# Patient Record
Sex: FEMALE | Race: BLACK OR AFRICAN AMERICAN | NOT HISPANIC OR LATINO | URBAN - METROPOLITAN AREA
[De-identification: names, ages, dates, MRNs, and addresses within clinical notes are randomized per-mention and may not be internally consistent; named-entity substitution may affect disease eponyms.]

---

## 2017-01-04 ENCOUNTER — OUTPATIENT (OUTPATIENT)
Dept: OUTPATIENT SERVICES | Facility: HOSPITAL | Age: 62
LOS: 1 days | End: 2017-01-04

## 2017-01-04 ENCOUNTER — LABORATORY RESULT (OUTPATIENT)
Age: 62
End: 2017-01-04

## 2017-01-04 ENCOUNTER — APPOINTMENT (OUTPATIENT)
Dept: INTERNAL MEDICINE | Facility: HOSPITAL | Age: 62
End: 2017-01-04

## 2017-01-04 LAB
APTT BLD: 42.4 SEC — HIGH (ref 27.5–37.4)
INR BLD: 2.44 — HIGH (ref 0.87–1.18)
PROTHROM AB SERPL-ACNC: 28.1 SEC — HIGH (ref 10–13.1)

## 2017-01-05 DIAGNOSIS — Z79.01 LONG TERM (CURRENT) USE OF ANTICOAGULANTS: ICD-10-CM

## 2017-01-11 ENCOUNTER — APPOINTMENT (OUTPATIENT)
Dept: INTERNAL MEDICINE | Facility: HOSPITAL | Age: 62
End: 2017-01-11

## 2017-01-11 ENCOUNTER — OUTPATIENT (OUTPATIENT)
Dept: OUTPATIENT SERVICES | Facility: HOSPITAL | Age: 62
LOS: 1 days | End: 2017-01-11

## 2017-01-12 DIAGNOSIS — Z79.01 LONG TERM (CURRENT) USE OF ANTICOAGULANTS: ICD-10-CM

## 2017-01-12 LAB — INR PPP: 2.9 RATIO

## 2017-01-25 ENCOUNTER — APPOINTMENT (OUTPATIENT)
Dept: INTERNAL MEDICINE | Facility: HOSPITAL | Age: 62
End: 2017-01-25

## 2017-01-31 ENCOUNTER — OUTPATIENT (OUTPATIENT)
Dept: OUTPATIENT SERVICES | Facility: HOSPITAL | Age: 62
LOS: 1 days | End: 2017-01-31

## 2017-01-31 ENCOUNTER — APPOINTMENT (OUTPATIENT)
Dept: INTERNAL MEDICINE | Facility: HOSPITAL | Age: 62
End: 2017-01-31

## 2017-02-01 DIAGNOSIS — Z79.01 LONG TERM (CURRENT) USE OF ANTICOAGULANTS: ICD-10-CM

## 2017-02-01 LAB — INR PPP: 2.7 RATIO

## 2017-02-14 ENCOUNTER — APPOINTMENT (OUTPATIENT)
Dept: INTERNAL MEDICINE | Facility: HOSPITAL | Age: 62
End: 2017-02-14

## 2017-02-23 ENCOUNTER — RESULT CHARGE (OUTPATIENT)
Age: 62
End: 2017-02-23

## 2017-02-23 ENCOUNTER — APPOINTMENT (OUTPATIENT)
Dept: INTERNAL MEDICINE | Facility: HOSPITAL | Age: 62
End: 2017-02-23

## 2017-02-23 ENCOUNTER — OUTPATIENT (OUTPATIENT)
Dept: OUTPATIENT SERVICES | Facility: HOSPITAL | Age: 62
LOS: 1 days | End: 2017-02-23

## 2017-02-24 DIAGNOSIS — I48.91 UNSPECIFIED ATRIAL FIBRILLATION: ICD-10-CM

## 2017-02-24 DIAGNOSIS — Z95.2 PRESENCE OF PROSTHETIC HEART VALVE: ICD-10-CM

## 2017-02-24 DIAGNOSIS — Z79.01 LONG TERM (CURRENT) USE OF ANTICOAGULANTS: ICD-10-CM

## 2017-02-24 LAB — INR PPP: 2.3 RATIO

## 2017-02-28 ENCOUNTER — APPOINTMENT (OUTPATIENT)
Dept: INTERNAL MEDICINE | Facility: HOSPITAL | Age: 62
End: 2017-02-28

## 2017-02-28 ENCOUNTER — OUTPATIENT (OUTPATIENT)
Dept: OUTPATIENT SERVICES | Facility: HOSPITAL | Age: 62
LOS: 1 days | End: 2017-02-28

## 2017-03-01 DIAGNOSIS — Z79.01 LONG TERM (CURRENT) USE OF ANTICOAGULANTS: ICD-10-CM

## 2017-03-02 ENCOUNTER — APPOINTMENT (OUTPATIENT)
Dept: INTERNAL MEDICINE | Facility: HOSPITAL | Age: 62
End: 2017-03-02

## 2017-03-07 ENCOUNTER — APPOINTMENT (OUTPATIENT)
Age: 62
End: 2017-03-07

## 2017-03-07 ENCOUNTER — OUTPATIENT (OUTPATIENT)
Dept: OUTPATIENT SERVICES | Facility: HOSPITAL | Age: 62
LOS: 1 days | End: 2017-03-07

## 2017-03-07 ENCOUNTER — RESULT CHARGE (OUTPATIENT)
Age: 62
End: 2017-03-07

## 2017-03-07 LAB — INR PPP: 2.2 RATIO

## 2017-03-08 DIAGNOSIS — Z79.01 LONG TERM (CURRENT) USE OF ANTICOAGULANTS: ICD-10-CM

## 2017-03-21 ENCOUNTER — APPOINTMENT (OUTPATIENT)
Dept: INTERNAL MEDICINE | Facility: HOSPITAL | Age: 62
End: 2017-03-21

## 2017-03-21 ENCOUNTER — OTHER (OUTPATIENT)
Age: 62
End: 2017-03-21

## 2017-03-21 ENCOUNTER — OUTPATIENT (OUTPATIENT)
Dept: OUTPATIENT SERVICES | Facility: HOSPITAL | Age: 62
LOS: 1 days | End: 2017-03-21

## 2017-03-21 DIAGNOSIS — Z79.01 LONG TERM (CURRENT) USE OF ANTICOAGULANTS: ICD-10-CM

## 2017-03-22 DIAGNOSIS — Z95.2 PRESENCE OF PROSTHETIC HEART VALVE: ICD-10-CM

## 2017-03-22 DIAGNOSIS — I48.91 UNSPECIFIED ATRIAL FIBRILLATION: ICD-10-CM

## 2017-03-22 DIAGNOSIS — Z95.4 PRESENCE OF OTHER HEART-VALVE REPLACEMENT: ICD-10-CM

## 2017-03-22 LAB — INR PPP: 2.3 RATIO

## 2017-03-28 ENCOUNTER — OUTPATIENT (OUTPATIENT)
Dept: OUTPATIENT SERVICES | Facility: HOSPITAL | Age: 62
LOS: 1 days | End: 2017-03-28

## 2017-03-28 ENCOUNTER — RESULT CHARGE (OUTPATIENT)
Age: 62
End: 2017-03-28

## 2017-03-28 ENCOUNTER — APPOINTMENT (OUTPATIENT)
Dept: INTERNAL MEDICINE | Facility: HOSPITAL | Age: 62
End: 2017-03-28

## 2017-03-29 ENCOUNTER — APPOINTMENT (OUTPATIENT)
Dept: INTERNAL MEDICINE | Facility: HOSPITAL | Age: 62
End: 2017-03-29

## 2017-03-30 DIAGNOSIS — Z79.01 LONG TERM (CURRENT) USE OF ANTICOAGULANTS: ICD-10-CM

## 2017-04-04 ENCOUNTER — APPOINTMENT (OUTPATIENT)
Dept: INTERNAL MEDICINE | Facility: HOSPITAL | Age: 62
End: 2017-04-04

## 2017-04-11 ENCOUNTER — APPOINTMENT (OUTPATIENT)
Dept: INTERNAL MEDICINE | Facility: HOSPITAL | Age: 62
End: 2017-04-11

## 2017-04-11 ENCOUNTER — OUTPATIENT (OUTPATIENT)
Dept: OUTPATIENT SERVICES | Facility: HOSPITAL | Age: 62
LOS: 1 days | End: 2017-04-11

## 2017-04-11 DIAGNOSIS — I48.91 UNSPECIFIED ATRIAL FIBRILLATION: ICD-10-CM

## 2017-04-12 LAB
INR PPP: 2 RATIO
QUALITY CONTROL: YES

## 2017-04-18 ENCOUNTER — APPOINTMENT (OUTPATIENT)
Dept: INTERNAL MEDICINE | Facility: HOSPITAL | Age: 62
End: 2017-04-18

## 2017-04-18 ENCOUNTER — OUTPATIENT (OUTPATIENT)
Dept: OUTPATIENT SERVICES | Facility: HOSPITAL | Age: 62
LOS: 1 days | End: 2017-04-18

## 2017-04-19 DIAGNOSIS — Z79.01 LONG TERM (CURRENT) USE OF ANTICOAGULANTS: ICD-10-CM

## 2017-04-21 DIAGNOSIS — Z00.00 ENCOUNTER FOR GENERAL ADULT MEDICAL EXAMINATION WITHOUT ABNORMAL FINDINGS: ICD-10-CM

## 2017-04-21 LAB
INR PPP: 2.4 RATIO
QUALITY CONTROL: YES

## 2017-04-25 ENCOUNTER — APPOINTMENT (OUTPATIENT)
Dept: INTERNAL MEDICINE | Facility: HOSPITAL | Age: 62
End: 2017-04-25

## 2017-04-27 ENCOUNTER — LABORATORY RESULT (OUTPATIENT)
Age: 62
End: 2017-04-27

## 2017-04-27 ENCOUNTER — OUTPATIENT (OUTPATIENT)
Dept: OUTPATIENT SERVICES | Facility: HOSPITAL | Age: 62
LOS: 1 days | End: 2017-04-27

## 2017-04-27 ENCOUNTER — APPOINTMENT (OUTPATIENT)
Dept: INTERNAL MEDICINE | Facility: HOSPITAL | Age: 62
End: 2017-04-27

## 2017-04-27 VITALS — DIASTOLIC BLOOD PRESSURE: 80 MMHG | SYSTOLIC BLOOD PRESSURE: 126 MMHG | HEART RATE: 70 BPM

## 2017-04-27 VITALS — HEIGHT: 63 IN | WEIGHT: 177 LBS | BODY MASS INDEX: 31.36 KG/M2

## 2017-04-27 LAB
BUN SERPL-MCNC: 14 MG/DL — SIGNIFICANT CHANGE UP (ref 7–23)
CALCIUM SERPL-MCNC: 9.4 MG/DL — SIGNIFICANT CHANGE UP (ref 8.4–10.5)
CHLORIDE SERPL-SCNC: 104 MMOL/L — SIGNIFICANT CHANGE UP (ref 98–107)
CHOLEST SERPL-MCNC: 136 MG/DL — SIGNIFICANT CHANGE UP (ref 120–199)
CO2 SERPL-SCNC: 26 MMOL/L — SIGNIFICANT CHANGE UP (ref 22–31)
CREAT SERPL-MCNC: 1.06 MG/DL — SIGNIFICANT CHANGE UP (ref 0.5–1.3)
GLUCOSE SERPL-MCNC: 108 MG/DL — HIGH (ref 70–99)
HBA1C BLD-MCNC: 6.4 % — HIGH (ref 4–5.6)
HDLC SERPL-MCNC: 54 MG/DL — SIGNIFICANT CHANGE UP (ref 45–65)
LIPID PNL WITH DIRECT LDL SERPL: 72 MG/DL — SIGNIFICANT CHANGE UP
POTASSIUM SERPL-MCNC: 4.8 MMOL/L — SIGNIFICANT CHANGE UP (ref 3.5–5.3)
POTASSIUM SERPL-SCNC: 4.8 MMOL/L — SIGNIFICANT CHANGE UP (ref 3.5–5.3)
SODIUM SERPL-SCNC: 142 MMOL/L — SIGNIFICANT CHANGE UP (ref 135–145)
TRIGL SERPL-MCNC: 75 MG/DL — SIGNIFICANT CHANGE UP (ref 10–149)

## 2017-04-28 DIAGNOSIS — E78.5 HYPERLIPIDEMIA, UNSPECIFIED: ICD-10-CM

## 2017-04-28 DIAGNOSIS — Z00.00 ENCOUNTER FOR GENERAL ADULT MEDICAL EXAMINATION WITHOUT ABNORMAL FINDINGS: ICD-10-CM

## 2017-04-28 DIAGNOSIS — I10 ESSENTIAL (PRIMARY) HYPERTENSION: ICD-10-CM

## 2017-04-28 DIAGNOSIS — R73.09 OTHER ABNORMAL GLUCOSE: ICD-10-CM

## 2017-04-28 DIAGNOSIS — I48.91 UNSPECIFIED ATRIAL FIBRILLATION: ICD-10-CM

## 2017-04-28 LAB — INR PPP: 3.1 RATIO

## 2017-05-11 ENCOUNTER — APPOINTMENT (OUTPATIENT)
Dept: INTERNAL MEDICINE | Facility: HOSPITAL | Age: 62
End: 2017-05-11

## 2017-05-16 ENCOUNTER — OUTPATIENT (OUTPATIENT)
Dept: OUTPATIENT SERVICES | Facility: HOSPITAL | Age: 62
LOS: 1 days | End: 2017-05-16

## 2017-05-16 ENCOUNTER — LABORATORY RESULT (OUTPATIENT)
Age: 62
End: 2017-05-16

## 2017-05-16 ENCOUNTER — APPOINTMENT (OUTPATIENT)
Dept: INTERNAL MEDICINE | Facility: HOSPITAL | Age: 62
End: 2017-05-16

## 2017-05-16 DIAGNOSIS — Z79.01 LONG TERM (CURRENT) USE OF ANTICOAGULANTS: ICD-10-CM

## 2017-05-16 LAB — FECAL IMMUNOCHEMICAL TEST: NEGATIVE — SIGNIFICANT CHANGE UP

## 2017-06-02 LAB — INR PPP: 2.9 RATIO

## 2017-06-06 ENCOUNTER — OUTPATIENT (OUTPATIENT)
Dept: OUTPATIENT SERVICES | Facility: HOSPITAL | Age: 62
LOS: 1 days | End: 2017-06-06

## 2017-06-06 ENCOUNTER — APPOINTMENT (OUTPATIENT)
Dept: INTERNAL MEDICINE | Facility: HOSPITAL | Age: 62
End: 2017-06-06

## 2017-06-07 DIAGNOSIS — Z95.2 PRESENCE OF PROSTHETIC HEART VALVE: ICD-10-CM

## 2017-06-07 DIAGNOSIS — Z79.01 LONG TERM (CURRENT) USE OF ANTICOAGULANTS: ICD-10-CM

## 2017-06-12 LAB — INR PPP: 3.2 RATIO

## 2017-06-26 ENCOUNTER — MOBILE ON CALL (OUTPATIENT)
Age: 62
End: 2017-06-26

## 2017-06-27 ENCOUNTER — APPOINTMENT (OUTPATIENT)
Dept: INTERNAL MEDICINE | Facility: HOSPITAL | Age: 62
End: 2017-06-27

## 2017-06-27 ENCOUNTER — RESULT CHARGE (OUTPATIENT)
Age: 62
End: 2017-06-27

## 2017-06-27 ENCOUNTER — OUTPATIENT (OUTPATIENT)
Dept: OUTPATIENT SERVICES | Facility: HOSPITAL | Age: 62
LOS: 1 days | End: 2017-06-27

## 2017-06-27 ENCOUNTER — RX RENEWAL (OUTPATIENT)
Age: 62
End: 2017-06-27

## 2017-06-27 VITALS — BODY MASS INDEX: 32.25 KG/M2 | HEIGHT: 63 IN | WEIGHT: 182 LBS

## 2017-06-27 DIAGNOSIS — R60.9 EDEMA, UNSPECIFIED: ICD-10-CM

## 2017-06-28 DIAGNOSIS — Z79.01 LONG TERM (CURRENT) USE OF ANTICOAGULANTS: ICD-10-CM

## 2017-06-30 DIAGNOSIS — Z95.2 PRESENCE OF PROSTHETIC HEART VALVE: ICD-10-CM

## 2017-06-30 DIAGNOSIS — R60.9 EDEMA, UNSPECIFIED: ICD-10-CM

## 2017-07-05 LAB — INR PPP: 2.4 RATIO

## 2017-07-17 ENCOUNTER — APPOINTMENT (OUTPATIENT)
Dept: INTERNAL MEDICINE | Facility: HOSPITAL | Age: 62
End: 2017-07-17

## 2017-07-17 ENCOUNTER — OUTPATIENT (OUTPATIENT)
Dept: OUTPATIENT SERVICES | Facility: HOSPITAL | Age: 62
LOS: 1 days | End: 2017-07-17

## 2017-07-17 ENCOUNTER — MEDICATION RENEWAL (OUTPATIENT)
Age: 62
End: 2017-07-17

## 2017-07-17 DIAGNOSIS — Z46.0: ICD-10-CM

## 2017-07-17 LAB — INR PPP: 3.2 RATIO

## 2017-07-18 ENCOUNTER — RX RENEWAL (OUTPATIENT)
Age: 62
End: 2017-07-18

## 2017-07-18 DIAGNOSIS — Z79.01 LONG TERM (CURRENT) USE OF ANTICOAGULANTS: ICD-10-CM

## 2017-07-24 ENCOUNTER — APPOINTMENT (OUTPATIENT)
Dept: INTERNAL MEDICINE | Facility: HOSPITAL | Age: 62
End: 2017-07-24

## 2017-07-25 ENCOUNTER — APPOINTMENT (OUTPATIENT)
Dept: INTERNAL MEDICINE | Facility: HOSPITAL | Age: 62
End: 2017-07-25

## 2017-07-25 ENCOUNTER — OUTPATIENT (OUTPATIENT)
Dept: OUTPATIENT SERVICES | Facility: HOSPITAL | Age: 62
LOS: 1 days | End: 2017-07-25

## 2017-07-25 LAB — INR PPP: 3 RATIO

## 2017-07-26 DIAGNOSIS — I48.91 UNSPECIFIED ATRIAL FIBRILLATION: ICD-10-CM

## 2017-08-08 ENCOUNTER — APPOINTMENT (OUTPATIENT)
Dept: OPHTHALMOLOGY | Facility: CLINIC | Age: 62
End: 2017-08-08

## 2017-08-08 ENCOUNTER — APPOINTMENT (OUTPATIENT)
Dept: INTERNAL MEDICINE | Facility: HOSPITAL | Age: 62
End: 2017-08-08

## 2017-08-08 ENCOUNTER — OUTPATIENT (OUTPATIENT)
Dept: OUTPATIENT SERVICES | Facility: HOSPITAL | Age: 62
LOS: 1 days | End: 2017-08-08

## 2017-08-12 ENCOUNTER — EMERGENCY (EMERGENCY)
Facility: HOSPITAL | Age: 62
LOS: 1 days | Discharge: HOME | End: 2017-08-12
Attending: EMERGENCY MEDICINE | Admitting: EMERGENCY MEDICINE
Payer: MEDICAID

## 2017-08-12 VITALS
SYSTOLIC BLOOD PRESSURE: 140 MMHG | RESPIRATION RATE: 20 BRPM | TEMPERATURE: 98 F | OXYGEN SATURATION: 100 % | HEART RATE: 74 BPM | DIASTOLIC BLOOD PRESSURE: 80 MMHG

## 2017-08-12 VITALS
DIASTOLIC BLOOD PRESSURE: 93 MMHG | HEART RATE: 85 BPM | SYSTOLIC BLOOD PRESSURE: 137 MMHG | OXYGEN SATURATION: 97 % | TEMPERATURE: 99 F | RESPIRATION RATE: 18 BRPM

## 2017-08-12 LAB
ALBUMIN SERPL ELPH-MCNC: 3.9 G/DL — SIGNIFICANT CHANGE UP (ref 3.3–5)
ALP SERPL-CCNC: 105 U/L — SIGNIFICANT CHANGE UP (ref 40–120)
ALT FLD-CCNC: 21 U/L — SIGNIFICANT CHANGE UP (ref 4–33)
APPEARANCE UR: CLEAR — SIGNIFICANT CHANGE UP
AST SERPL-CCNC: 25 U/L — SIGNIFICANT CHANGE UP (ref 4–32)
BILIRUB SERPL-MCNC: 0.3 MG/DL — SIGNIFICANT CHANGE UP (ref 0.2–1.2)
BILIRUB UR-MCNC: NEGATIVE — SIGNIFICANT CHANGE UP
BLOOD UR QL VISUAL: HIGH
BUN SERPL-MCNC: 18 MG/DL — SIGNIFICANT CHANGE UP (ref 7–23)
CALCIUM SERPL-MCNC: 9.7 MG/DL — SIGNIFICANT CHANGE UP (ref 8.4–10.5)
CHLORIDE SERPL-SCNC: 100 MMOL/L — SIGNIFICANT CHANGE UP (ref 98–107)
CO2 SERPL-SCNC: 27 MMOL/L — SIGNIFICANT CHANGE UP (ref 22–31)
COLOR SPEC: SIGNIFICANT CHANGE UP
CREAT SERPL-MCNC: 1.05 MG/DL — SIGNIFICANT CHANGE UP (ref 0.5–1.3)
GLUCOSE SERPL-MCNC: 124 MG/DL — HIGH (ref 70–99)
GLUCOSE UR-MCNC: NEGATIVE — SIGNIFICANT CHANGE UP
HCT VFR BLD CALC: 44.4 % — SIGNIFICANT CHANGE UP (ref 34.5–45)
HGB BLD-MCNC: 14 G/DL — SIGNIFICANT CHANGE UP (ref 11.5–15.5)
KETONES UR-MCNC: NEGATIVE — SIGNIFICANT CHANGE UP
LEUKOCYTE ESTERASE UR-ACNC: NEGATIVE — SIGNIFICANT CHANGE UP
LIDOCAIN IGE QN: 38.6 U/L — SIGNIFICANT CHANGE UP (ref 7–60)
MCHC RBC-ENTMCNC: 29.4 PG — SIGNIFICANT CHANGE UP (ref 27–34)
MCHC RBC-ENTMCNC: 31.5 % — LOW (ref 32–36)
MCV RBC AUTO: 93.1 FL — SIGNIFICANT CHANGE UP (ref 80–100)
NITRITE UR-MCNC: NEGATIVE — SIGNIFICANT CHANGE UP
NRBC # FLD: 0 — SIGNIFICANT CHANGE UP
PH UR: 7 — SIGNIFICANT CHANGE UP (ref 4.6–8)
PLATELET # BLD AUTO: 226 K/UL — SIGNIFICANT CHANGE UP (ref 150–400)
PMV BLD: 10.4 FL — SIGNIFICANT CHANGE UP (ref 7–13)
POTASSIUM SERPL-MCNC: 4.8 MMOL/L — SIGNIFICANT CHANGE UP (ref 3.5–5.3)
POTASSIUM SERPL-SCNC: 4.8 MMOL/L — SIGNIFICANT CHANGE UP (ref 3.5–5.3)
PROT SERPL-MCNC: 7.8 G/DL — SIGNIFICANT CHANGE UP (ref 6–8.3)
PROT UR-MCNC: 20 — SIGNIFICANT CHANGE UP
RBC # BLD: 4.77 M/UL — SIGNIFICANT CHANGE UP (ref 3.8–5.2)
RBC # FLD: 12.2 % — SIGNIFICANT CHANGE UP (ref 10.3–14.5)
RBC CASTS # UR COMP ASSIST: HIGH (ref 0–?)
SODIUM SERPL-SCNC: 138 MMOL/L — SIGNIFICANT CHANGE UP (ref 135–145)
SP GR SPEC: > 1.04 — HIGH (ref 1–1.03)
SQUAMOUS # UR AUTO: SIGNIFICANT CHANGE UP
UROBILINOGEN FLD QL: NORMAL E.U. — SIGNIFICANT CHANGE UP (ref 0.1–0.2)
WBC # BLD: 9.75 K/UL — SIGNIFICANT CHANGE UP (ref 3.8–10.5)
WBC # FLD AUTO: 9.75 K/UL — SIGNIFICANT CHANGE UP (ref 3.8–10.5)
WBC UR QL: SIGNIFICANT CHANGE UP (ref 0–?)

## 2017-08-12 PROCEDURE — 99285 EMERGENCY DEPT VISIT HI MDM: CPT

## 2017-08-12 PROCEDURE — 74177 CT ABD & PELVIS W/CONTRAST: CPT | Mod: 26

## 2017-08-12 RX ORDER — FAMOTIDINE 10 MG/ML
20 INJECTION INTRAVENOUS ONCE
Qty: 0 | Refills: 0 | Status: COMPLETED | OUTPATIENT
Start: 2017-08-12 | End: 2017-08-12

## 2017-08-12 RX ORDER — CEPHALEXIN 500 MG
1 CAPSULE ORAL
Qty: 14 | Refills: 0
Start: 2017-08-12 | End: 2017-08-19

## 2017-08-12 RX ADMIN — FAMOTIDINE 20 MILLIGRAM(S): 10 INJECTION INTRAVENOUS at 16:58

## 2017-08-12 NOTE — ED ADULT NURSE NOTE - OBJECTIVE STATEMENT
Received pt to room 8 A&Ox4 nonenglish speaking requests son at bedside to translate c/o diffuse midline abdominal pain x 1 week worsening this am, denies N/V, denies urinary symptoms, last BM this am. Noted to be poor historian denies hx or medications, denies allergies. IV placed, labs sent, VS as documented, will reassess.

## 2017-08-12 NOTE — ED PROVIDER NOTE - PMH
Atrial fibrillation  on coumadin  History of MI (myocardial infarction)  8-9 years ago in [facility]  History of stroke  2008 hospitalized in [facility]  HLD (hyperlipidemia)    HTN (hypertension)

## 2017-08-12 NOTE — ED ADULT TRIAGE NOTE - CHIEF COMPLAINT QUOTE
Pt. c/o midabdominal pain x 7 days; last bowel movement yesterday. Denies nausea, vomiting, diarrhea, fevers, SOB, chest pain, dysuria.

## 2017-08-12 NOTE — ED PROVIDER NOTE - PROGRESS NOTE DETAILS
abdominal pain improved; CT shows possible ureteritis; UA shows blood; counseled pt on hematuria; will treat for UTI with urology follow up

## 2017-08-14 DIAGNOSIS — H35.143 RETINOPATHY OF PREMATURITY, STAGE 3, BILATERAL: ICD-10-CM

## 2017-08-14 LAB
BACTERIA UR CULT: SIGNIFICANT CHANGE UP
SPECIMEN SOURCE: SIGNIFICANT CHANGE UP

## 2017-08-15 ENCOUNTER — EMERGENCY (EMERGENCY)
Facility: HOSPITAL | Age: 62
LOS: 1 days | Discharge: HOME | End: 2017-08-15
Attending: EMERGENCY MEDICINE | Admitting: EMERGENCY MEDICINE
Payer: MEDICAID

## 2017-08-15 ENCOUNTER — LABORATORY RESULT (OUTPATIENT)
Age: 62
End: 2017-08-15

## 2017-08-15 ENCOUNTER — RESULT REVIEW (OUTPATIENT)
Age: 62
End: 2017-08-15

## 2017-08-15 ENCOUNTER — APPOINTMENT (OUTPATIENT)
Dept: INTERNAL MEDICINE | Facility: HOSPITAL | Age: 62
End: 2017-08-15

## 2017-08-15 ENCOUNTER — OUTPATIENT (OUTPATIENT)
Dept: OUTPATIENT SERVICES | Facility: HOSPITAL | Age: 62
LOS: 1 days | End: 2017-08-15

## 2017-08-15 VITALS
OXYGEN SATURATION: 100 % | RESPIRATION RATE: 16 BRPM | SYSTOLIC BLOOD PRESSURE: 130 MMHG | TEMPERATURE: 98 F | HEART RATE: 56 BPM | DIASTOLIC BLOOD PRESSURE: 77 MMHG

## 2017-08-15 VITALS
DIASTOLIC BLOOD PRESSURE: 78 MMHG | SYSTOLIC BLOOD PRESSURE: 127 MMHG | HEART RATE: 75 BPM | OXYGEN SATURATION: 100 % | RESPIRATION RATE: 16 BRPM

## 2017-08-15 LAB
ALBUMIN SERPL ELPH-MCNC: 3.8 G/DL — SIGNIFICANT CHANGE UP (ref 3.3–5)
ALP SERPL-CCNC: 101 U/L — SIGNIFICANT CHANGE UP (ref 40–120)
ALT FLD-CCNC: 18 U/L — SIGNIFICANT CHANGE UP (ref 4–33)
APPEARANCE UR: CLEAR — SIGNIFICANT CHANGE UP
APTT BLD: 64.8 SEC — HIGH (ref 27.5–37.4)
AST SERPL-CCNC: 21 U/L — SIGNIFICANT CHANGE UP (ref 4–32)
BASE EXCESS BLDV CALC-SCNC: 4.4 MMOL/L — SIGNIFICANT CHANGE UP
BASOPHILS # BLD AUTO: 0.08 K/UL — SIGNIFICANT CHANGE UP (ref 0–0.2)
BASOPHILS NFR BLD AUTO: 1.1 % — SIGNIFICANT CHANGE UP (ref 0–2)
BILIRUB SERPL-MCNC: 0.2 MG/DL — SIGNIFICANT CHANGE UP (ref 0.2–1.2)
BILIRUB UR-MCNC: NEGATIVE — SIGNIFICANT CHANGE UP
BLOOD UR QL VISUAL: NEGATIVE — SIGNIFICANT CHANGE UP
BUN SERPL-MCNC: 24 MG/DL — HIGH (ref 7–23)
CALCIUM SERPL-MCNC: 10 MG/DL — SIGNIFICANT CHANGE UP (ref 8.4–10.5)
CHLORIDE SERPL-SCNC: 103 MMOL/L — SIGNIFICANT CHANGE UP (ref 98–107)
CO2 SERPL-SCNC: 30 MMOL/L — SIGNIFICANT CHANGE UP (ref 22–31)
COLOR SPEC: YELLOW — SIGNIFICANT CHANGE UP
CREAT SERPL-MCNC: 1.03 MG/DL — SIGNIFICANT CHANGE UP (ref 0.5–1.3)
EOSINOPHIL # BLD AUTO: 0.4 K/UL — SIGNIFICANT CHANGE UP (ref 0–0.5)
EOSINOPHIL NFR BLD AUTO: 5.3 % — SIGNIFICANT CHANGE UP (ref 0–6)
GAS PNL BLDV: 140 MMOL/L — SIGNIFICANT CHANGE UP (ref 136–146)
GLUCOSE BLDV-MCNC: 124 — HIGH (ref 70–99)
GLUCOSE SERPL-MCNC: 157 MG/DL — HIGH (ref 70–99)
GLUCOSE UR-MCNC: NEGATIVE — SIGNIFICANT CHANGE UP
HCO3 BLDV-SCNC: 26 MMOL/L — SIGNIFICANT CHANGE UP (ref 20–27)
HCT VFR BLD CALC: 42.7 % — SIGNIFICANT CHANGE UP (ref 34.5–45)
HCT VFR BLDV CALC: 41.5 % — SIGNIFICANT CHANGE UP (ref 34.5–45)
HGB BLD-MCNC: 13.5 G/DL — SIGNIFICANT CHANGE UP (ref 11.5–15.5)
HGB BLDV-MCNC: 13.5 G/DL — SIGNIFICANT CHANGE UP (ref 11.5–15.5)
IMM GRANULOCYTES # BLD AUTO: 0.01 # — SIGNIFICANT CHANGE UP
IMM GRANULOCYTES NFR BLD AUTO: 0.1 % — SIGNIFICANT CHANGE UP (ref 0–1.5)
INR BLD: 6.76 — CRITICAL HIGH (ref 0.88–1.17)
INR BLD: 6.83 — CRITICAL HIGH (ref 0.88–1.17)
KETONES UR-MCNC: NEGATIVE — SIGNIFICANT CHANGE UP
LEUKOCYTE ESTERASE UR-ACNC: NEGATIVE — SIGNIFICANT CHANGE UP
LYMPHOCYTES # BLD AUTO: 2.22 K/UL — SIGNIFICANT CHANGE UP (ref 1–3.3)
LYMPHOCYTES # BLD AUTO: 29.4 % — SIGNIFICANT CHANGE UP (ref 13–44)
MCHC RBC-ENTMCNC: 30.1 PG — SIGNIFICANT CHANGE UP (ref 27–34)
MCHC RBC-ENTMCNC: 31.6 % — LOW (ref 32–36)
MCV RBC AUTO: 95.1 FL — SIGNIFICANT CHANGE UP (ref 80–100)
MONOCYTES # BLD AUTO: 0.55 K/UL — SIGNIFICANT CHANGE UP (ref 0–0.9)
MONOCYTES NFR BLD AUTO: 7.3 % — SIGNIFICANT CHANGE UP (ref 2–14)
MUCOUS THREADS # UR AUTO: SIGNIFICANT CHANGE UP
NEUTROPHILS # BLD AUTO: 4.28 K/UL — SIGNIFICANT CHANGE UP (ref 1.8–7.4)
NEUTROPHILS NFR BLD AUTO: 56.8 % — SIGNIFICANT CHANGE UP (ref 43–77)
NITRITE UR-MCNC: NEGATIVE — SIGNIFICANT CHANGE UP
NRBC # FLD: 0 — SIGNIFICANT CHANGE UP
PCO2 BLDV: 58 MMHG — HIGH (ref 41–51)
PH BLDV: 7.33 PH — SIGNIFICANT CHANGE UP (ref 7.32–7.43)
PH UR: 6 — SIGNIFICANT CHANGE UP (ref 4.6–8)
PLATELET # BLD AUTO: 229 K/UL — SIGNIFICANT CHANGE UP (ref 150–400)
PMV BLD: 10.6 FL — SIGNIFICANT CHANGE UP (ref 7–13)
PO2 BLDV: 26 MMHG — LOW (ref 35–40)
POTASSIUM BLDV-SCNC: 4.8 MMOL/L — HIGH (ref 3.4–4.5)
POTASSIUM SERPL-MCNC: 5.3 MMOL/L — SIGNIFICANT CHANGE UP (ref 3.5–5.3)
POTASSIUM SERPL-SCNC: 5.3 MMOL/L — SIGNIFICANT CHANGE UP (ref 3.5–5.3)
PROT SERPL-MCNC: 7.7 G/DL — SIGNIFICANT CHANGE UP (ref 6–8.3)
PROT UR-MCNC: 10 — SIGNIFICANT CHANGE UP
PROTHROM AB SERPL-ACNC: 78.7 SEC — HIGH (ref 9.8–13.1)
PROTHROM AB SERPL-ACNC: 79.5 SEC — HIGH (ref 9.8–13.1)
RBC # BLD: 4.49 M/UL — SIGNIFICANT CHANGE UP (ref 3.8–5.2)
RBC # FLD: 12.2 % — SIGNIFICANT CHANGE UP (ref 10.3–14.5)
RBC CASTS # UR COMP ASSIST: SIGNIFICANT CHANGE UP (ref 0–?)
SAO2 % BLDV: 35.8 % — LOW (ref 60–85)
SODIUM SERPL-SCNC: 143 MMOL/L — SIGNIFICANT CHANGE UP (ref 135–145)
SP GR SPEC: 1.02 — SIGNIFICANT CHANGE UP (ref 1–1.03)
SQUAMOUS # UR AUTO: SIGNIFICANT CHANGE UP
UROBILINOGEN FLD QL: NORMAL E.U. — SIGNIFICANT CHANGE UP (ref 0.1–0.2)
WBC # BLD: 7.54 K/UL — SIGNIFICANT CHANGE UP (ref 3.8–10.5)
WBC # FLD AUTO: 7.54 K/UL — SIGNIFICANT CHANGE UP (ref 3.8–10.5)
WBC UR QL: SIGNIFICANT CHANGE UP (ref 0–?)

## 2017-08-15 PROCEDURE — 99285 EMERGENCY DEPT VISIT HI MDM: CPT

## 2017-08-15 RX ORDER — SODIUM CHLORIDE 9 MG/ML
1000 INJECTION INTRAMUSCULAR; INTRAVENOUS; SUBCUTANEOUS ONCE
Qty: 0 | Refills: 0 | Status: COMPLETED | OUTPATIENT
Start: 2017-08-15 | End: 2017-08-15

## 2017-08-15 RX ORDER — FAMOTIDINE 10 MG/ML
20 INJECTION INTRAVENOUS
Qty: 0 | Refills: 0 | Status: DISCONTINUED | OUTPATIENT
Start: 2017-08-15 | End: 2017-08-19

## 2017-08-15 RX ORDER — ONDANSETRON 8 MG/1
4 TABLET, FILM COATED ORAL ONCE
Qty: 0 | Refills: 0 | Status: DISCONTINUED | OUTPATIENT
Start: 2017-08-15 | End: 2017-08-15

## 2017-08-15 RX ORDER — ONDANSETRON 8 MG/1
4 TABLET, FILM COATED ORAL ONCE
Qty: 0 | Refills: 0 | Status: COMPLETED | OUTPATIENT
Start: 2017-08-15 | End: 2017-08-15

## 2017-08-15 RX ADMIN — Medication 30 MILLILITER(S): at 15:30

## 2017-08-15 RX ADMIN — FAMOTIDINE 20 MILLIGRAM(S): 10 INJECTION INTRAVENOUS at 15:30

## 2017-08-15 RX ADMIN — ONDANSETRON 4 MILLIGRAM(S): 8 TABLET, FILM COATED ORAL at 15:58

## 2017-08-15 RX ADMIN — SODIUM CHLORIDE 1000 MILLILITER(S): 9 INJECTION INTRAMUSCULAR; INTRAVENOUS; SUBCUTANEOUS at 14:59

## 2017-08-15 NOTE — ED POST DISCHARGE NOTE - REASON FOR FOLLOW-UP
Culture Follow-up/Other UCX: staph sp coag neg 10,000-49,000 and normal genitourinary lakeisha 10,000-49,000. Pt treated with Keflex

## 2017-08-15 NOTE — ED PROVIDER NOTE - MEDICAL DECISION MAKING DETAILS
- CBC and coags to evaluate bleeding and INR status  - CMP to evaluate electrolytes - CBC and coags to evaluate bleeding and INR status  - CMP to evaluate electrolytes  - FAST and gallbladder US to examine abdominal pain  - reviewed labs and INR 6.7   - d/w PMD about f/u care. Patient will get repeat INR in clinic tomorrow at 1p

## 2017-08-15 NOTE — ED PROCEDURE NOTE - PROCEDURE ADDITIONAL DETAILS
Focused Ed ultrasound RUQ:  Indication: abdominal pain  Findings: no wall thickening, no wall edema, no pericholecystic fluid, negative sonographic Grijalva's. no stones/sludge  anterior gallbladder wall: within normal limits  CBD: within normal limits    Impression: no cholelithiasis, no cholecystitis  [facility]  [id number]

## 2017-08-15 NOTE — ED ADULT NURSE NOTE - OBJECTIVE STATEMENT
Pt received AxOx4, Creole only speaking with son as . Pt was sent to be seen in the ED from PCP office for elevated Coumadin level in the serum. Pt denies any recent injury/trauma. No bruise or active bleeding were noted on exam. Pt also has c/o abdominal pain started about a week ago. abdomen is tender on palpation, bowel sound present in all 4 quadrants. Denies any n/v/diarrhea or constipation. EDMD is aware and at bedside evaluating. VS are as noted, lab are sent. Pt is in NAD at this time. 20G PIV placed in RAC. will continue to monitor.

## 2017-08-15 NOTE — ED PROVIDER NOTE - OBJECTIVE STATEMENT
Patient is a 62 yo F with PMHx of ROEL galvez on coumadin, HTN, HLD, CVA in 2008, Heart valve replacement 2009, MI 2010 who presents to the ED from her PCP with abnormal lab and abdominal pain. She is accompanied by her son. He states that at her doctor's office, her INR was 8. She currently takes warfarin 6mg M/W/F and 4mg T/R/Sa/Tang. She has been experiencing diffuse abdominal pain for the past week. She was seen in the ED 3 days ago and was discharge with treatment for a UTI. The son states that she was looking better, but now the pain is much worse. She localized the pain to her lower abdomen. She is tolerating po, but is not eating very much. Denies fever/chills/N/V/change in bowel movements/melena/BRB in stool. Son states he is unsure when the coumadin was changed last.

## 2017-08-15 NOTE — ED PROVIDER NOTE - PSH
S/P AVR (aortic valve replacement)  1/09 metal in [place]  S/P MVR (mitral valve replacement)  1/09 metal in [place]

## 2017-08-15 NOTE — ED PROCEDURE NOTE - PROCEDURE ADDITIONAL DETAILS
Focused ED ultrasound FAST exam performed.  Indication: r/o free fluid  Abdomen scanned in grey scale in bilateral upper quadrants, pelvis, and subxiphoid.  Right upper quadrant: no abdominal free fluid.  Left upper quadrant: no abdominal free fluid.  Thoracic cavity scanned bilaterally: no hemothorax or effusion.  Subxiphoid view: no pericardial fluid.    Impression: Negative FAST exam.  [place]  [id number]

## 2017-08-15 NOTE — ED PROVIDER NOTE - PHYSICAL EXAMINATION
Gen: no acute distress  Neuro: alert and oriented x4, moving spontaneously  CV: RRR, no murmurs, no calf tenderness, dorsalis pedis b/l +2  Resp: lungs CTA  GI: soft abdomen, diffuse tenderness with increased tenderness to epigastric and lower quadrants, +BS  Skin: no bruising or obvious hematomas

## 2017-08-15 NOTE — ED PROVIDER NOTE - CARE PLAN
Principal Discharge DX:	INR (international normal ratio) abnormal  Secondary Diagnosis:	Chronic atrial fibrillation

## 2017-08-15 NOTE — ED PROVIDER NOTE - ATTENDING CONTRIBUTION TO CARE
61F h/o afib, htn, cva, cad presents from clinic with family for elevated INR. Patient also with chronic abd pain that is unchanged from previous. INR reported in clinic at 8. Here in ED at 6.8. Patient with no signs of bleeding. Patient advised to hold coumadin and recheck INR with PMD in 2 days. Patient with chronic abd pain and tenderness. The patient had repeat abdominal examinations.  They did not show peritoneal signs.  There were no signs of ischemic bowel, AAA, or perforations. No evidence of Appendicitis, Diverticulitis, Bowel Obstruction, MI, Torsion, acute biliary tract disease or any other acute abdominal condition. US showed negative FAST and normal gallbladder. Patient asking to go home, will followup with primary care physician.

## 2017-08-15 NOTE — ED PROVIDER NOTE - PMH
Atrial fibrillation  on coumadin  History of MI (myocardial infarction)  8-9 years ago in [facility]  History of stroke  2008 hospitalized in [facility]  HLD (hyperlipidemia)    HTN (hypertension)

## 2017-08-16 ENCOUNTER — APPOINTMENT (OUTPATIENT)
Dept: INTERNAL MEDICINE | Facility: HOSPITAL | Age: 62
End: 2017-08-16

## 2017-08-16 ENCOUNTER — RESULT CHARGE (OUTPATIENT)
Age: 62
End: 2017-08-16

## 2017-08-16 ENCOUNTER — OUTPATIENT (OUTPATIENT)
Dept: OUTPATIENT SERVICES | Facility: HOSPITAL | Age: 62
LOS: 1 days | End: 2017-08-16

## 2017-08-16 ENCOUNTER — APPOINTMENT (OUTPATIENT)
Dept: INTERNAL MEDICINE | Facility: HOSPITAL | Age: 62
End: 2017-08-16
Payer: MEDICAID

## 2017-08-16 VITALS — BODY MASS INDEX: 31.36 KG/M2 | WEIGHT: 177 LBS | HEIGHT: 63 IN

## 2017-08-16 DIAGNOSIS — Z79.01 LONG TERM (CURRENT) USE OF ANTICOAGULANTS: ICD-10-CM

## 2017-08-16 PROCEDURE — 99213 OFFICE O/P EST LOW 20 MIN: CPT | Mod: GE

## 2017-08-17 DIAGNOSIS — Z01.419 ENCOUNTER FOR GYNECOLOGICAL EXAMINATION (GENERAL) (ROUTINE) WITHOUT ABNORMAL FINDINGS: ICD-10-CM

## 2017-08-17 DIAGNOSIS — E78.5 HYPERLIPIDEMIA, UNSPECIFIED: ICD-10-CM

## 2017-08-17 DIAGNOSIS — I48.91 UNSPECIFIED ATRIAL FIBRILLATION: ICD-10-CM

## 2017-08-17 DIAGNOSIS — Z79.01 LONG TERM (CURRENT) USE OF ANTICOAGULANTS: ICD-10-CM

## 2017-08-22 ENCOUNTER — RESULT CHARGE (OUTPATIENT)
Age: 62
End: 2017-08-22

## 2017-08-22 ENCOUNTER — OUTPATIENT (OUTPATIENT)
Dept: OUTPATIENT SERVICES | Facility: HOSPITAL | Age: 62
LOS: 1 days | End: 2017-08-22

## 2017-08-22 ENCOUNTER — APPOINTMENT (OUTPATIENT)
Dept: INTERNAL MEDICINE | Facility: HOSPITAL | Age: 62
End: 2017-08-22

## 2017-08-22 LAB — INR PPP: 1.7 RATIO

## 2017-08-23 DIAGNOSIS — I48.91 UNSPECIFIED ATRIAL FIBRILLATION: ICD-10-CM

## 2017-08-29 ENCOUNTER — OUTPATIENT (OUTPATIENT)
Dept: OUTPATIENT SERVICES | Facility: HOSPITAL | Age: 62
LOS: 1 days | End: 2017-08-29

## 2017-08-29 ENCOUNTER — APPOINTMENT (OUTPATIENT)
Dept: INTERNAL MEDICINE | Facility: HOSPITAL | Age: 62
End: 2017-08-29

## 2017-08-29 LAB — INR PPP: 3.4 RATIO

## 2017-08-30 DIAGNOSIS — Z79.01 LONG TERM (CURRENT) USE OF ANTICOAGULANTS: ICD-10-CM

## 2017-09-05 ENCOUNTER — APPOINTMENT (OUTPATIENT)
Dept: INTERNAL MEDICINE | Facility: HOSPITAL | Age: 62
End: 2017-09-05

## 2017-09-05 ENCOUNTER — OUTPATIENT (OUTPATIENT)
Dept: OUTPATIENT SERVICES | Facility: HOSPITAL | Age: 62
LOS: 1 days | End: 2017-09-05

## 2017-09-05 DIAGNOSIS — Z00.00 ENCOUNTER FOR GENERAL ADULT MEDICAL EXAMINATION WITHOUT ABNORMAL FINDINGS: ICD-10-CM

## 2017-09-06 DIAGNOSIS — Z79.01 LONG TERM (CURRENT) USE OF ANTICOAGULANTS: ICD-10-CM

## 2017-09-06 LAB — INR PPP: 3 RATIO

## 2017-09-12 ENCOUNTER — APPOINTMENT (OUTPATIENT)
Dept: INTERNAL MEDICINE | Facility: HOSPITAL | Age: 62
End: 2017-09-12

## 2017-09-12 ENCOUNTER — RESULT CHARGE (OUTPATIENT)
Age: 62
End: 2017-09-12

## 2017-09-12 ENCOUNTER — OUTPATIENT (OUTPATIENT)
Dept: OUTPATIENT SERVICES | Facility: HOSPITAL | Age: 62
LOS: 1 days | End: 2017-09-12

## 2017-09-12 DIAGNOSIS — Z79.01 LONG TERM (CURRENT) USE OF ANTICOAGULANTS: ICD-10-CM

## 2017-09-12 LAB — INR PPP: 2.4 RATIO

## 2017-09-13 ENCOUNTER — APPOINTMENT (OUTPATIENT)
Dept: INTERNAL MEDICINE | Facility: HOSPITAL | Age: 62
End: 2017-09-13

## 2017-09-19 ENCOUNTER — APPOINTMENT (OUTPATIENT)
Dept: INTERNAL MEDICINE | Facility: HOSPITAL | Age: 62
End: 2017-09-19

## 2017-09-19 ENCOUNTER — OUTPATIENT (OUTPATIENT)
Dept: OUTPATIENT SERVICES | Facility: HOSPITAL | Age: 62
LOS: 1 days | End: 2017-09-19

## 2017-09-19 DIAGNOSIS — Z79.01 LONG TERM (CURRENT) USE OF ANTICOAGULANTS: ICD-10-CM

## 2017-09-19 DIAGNOSIS — Z00.00 ENCOUNTER FOR GENERAL ADULT MEDICAL EXAMINATION WITHOUT ABNORMAL FINDINGS: ICD-10-CM

## 2017-09-20 ENCOUNTER — APPOINTMENT (OUTPATIENT)
Dept: INTERNAL MEDICINE | Facility: HOSPITAL | Age: 62
End: 2017-09-20

## 2017-09-21 ENCOUNTER — APPOINTMENT (OUTPATIENT)
Dept: OPHTHALMOLOGY | Facility: CLINIC | Age: 62
End: 2017-09-21

## 2017-09-22 ENCOUNTER — APPOINTMENT (OUTPATIENT)
Dept: INTERNAL MEDICINE | Facility: HOSPITAL | Age: 62
End: 2017-09-22

## 2017-09-26 ENCOUNTER — MED ADMIN CHARGE (OUTPATIENT)
Age: 62
End: 2017-09-26

## 2017-09-26 ENCOUNTER — OTHER (OUTPATIENT)
Age: 62
End: 2017-09-26

## 2017-09-26 ENCOUNTER — OUTPATIENT (OUTPATIENT)
Dept: OUTPATIENT SERVICES | Facility: HOSPITAL | Age: 62
LOS: 1 days | End: 2017-09-26

## 2017-09-26 ENCOUNTER — APPOINTMENT (OUTPATIENT)
Dept: INTERNAL MEDICINE | Facility: HOSPITAL | Age: 62
End: 2017-09-26

## 2017-09-26 VITALS
WEIGHT: 177 LBS | SYSTOLIC BLOOD PRESSURE: 130 MMHG | BODY MASS INDEX: 31.36 KG/M2 | HEART RATE: 81 BPM | DIASTOLIC BLOOD PRESSURE: 74 MMHG | HEIGHT: 63 IN

## 2017-09-26 DIAGNOSIS — I48.91 UNSPECIFIED ATRIAL FIBRILLATION: ICD-10-CM

## 2017-09-26 DIAGNOSIS — E78.5 HYPERLIPIDEMIA, UNSPECIFIED: ICD-10-CM

## 2017-09-26 DIAGNOSIS — I10 ESSENTIAL (PRIMARY) HYPERTENSION: ICD-10-CM

## 2017-09-26 DIAGNOSIS — Z23 ENCOUNTER FOR IMMUNIZATION: ICD-10-CM

## 2017-10-02 ENCOUNTER — APPOINTMENT (OUTPATIENT)
Dept: INTERNAL MEDICINE | Facility: HOSPITAL | Age: 62
End: 2017-10-02

## 2017-10-03 ENCOUNTER — APPOINTMENT (OUTPATIENT)
Dept: INTERNAL MEDICINE | Facility: HOSPITAL | Age: 62
End: 2017-10-03

## 2017-10-03 ENCOUNTER — OUTPATIENT (OUTPATIENT)
Dept: OUTPATIENT SERVICES | Facility: HOSPITAL | Age: 62
LOS: 1 days | End: 2017-10-03

## 2017-10-03 DIAGNOSIS — Z79.01 LONG TERM (CURRENT) USE OF ANTICOAGULANTS: ICD-10-CM

## 2017-10-03 DIAGNOSIS — Z00.00 ENCOUNTER FOR GENERAL ADULT MEDICAL EXAMINATION WITHOUT ABNORMAL FINDINGS: ICD-10-CM

## 2017-10-03 LAB — INR PPP: 2.2 RATIO

## 2017-10-10 ENCOUNTER — APPOINTMENT (OUTPATIENT)
Dept: INTERNAL MEDICINE | Facility: HOSPITAL | Age: 62
End: 2017-10-10

## 2017-10-10 ENCOUNTER — OUTPATIENT (OUTPATIENT)
Dept: OUTPATIENT SERVICES | Facility: HOSPITAL | Age: 62
LOS: 1 days | End: 2017-10-10

## 2017-10-10 ENCOUNTER — LABORATORY RESULT (OUTPATIENT)
Age: 62
End: 2017-10-10

## 2017-10-10 DIAGNOSIS — Z79.01 LONG TERM (CURRENT) USE OF ANTICOAGULANTS: ICD-10-CM

## 2017-10-10 LAB
INR BLD: 3.44 — HIGH (ref 0.88–1.17)
PROTHROM AB SERPL-ACNC: 39.5 SEC — HIGH (ref 9.8–13.1)

## 2017-10-17 ENCOUNTER — APPOINTMENT (OUTPATIENT)
Dept: INTERNAL MEDICINE | Facility: HOSPITAL | Age: 62
End: 2017-10-17

## 2017-10-24 ENCOUNTER — APPOINTMENT (OUTPATIENT)
Dept: NEUROLOGY | Facility: HOSPITAL | Age: 62
End: 2017-10-24

## 2017-10-24 ENCOUNTER — OUTPATIENT (OUTPATIENT)
Dept: OUTPATIENT SERVICES | Facility: HOSPITAL | Age: 62
LOS: 1 days | End: 2017-10-24

## 2017-10-24 ENCOUNTER — LABORATORY RESULT (OUTPATIENT)
Age: 62
End: 2017-10-24

## 2017-10-24 ENCOUNTER — APPOINTMENT (OUTPATIENT)
Dept: INTERNAL MEDICINE | Facility: HOSPITAL | Age: 62
End: 2017-10-24

## 2017-10-24 DIAGNOSIS — F03.90 UNSPECIFIED DEMENTIA, UNSPECIFIED SEVERITY, WITHOUT BEHAVIORAL DISTURBANCE, PSYCHOTIC DISTURBANCE, MOOD DISTURBANCE, AND ANXIETY: ICD-10-CM

## 2017-10-24 DIAGNOSIS — F03.90 UNSPECIFIED DEMENTIA W/OUT BEHAVIORAL DISTURBANCE: ICD-10-CM

## 2017-10-24 LAB
INR BLD: 2.48 — HIGH (ref 0.88–1.17)
PROTHROM AB SERPL-ACNC: 28.3 SEC — HIGH (ref 9.8–13.1)

## 2017-10-26 DIAGNOSIS — Z79.01 LONG TERM (CURRENT) USE OF ANTICOAGULANTS: ICD-10-CM

## 2017-10-26 DIAGNOSIS — F03.90 UNSPECIFIED DEMENTIA WITHOUT BEHAVIORAL DISTURBANCE: ICD-10-CM

## 2017-10-31 ENCOUNTER — OUTPATIENT (OUTPATIENT)
Dept: OUTPATIENT SERVICES | Facility: HOSPITAL | Age: 62
LOS: 1 days | End: 2017-10-31

## 2017-10-31 ENCOUNTER — APPOINTMENT (OUTPATIENT)
Dept: INTERNAL MEDICINE | Facility: HOSPITAL | Age: 62
End: 2017-10-31

## 2017-11-01 DIAGNOSIS — Z79.01 LONG TERM (CURRENT) USE OF ANTICOAGULANTS: ICD-10-CM

## 2017-11-02 ENCOUNTER — RECORD ABSTRACTING (OUTPATIENT)
Age: 62
End: 2017-11-02

## 2017-11-02 LAB — INR PPP: 3.1 RATIO

## 2017-11-14 ENCOUNTER — APPOINTMENT (OUTPATIENT)
Dept: INTERNAL MEDICINE | Facility: HOSPITAL | Age: 62
End: 2017-11-14

## 2017-11-14 ENCOUNTER — OUTPATIENT (OUTPATIENT)
Dept: OUTPATIENT SERVICES | Facility: HOSPITAL | Age: 62
LOS: 1 days | End: 2017-11-14

## 2017-11-14 DIAGNOSIS — Z79.01 LONG TERM (CURRENT) USE OF ANTICOAGULANTS: ICD-10-CM

## 2017-11-17 LAB — INR PPP: 2.9 RATIO

## 2017-11-21 ENCOUNTER — OUTPATIENT (OUTPATIENT)
Dept: OUTPATIENT SERVICES | Facility: HOSPITAL | Age: 62
LOS: 1 days | End: 2017-11-21

## 2017-11-21 ENCOUNTER — OTHER (OUTPATIENT)
Age: 62
End: 2017-11-21

## 2017-11-21 ENCOUNTER — APPOINTMENT (OUTPATIENT)
Dept: INTERNAL MEDICINE | Facility: HOSPITAL | Age: 62
End: 2017-11-21

## 2017-11-21 LAB — INR PPP: 4 RATIO

## 2017-11-22 DIAGNOSIS — Z79.01 LONG TERM (CURRENT) USE OF ANTICOAGULANTS: ICD-10-CM

## 2017-12-01 ENCOUNTER — APPOINTMENT (OUTPATIENT)
Dept: INTERNAL MEDICINE | Facility: HOSPITAL | Age: 62
End: 2017-12-01

## 2017-12-01 ENCOUNTER — OUTPATIENT (OUTPATIENT)
Dept: OUTPATIENT SERVICES | Facility: HOSPITAL | Age: 62
LOS: 1 days | End: 2017-12-01

## 2017-12-01 ENCOUNTER — LABORATORY RESULT (OUTPATIENT)
Age: 62
End: 2017-12-01

## 2017-12-01 LAB
INR BLD: 2.72 — HIGH (ref 0.88–1.17)
PROTHROM AB SERPL-ACNC: 31.1 SEC — HIGH (ref 9.8–13.1)

## 2017-12-05 ENCOUNTER — RX RENEWAL (OUTPATIENT)
Age: 62
End: 2017-12-05

## 2017-12-07 DIAGNOSIS — Z79.01 LONG TERM (CURRENT) USE OF ANTICOAGULANTS: ICD-10-CM

## 2017-12-18 ENCOUNTER — OUTPATIENT (OUTPATIENT)
Dept: OUTPATIENT SERVICES | Facility: HOSPITAL | Age: 62
LOS: 1 days | End: 2017-12-18

## 2017-12-18 ENCOUNTER — APPOINTMENT (OUTPATIENT)
Dept: INTERNAL MEDICINE | Facility: HOSPITAL | Age: 62
End: 2017-12-18

## 2017-12-18 DIAGNOSIS — Z86.79 PERSONAL HISTORY OF OTHER DISEASES OF THE CIRCULATORY SYSTEM: ICD-10-CM

## 2017-12-18 DIAGNOSIS — Z95.2 PRESENCE OF PROSTHETIC HEART VALVE: ICD-10-CM

## 2017-12-18 DIAGNOSIS — Z79.01 LONG TERM (CURRENT) USE OF ANTICOAGULANTS: ICD-10-CM

## 2017-12-18 DIAGNOSIS — Z00.00 ENCOUNTER FOR GENERAL ADULT MEDICAL EXAMINATION WITHOUT ABNORMAL FINDINGS: ICD-10-CM

## 2017-12-19 LAB — INR PPP: 2.8 RATIO

## 2018-01-08 ENCOUNTER — LABORATORY RESULT (OUTPATIENT)
Age: 63
End: 2018-01-08

## 2018-01-08 ENCOUNTER — APPOINTMENT (OUTPATIENT)
Dept: INTERNAL MEDICINE | Facility: HOSPITAL | Age: 63
End: 2018-01-08

## 2018-01-08 ENCOUNTER — OUTPATIENT (OUTPATIENT)
Dept: OUTPATIENT SERVICES | Facility: HOSPITAL | Age: 63
LOS: 1 days | End: 2018-01-08

## 2018-01-08 LAB
INR BLD: 2.36 — HIGH (ref 0.88–1.17)
PROTHROM AB SERPL-ACNC: 26.7 SEC — HIGH (ref 9.8–13.1)

## 2018-01-09 DIAGNOSIS — Z79.01 LONG TERM (CURRENT) USE OF ANTICOAGULANTS: ICD-10-CM

## 2018-01-22 ENCOUNTER — APPOINTMENT (OUTPATIENT)
Dept: INTERNAL MEDICINE | Facility: HOSPITAL | Age: 63
End: 2018-01-22

## 2018-01-22 ENCOUNTER — OUTPATIENT (OUTPATIENT)
Dept: OUTPATIENT SERVICES | Facility: HOSPITAL | Age: 63
LOS: 1 days | End: 2018-01-22

## 2018-01-22 DIAGNOSIS — Z95.2 PRESENCE OF PROSTHETIC HEART VALVE: ICD-10-CM

## 2018-01-22 DIAGNOSIS — Z00.00 ENCOUNTER FOR GENERAL ADULT MEDICAL EXAMINATION WITHOUT ABNORMAL FINDINGS: ICD-10-CM

## 2018-01-22 DIAGNOSIS — Z79.01 LONG TERM (CURRENT) USE OF ANTICOAGULANTS: ICD-10-CM

## 2018-01-22 DIAGNOSIS — I48.91 UNSPECIFIED ATRIAL FIBRILLATION: ICD-10-CM

## 2018-01-30 LAB — INR PPP: 3.5 RATIO

## 2018-02-12 ENCOUNTER — OUTPATIENT (OUTPATIENT)
Dept: OUTPATIENT SERVICES | Facility: HOSPITAL | Age: 63
LOS: 1 days | End: 2018-02-12

## 2018-02-12 ENCOUNTER — APPOINTMENT (OUTPATIENT)
Dept: INTERNAL MEDICINE | Facility: HOSPITAL | Age: 63
End: 2018-02-12

## 2018-02-12 ENCOUNTER — OTHER (OUTPATIENT)
Age: 63
End: 2018-02-12

## 2018-02-12 DIAGNOSIS — I48.91 UNSPECIFIED ATRIAL FIBRILLATION: ICD-10-CM

## 2018-02-13 LAB — INR PPP: 3.5 RATIO

## 2018-02-20 ENCOUNTER — APPOINTMENT (OUTPATIENT)
Dept: INTERNAL MEDICINE | Facility: HOSPITAL | Age: 63
End: 2018-02-20

## 2018-02-26 ENCOUNTER — OUTPATIENT (OUTPATIENT)
Dept: OUTPATIENT SERVICES | Facility: HOSPITAL | Age: 63
LOS: 1 days | End: 2018-02-26

## 2018-02-26 ENCOUNTER — APPOINTMENT (OUTPATIENT)
Dept: INTERNAL MEDICINE | Facility: HOSPITAL | Age: 63
End: 2018-02-26

## 2018-02-27 DIAGNOSIS — Z79.01 LONG TERM (CURRENT) USE OF ANTICOAGULANTS: ICD-10-CM

## 2018-02-27 LAB — INR PPP: 2.2 RATIO

## 2018-03-05 ENCOUNTER — APPOINTMENT (OUTPATIENT)
Dept: INTERNAL MEDICINE | Facility: HOSPITAL | Age: 63
End: 2018-03-05

## 2018-03-05 ENCOUNTER — OUTPATIENT (OUTPATIENT)
Dept: OUTPATIENT SERVICES | Facility: HOSPITAL | Age: 63
LOS: 1 days | End: 2018-03-05

## 2018-03-05 DIAGNOSIS — Z79.01 LONG TERM (CURRENT) USE OF ANTICOAGULANTS: ICD-10-CM

## 2018-03-06 LAB — INR PPP: 2.6 RATIO

## 2018-04-09 ENCOUNTER — APPOINTMENT (OUTPATIENT)
Dept: INTERNAL MEDICINE | Facility: HOSPITAL | Age: 63
End: 2018-04-09

## 2018-04-09 ENCOUNTER — OUTPATIENT (OUTPATIENT)
Dept: OUTPATIENT SERVICES | Facility: HOSPITAL | Age: 63
LOS: 1 days | End: 2018-04-09

## 2018-04-11 ENCOUNTER — OTHER (OUTPATIENT)
Age: 63
End: 2018-04-11

## 2018-04-11 DIAGNOSIS — Z79.01 LONG TERM (CURRENT) USE OF ANTICOAGULANTS: ICD-10-CM

## 2018-04-12 ENCOUNTER — MEDICATION RENEWAL (OUTPATIENT)
Age: 63
End: 2018-04-12

## 2018-04-12 ENCOUNTER — MED ADMIN CHARGE (OUTPATIENT)
Age: 63
End: 2018-04-12

## 2018-04-23 ENCOUNTER — OUTPATIENT (OUTPATIENT)
Dept: OUTPATIENT SERVICES | Facility: HOSPITAL | Age: 63
LOS: 1 days | End: 2018-04-23

## 2018-04-23 ENCOUNTER — APPOINTMENT (OUTPATIENT)
Dept: INTERNAL MEDICINE | Facility: HOSPITAL | Age: 63
End: 2018-04-23

## 2018-04-23 DIAGNOSIS — Z79.01 LONG TERM (CURRENT) USE OF ANTICOAGULANTS: ICD-10-CM

## 2018-04-23 LAB — INR PPP: 2.6 RATIO

## 2018-05-01 LAB — INR PPP: 3.5 RATIO

## 2018-05-02 DIAGNOSIS — Z00.00 ENCOUNTER FOR GENERAL ADULT MEDICAL EXAMINATION WITHOUT ABNORMAL FINDINGS: ICD-10-CM

## 2018-05-14 ENCOUNTER — APPOINTMENT (OUTPATIENT)
Dept: INTERNAL MEDICINE | Facility: HOSPITAL | Age: 63
End: 2018-05-14

## 2018-05-14 ENCOUNTER — RESULT CHARGE (OUTPATIENT)
Age: 63
End: 2018-05-14

## 2018-05-14 ENCOUNTER — OUTPATIENT (OUTPATIENT)
Dept: OUTPATIENT SERVICES | Facility: HOSPITAL | Age: 63
LOS: 1 days | End: 2018-05-14

## 2018-05-14 DIAGNOSIS — Z79.01 LONG TERM (CURRENT) USE OF ANTICOAGULANTS: ICD-10-CM

## 2018-05-14 LAB — INR PPP: 3.2 RATIO

## 2018-05-15 NOTE — ED PROVIDER NOTE - NEURO NEGATIVE STATEMENT, MLM
used
no loss of consciousness, no gait abnormality, no headache, no sensory deficits, and no weakness.

## 2018-05-29 ENCOUNTER — APPOINTMENT (OUTPATIENT)
Dept: INTERNAL MEDICINE | Facility: HOSPITAL | Age: 63
End: 2018-05-29

## 2018-06-04 ENCOUNTER — APPOINTMENT (OUTPATIENT)
Dept: INTERNAL MEDICINE | Facility: HOSPITAL | Age: 63
End: 2018-06-04

## 2018-06-04 ENCOUNTER — OUTPATIENT (OUTPATIENT)
Dept: OUTPATIENT SERVICES | Facility: HOSPITAL | Age: 63
LOS: 1 days | End: 2018-06-04

## 2018-06-04 DIAGNOSIS — Z79.01 LONG TERM (CURRENT) USE OF ANTICOAGULANTS: ICD-10-CM

## 2018-06-04 LAB — INR PPP: 4.1 RATIO

## 2018-06-11 ENCOUNTER — OUTPATIENT (OUTPATIENT)
Dept: OUTPATIENT SERVICES | Facility: HOSPITAL | Age: 63
LOS: 1 days | End: 2018-06-11

## 2018-06-11 ENCOUNTER — APPOINTMENT (OUTPATIENT)
Dept: INTERNAL MEDICINE | Facility: HOSPITAL | Age: 63
End: 2018-06-11

## 2018-06-11 ENCOUNTER — RESULT CHARGE (OUTPATIENT)
Age: 63
End: 2018-06-11

## 2018-06-12 DIAGNOSIS — Z79.01 LONG TERM (CURRENT) USE OF ANTICOAGULANTS: ICD-10-CM

## 2018-06-13 LAB — INR PPP: 2.6 RATIO

## 2018-06-25 ENCOUNTER — APPOINTMENT (OUTPATIENT)
Dept: INTERNAL MEDICINE | Facility: HOSPITAL | Age: 63
End: 2018-06-25

## 2018-06-25 ENCOUNTER — OUTPATIENT (OUTPATIENT)
Dept: OUTPATIENT SERVICES | Facility: HOSPITAL | Age: 63
LOS: 1 days | End: 2018-06-25

## 2018-06-25 ENCOUNTER — LABORATORY RESULT (OUTPATIENT)
Age: 63
End: 2018-06-25

## 2018-06-25 DIAGNOSIS — Z79.01 LONG TERM (CURRENT) USE OF ANTICOAGULANTS: ICD-10-CM

## 2018-06-25 LAB
INR BLD: 3.56 — HIGH (ref 0.88–1.17)
PROTHROM AB SERPL-ACNC: 40.6 SEC — HIGH (ref 9.8–13.1)

## 2018-07-02 ENCOUNTER — APPOINTMENT (OUTPATIENT)
Dept: INTERNAL MEDICINE | Facility: HOSPITAL | Age: 63
End: 2018-07-02

## 2018-07-09 ENCOUNTER — OUTPATIENT (OUTPATIENT)
Dept: OUTPATIENT SERVICES | Facility: HOSPITAL | Age: 63
LOS: 1 days | End: 2018-07-09

## 2018-07-09 ENCOUNTER — RESULT CHARGE (OUTPATIENT)
Age: 63
End: 2018-07-09

## 2018-07-09 ENCOUNTER — APPOINTMENT (OUTPATIENT)
Dept: INTERNAL MEDICINE | Facility: HOSPITAL | Age: 63
End: 2018-07-09

## 2018-07-09 DIAGNOSIS — Z79.01 LONG TERM (CURRENT) USE OF ANTICOAGULANTS: ICD-10-CM

## 2018-07-09 LAB
INR PPP: 2.4 RATIO
QUALITY CONTROL: YES

## 2018-07-16 ENCOUNTER — APPOINTMENT (OUTPATIENT)
Dept: INTERNAL MEDICINE | Facility: HOSPITAL | Age: 63
End: 2018-07-16

## 2018-07-16 ENCOUNTER — OUTPATIENT (OUTPATIENT)
Dept: OUTPATIENT SERVICES | Facility: HOSPITAL | Age: 63
LOS: 1 days | End: 2018-07-16

## 2018-07-16 DIAGNOSIS — Z79.01 LONG TERM (CURRENT) USE OF ANTICOAGULANTS: ICD-10-CM

## 2018-07-17 LAB — INR PPP: 2.5 RATIO

## 2018-07-23 ENCOUNTER — APPOINTMENT (OUTPATIENT)
Dept: INTERNAL MEDICINE | Facility: HOSPITAL | Age: 63
End: 2018-07-23

## 2018-07-30 ENCOUNTER — APPOINTMENT (OUTPATIENT)
Dept: INTERNAL MEDICINE | Facility: HOSPITAL | Age: 63
End: 2018-07-30

## 2018-07-30 ENCOUNTER — OUTPATIENT (OUTPATIENT)
Dept: OUTPATIENT SERVICES | Facility: HOSPITAL | Age: 63
LOS: 1 days | End: 2018-07-30

## 2018-07-30 ENCOUNTER — OTHER (OUTPATIENT)
Age: 63
End: 2018-07-30

## 2018-07-30 ENCOUNTER — LABORATORY RESULT (OUTPATIENT)
Age: 63
End: 2018-07-30

## 2018-07-30 LAB
INR BLD: 2.85 — HIGH (ref 0.88–1.17)
PROTHROM AB SERPL-ACNC: 33.5 SEC — HIGH (ref 9.8–13.1)

## 2018-07-31 DIAGNOSIS — Z79.01 LONG TERM (CURRENT) USE OF ANTICOAGULANTS: ICD-10-CM

## 2018-08-10 ENCOUNTER — APPOINTMENT (OUTPATIENT)
Dept: INTERNAL MEDICINE | Facility: HOSPITAL | Age: 63
End: 2018-08-10
Payer: MEDICAID

## 2018-08-10 ENCOUNTER — LABORATORY RESULT (OUTPATIENT)
Age: 63
End: 2018-08-10

## 2018-08-10 ENCOUNTER — OUTPATIENT (OUTPATIENT)
Dept: OUTPATIENT SERVICES | Facility: HOSPITAL | Age: 63
LOS: 1 days | End: 2018-08-10

## 2018-08-10 VITALS — SYSTOLIC BLOOD PRESSURE: 144 MMHG | HEART RATE: 66 BPM | DIASTOLIC BLOOD PRESSURE: 90 MMHG

## 2018-08-10 VITALS — BODY MASS INDEX: 30.83 KG/M2 | WEIGHT: 174 LBS | HEIGHT: 63 IN

## 2018-08-10 DIAGNOSIS — M79.642 PAIN IN LEFT HAND: ICD-10-CM

## 2018-08-10 DIAGNOSIS — R52 PAIN, UNSPECIFIED: ICD-10-CM

## 2018-08-10 DIAGNOSIS — Z86.79 PERSONAL HISTORY OF OTHER DISEASES OF THE CIRCULATORY SYSTEM: ICD-10-CM

## 2018-08-10 DIAGNOSIS — K08.9 DISORDER OF TEETH AND SUPPORTING STRUCTURES, UNSPECIFIED: ICD-10-CM

## 2018-08-10 DIAGNOSIS — Z95.4 PRESENCE OF OTHER HEART-VALVE REPLACEMENT: ICD-10-CM

## 2018-08-10 LAB
ALBUMIN SERPL ELPH-MCNC: 3.9 G/DL — SIGNIFICANT CHANGE UP (ref 3.3–5)
ALP SERPL-CCNC: 83 U/L — SIGNIFICANT CHANGE UP (ref 40–120)
ALT FLD-CCNC: 19 U/L — SIGNIFICANT CHANGE UP (ref 4–33)
AST SERPL-CCNC: 23 U/L — SIGNIFICANT CHANGE UP (ref 4–32)
BILIRUB SERPL-MCNC: < 0.2 MG/DL — LOW (ref 0.2–1.2)
BUN SERPL-MCNC: 18 MG/DL — SIGNIFICANT CHANGE UP (ref 7–23)
CALCIUM SERPL-MCNC: 9.2 MG/DL — SIGNIFICANT CHANGE UP (ref 8.4–10.5)
CHLORIDE SERPL-SCNC: 105 MMOL/L — SIGNIFICANT CHANGE UP (ref 98–107)
CHOLEST SERPL-MCNC: 108 MG/DL — LOW (ref 120–199)
CO2 SERPL-SCNC: 25 MMOL/L — SIGNIFICANT CHANGE UP (ref 22–31)
CREAT SERPL-MCNC: 0.96 MG/DL — SIGNIFICANT CHANGE UP (ref 0.5–1.3)
GLUCOSE SERPL-MCNC: 93 MG/DL — SIGNIFICANT CHANGE UP (ref 70–99)
HBA1C BLD-MCNC: 6.3 % — HIGH (ref 4–5.6)
HDLC SERPL-MCNC: 48 MG/DL — SIGNIFICANT CHANGE UP (ref 45–65)
LIPID PNL WITH DIRECT LDL SERPL: 48 MG/DL — SIGNIFICANT CHANGE UP
POTASSIUM SERPL-MCNC: 4.7 MMOL/L — SIGNIFICANT CHANGE UP (ref 3.5–5.3)
POTASSIUM SERPL-SCNC: 4.7 MMOL/L — SIGNIFICANT CHANGE UP (ref 3.5–5.3)
PROT SERPL-MCNC: 7.6 G/DL — SIGNIFICANT CHANGE UP (ref 6–8.3)
SODIUM SERPL-SCNC: 140 MMOL/L — SIGNIFICANT CHANGE UP (ref 135–145)
TRIGL SERPL-MCNC: 58 MG/DL — SIGNIFICANT CHANGE UP (ref 10–149)

## 2018-08-10 PROCEDURE — 99214 OFFICE O/P EST MOD 30 MIN: CPT | Mod: GC

## 2018-08-10 NOTE — PHYSICAL EXAM
[No Acute Distress] : no acute distress [Well-Appearing] : well-appearing [Normal Sclera/Conjunctiva] : normal sclera/conjunctiva [PERRL] : pupils equal round and reactive to light [EOMI] : extraocular movements intact [Normal Outer Ear/Nose] : the outer ears and nose were normal in appearance [Normal Oropharynx] : the oropharynx was normal [No Lymphadenopathy] : no lymphadenopathy [Thyroid Normal, No Nodules] : the thyroid was normal and there were no nodules present [No Respiratory Distress] : no respiratory distress  [Clear to Auscultation] : lungs were clear to auscultation bilaterally [Irregularly Irregular] : irregularly irregular [Prosthetic Aortic Valve] : prosthetic aortic valve heard [Prosthetic Mitral Valve] : prosthetic mitral valve heard [III] : a grade 3 [No Varicosities] : no varicosities [No Extremity Clubbing/Cyanosis] : no extremity clubbing/cyanosis [Soft] : abdomen soft [Non Tender] : non-tender [Non-distended] : non-distended [Normal Bowel Sounds] : normal bowel sounds [Normal Posterior Cervical Nodes] : no posterior cervical lymphadenopathy [Normal Anterior Cervical Nodes] : no anterior cervical lymphadenopathy [No Joint Swelling] : no joint swelling [No Rash] : no rash [No Focal Deficits] : no focal deficits [Normal Affect] : the affect was normal [Normal Insight/Judgement] : insight and judgment were intact

## 2018-08-14 ENCOUNTER — APPOINTMENT (OUTPATIENT)
Dept: INTERNAL MEDICINE | Facility: HOSPITAL | Age: 63
End: 2018-08-14

## 2018-08-14 NOTE — ASSESSMENT
[FreeTextEntry1] : Pt is 63 yo Guine[place] Creole speaking F w/ Afib, AVR/MVR (2009) on coumadin, pre-DM, HTN, and HLD  presenting to clinic for follow up visit accompanied by her son.\par \par HM: -Referred for PAP.\par -Referred for Mammogram\par -Ordered FIT test\par -Ordered A1C\par -Dental Referral\par \par RTC in 3 months\par D/w Dr. Garza\par \par Jeremy Denney MD PGY1

## 2018-08-14 NOTE — HEALTH RISK ASSESSMENT
[No falls in past year] : Patient reported no falls in the past year [0] : 1) Little interest or pleasure doing things: Not at all (0) [1] : 2) Feeling down, depressed, or hopeless for several days (1) [] : No [EOM2Deukx] : 1

## 2018-08-14 NOTE — HISTORY OF PRESENT ILLNESS
[Family Member] : family member [de-identified] : Pt is 61 yo Pakistani Creole speaking F w/ Afib, AVR/MVR (2009) on coumadin, pre-DM, HTN, and HLD  presenting to clinic for follow up visit accompanied by her son.\par \par Pt reported that she has been experiencing dizzy spells about 1-3x/week. Pt reported that she could not remember when these spell began, but have been going on for over 1 month. Pt noted that she has not experienced any falls, but spontaneously experienced dizziness while sitting, when standing up from a sitting position, and while standing still and not moving. Pt reported that dizziness was not preceded by chest pain or palpations. Pt reported dyspnea on exertion while climbing a flight of stair which has been ongoing for about 2 years. Pt reported no fever, chills, chest pain, palpitations, abdominal pain, loose stools, constipation, abnormal bleeding, or dysuria.\par \par HM: PAP: 11/15\par Mammo: 11/14\par FIT: 5/17\par A1C: 6.4% 4/17\par PPSV: 10/13\par Td/Tdap: 5/14\par \par

## 2018-08-14 NOTE — REVIEW OF SYSTEMS
[Dyspnea on Exertion] : dyspnea on exertion [Negative] : Heme/Lymph [Cough] : cough [FreeTextEntry5] : See HPI

## 2018-08-15 DIAGNOSIS — E78.5 HYPERLIPIDEMIA, UNSPECIFIED: ICD-10-CM

## 2018-08-15 DIAGNOSIS — R52 PAIN, UNSPECIFIED: ICD-10-CM

## 2018-08-15 DIAGNOSIS — Z95.4 PRESENCE OF OTHER HEART-VALVE REPLACEMENT: ICD-10-CM

## 2018-08-15 DIAGNOSIS — I10 ESSENTIAL (PRIMARY) HYPERTENSION: ICD-10-CM

## 2018-08-15 DIAGNOSIS — I48.91 UNSPECIFIED ATRIAL FIBRILLATION: ICD-10-CM

## 2018-08-15 DIAGNOSIS — Z86.79 PERSONAL HISTORY OF OTHER DISEASES OF THE CIRCULATORY SYSTEM: ICD-10-CM

## 2018-08-15 DIAGNOSIS — K08.9 DISORDER OF TEETH AND SUPPORTING STRUCTURES, UNSPECIFIED: ICD-10-CM

## 2018-08-15 DIAGNOSIS — R42 DIZZINESS AND GIDDINESS: ICD-10-CM

## 2018-08-15 DIAGNOSIS — M79.642 PAIN IN LEFT HAND: ICD-10-CM

## 2018-08-27 ENCOUNTER — OUTPATIENT (OUTPATIENT)
Dept: OUTPATIENT SERVICES | Facility: HOSPITAL | Age: 63
LOS: 1 days | End: 2018-08-27

## 2018-08-27 ENCOUNTER — APPOINTMENT (OUTPATIENT)
Dept: INTERNAL MEDICINE | Facility: HOSPITAL | Age: 63
End: 2018-08-27

## 2018-08-27 DIAGNOSIS — I48.91 UNSPECIFIED ATRIAL FIBRILLATION: ICD-10-CM

## 2018-08-28 LAB — INR PPP: 2.4 RATIO

## 2018-09-10 ENCOUNTER — OUTPATIENT (OUTPATIENT)
Dept: OUTPATIENT SERVICES | Facility: HOSPITAL | Age: 63
LOS: 1 days | End: 2018-09-10

## 2018-09-10 ENCOUNTER — APPOINTMENT (OUTPATIENT)
Dept: INTERNAL MEDICINE | Facility: HOSPITAL | Age: 63
End: 2018-09-10

## 2018-09-10 DIAGNOSIS — Z79.01 LONG TERM (CURRENT) USE OF ANTICOAGULANTS: ICD-10-CM

## 2018-09-12 LAB — INR PPP: 2.6 RATIO

## 2018-09-14 ENCOUNTER — FORM ENCOUNTER (OUTPATIENT)
Age: 63
End: 2018-09-14

## 2018-09-15 ENCOUNTER — APPOINTMENT (OUTPATIENT)
Dept: MAMMOGRAPHY | Facility: IMAGING CENTER | Age: 63
End: 2018-09-15
Payer: MEDICAID

## 2018-09-15 ENCOUNTER — OUTPATIENT (OUTPATIENT)
Dept: OUTPATIENT SERVICES | Facility: HOSPITAL | Age: 63
LOS: 1 days | End: 2018-09-15
Payer: MEDICAID

## 2018-09-15 DIAGNOSIS — Z00.8 ENCOUNTER FOR OTHER GENERAL EXAMINATION: ICD-10-CM

## 2018-09-15 PROCEDURE — 77063 BREAST TOMOSYNTHESIS BI: CPT | Mod: 26

## 2018-09-15 PROCEDURE — 77067 SCR MAMMO BI INCL CAD: CPT

## 2018-09-15 PROCEDURE — 77067 SCR MAMMO BI INCL CAD: CPT | Mod: 26

## 2018-09-15 PROCEDURE — 77063 BREAST TOMOSYNTHESIS BI: CPT

## 2018-09-24 ENCOUNTER — APPOINTMENT (OUTPATIENT)
Dept: CV DIAGNOSITCS | Facility: HOSPITAL | Age: 63
End: 2018-09-24

## 2018-10-01 ENCOUNTER — OTHER (OUTPATIENT)
Age: 63
End: 2018-10-01

## 2018-10-01 ENCOUNTER — OUTPATIENT (OUTPATIENT)
Dept: OUTPATIENT SERVICES | Facility: HOSPITAL | Age: 63
LOS: 1 days | End: 2018-10-01

## 2018-10-01 ENCOUNTER — RESULT CHARGE (OUTPATIENT)
Age: 63
End: 2018-10-01

## 2018-10-01 ENCOUNTER — APPOINTMENT (OUTPATIENT)
Dept: INTERNAL MEDICINE | Facility: HOSPITAL | Age: 63
End: 2018-10-01

## 2018-10-01 DIAGNOSIS — Z95.2 PRESENCE OF PROSTHETIC HEART VALVE: ICD-10-CM

## 2018-10-02 ENCOUNTER — APPOINTMENT (OUTPATIENT)
Dept: CARDIOLOGY | Facility: CLINIC | Age: 63
End: 2018-10-02

## 2018-10-02 DIAGNOSIS — Z79.01 LONG TERM (CURRENT) USE OF ANTICOAGULANTS: ICD-10-CM

## 2018-10-02 LAB — INR PPP: 1.9 RATIO

## 2018-10-08 ENCOUNTER — APPOINTMENT (OUTPATIENT)
Dept: INTERNAL MEDICINE | Facility: HOSPITAL | Age: 63
End: 2018-10-08

## 2018-10-08 ENCOUNTER — OUTPATIENT (OUTPATIENT)
Dept: OUTPATIENT SERVICES | Facility: HOSPITAL | Age: 63
LOS: 1 days | End: 2018-10-08

## 2018-10-08 DIAGNOSIS — Z79.01 LONG TERM (CURRENT) USE OF ANTICOAGULANTS: ICD-10-CM

## 2018-10-08 DIAGNOSIS — I48.91 UNSPECIFIED ATRIAL FIBRILLATION: ICD-10-CM

## 2018-10-12 LAB — INR PPP: 2.3 RATIO

## 2018-10-15 ENCOUNTER — APPOINTMENT (OUTPATIENT)
Dept: INTERNAL MEDICINE | Facility: HOSPITAL | Age: 63
End: 2018-10-15

## 2018-10-29 ENCOUNTER — OUTPATIENT (OUTPATIENT)
Dept: OUTPATIENT SERVICES | Facility: HOSPITAL | Age: 63
LOS: 1 days | End: 2018-10-29

## 2018-10-29 ENCOUNTER — RESULT REVIEW (OUTPATIENT)
Age: 63
End: 2018-10-29

## 2018-10-29 ENCOUNTER — LABORATORY RESULT (OUTPATIENT)
Age: 63
End: 2018-10-29

## 2018-10-29 ENCOUNTER — APPOINTMENT (OUTPATIENT)
Dept: INTERNAL MEDICINE | Facility: HOSPITAL | Age: 63
End: 2018-10-29

## 2018-10-29 DIAGNOSIS — Z79.01 LONG TERM (CURRENT) USE OF ANTICOAGULANTS: ICD-10-CM

## 2018-10-29 LAB
INR BLD: 3.43 — HIGH (ref 0.88–1.17)
PROTHROM AB SERPL-ACNC: 40.4 SEC — HIGH (ref 9.8–13.1)

## 2018-11-05 ENCOUNTER — LABORATORY RESULT (OUTPATIENT)
Age: 63
End: 2018-11-05

## 2018-11-05 ENCOUNTER — OUTPATIENT (OUTPATIENT)
Dept: OUTPATIENT SERVICES | Facility: HOSPITAL | Age: 63
LOS: 1 days | End: 2018-11-05

## 2018-11-05 ENCOUNTER — OTHER (OUTPATIENT)
Age: 63
End: 2018-11-05

## 2018-11-05 ENCOUNTER — APPOINTMENT (OUTPATIENT)
Dept: INTERNAL MEDICINE | Facility: HOSPITAL | Age: 63
End: 2018-11-05

## 2018-11-05 LAB
INR BLD: 2.2 — HIGH (ref 0.88–1.17)
PROTHROM AB SERPL-ACNC: 25 SEC — HIGH (ref 9.8–13.1)

## 2018-11-06 DIAGNOSIS — Z79.01 LONG TERM (CURRENT) USE OF ANTICOAGULANTS: ICD-10-CM

## 2018-12-03 ENCOUNTER — APPOINTMENT (OUTPATIENT)
Dept: INTERNAL MEDICINE | Facility: HOSPITAL | Age: 63
End: 2018-12-03

## 2018-12-03 ENCOUNTER — OUTPATIENT (OUTPATIENT)
Dept: OUTPATIENT SERVICES | Facility: HOSPITAL | Age: 63
LOS: 1 days | End: 2018-12-03

## 2018-12-03 ENCOUNTER — OTHER (OUTPATIENT)
Age: 63
End: 2018-12-03

## 2018-12-03 ENCOUNTER — LABORATORY RESULT (OUTPATIENT)
Age: 63
End: 2018-12-03

## 2018-12-03 LAB
INR BLD: 2.38 — HIGH (ref 0.88–1.17)
PROTHROM AB SERPL-ACNC: 27.9 SEC — HIGH (ref 9.8–13.1)

## 2018-12-05 DIAGNOSIS — Z79.01 LONG TERM (CURRENT) USE OF ANTICOAGULANTS: ICD-10-CM

## 2018-12-17 ENCOUNTER — APPOINTMENT (OUTPATIENT)
Dept: INTERNAL MEDICINE | Facility: HOSPITAL | Age: 63
End: 2018-12-17

## 2018-12-17 ENCOUNTER — OUTPATIENT (OUTPATIENT)
Dept: OUTPATIENT SERVICES | Facility: HOSPITAL | Age: 63
LOS: 1 days | End: 2018-12-17

## 2018-12-17 ENCOUNTER — LABORATORY RESULT (OUTPATIENT)
Age: 63
End: 2018-12-17

## 2018-12-17 DIAGNOSIS — Z79.01 LONG TERM (CURRENT) USE OF ANTICOAGULANTS: ICD-10-CM

## 2018-12-17 LAB
INR BLD: 2.39 — HIGH (ref 0.88–1.17)
PROTHROM AB SERPL-ACNC: 27.2 SEC — HIGH (ref 9.8–13.1)

## 2019-01-16 ENCOUNTER — LABORATORY RESULT (OUTPATIENT)
Age: 64
End: 2019-01-16

## 2019-01-16 ENCOUNTER — APPOINTMENT (OUTPATIENT)
Dept: INTERNAL MEDICINE | Facility: HOSPITAL | Age: 64
End: 2019-01-16

## 2019-01-16 ENCOUNTER — OTHER (OUTPATIENT)
Age: 64
End: 2019-01-16

## 2019-01-16 ENCOUNTER — OUTPATIENT (OUTPATIENT)
Dept: OUTPATIENT SERVICES | Facility: HOSPITAL | Age: 64
LOS: 1 days | End: 2019-01-16

## 2019-01-16 LAB
INR BLD: 2.71 — HIGH (ref 0.88–1.17)
PROTHROM AB SERPL-ACNC: 31.9 SEC — HIGH (ref 9.8–13.1)

## 2019-01-17 DIAGNOSIS — Z79.01 LONG TERM (CURRENT) USE OF ANTICOAGULANTS: ICD-10-CM

## 2019-01-31 ENCOUNTER — OUTPATIENT (OUTPATIENT)
Dept: OUTPATIENT SERVICES | Facility: HOSPITAL | Age: 64
LOS: 1 days | End: 2019-01-31

## 2019-01-31 ENCOUNTER — APPOINTMENT (OUTPATIENT)
Dept: CV DIAGNOSITCS | Facility: HOSPITAL | Age: 64
End: 2019-01-31
Payer: MEDICAID

## 2019-01-31 DIAGNOSIS — Z00.00 ENCOUNTER FOR GENERAL ADULT MEDICAL EXAMINATION WITHOUT ABNORMAL FINDINGS: ICD-10-CM

## 2019-01-31 DIAGNOSIS — R42 DIZZINESS AND GIDDINESS: ICD-10-CM

## 2019-01-31 PROCEDURE — 93306 TTE W/DOPPLER COMPLETE: CPT | Mod: 26

## 2019-02-03 ENCOUNTER — EMERGENCY (EMERGENCY)
Facility: HOSPITAL | Age: 64
LOS: 1 days | Discharge: HOME | End: 2019-02-03
Attending: EMERGENCY MEDICINE | Admitting: EMERGENCY MEDICINE
Payer: MEDICAID

## 2019-02-03 VITALS
SYSTOLIC BLOOD PRESSURE: 155 MMHG | TEMPERATURE: 98 F | HEART RATE: 79 BPM | OXYGEN SATURATION: 100 % | RESPIRATION RATE: 16 BRPM | DIASTOLIC BLOOD PRESSURE: 94 MMHG

## 2019-02-03 VITALS
TEMPERATURE: 98 F | OXYGEN SATURATION: 100 % | SYSTOLIC BLOOD PRESSURE: 145 MMHG | RESPIRATION RATE: 17 BRPM | HEART RATE: 70 BPM | DIASTOLIC BLOOD PRESSURE: 78 MMHG

## 2019-02-03 LAB
ALBUMIN SERPL ELPH-MCNC: 4 G/DL — SIGNIFICANT CHANGE UP (ref 3.3–5)
ALP SERPL-CCNC: 83 U/L — SIGNIFICANT CHANGE UP (ref 40–120)
ALT FLD-CCNC: 15 U/L — SIGNIFICANT CHANGE UP (ref 4–33)
ANION GAP SERPL CALC-SCNC: 9 MMO/L — SIGNIFICANT CHANGE UP (ref 7–14)
APPEARANCE UR: CLEAR — SIGNIFICANT CHANGE UP
APTT BLD: 36.2 SEC — SIGNIFICANT CHANGE UP (ref 27.5–36.3)
AST SERPL-CCNC: 17 U/L — SIGNIFICANT CHANGE UP (ref 4–32)
BASOPHILS # BLD AUTO: 0.1 K/UL — SIGNIFICANT CHANGE UP (ref 0–0.2)
BASOPHILS NFR BLD AUTO: 1.5 % — SIGNIFICANT CHANGE UP (ref 0–2)
BILIRUB SERPL-MCNC: 0.6 MG/DL — SIGNIFICANT CHANGE UP (ref 0.2–1.2)
BILIRUB UR-MCNC: NEGATIVE — SIGNIFICANT CHANGE UP
BLOOD UR QL VISUAL: NEGATIVE — SIGNIFICANT CHANGE UP
BUN SERPL-MCNC: 14 MG/DL — SIGNIFICANT CHANGE UP (ref 7–23)
CALCIUM SERPL-MCNC: 9.8 MG/DL — SIGNIFICANT CHANGE UP (ref 8.4–10.5)
CHLORIDE SERPL-SCNC: 103 MMOL/L — SIGNIFICANT CHANGE UP (ref 98–107)
CO2 SERPL-SCNC: 30 MMOL/L — SIGNIFICANT CHANGE UP (ref 22–31)
COLOR SPEC: YELLOW — SIGNIFICANT CHANGE UP
CREAT SERPL-MCNC: 0.96 MG/DL — SIGNIFICANT CHANGE UP (ref 0.5–1.3)
EOSINOPHIL # BLD AUTO: 0.35 K/UL — SIGNIFICANT CHANGE UP (ref 0–0.5)
EOSINOPHIL NFR BLD AUTO: 5.4 % — SIGNIFICANT CHANGE UP (ref 0–6)
GLUCOSE SERPL-MCNC: 91 MG/DL — SIGNIFICANT CHANGE UP (ref 70–99)
GLUCOSE UR-MCNC: NEGATIVE — SIGNIFICANT CHANGE UP
HCT VFR BLD CALC: 42.1 % — SIGNIFICANT CHANGE UP (ref 34.5–45)
HGB BLD-MCNC: 13.2 G/DL — SIGNIFICANT CHANGE UP (ref 11.5–15.5)
IMM GRANULOCYTES NFR BLD AUTO: 0.3 % — SIGNIFICANT CHANGE UP (ref 0–1.5)
INR BLD: 1.95 — HIGH (ref 0.88–1.17)
KETONES UR-MCNC: NEGATIVE — SIGNIFICANT CHANGE UP
LEUKOCYTE ESTERASE UR-ACNC: NEGATIVE — SIGNIFICANT CHANGE UP
LYMPHOCYTES # BLD AUTO: 2.53 K/UL — SIGNIFICANT CHANGE UP (ref 1–3.3)
LYMPHOCYTES # BLD AUTO: 39 % — SIGNIFICANT CHANGE UP (ref 13–44)
MCHC RBC-ENTMCNC: 30.1 PG — SIGNIFICANT CHANGE UP (ref 27–34)
MCHC RBC-ENTMCNC: 31.4 % — LOW (ref 32–36)
MCV RBC AUTO: 95.9 FL — SIGNIFICANT CHANGE UP (ref 80–100)
MONOCYTES # BLD AUTO: 0.5 K/UL — SIGNIFICANT CHANGE UP (ref 0–0.9)
MONOCYTES NFR BLD AUTO: 7.7 % — SIGNIFICANT CHANGE UP (ref 2–14)
NEUTROPHILS # BLD AUTO: 2.98 K/UL — SIGNIFICANT CHANGE UP (ref 1.8–7.4)
NEUTROPHILS NFR BLD AUTO: 46.1 % — SIGNIFICANT CHANGE UP (ref 43–77)
NITRITE UR-MCNC: NEGATIVE — SIGNIFICANT CHANGE UP
NRBC # FLD: 0 K/UL — LOW (ref 25–125)
PH UR: 7 — SIGNIFICANT CHANGE UP (ref 5–8)
PLATELET # BLD AUTO: 160 K/UL — SIGNIFICANT CHANGE UP (ref 150–400)
PMV BLD: 11.3 FL — SIGNIFICANT CHANGE UP (ref 7–13)
POTASSIUM SERPL-MCNC: 4.9 MMOL/L — SIGNIFICANT CHANGE UP (ref 3.5–5.3)
POTASSIUM SERPL-SCNC: 4.9 MMOL/L — SIGNIFICANT CHANGE UP (ref 3.5–5.3)
PROT SERPL-MCNC: 7.1 G/DL — SIGNIFICANT CHANGE UP (ref 6–8.3)
PROT UR-MCNC: NEGATIVE — SIGNIFICANT CHANGE UP
PROTHROM AB SERPL-ACNC: 22.7 SEC — HIGH (ref 9.8–13.1)
RBC # BLD: 4.39 M/UL — SIGNIFICANT CHANGE UP (ref 3.8–5.2)
RBC # FLD: 12.4 % — SIGNIFICANT CHANGE UP (ref 10.3–14.5)
SODIUM SERPL-SCNC: 142 MMOL/L — SIGNIFICANT CHANGE UP (ref 135–145)
SP GR SPEC: 1.01 — SIGNIFICANT CHANGE UP (ref 1–1.04)
TROPONIN T, HIGH SENSITIVITY: < 6 NG/L — SIGNIFICANT CHANGE UP (ref ?–14)
UROBILINOGEN FLD QL: NORMAL — SIGNIFICANT CHANGE UP
WBC # BLD: 6.48 K/UL — SIGNIFICANT CHANGE UP (ref 3.8–10.5)
WBC # FLD AUTO: 6.48 K/UL — SIGNIFICANT CHANGE UP (ref 3.8–10.5)

## 2019-02-03 PROCEDURE — 99284 EMERGENCY DEPT VISIT MOD MDM: CPT | Mod: 25

## 2019-02-03 PROCEDURE — 71045 X-RAY EXAM CHEST 1 VIEW: CPT | Mod: 26

## 2019-02-03 PROCEDURE — 73030 X-RAY EXAM OF SHOULDER: CPT | Mod: 26,RT

## 2019-02-03 PROCEDURE — 73502 X-RAY EXAM HIP UNI 2-3 VIEWS: CPT | Mod: 26,RT

## 2019-02-03 PROCEDURE — 93010 ELECTROCARDIOGRAM REPORT: CPT

## 2019-02-03 PROCEDURE — 70450 CT HEAD/BRAIN W/O DYE: CPT | Mod: 26

## 2019-02-03 RX ORDER — ACETAMINOPHEN 500 MG
650 TABLET ORAL ONCE
Qty: 0 | Refills: 0 | Status: COMPLETED | OUTPATIENT
Start: 2019-02-03 | End: 2019-02-03

## 2019-02-03 RX ORDER — METOCLOPRAMIDE HCL 10 MG
10 TABLET ORAL ONCE
Qty: 0 | Refills: 0 | Status: COMPLETED | OUTPATIENT
Start: 2019-02-03 | End: 2019-02-03

## 2019-02-03 RX ADMIN — Medication 10 MILLIGRAM(S): at 12:59

## 2019-02-03 RX ADMIN — Medication 650 MILLIGRAM(S): at 12:42

## 2019-02-03 NOTE — ED PROVIDER NOTE - OBJECTIVE STATEMENT
12:22 Sridevi att: 63F h/o cva residual left sided weakness, htn, hld, afib on coumadin  p/w ataxia and rue weakness x 3 wks. Per daughter at times she notes her baseline ambulatory without assistance mother has an occasional stumble and wobble. Past three weeks rue less strength. Denies fever, urinary symptoms, cough. At present patient notes mild frontal migraine ha.

## 2019-02-03 NOTE — ED PROVIDER NOTE - PHYSICAL EXAMINATION
Sridevi att: CN 2-12 intact, neg pronator drift, sensation intact throughout, clear speech. While examining ue strength patient noted to have severe right shoulder pain limiting RUE range of motrion. While ambulating patient ambulates in straight line without asstisance but displays issues with turning. Neg romberg.

## 2019-02-03 NOTE — ED PROVIDER NOTE - ATTENDING CONTRIBUTION TO CARE
Dr. Laureano: I have personally seen and examined this patient at the bedside. I have fully participated in the care of this patient. I have reviewed all pertinent clinical information, including history, physical exam, plan and the Resident's note and agree except as noted. HPI above as by me. PE above as by me. 63F h/o cva with residual left sided weakness p/w 3 wk intermittent ataxia and RUE wk. On exam patient's RUE strength limited by severe right shoulder pain worse with abduction. On gait patient ambulates steadily in a straight line but displays issues executing turns PLAN ct head r/o cva, cbc, cmp,ua, ucx, xr rt hip, xr rt shoulder, pt eval. If ct head abnl low threshold to acquire neuro consult.

## 2019-02-03 NOTE — ED PROVIDER NOTE - NSFOLLOWUPINSTRUCTIONS_ED_ALL_ED_FT
follow up with pmd within 1-2days, call to make appointment  come back to ED if you develop fever greater than 100.4F, chest pain, worsening pain, shortness of breath, worsening weakness  take tylenol 650 mg every 6 hours as needed for pain

## 2019-02-03 NOTE — ED PROVIDER NOTE - PMH
Atrial fibrillation  on coumadin  History of MI (myocardial infarction)  8-9 years ago in [facility]  History of stroke  2008 hospitalized in [facility]  HLD (hyperlipidemia)    HTN (hypertension) Atrial fibrillation  on coumadin  History of MI (myocardial infarction)  8-9 years ago in [facility]  History of stroke  2008 hospitalized in [facility]  HLD (hyperlipidemia)    HTN (hypertension)

## 2019-02-03 NOTE — PHYSICAL THERAPY INITIAL EVALUATION ADULT - ADDITIONAL COMMENTS
Pt reports that she lives in an apartment with daughter with ~15STE; (+)1 handrail; bedroom/bathroom on first floor. Prior to hospital admission pt was completely independent and used no assistive device with ambulation. Pt denies any recent falls.    Pt left comfortable on stretcher, NAD, all lines intact, all precautions maintained, with daughter at bedside, and RN/MD aware of PT evaluation.

## 2019-02-03 NOTE — PHYSICAL THERAPY INITIAL EVALUATION ADULT - DIAGNOSIS, PT EVAL
Pt admitted for right sided body pain; CT of Head A chronic right cerebellar infarct is again noted. No new acute intracranial abnormality is noted; X-ray of  right shoulder and right hip (-)for fracture; pt presents with antalgic gait

## 2019-02-03 NOTE — ED PROVIDER NOTE - PSH
S/P AVR (aortic valve replacement)  1/09 metal in [place]  S/P MVR (mitral valve replacement)  1/09 metal in [place]

## 2019-02-03 NOTE — PHYSICAL THERAPY INITIAL EVALUATION ADULT - ACTIVE RANGE OF MOTION EXAMINATION, REHAB EVAL
bilateral lower extremity Active ROM was WNL (within normal limits)/emmanuel. upper extremity Active ROM was WNL (within normal limits)

## 2019-02-03 NOTE — ED PROVIDER NOTE - CARE PLAN
Principal Discharge DX:	Ataxia Principal Discharge DX:	Ataxia  Secondary Diagnosis:	Right shoulder pain

## 2019-02-03 NOTE — ED PROVIDER NOTE - MEDICAL DECISION MAKING DETAILS
Sridevi att: 63F h/o cva with residual left sided weakness p/w 3 wk intermittent ataxia and RUE wk. On exam patient's RUE strength limited by severe right shoulder pain worse with abduction. On gait patient ambulates steadily in a straight line but displays issues executing turns PLAN ct head r/o cva, cbc, cmp,ua, ucx, xr rt hip, xr rt shoulder, pt eval. If ct head abnl low threshold to acquire neuro consult

## 2019-02-03 NOTE — PHYSICAL THERAPY INITIAL EVALUATION ADULT - PATIENT PROFILE REVIEW, REHAB EVAL
yes/No Formal Activity Order in the computer; spoke with ED RN prior to PT evaluation--> Pt OK for PT consult

## 2019-02-03 NOTE — PHYSICAL THERAPY INITIAL EVALUATION ADULT - PERTINENT HX OF CURRENT PROBLEM, REHAB EVAL
Pt is a 74 year old female that presents to ED with c/o Right sided pain x a few weeks. Pt with hx CVA with left sided weakness, A-fib, MI, and HTN

## 2019-02-03 NOTE — PHYSICAL THERAPY INITIAL EVALUATION ADULT - GENERAL OBSERVATIONS, REHAB EVAL
Pt encountered in semisupine position on stretcher in the ED, no distress, AxOx4, with +IV, and daughter @bedside.

## 2019-02-03 NOTE — ED PROVIDER NOTE - PROGRESS NOTE DETAILS
Sridevi att: Patient ambulated to and from the length of the crit care hallway steady and unassisted and provided urine sample. Notified family ct head neg acute ich, labs nl. Given unsteady turns dispo home pending PT eval. TRE Cisse asked to expedite PT eval for outpatient reccs. Anticipate dc home. pt signed out to me from dr gagnon.  wnl. will dc

## 2019-02-04 NOTE — DISCUSSION/SUMMARY
[ED Visit] : a visit to ED [FreeTextEntry1] : Pt seen in the ED for unsteady gait; CT head and labs unremarkable. pt seen by PT and able to ambulate without issue. Pt d/c home.Pt has cardiology f/u and INR check this week.

## 2019-02-05 LAB
BACTERIA UR CULT: SIGNIFICANT CHANGE UP
SPECIMEN SOURCE: SIGNIFICANT CHANGE UP

## 2019-02-06 ENCOUNTER — APPOINTMENT (OUTPATIENT)
Dept: INTERNAL MEDICINE | Facility: HOSPITAL | Age: 64
End: 2019-02-06

## 2019-02-07 ENCOUNTER — APPOINTMENT (OUTPATIENT)
Dept: CARDIOLOGY | Facility: CLINIC | Age: 64
End: 2019-02-07
Payer: MEDICAID

## 2019-02-07 ENCOUNTER — APPOINTMENT (OUTPATIENT)
Dept: CARDIOLOGY | Facility: HOSPITAL | Age: 64
End: 2019-02-07

## 2019-02-07 VITALS
HEART RATE: 74 BPM | DIASTOLIC BLOOD PRESSURE: 84 MMHG | OXYGEN SATURATION: 98 % | BODY MASS INDEX: 31.01 KG/M2 | SYSTOLIC BLOOD PRESSURE: 122 MMHG | HEIGHT: 63 IN | WEIGHT: 175 LBS

## 2019-02-07 DIAGNOSIS — I49.1 ATRIAL PREMATURE DEPOLARIZATION: ICD-10-CM

## 2019-02-07 PROCEDURE — 99205 OFFICE O/P NEW HI 60 MIN: CPT

## 2019-02-07 PROCEDURE — 93000 ELECTROCARDIOGRAM COMPLETE: CPT

## 2019-02-09 ENCOUNTER — NON-APPOINTMENT (OUTPATIENT)
Age: 64
End: 2019-02-09

## 2019-02-11 ENCOUNTER — APPOINTMENT (OUTPATIENT)
Dept: INTERNAL MEDICINE | Facility: HOSPITAL | Age: 64
End: 2019-02-11

## 2019-02-11 ENCOUNTER — OUTPATIENT (OUTPATIENT)
Dept: OUTPATIENT SERVICES | Facility: HOSPITAL | Age: 64
LOS: 1 days | End: 2019-02-11

## 2019-02-11 ENCOUNTER — LABORATORY RESULT (OUTPATIENT)
Age: 64
End: 2019-02-11

## 2019-02-11 ENCOUNTER — RESULT REVIEW (OUTPATIENT)
Age: 64
End: 2019-02-11

## 2019-02-11 DIAGNOSIS — Z79.01 LONG TERM (CURRENT) USE OF ANTICOAGULANTS: ICD-10-CM

## 2019-02-11 LAB
INR BLD: 3.03 — HIGH (ref 0.88–1.17)
PROTHROM AB SERPL-ACNC: 34.8 SEC — HIGH (ref 9.8–13.1)

## 2019-02-19 ENCOUNTER — APPOINTMENT (OUTPATIENT)
Dept: INTERNAL MEDICINE | Facility: HOSPITAL | Age: 64
End: 2019-02-19

## 2019-02-19 ENCOUNTER — OTHER (OUTPATIENT)
Age: 64
End: 2019-02-19

## 2019-02-19 ENCOUNTER — OUTPATIENT (OUTPATIENT)
Dept: OUTPATIENT SERVICES | Facility: HOSPITAL | Age: 64
LOS: 1 days | End: 2019-02-19

## 2019-02-19 ENCOUNTER — LABORATORY RESULT (OUTPATIENT)
Age: 64
End: 2019-02-19

## 2019-02-19 LAB
INR BLD: 3.15 — HIGH (ref 0.88–1.17)
PROTHROM AB SERPL-ACNC: 37.2 SEC — HIGH (ref 9.8–13.1)

## 2019-03-01 DIAGNOSIS — Z79.01 LONG TERM (CURRENT) USE OF ANTICOAGULANTS: ICD-10-CM

## 2019-03-06 DIAGNOSIS — R79.1 ABNORMAL COAGULATION PROFILE: ICD-10-CM

## 2019-03-11 ENCOUNTER — APPOINTMENT (OUTPATIENT)
Dept: INTERNAL MEDICINE | Facility: HOSPITAL | Age: 64
End: 2019-03-11

## 2019-03-25 ENCOUNTER — APPOINTMENT (OUTPATIENT)
Dept: INTERNAL MEDICINE | Facility: HOSPITAL | Age: 64
End: 2019-03-25

## 2019-04-29 ENCOUNTER — APPOINTMENT (OUTPATIENT)
Dept: INTERNAL MEDICINE | Facility: CLINIC | Age: 64
End: 2019-04-29

## 2019-05-06 ENCOUNTER — APPOINTMENT (OUTPATIENT)
Dept: INTERNAL MEDICINE | Facility: CLINIC | Age: 64
End: 2019-05-06

## 2019-05-06 ENCOUNTER — RESULT REVIEW (OUTPATIENT)
Age: 64
End: 2019-05-06

## 2019-05-06 ENCOUNTER — OUTPATIENT (OUTPATIENT)
Dept: OUTPATIENT SERVICES | Facility: HOSPITAL | Age: 64
LOS: 1 days | End: 2019-05-06

## 2019-05-06 ENCOUNTER — OTHER (OUTPATIENT)
Age: 64
End: 2019-05-06

## 2019-05-06 DIAGNOSIS — I48.91 UNSPECIFIED ATRIAL FIBRILLATION: ICD-10-CM

## 2019-05-07 DIAGNOSIS — Z79.01 LONG TERM (CURRENT) USE OF ANTICOAGULANTS: ICD-10-CM

## 2019-05-15 LAB
INR PPP: 2 RATIO
QUALITY CONTROL: YES

## 2019-05-20 ENCOUNTER — APPOINTMENT (OUTPATIENT)
Dept: INTERNAL MEDICINE | Facility: CLINIC | Age: 64
End: 2019-05-20

## 2019-05-29 ENCOUNTER — APPOINTMENT (OUTPATIENT)
Dept: INTERNAL MEDICINE | Facility: CLINIC | Age: 64
End: 2019-05-29

## 2019-05-29 ENCOUNTER — OUTPATIENT (OUTPATIENT)
Dept: OUTPATIENT SERVICES | Facility: HOSPITAL | Age: 64
LOS: 1 days | End: 2019-05-29

## 2019-05-29 LAB
INR PPP: 2.2 RATIO
QUALITY CONTROL: YES

## 2019-05-30 DIAGNOSIS — I48.91 UNSPECIFIED ATRIAL FIBRILLATION: ICD-10-CM

## 2019-05-30 DIAGNOSIS — Z79.01 LONG TERM (CURRENT) USE OF ANTICOAGULANTS: ICD-10-CM

## 2019-06-10 ENCOUNTER — APPOINTMENT (OUTPATIENT)
Dept: INTERNAL MEDICINE | Facility: CLINIC | Age: 64
End: 2019-06-10

## 2019-06-17 ENCOUNTER — APPOINTMENT (OUTPATIENT)
Dept: INTERNAL MEDICINE | Facility: CLINIC | Age: 64
End: 2019-06-17

## 2019-06-17 ENCOUNTER — OUTPATIENT (OUTPATIENT)
Dept: OUTPATIENT SERVICES | Facility: HOSPITAL | Age: 64
LOS: 1 days | End: 2019-06-17

## 2019-06-20 DIAGNOSIS — Z79.01 LONG TERM (CURRENT) USE OF ANTICOAGULANTS: ICD-10-CM

## 2019-06-20 DIAGNOSIS — I48.91 UNSPECIFIED ATRIAL FIBRILLATION: ICD-10-CM

## 2019-06-20 LAB
INR PPP: 2.6 RATIO
POCT-PROTHROMBIN TIME: 31.4 SECS
QUALITY CONTROL: YES

## 2019-06-24 ENCOUNTER — OUTPATIENT (OUTPATIENT)
Dept: OUTPATIENT SERVICES | Facility: HOSPITAL | Age: 64
LOS: 1 days | End: 2019-06-24

## 2019-06-24 ENCOUNTER — LABORATORY RESULT (OUTPATIENT)
Age: 64
End: 2019-06-24

## 2019-06-24 ENCOUNTER — APPOINTMENT (OUTPATIENT)
Dept: INTERNAL MEDICINE | Facility: CLINIC | Age: 64
End: 2019-06-24
Payer: MEDICAID

## 2019-06-24 VITALS — DIASTOLIC BLOOD PRESSURE: 80 MMHG | HEART RATE: 74 BPM | SYSTOLIC BLOOD PRESSURE: 126 MMHG

## 2019-06-24 VITALS
WEIGHT: 173 LBS | HEIGHT: 63 IN | SYSTOLIC BLOOD PRESSURE: 140 MMHG | DIASTOLIC BLOOD PRESSURE: 84 MMHG | BODY MASS INDEX: 30.65 KG/M2 | HEART RATE: 65 BPM

## 2019-06-24 DIAGNOSIS — R73.03 PREDIABETES.: ICD-10-CM

## 2019-06-24 LAB
ALBUMIN SERPL ELPH-MCNC: 4.1 G/DL — SIGNIFICANT CHANGE UP (ref 3.3–5)
ALP SERPL-CCNC: 84 U/L — SIGNIFICANT CHANGE UP (ref 40–120)
ALT FLD-CCNC: 12 U/L — SIGNIFICANT CHANGE UP (ref 4–33)
ANION GAP SERPL CALC-SCNC: 9 MMO/L — SIGNIFICANT CHANGE UP (ref 7–14)
AST SERPL-CCNC: 16 U/L — SIGNIFICANT CHANGE UP (ref 4–32)
BASOPHILS # BLD AUTO: 0.1 K/UL — SIGNIFICANT CHANGE UP (ref 0–0.2)
BASOPHILS NFR BLD AUTO: 1.4 % — SIGNIFICANT CHANGE UP (ref 0–2)
BILIRUB SERPL-MCNC: 0.3 MG/DL — SIGNIFICANT CHANGE UP (ref 0.2–1.2)
BUN SERPL-MCNC: 17 MG/DL — SIGNIFICANT CHANGE UP (ref 7–23)
CALCIUM SERPL-MCNC: 10.1 MG/DL — SIGNIFICANT CHANGE UP (ref 8.4–10.5)
CHLORIDE SERPL-SCNC: 104 MMOL/L — SIGNIFICANT CHANGE UP (ref 98–107)
CHOLEST SERPL-MCNC: 100 MG/DL — LOW (ref 120–199)
CO2 SERPL-SCNC: 29 MMOL/L — SIGNIFICANT CHANGE UP (ref 22–31)
CREAT SERPL-MCNC: 0.91 MG/DL — SIGNIFICANT CHANGE UP (ref 0.5–1.3)
EOSINOPHIL # BLD AUTO: 0.47 K/UL — SIGNIFICANT CHANGE UP (ref 0–0.5)
EOSINOPHIL NFR BLD AUTO: 6.7 % — HIGH (ref 0–6)
GLUCOSE SERPL-MCNC: 99 MG/DL — SIGNIFICANT CHANGE UP (ref 70–99)
HBA1C BLD-MCNC: 6.2 % — HIGH (ref 4–5.6)
HCT VFR BLD CALC: 42.7 % — SIGNIFICANT CHANGE UP (ref 34.5–45)
HDLC SERPL-MCNC: 49 MG/DL — SIGNIFICANT CHANGE UP (ref 45–65)
HGB BLD-MCNC: 13.1 G/DL — SIGNIFICANT CHANGE UP (ref 11.5–15.5)
IMM GRANULOCYTES NFR BLD AUTO: 0.4 % — SIGNIFICANT CHANGE UP (ref 0–1.5)
LIPID PNL WITH DIRECT LDL SERPL: 51 MG/DL — SIGNIFICANT CHANGE UP
LYMPHOCYTES # BLD AUTO: 2.3 K/UL — SIGNIFICANT CHANGE UP (ref 1–3.3)
LYMPHOCYTES # BLD AUTO: 32.9 % — SIGNIFICANT CHANGE UP (ref 13–44)
MCHC RBC-ENTMCNC: 29.8 PG — SIGNIFICANT CHANGE UP (ref 27–34)
MCHC RBC-ENTMCNC: 30.7 % — LOW (ref 32–36)
MCV RBC AUTO: 97 FL — SIGNIFICANT CHANGE UP (ref 80–100)
MONOCYTES # BLD AUTO: 0.47 K/UL — SIGNIFICANT CHANGE UP (ref 0–0.9)
MONOCYTES NFR BLD AUTO: 6.7 % — SIGNIFICANT CHANGE UP (ref 2–14)
NEUTROPHILS # BLD AUTO: 3.63 K/UL — SIGNIFICANT CHANGE UP (ref 1.8–7.4)
NEUTROPHILS NFR BLD AUTO: 51.9 % — SIGNIFICANT CHANGE UP (ref 43–77)
NRBC # FLD: 0 K/UL — SIGNIFICANT CHANGE UP (ref 0–0)
PLATELET # BLD AUTO: 177 K/UL — SIGNIFICANT CHANGE UP (ref 150–400)
PMV BLD: 11.2 FL — SIGNIFICANT CHANGE UP (ref 7–13)
POTASSIUM SERPL-MCNC: 4.3 MMOL/L — SIGNIFICANT CHANGE UP (ref 3.5–5.3)
POTASSIUM SERPL-SCNC: 4.3 MMOL/L — SIGNIFICANT CHANGE UP (ref 3.5–5.3)
PROT SERPL-MCNC: 7.6 G/DL — SIGNIFICANT CHANGE UP (ref 6–8.3)
RBC # BLD: 4.4 M/UL — SIGNIFICANT CHANGE UP (ref 3.8–5.2)
RBC # FLD: 12.7 % — SIGNIFICANT CHANGE UP (ref 10.3–14.5)
SODIUM SERPL-SCNC: 142 MMOL/L — SIGNIFICANT CHANGE UP (ref 135–145)
TRIGL SERPL-MCNC: 58 MG/DL — SIGNIFICANT CHANGE UP (ref 10–149)
WBC # BLD: 7 K/UL — SIGNIFICANT CHANGE UP (ref 3.8–10.5)
WBC # FLD AUTO: 7 K/UL — SIGNIFICANT CHANGE UP (ref 3.8–10.5)

## 2019-06-24 PROCEDURE — 99214 OFFICE O/P EST MOD 30 MIN: CPT | Mod: GC

## 2019-06-25 ENCOUNTER — RESULT REVIEW (OUTPATIENT)
Age: 64
End: 2019-06-25

## 2019-06-28 ENCOUNTER — RX RENEWAL (OUTPATIENT)
Age: 64
End: 2019-06-28

## 2019-06-29 NOTE — PLAN
[FreeTextEntry1] : The patient is a 63 year old Dominican Creole speaking female with Afib, AVR/MVR (2009) on Coumadin, pre-diabetes mellitus, HTN and HLD who presents for follow up of the following issues:\par \par 1) Dizziness - the patient's dizziness has persisted for years. Differentials are arrhythmia vs intracranial lesion vs orthostatic 2/2 dehydration, blood loss or diuretics vs electrolyte abnormalities \par - Blood pressure on examination 126/80, patient endorses adequate po intake, dizziness is not associated with position, and denies any recent blood loss. Orthostatic is less likely.\par - Neurologic examination was normal, so intracranial lesion less likely\par - will check CMP to assess electrolytes and CBC to assess for bleeding\par - patient was supposed to complete echo and nuclear stress test, but has not; will refer for echo and stress test\par \par 2) Atrial fibrillation - continue with Coumadin 4 mg daily.\par \par 3) Hyperlipidemia - continue with simvastatin 10 mg daily\par \par 4) Hypertension - continue with HCTZ 12.5 mg daily and lisinopril 10 mg daily\par \par 5) Pre-diabetes mellitus - Last HbA1C was 6.3% in 08/2018. Will check HBA1C and lipid panel.\par \par 6) HCM - Will send for new FIT test. Up-to-date on mammogram. Age 63, so pap smears no longer needed.\par \par Patient discussed with Dr. Alvarado.\par All risks and benefits were discussed with patient. \par Patient and attending agreed with the above assessment and plan.\par \par Lina Robertson M.D. PGY1\par Internal Medicine\par [facility]\par [phone]\par \par \par

## 2019-06-29 NOTE — HISTORY OF PRESENT ILLNESS
[Other: _____] : [unfilled] [FreeTextEntry1] : The patient is here for a follow-up visit. [de-identified] : The patient is a 63 year old Bhutanese Creole speaking female with Afib, AVR/MVR (2009) on coumadin, pre-diabetes mellitus, HTN and HLD who presents for follow up of the following issues:\par \par 1) Dizziness - the patient continues to experience dizziness. These episodes occur at any time of the day and are not associated with any activity. The episodes last for a few seconds. She denies presyncopal symptoms. The room does not spin. She denies nausea or vomiting or hearing changes. She denies poor po intake, palpitations or shortness of breath. She does not feel limited in her daily activities, but her son notes that she does have to stop and hold onto something for balance when episodes occur. She has gait instability but has never fallen.\par \par 2) Atrial fibrillation - the patient is compliant with her Coumadin and INR monitoring visits. She denies episodes of bleeding or dark stools.\par \par 3) Hyperlipidemia - she takes simvastatin regularly.\par \par 4) Hypertension - she is compliant with her HCTZ and lisinopril daily.\par \par 5) HCM - The patient's last FIT test in 2017 was normal. Her last pap smear in 2015 was normal. Her last mammogram in 2018 was normal.

## 2019-06-29 NOTE — END OF VISIT
[] : Resident [FreeTextEntry3] : Pt's neurologic exam is normal today; cranial nerves and limbs examined carefully and no deficits identified.  will complete cardiac screening already planned, hydrate well, take additional care with position change.  will follow closely.  new neurology evaluation may be necessary if other cause cannot be readily identified.  FIT test as well.

## 2019-07-01 DIAGNOSIS — R73.03 PREDIABETES: ICD-10-CM

## 2019-07-01 DIAGNOSIS — I48.91 UNSPECIFIED ATRIAL FIBRILLATION: ICD-10-CM

## 2019-07-01 DIAGNOSIS — Z79.01 LONG TERM (CURRENT) USE OF ANTICOAGULANTS: ICD-10-CM

## 2019-07-01 DIAGNOSIS — Z01.419 ENCOUNTER FOR GYNECOLOGICAL EXAMINATION (GENERAL) (ROUTINE) WITHOUT ABNORMAL FINDINGS: ICD-10-CM

## 2019-07-01 DIAGNOSIS — I10 ESSENTIAL (PRIMARY) HYPERTENSION: ICD-10-CM

## 2019-07-01 DIAGNOSIS — R42 DIZZINESS AND GIDDINESS: ICD-10-CM

## 2019-07-26 ENCOUNTER — RX RENEWAL (OUTPATIENT)
Age: 64
End: 2019-07-26

## 2019-07-29 ENCOUNTER — OUTPATIENT (OUTPATIENT)
Dept: OUTPATIENT SERVICES | Facility: HOSPITAL | Age: 64
LOS: 1 days | End: 2019-07-29

## 2019-07-29 ENCOUNTER — APPOINTMENT (OUTPATIENT)
Dept: INTERNAL MEDICINE | Facility: CLINIC | Age: 64
End: 2019-07-29

## 2019-07-29 ENCOUNTER — RX RENEWAL (OUTPATIENT)
Age: 64
End: 2019-07-29

## 2019-08-01 DIAGNOSIS — Z79.01 LONG TERM (CURRENT) USE OF ANTICOAGULANTS: ICD-10-CM

## 2019-08-01 LAB
INR PPP: 3 RATIO
POCT-PROTHROMBIN TIME: 35.6 SECS
QUALITY CONTROL: YES

## 2019-08-05 ENCOUNTER — APPOINTMENT (OUTPATIENT)
Dept: INTERNAL MEDICINE | Facility: CLINIC | Age: 64
End: 2019-08-05

## 2019-08-12 ENCOUNTER — OUTPATIENT (OUTPATIENT)
Dept: OUTPATIENT SERVICES | Facility: HOSPITAL | Age: 64
LOS: 1 days | End: 2019-08-12

## 2019-08-12 ENCOUNTER — RESULT CHARGE (OUTPATIENT)
Age: 64
End: 2019-08-12

## 2019-08-12 ENCOUNTER — APPOINTMENT (OUTPATIENT)
Dept: INTERNAL MEDICINE | Facility: CLINIC | Age: 64
End: 2019-08-12

## 2019-08-12 DIAGNOSIS — I48.91 UNSPECIFIED ATRIAL FIBRILLATION: ICD-10-CM

## 2019-08-13 DIAGNOSIS — Z79.01 LONG TERM (CURRENT) USE OF ANTICOAGULANTS: ICD-10-CM

## 2019-08-16 LAB
INR PPP: 3.5 RATIO
POCT-PROTHROMBIN TIME: 41.5 SECS
QUALITY CONTROL: YES

## 2019-08-29 ENCOUNTER — FORM ENCOUNTER (OUTPATIENT)
Age: 64
End: 2019-08-29

## 2019-08-30 ENCOUNTER — INPATIENT (INPATIENT)
Facility: HOSPITAL | Age: 64
LOS: 1 days | Discharge: HOME | End: 2019-09-01
Attending: INTERNAL MEDICINE | Admitting: INTERNAL MEDICINE
Payer: MEDICARE

## 2019-08-30 ENCOUNTER — APPOINTMENT (OUTPATIENT)
Dept: CV DIAGNOSTICS | Facility: HOSPITAL | Age: 64
End: 2019-08-30
Payer: MEDICARE

## 2019-08-30 VITALS
SYSTOLIC BLOOD PRESSURE: 144 MMHG | TEMPERATURE: 99 F | RESPIRATION RATE: 16 BRPM | DIASTOLIC BLOOD PRESSURE: 78 MMHG | OXYGEN SATURATION: 100 % | HEART RATE: 63 BPM

## 2019-08-30 DIAGNOSIS — I48.91 UNSPECIFIED ATRIAL FIBRILLATION: ICD-10-CM

## 2019-08-30 DIAGNOSIS — E78.5 HYPERLIPIDEMIA, UNSPECIFIED: ICD-10-CM

## 2019-08-30 DIAGNOSIS — I10 ESSENTIAL (PRIMARY) HYPERTENSION: ICD-10-CM

## 2019-08-30 DIAGNOSIS — I25.2 OLD MYOCARDIAL INFARCTION: ICD-10-CM

## 2019-08-30 DIAGNOSIS — G45.9 TRANSIENT CEREBRAL ISCHEMIC ATTACK, UNSPECIFIED: ICD-10-CM

## 2019-08-30 LAB
ALBUMIN SERPL ELPH-MCNC: 3.8 G/DL — SIGNIFICANT CHANGE UP (ref 3.3–5)
ALP SERPL-CCNC: 85 U/L — SIGNIFICANT CHANGE UP (ref 40–120)
ALT FLD-CCNC: 17 U/L — SIGNIFICANT CHANGE UP (ref 4–33)
ANION GAP SERPL CALC-SCNC: 8 MMO/L — SIGNIFICANT CHANGE UP (ref 7–14)
APTT BLD: 42 SEC — HIGH (ref 27.5–36.3)
AST SERPL-CCNC: 24 U/L — SIGNIFICANT CHANGE UP (ref 4–32)
BASE EXCESS BLDV CALC-SCNC: 4.9 MMOL/L — SIGNIFICANT CHANGE UP
BASOPHILS # BLD AUTO: 0.13 K/UL — SIGNIFICANT CHANGE UP (ref 0–0.2)
BASOPHILS NFR BLD AUTO: 1.5 % — SIGNIFICANT CHANGE UP (ref 0–2)
BILIRUB SERPL-MCNC: 0.2 MG/DL — SIGNIFICANT CHANGE UP (ref 0.2–1.2)
BLOOD GAS VENOUS - CREATININE: 0.82 MG/DL — SIGNIFICANT CHANGE UP (ref 0.5–1.3)
BUN SERPL-MCNC: 15 MG/DL — SIGNIFICANT CHANGE UP (ref 7–23)
CALCIUM SERPL-MCNC: 9.4 MG/DL — SIGNIFICANT CHANGE UP (ref 8.4–10.5)
CHLORIDE BLDV-SCNC: 104 MMOL/L — SIGNIFICANT CHANGE UP (ref 96–108)
CHLORIDE SERPL-SCNC: 104 MMOL/L — SIGNIFICANT CHANGE UP (ref 98–107)
CO2 SERPL-SCNC: 27 MMOL/L — SIGNIFICANT CHANGE UP (ref 22–31)
CREAT SERPL-MCNC: 0.84 MG/DL — SIGNIFICANT CHANGE UP (ref 0.5–1.3)
EOSINOPHIL # BLD AUTO: 0.5 K/UL — SIGNIFICANT CHANGE UP (ref 0–0.5)
EOSINOPHIL NFR BLD AUTO: 5.9 % — SIGNIFICANT CHANGE UP (ref 0–6)
GAS PNL BLDV: 138 MMOL/L — SIGNIFICANT CHANGE UP (ref 136–146)
GLUCOSE BLDV-MCNC: 75 MG/DL — SIGNIFICANT CHANGE UP (ref 70–99)
GLUCOSE SERPL-MCNC: 98 MG/DL — SIGNIFICANT CHANGE UP (ref 70–99)
HBA1C BLD-MCNC: 6.3 % — HIGH (ref 4–5.6)
HCO3 BLDV-SCNC: 26 MMOL/L — SIGNIFICANT CHANGE UP (ref 20–27)
HCT VFR BLD CALC: 41.3 % — SIGNIFICANT CHANGE UP (ref 34.5–45)
HCT VFR BLDV CALC: 41.4 % — SIGNIFICANT CHANGE UP (ref 34.5–45)
HGB BLD-MCNC: 12.6 G/DL — SIGNIFICANT CHANGE UP (ref 11.5–15.5)
HGB BLDV-MCNC: 13.5 G/DL — SIGNIFICANT CHANGE UP (ref 11.5–15.5)
IMM GRANULOCYTES NFR BLD AUTO: 0.4 % — SIGNIFICANT CHANGE UP (ref 0–1.5)
INR BLD: 2.56 — HIGH (ref 0.88–1.17)
LACTATE BLDV-MCNC: 0.8 MMOL/L — SIGNIFICANT CHANGE UP (ref 0.5–2)
LYMPHOCYTES # BLD AUTO: 2.86 K/UL — SIGNIFICANT CHANGE UP (ref 1–3.3)
LYMPHOCYTES # BLD AUTO: 33.8 % — SIGNIFICANT CHANGE UP (ref 13–44)
MCHC RBC-ENTMCNC: 29.6 PG — SIGNIFICANT CHANGE UP (ref 27–34)
MCHC RBC-ENTMCNC: 30.5 % — LOW (ref 32–36)
MCV RBC AUTO: 96.9 FL — SIGNIFICANT CHANGE UP (ref 80–100)
MONOCYTES # BLD AUTO: 0.74 K/UL — SIGNIFICANT CHANGE UP (ref 0–0.9)
MONOCYTES NFR BLD AUTO: 8.7 % — SIGNIFICANT CHANGE UP (ref 2–14)
NEUTROPHILS # BLD AUTO: 4.2 K/UL — SIGNIFICANT CHANGE UP (ref 1.8–7.4)
NEUTROPHILS NFR BLD AUTO: 49.7 % — SIGNIFICANT CHANGE UP (ref 43–77)
NRBC # FLD: 0 K/UL — SIGNIFICANT CHANGE UP (ref 0–0)
PCO2 BLDV: 63 MMHG — HIGH (ref 41–51)
PH BLDV: 7.31 PH — LOW (ref 7.32–7.43)
PLATELET # BLD AUTO: 169 K/UL — SIGNIFICANT CHANGE UP (ref 150–400)
PMV BLD: 10.5 FL — SIGNIFICANT CHANGE UP (ref 7–13)
PO2 BLDV: < 24 MMHG — LOW (ref 35–40)
POTASSIUM BLDV-SCNC: 4.1 MMOL/L — SIGNIFICANT CHANGE UP (ref 3.4–4.5)
POTASSIUM SERPL-MCNC: 4.1 MMOL/L — SIGNIFICANT CHANGE UP (ref 3.5–5.3)
POTASSIUM SERPL-SCNC: 4.1 MMOL/L — SIGNIFICANT CHANGE UP (ref 3.5–5.3)
PROT SERPL-MCNC: 7.4 G/DL — SIGNIFICANT CHANGE UP (ref 6–8.3)
PROTHROM AB SERPL-ACNC: 29.3 SEC — HIGH (ref 9.8–13.1)
RBC # BLD: 4.26 M/UL — SIGNIFICANT CHANGE UP (ref 3.8–5.2)
RBC # FLD: 12.7 % — SIGNIFICANT CHANGE UP (ref 10.3–14.5)
SAO2 % BLDV: 26.1 % — LOW (ref 60–85)
SODIUM SERPL-SCNC: 139 MMOL/L — SIGNIFICANT CHANGE UP (ref 135–145)
WBC # BLD: 8.46 K/UL — SIGNIFICANT CHANGE UP (ref 3.8–10.5)
WBC # FLD AUTO: 8.46 K/UL — SIGNIFICANT CHANGE UP (ref 3.8–10.5)

## 2019-08-30 PROCEDURE — 70496 CT ANGIOGRAPHY HEAD: CPT | Mod: 26

## 2019-08-30 PROCEDURE — 70450 CT HEAD/BRAIN W/O DYE: CPT | Mod: 26,59

## 2019-08-30 PROCEDURE — 99223 1ST HOSP IP/OBS HIGH 75: CPT

## 2019-08-30 PROCEDURE — 70498 CT ANGIOGRAPHY NECK: CPT | Mod: 26

## 2019-08-30 RX ORDER — HYDROCHLOROTHIAZIDE 25 MG
25 TABLET ORAL DAILY
Refills: 0 | Status: DISCONTINUED | OUTPATIENT
Start: 2019-08-30 | End: 2019-09-01

## 2019-08-30 RX ORDER — LISINOPRIL 2.5 MG/1
10 TABLET ORAL DAILY
Refills: 0 | Status: DISCONTINUED | OUTPATIENT
Start: 2019-08-30 | End: 2019-09-01

## 2019-08-30 RX ORDER — ASPIRIN/CALCIUM CARB/MAGNESIUM 324 MG
81 TABLET ORAL DAILY
Refills: 0 | Status: DISCONTINUED | OUTPATIENT
Start: 2019-08-30 | End: 2019-09-01

## 2019-08-30 RX ORDER — SIMVASTATIN 20 MG/1
0 TABLET, FILM COATED ORAL
Qty: 0 | Refills: 0 | DISCHARGE

## 2019-08-30 RX ORDER — CHOLECALCIFEROL (VITAMIN D3) 125 MCG
1000 CAPSULE ORAL DAILY
Refills: 0 | Status: DISCONTINUED | OUTPATIENT
Start: 2019-08-30 | End: 2019-09-01

## 2019-08-30 RX ORDER — ASPIRIN/CALCIUM CARB/MAGNESIUM 324 MG
300 TABLET ORAL DAILY
Refills: 0 | Status: DISCONTINUED | OUTPATIENT
Start: 2019-08-30 | End: 2019-08-31

## 2019-08-30 RX ORDER — WARFARIN SODIUM 2.5 MG/1
4 TABLET ORAL ONCE
Refills: 0 | Status: COMPLETED | OUTPATIENT
Start: 2019-08-30 | End: 2019-08-30

## 2019-08-30 RX ORDER — PANTOPRAZOLE SODIUM 20 MG/1
40 TABLET, DELAYED RELEASE ORAL
Refills: 0 | Status: DISCONTINUED | OUTPATIENT
Start: 2019-08-30 | End: 2019-09-01

## 2019-08-30 RX ORDER — METOPROLOL TARTRATE 50 MG
12.5 TABLET ORAL
Refills: 0 | Status: DISCONTINUED | OUTPATIENT
Start: 2019-08-30 | End: 2019-08-31

## 2019-08-30 RX ORDER — SIMVASTATIN 20 MG/1
40 TABLET, FILM COATED ORAL AT BEDTIME
Refills: 0 | Status: DISCONTINUED | OUTPATIENT
Start: 2019-08-30 | End: 2019-09-01

## 2019-08-30 RX ORDER — METOPROLOL TARTRATE 50 MG
0 TABLET ORAL
Qty: 0 | Refills: 0 | DISCHARGE

## 2019-08-30 RX ADMIN — SIMVASTATIN 40 MILLIGRAM(S): 20 TABLET, FILM COATED ORAL at 21:21

## 2019-08-30 RX ADMIN — WARFARIN SODIUM 4 MILLIGRAM(S): 2.5 TABLET ORAL at 21:21

## 2019-08-30 NOTE — H&P ADULT - NSICDXPASTMEDICALHX_GEN_ALL_CORE_FT
PAST MEDICAL HISTORY:  Atrial fibrillation on coumadin    History of MI (myocardial infarction) 8-9 years ago in [facility]    History of stroke 2008 hospitalized in [facility]    HLD (hyperlipidemia)     HTN (hypertension) PAST MEDICAL HISTORY:  Atrial fibrillation on coumadin    Cerebrovascular accident (CVA)     History of MI (myocardial infarction) 8-9 years ago in [facility]    History of stroke 2008 hospitalized in [facility]    HLD (hyperlipidemia)     HTN (hypertension)

## 2019-08-30 NOTE — ED ADULT TRIAGE NOTE - CHIEF COMPLAINT QUOTE
code stroke from stress lab. as per son, pt became unresponsive for a few seconds. pt at baseline at this time, as per son. pt sent to rm 23 ct scan done prior to arrival

## 2019-08-30 NOTE — ED PROVIDER NOTE - PROGRESS NOTE DETAILS
Per neuro: no TPA at this time, possible heparin bridge if INR subtherapeutic. TBA for MRI and medical management  Arias Gunn MD, PGY3 Emergency Medicine

## 2019-08-30 NOTE — H&P ADULT - NSHPLABSRESULTS_GEN_ALL_CORE
EKG, 8/30, afib 73bpm, qtc 484, bi-phasic Tw in II, III, aVF, V5-6, I, aVL - unchanged compared to 2/3/2019 EKG - my reading       TTE 1/31/2019:  EF=60%  1. Bioprosthetic mitral valve replacement. Mild mitral  regurgitation.  Mean transmitral valve gradient equals 7 mm  Hg, which is elevated even in the setting of a prosthetic  valve.  2. Bioprosthetic aortic valve replacement. Peak transaortic  valve gradient equals 18 mm Hg, mean transaortic valve  gradient equals 10 mm Hg, which is probably normal in the  presence of a prosthetic valve. Minimal aortic  regurgitation.  3. Mildly dilated left atrium.  LA volume index = 37 cc/m2.  4. Normal left ventricular internal dimensions and wall  thicknesses.  5. Normal left ventricular systolic function. No segmental  wall motion abnormalities.  Paradoxical septal wall motion,  consistent with prior cardiac surgery.  6. Normal right ventricular size and function.  7. Estimated right ventricular systolic pressure equals 46  mm Hg, assuming right atrial pressure equals 10 mm Hg,  consistent with mild pulmonary hypertension. EKG, 8/30, afib 73bpm, qtc 484, bi-phasic Tw in II, III, aVF, V5-6, I, aVL - unchanged compared to 2/3/2019 EKG - my reading       TTE 1/31/2019:  EF=60%  1. Bioprosthetic mitral valve replacement. Mild mitral  regurgitation.  Mean transmitral valve gradient equals 7 mm  Hg, which is elevated even in the setting of a prosthetic  valve.  2. Bioprosthetic aortic valve replacement. Peak transaortic  valve gradient equals 18 mm Hg, mean transaortic valve  gradient equals 10 mm Hg, which is probably normal in the  presence of a prosthetic valve. Minimal aortic  regurgitation.  3. Mildly dilated left atrium.  LA volume index = 37 cc/m2.  4. Normal left ventricular internal dimensions and wall  thicknesses.  5. Normal left ventricular systolic function. No segmental  wall motion abnormalities.  Paradoxical septal wall motion,  consistent with prior cardiac surgery.  6. Normal right ventricular size and function.  7. Estimated right ventricular systolic pressure equals 46  mm Hg, assuming right atrial pressure equals 10 mm Hg,  consistent with mild pulmonary hypertension.       CT BRAIN STROKE PROTOCOL        PROCEDURE DATE:  Aug 30 2019         INTERPRETATION:  CLINICAL INFORMATION: Acute onset altered mental status   and right paresthesia, resolving, code stroke.    TECHNIQUE: Sequential axial images were obtained from the vertex to the   skull base without intravenous contrast. Coronal and sagittal   reformations were obtained.     COMPARISON: CT brain 2/3/2019.    FINDINGS:    There is no acute intracranial hemorrhage or mass effect. A chronic right   cerebellar infarct is again noted.Gray-white matter differentiation is   otherwise preserved. There are chronic white matter microvascular   ischemic changes. The ventricles and sulci are stable in size.    The calvarium is intact.     Moderate ethmoid mucosal thickening. The mastoid cavities are   underpneumatized.    IMPRESSION:   No acute intracranial hemorrhage, mass effect, or CT evidence of acute   intracranial pathology.

## 2019-08-30 NOTE — CONSULT NOTE ADULT - ATTENDING COMMENTS
abd pain resolved, vomiting resolved
63-year-old right-handed lady first evaluated [facility] on 8/30/19 with speech difficulty and right-sided weakness. She has a history of atrial fibrillation and porcine mitral valve replacement, on warfarin. In 2008 she had a stroke, most likely with an unsteady gait. On 8/30/19, while about to undergo an outpatient stress test, she developed sudden inability to generate speech, right hemiparesis and gaze deviation (no details available). ROS otherwise negative. Exam. Alert and attentive; speech fluent and prosodic; followed crossed commands; flattening of right nasolabial fold but her smile was symmetric; no drift and power 5/5 throughout; no limb ataxia; gait not tested (earlier gait testing reportedly showed postural instability with a tendency to stagger to her right, but it is unclear whether this was her recent baseline or not); remainder of neurologic exam was nonfocal. CT head (8/30/19) to my eye was unremarkable. CTA neck and head (8/30/19) to my eye was unremarkable. Impression. On 8/30/19, while awaiting an outpatient stress test, she developed sudden difficulty generating speech, perhaps a motor aphasia, and right hemiparesis. Her presentation was consistent with left hemispheric dysfunction, possibly a stroke or TIA due to cardioembolism, related to atrial fibrillation. Her deficits have essentially resolved, and therefore she was excluded from IV TPA and endovascular thrombectomy (also no large artery occlusion). Suggest. Elective MRI brain either as inpatient or outpatient; followup INR; if INR is less than 2, then she may benefit from bridging with IV heparin or therapeutic Lovenox until INR is therapeutic, along with PT/OT; if INR is greater than 2, then most likely her Coumadin can be increased slightly and, as long as her gait were safe, she would be cleared from the neurovascular standpoint for discharge.

## 2019-08-30 NOTE — ED PROVIDER NOTE - PSH
S/P AVR (aortic valve replacement)  1/09 metal in [place]  S/P MVR (mitral valve replacement)  1/09 metal in [place]

## 2019-08-30 NOTE — H&P ADULT - PROBLEM SELECTOR PLAN 1
TELE will resume home meds, but will need to clarify doses in am, will do ECHO in am and will order MRI/MRA as well will resume home coumadin and ASA FLP and A1C in am, will F/U neurology recommendation  will do Official dysphagia screen and document it, but for now blaire resume PO meds, PT/OT ordered, TELE  2D ECHO of heart  MRI/MRA r/o CVA  c/w coumadin and ASA FLP and A1C in am  Evaluated by neurology   Official dysphagia screen   PT/OT ordered

## 2019-08-30 NOTE — CONSULT NOTE ADULT - SUBJECTIVE AND OBJECTIVE BOX
HPI:    Patient is a 62yo RH Creole speaking woman who presents to the facility for outpatient cardiac stress testing. At 11:07 AM this morning, while sitting in the outpatient waiting room patient suffered an episode of abrupt lack of verbal output, reported lateral gaze deviation, R lower facial droop and RUE and RLE weakness. Patient harbors a past medical significant for HTN, HLD, MV/ AV Replacement, MI, Atrial Fibrillation on Warfarin, and prior R cerebellar infarct (11years ago). Code Stroke called for which Neurology was consulted. NIHSS of 3 upon arrival and examining the patient. MRS of 0. Son who translates during the encounter did not notice any dysarthria, aphrasia, syntactical or grammatical speech errors in patients native language. CT head noncontrast demonstrates old R cerebellar infarct, no acute intracranial process. CTA of Head and Neck: No occlusion, significant stenosis or other vascular abnormality identified. Patient relocated to ED after CT scans. Patient returned to baseline as deemed by self and by family upon arrival to the ED    (Stroke only)  NIHSS: 3  MRS: 0    A 10-system ROS was performed and is negative except for those items noted above and/or in the HPI.    PAST MEDICAL & SURGICAL HISTORY:  History of stroke: 2008 hospitalized in [facility]  History of MI (myocardial infarction): 8-9 years ago in [facility]  Atrial fibrillation: on coumadin  HLD (hyperlipidemia)  HTN (hypertension)  S/P MVR (mitral valve replacement): 1/09 metal in [place]  S/P AVR (aortic valve replacement): 1/09 metal in [place]    FAMILY HISTORY:  No pertinent family history in first degree relatives    MEDICATIONS (HOME):  Home Medications:  acetaminophen 325 mg oral tablet: 2 tab(s) orally every 6 hours, As Needed for pain (24 Jun 2016 11:31)  aspirin 81 mg oral tablet: 1 tab(s) orally once a day (22 Jun 2016 17:26)  hydrochlorothiazide 25 mg oral tablet: 1 tab(s) orally once a day (24 Jun 2016 11:31)  lisinopril 10 mg oral tablet: 1 tab(s) orally once a day (22 Jun 2016 17:26)  pantoprazole 40 mg oral delayed release tablet: 1 tab(s) orally once a day (22 Jun 2016 17:26)  Vitamin D3 2000 intl units oral tablet: 1 tab(s) orally once a day (22 Jun 2016 17:26)  warfarin 2 mg oral tablet:  orally once a day 6mg Mon-Thurs, 4 mg Sat and Sun, no coumadin on Friday.  - as before, have INR check in 2 - 3 days (24 Jun 2016 11:27)    MEDICATIONS  (STANDING):    MEDICATIONS  (PRN):    ALLERGIES/INTOLERANCES:  Allergies  No Known Allergies    Intolerances    VITALS & EXAMINATION:  Vital Signs Last 24 Hrs  T(C): 37.1 (30 Aug 2019 12:13), Max: 37.1 (30 Aug 2019 12:13)  T(F): 98.7 (30 Aug 2019 12:13), Max: 98.7 (30 Aug 2019 12:13)  HR: 66 (30 Aug 2019 12:25) (63 - 66)  BP: 167/107 (30 Aug 2019 12:25) (144/78 - 167/107)  BP(mean): --  RR: 18 (30 Aug 2019 12:25) (16 - 18)  SpO2: 99% (30 Aug 2019 12:25) (99% - 100%)    General:  Constitutional:  Female, appears stated age, in no apparent distress including pain  Head: Normocephalic & atraumatic.  Neurological (>12):  MS: Awake, alert, oriented to person, place, situation, time. Normal affect. Follows all commands.    Language: Speech is clear, fluent with good repetition & comprehension (able to name objects)    CNs: PERRLA (R = 3mm, L = 3mm). VFF. EOMI no nystagmus, no diplopia. V1-3 intact to LT/pinprick, well developed masseter muscles b/l. Mild R Facial Droop, full eye closure strength b/l. Hearing grossly normal (rubbing fingers) b/l. Symmetric palate elevation in midline. Gag reflex deferred. Head turning & shoulder shrug intact b/l. Tongue midline, normal movements, no atrophy.    Motor: Normal muscle bulk & tone. No noticeable tremor or seizure. No pronator drift.              Deltoid	Biceps	Triceps	Wrist	Finger ABd	   R	5	5	5	5	5		5 	  L	5	5	5	5	5		5    	H-Flex	H-Ext	H-ABd	H-ADd	K-Flex	K-Ext	D-Flex	P-Flex  R	4+	5	5	5	5	4+	5	4+	   L	5	5	5	5	5	5	5	5	     Sensation: Intact to LT/PP b/l throughout.     Cortical: Extinction on DSS (neglect): none    Reflexes:              Biceps(C5)       BR(C6)     Triceps(C7)               Patellar(L4)    Achilles(S1)    Plantar Resp  R	2	          2	             2		        2		    2		Down   L	2	          2	             2		        2		    2		Down     Coordination: intact rapid-alt movements. No dysmetria to FTN/HTS    Gait: Normal Romberg. No postural instability. Normal stance and tandem gait.       Radiology (XR, CT, MR, U/S, TTE/MARSHA):    < from: CT Brain Stroke Protocol (08.30.19 @ 11:31) >      IMPRESSION:   No acute intracranial hemorrhage, mass effect, or CT evidence of acute   intracranial pathology.    < end of copied text >    < from: CT Angio Neck w/ IV Cont (08.30.19 @ 11:50) >  FINDINGS:   Neck CTA:    The visualized aorta and great vessels are unremarkable. The common   carotid arteries are normal in appearance.   The internal carotid arteries and external carotid arteries are patent.   The vertebral arteries are patent.    Brain CTA:    The distal internal carotid arteries are patent.   The [place] is normal in appearance.   The visualized cerebral arteries are unremarkable.   The vertebrobasilar junction and basilar artery are normal.    All measurements are performed using standard NASCET criteria.    CT of the head is unchanged from the prior exam. No abnormal enhancement   is seen. Chronic right cerebellar infarct is again seen. Moderate sinus   mucosal thickening is seen.    IMPRESSION:  No occlusion, significant stenosis or other vascular   abnormality identified.    < end of copied text >

## 2019-08-30 NOTE — H&P ADULT - PROBLEM SELECTOR PLAN 3
resume simvastatin and FLP in am pCO2=63 on VBG; Coincidental finding; Unclear etiology: undiagnosed chronic pulmonary disesase vs ?edema  -CXR pending  -repeat VBG in AM  -ProBNP in AM  -consider Pulmonology c/s

## 2019-08-30 NOTE — H&P ADULT - HISTORY OF PRESENT ILLNESS
Pt 62yo Female with hx of HTN PreDM, Dyslipidemia, CAD MI in 2009, AFIB on AC, BIO (as per family) AVR and MVR and Hx of CVA (right cerebellar infarct 11years ago). presenting to [facility] ER from STRESS LAB for sudden onset of weakness and altered mental status. According to family and Pt, today around 1100 this morning at stress test lab. Pt was on a stretcher getting IV placed. Soon after IV was placed Pt became altered with abrupt lack of verbal output, reported lateral gaze deviation, right lower facial droop and right upper and right lower extremity weakness weakness. Episode lasted for about 30 minutes and gradually Pt returned to her baseline. Code stroke was called and Pt was rushed to ED to have CT HEAD. CT head noncontrast demonstrates old right cerebellar infarct with no evidence of acute intracranial process. CTA of Head and Neck revealed no occlusion, significant stenosis or other vascular abnormality identified. Her deficits have essentially resolved in less then 40 minutes, and therefore she was excluded from IV TPA Pt 62yo Female with hx of HTN PreDM, Dyslipidemia, CAD MI in 2009, AFIB on AC, BIO (as per family) AVR and MVR and Hx of CVA (right cerebellar infarct 11years ago). presenting to [facility] ER from STRESS LAB for sudden onset of weakness and altered mental status. According to family and Pt, today around 1100 this morning at stress test lab. Pt was on a stretcher getting IV placed. Soon after IV was placed Pt became altered with abrupt lack of verbal output, reported lateral gaze deviation, right lower facial droop and right upper and right lower extremity weakness weakness. Episode lasted for about 30 minutes and gradually Pt returned to her baseline. Code stroke was called and Pt was rushed to ED to have CT HEAD. CT head noncontrast demonstrates old right cerebellar infarct with no evidence of acute intracranial process. CTA of Head and Neck revealed no occlusion, significant stenosis or other vascular abnormality identified. Her deficits have essentially resolved in less then 40 minutes, and therefore she was excluded from IV TPA   Prior to today's event Pt reports feeling normal self with no complains, but long standing exertional dyspnea as well as dry positional cough. Due to exertional symptoms Pt was recommended to have a routing STRESS TEST that was arranged for today.  Pt reports no fever or chills, no N/V/D, no GI complains, and reports no Chest pain dizziness or palpitations.   Pt reports Hx of CVA in 2008 with associated right residual weakness and speech difficulty that completely resolved. 64yo Female with hx of HTN, PreDM, Dyslipidemia, CAD MI in 2009, AFIB on AC, BIO (as per family) AVR and MVR and Hx of CVA (right cerebellar infarct 11years ago). presenting to [facility] ER from STRESS LAB for sudden onset of weakness and altered mental status. According to family and Pt, today around 1100 this morning at stress test lab. Pt was on a stretcher getting IV placed. Soon after IV was placed Pt became altered with abrupt lack of verbal output, reported lateral gaze deviation, right lower facial droop and right upper and right lower extremity weakness weakness. Episode lasted for about 30 minutes and gradually Pt returned to her baseline. Code stroke was called and Pt was rushed to ED to have CT HEAD. CT head noncontrast demonstrates old right cerebellar infarct with no evidence of acute intracranial process. CTA of Head and Neck revealed no occlusion, significant stenosis or other vascular abnormality identified. Her deficits have essentially resolved in less then 40 minutes, and therefore she was excluded from IV TPA   Prior to today's event Pt reports feeling normal self with no complains, but long standing exertional dyspnea as well as dry positional cough. Due to exertional symptoms Pt was recommended to have a routing STRESS TEST that was arranged for today.  Pt reports no fever or chills, no N/V/D, no GI complains, and reports no Chest pain dizziness or palpitations.   Pt reports Hx of CVA in 2008 with associated right residual weakness and speech difficulty that completely resolved. 62yo Female with hx of HTN, Pre-DM, dyslipidemia, CAD MI in 2009, A-FIB on A/C, BIO (as per family) AVR and MVR and Hx of CVA (right cerebellar infarct 11years ago). presenting to [facility] ER from STRESS LAB for sudden onset of weakness and altered mental status. According to family and Pt, today around 1100 this morning at stress test lab. Pt was on a stretcher getting IV placed. Soon after IV was placed Pt became altered with abrupt lack of verbal output, reported lateral gaze deviation, right lower facial droop and right upper and right lower extremity weakness weakness. Episode lasted for about 30 minutes and gradually Pt returned to her baseline. Code stroke was called and Pt was rushed to ED to have CT HEAD. CT head noncontrast demonstrates old right cerebellar infarct with no evidence of acute intracranial process. CTA of Head and Neck revealed no occlusion, significant stenosis or other vascular abnormality identified. Her deficits have essentially resolved in less then 40 minutes, and therefore she was excluded from IV TPA.  Prior to today's event Pt reports feeling normal self with no complains, but long standing exertional dyspnea as well as dry positional cough. Due to exertional symptoms Pt was recommended to have a routing STRESS TEST that was arranged for today.  Pt reports no fever or chills, no N/V/D, no GI complains, and reports no Chest pain dizziness or palpitations.   Pt reports Hx of CVA in 2008 with associated right residual weakness and speech difficulty that completely resolved. 64yo Female with hx of HTN, Pre-DM, dyslipidemia, CAD MI in 2009, A-FIB on A/C, BIO (as per family) AVR and MVR and Hx of CVA (right cerebellar infarct 11years ago). presenting to [facility] ER from STRESS LAB for sudden onset of weakness and altered mental status. According to family and the patient, today around 1100 this morning, at stress test lab, while awaiting an outpatient stress test (was on a stretcher getting IV placed) became altered with abrupt lack of verbal output, reported lateral gaze deviation, right lower facial droop and right upper and right lower extremity weakness weakness. Episode lasted for about 30 minutes and gradually Pt returned to her baseline. Code stroke was called and Pt was rushed to ED to have CT HEAD. CT head noncontrast demonstrates old right cerebellar infarct with no evidence of acute intracranial process. CTA of Head and Neck revealed no occlusion, significant stenosis or other vascular abnormality identified. Her deficits have essentially resolved in less then 40 minutes, and therefore she was excluded from IV TPA.  Prior to today's event Pt reports feeling normal self with no complains, but long standing exertional dyspnea as well as dry positional cough. Due to exertional symptoms Pt was recommended to have a routing STRESS TEST that was arranged for today. Per [facility] cardiology progress notes, 2/7/19, indication for the pharmacologic nuclear stress test was VIRAMONTES and dizziness;    Pt reports no fever or chills, no N/V/D, no GI complains, and reports no chest pain dizziness or palpitations.   Pt reports Hx of CVA in 2008 with associated right residual weakness and speech difficulty that completely resolved. 64yo Female with hx of HTN, Pre-DM, dyslipidemia, CAD MI in 2009, A-FIB on A/C, BIO (as per family) AVR and MVR and Hx of CVA (right cerebellar infarct 11years ago). presenting to [facility] ER from STRESS LAB for sudden onset of weakness and altered mental status. According to family and the patient, today around 1100 this morning, at stress test lab, while awaiting an outpatient stress test (was on a stretcher getting IV placed) became altered with abrupt lack of verbal output, reported lateral gaze deviation, right lower facial droop and right upper and right lower extremity weakness weakness. Episode lasted for about 30 minutes and gradually Pt returned to her baseline. Code stroke was called and Pt was rushed to ED to have CT HEAD. CT head noncontrast demonstrates old right cerebellar infarct with no evidence of acute intracranial process. CTA of Head and Neck revealed no occlusion, significant stenosis or other vascular abnormality identified. Her deficits have essentially resolved in less then 40 minutes, and therefore she was excluded from IV TPA.  Prior to today's event Pt reports feeling normal self with no complains, but long standing exertional dyspnea as well as dry positional cough. Per [facility] cardiology progress notes, 2/7/19, indication for the pharmacologic nuclear stress test was VIRAMONTES and dizziness;    Pt reports no fever or chills, no N/V/D, no GI complains, and reports no chest pain dizziness or palpitations.   Pt reports Hx of CVA in 2008 with associated right residual weakness and speech difficulty that completely resolved.

## 2019-08-30 NOTE — H&P ADULT - PROBLEM SELECTOR PLAN 2
Will resume home Coumadin for now, with BB, but will need to clarify meds in am  will F/U MRI/MRA if negative will continue with Coumadin, if new finding will reevaluate coumadin Will resume home Coumadin for now INR is theraputic, with BB, but will need to clarify meds in am [id number] risk stratification score 6  c/w Coumadin  INR is therapeutic  c/w low dose metoprolol - will need to clarify dose in am

## 2019-08-30 NOTE — H&P ADULT - ASSESSMENT
PT 64yo female with Hx of HTN Pre DM CAD CVA AFIB on AC and Bio MVR and AVR,  while about to undergo an outpatient stress test, developed sudden inability to generate speech, right hemiparesis and gaze deviation (no details available). ROS otherwise revealed longstanding exertional dyspnea. Exam. Alert and attentive; speech fluent and prosodic; followed crossed commands; flattening of right nasolabial fold but her smile was symmetric; no drift and power 5/5 throughout; no limb ataxia; gait not tested (earlier gait testing reportedly showed postural instability with a tendency to stagger to her right remainder of neurologic exam was nonfocal. unofficial bedside S&S was done by family member and was uneventful 64yo female with Hx of HTN Pre DM Type 2, CAD, CVA, A-FIB on A/C, Bio MVR and AVR per recent TTE,  while about to undergo an outpatient stress test, developed sudden inability to generate speech, right hemiparesis and gaze deviation (no details available). ROS otherwise revealed longstanding exertional dyspnea. Exam. Alert and attentive; speech fluent and prosodic; followed crossed commands; flattening of right nasolabial fold but her smile was symmetric; no drift and power 5/5 throughout; no limb ataxia; gait not tested (earlier gait testing reportedly showed postural instability with a tendency to stagger to her right remainder of neurologic exam was nonfocal. unofficial bedside S&S was done by family member and was uneventful 64yo female with Hx of HTN Pre DM Type 2, CAD, CVA, A-FIB on A/C, Bio MVR and AVR per recent TTE a/w a transient episode of sudden inability to speak, right hemiparesis and gaze abnormality likely due to TIA vs CVA; Coincidental finding of hypercarbia;

## 2019-08-30 NOTE — H&P ADULT - NEGATIVE GENERAL SYMPTOMS
no sweating/no anorexia/no fever/no fatigue/no malaise/no chills/no weight loss/no polyuria/no weight gain/no polyphagia/no polydipsia

## 2019-08-30 NOTE — H&P ADULT - PROBLEM SELECTOR PLAN 5
resume ASA statins and BB resume ASA statins and BB, With was supposed to have a routine  stress test today for evaluation of exertional dyspnea. Pt does not describe any C/P and reports that VIRAMONTES is old, for now will hold on STRESS TEST and call cardiology in am for further F/U and arrange another date for ischemic W/U resume simvastatin and FLP in am

## 2019-08-30 NOTE — ED ADULT NURSE NOTE - NSIMPLEMENTINTERV_GEN_ALL_ED
Implemented All Universal Safety Interventions:  [place] to call system. Call bell, personal items and telephone within reach. Instruct patient to call for assistance. Room bathroom lighting operational. Non-slip footwear when patient is off stretcher. Physically safe environment: no spills, clutter or unnecessary equipment. Stretcher in lowest position, wheels locked, appropriate side rails in place.

## 2019-08-30 NOTE — H&P ADULT - PROBLEM SELECTOR PLAN 4
resume home meds -Screening EKG significant for biphasic Tw  -Per [facility] cardiology progress notes, 2/7/19, indication for the pharmacologic nuclear stress test was VIRAMONTES and dizziness;  -Reach out to Dr. Atkinson (cardiology) in AM regarding the admission

## 2019-08-30 NOTE — ED PROVIDER NOTE - PHYSICAL EXAMINATION
General: Well appearing, alert, oriented, no acute distress. Resting in bed.  HEENT: PERRLA EOMI. No trauma/bruising noted to head or face. No lip/tongue/throat swelling noted on exam.  CV: Regular rate and rhythm, S1/S2, no murmurs/rubs/gallops noted on exam. No tenderness to palpation to chest wall.  Lungs: Clear to ascultation bilaterally, no wheezes/crackles/rales noted on exam. Equal chest wall excursion noted.   Abdomen: Soft, non tender, non distended, no guarding or rebound. No CVA tenderness to palpation.   MSK: Full ROM of upper and lower extremities bilaterally. No tenderness to palpation to extremities. Full ROM of neck. No C-spine or spinal bony tenderness to palpation. No gross deformities noted to extremities.  Neuro: Awake, A+O x4, moving all extremities spontaneously. CN 2-12 grossly intact. No nystagmus noted. Strength and sensation grossly intact to all extremities EXCEPT DECREASED STRENGTH 4/5 TO RLE. Ambulatory w/o assist, normal gait. No pronator drift. Finger to nose normal.  Extremities: No swelling or edema noted to extremities. No calf tenderness to palpation.   Skin: No rash noted on exam. General: Well appearing, alert, oriented, no acute distress. Resting in bed.  HEENT: PERRLA EOMI. No trauma/bruising noted to head or face. No lip/tongue/throat swelling noted on exam.  CV: Regular rate and rhythm, S1/S2, no murmurs/rubs/gallops noted on exam. No tenderness to palpation to chest wall.  Lungs: Clear to ascultation bilaterally, no wheezes/crackles/rales noted on exam. Equal chest wall excursion noted.   Abdomen: Soft, non tender, non distended, no guarding or rebound. No CVA tenderness to palpation.   MSK: Full ROM of upper and lower extremities bilaterally. No tenderness to palpation to extremities. Full ROM of neck. No C-spine or spinal bony tenderness to palpation. No gross deformities noted to extremities.  Neuro: Awake, A+O x4, moving all extremities spontaneously. CN 2-12 grossly intact. No nystagmus noted. Strength and sensation grossly intact to all extremities EXCEPT DECREASED STRENGTH 4/5 TO RLE. Ambulatory w/o assist, however,. slightly ataxic to right. No pronator drift. Finger to nose normal.  Extremities: No swelling or edema noted to extremities. No calf tenderness to palpation.   Skin: No rash noted on exam.

## 2019-08-30 NOTE — ED PROVIDER NOTE - ATTENDING CONTRIBUTION TO CARE
Attending note:   After face to face evaluation of this patient, I concur with above noted hx, pe, and care plan for this patient.  64 y/o F with 30 minutes of right sided weakness with facial droop, arrived in ED with slight pronator drift only.   Patient on coumadin; stroke code in evaluation

## 2019-08-30 NOTE — H&P ADULT - NSICDXPASTSURGICALHX_GEN_ALL_CORE_FT
PAST SURGICAL HISTORY:  S/P AVR (aortic valve replacement) 1/09 metal in [place]    S/P MVR (mitral valve replacement) 1/09 metal in [place] PAST SURGICAL HISTORY:  S/P AVR (aortic valve replacement) 1/09 metal in [place]    S/P MVR (mitral valve replacement) 1/09 metal in [place]

## 2019-08-30 NOTE — ED PROVIDER NOTE - PMH
Atrial fibrillation  on coumadin  History of MI (myocardial infarction)  8-9 years ago in [facility]  History of stroke  2008 hospitalized in [facility]  HLD (hyperlipidemia)    HTN (hypertension)

## 2019-08-30 NOTE — CONSULT NOTE ADULT - ASSESSMENT
64yo RH Creole speaking woman who presents to the facility for outpatient cardiac stress testing. At 11:07 AM this morning, while sitting in the outpatient waiting room patient suffered an episode of abrupt lack of verbal output, reported lateral gaze deviation, R lower facial droop and RUE and RLE weakness. Patient harbors a past medical significant for HTN, HLD, MV/ AV Replacement, MI, Atrial Fibrillation on Warfarin, and prior R cerebellar infarct (11years ago). Code Stroke called for which Neurology was consulted. NIHSS of 3 upon arrival and examining the patient. MRS of 0. Son who translates during the encounter did not notice any dysarthria, aphrasia, syntactical or grammatical speech errors in patients native language. CT head noncontrast demonstrates old R cerebellar infarct, no acute intracranial process. CTA of Head and Neck: No occlusion, significant stenosis or other vascular abnormality identified. Patient returned to baseline as deemed by self and by family upon arrival to the ED. Patient possesses numerous risk factors for stroke development however patient in light of a definitive return to baseline has most likely suffered a TIA of the L M1 distribution. DDx: TIA from the distribution of L M1 vs Stroke    Impression: TIA from the distribution of L M1 vs Stroke    Plan:    Admit to Medicine  MRI Brain w/o contrast  Optimize INR with Coumadin via medical management  Neuro checks q4h

## 2019-08-31 DIAGNOSIS — R94.31 ABNORMAL ELECTROCARDIOGRAM [ECG] [EKG]: ICD-10-CM

## 2019-08-31 DIAGNOSIS — I48.2 CHRONIC ATRIAL FIBRILLATION: ICD-10-CM

## 2019-08-31 DIAGNOSIS — R06.89 OTHER ABNORMALITIES OF BREATHING: ICD-10-CM

## 2019-08-31 DIAGNOSIS — Z79.899 OTHER LONG TERM (CURRENT) DRUG THERAPY: ICD-10-CM

## 2019-08-31 DIAGNOSIS — Z29.9 ENCOUNTER FOR PROPHYLACTIC MEASURES, UNSPECIFIED: ICD-10-CM

## 2019-08-31 DIAGNOSIS — G45.9 TRANSIENT CEREBRAL ISCHEMIC ATTACK, UNSPECIFIED: ICD-10-CM

## 2019-08-31 LAB
ALBUMIN SERPL ELPH-MCNC: 3.5 G/DL — SIGNIFICANT CHANGE UP (ref 3.3–5)
ALP SERPL-CCNC: 81 U/L — SIGNIFICANT CHANGE UP (ref 40–120)
ALT FLD-CCNC: 16 U/L — SIGNIFICANT CHANGE UP (ref 4–33)
ANION GAP SERPL CALC-SCNC: 9 MMO/L — SIGNIFICANT CHANGE UP (ref 7–14)
APTT BLD: 42.6 SEC — HIGH (ref 27.5–36.3)
AST SERPL-CCNC: 20 U/L — SIGNIFICANT CHANGE UP (ref 4–32)
BASE EXCESS BLDV CALC-SCNC: 1.3 MMOL/L — SIGNIFICANT CHANGE UP
BASOPHILS # BLD AUTO: 0.09 K/UL — SIGNIFICANT CHANGE UP (ref 0–0.2)
BASOPHILS NFR BLD AUTO: 1.3 % — SIGNIFICANT CHANGE UP (ref 0–2)
BILIRUB SERPL-MCNC: 0.2 MG/DL — SIGNIFICANT CHANGE UP (ref 0.2–1.2)
BLOOD GAS VENOUS - CREATININE: 0.71 MG/DL — SIGNIFICANT CHANGE UP (ref 0.5–1.3)
BUN SERPL-MCNC: 13 MG/DL — SIGNIFICANT CHANGE UP (ref 7–23)
CALCIUM SERPL-MCNC: 9.1 MG/DL — SIGNIFICANT CHANGE UP (ref 8.4–10.5)
CHLORIDE BLDV-SCNC: 108 MMOL/L — SIGNIFICANT CHANGE UP (ref 96–108)
CHLORIDE SERPL-SCNC: 104 MMOL/L — SIGNIFICANT CHANGE UP (ref 98–107)
CHOLEST SERPL-MCNC: 104 MG/DL — LOW (ref 120–199)
CO2 SERPL-SCNC: 26 MMOL/L — SIGNIFICANT CHANGE UP (ref 22–31)
CREAT SERPL-MCNC: 0.77 MG/DL — SIGNIFICANT CHANGE UP (ref 0.5–1.3)
EOSINOPHIL # BLD AUTO: 0.51 K/UL — HIGH (ref 0–0.5)
EOSINOPHIL NFR BLD AUTO: 7.5 % — HIGH (ref 0–6)
GAS PNL BLDV: 136 MMOL/L — SIGNIFICANT CHANGE UP (ref 136–146)
GLUCOSE BLDV-MCNC: 108 MG/DL — HIGH (ref 70–99)
GLUCOSE SERPL-MCNC: 108 MG/DL — HIGH (ref 70–99)
HBA1C BLD-MCNC: 6.4 % — HIGH (ref 4–5.6)
HCO3 BLDV-SCNC: 25 MMOL/L — SIGNIFICANT CHANGE UP (ref 20–27)
HCT VFR BLD CALC: 40.2 % — SIGNIFICANT CHANGE UP (ref 34.5–45)
HCT VFR BLDV CALC: 39.4 % — SIGNIFICANT CHANGE UP (ref 34.5–45)
HDLC SERPL-MCNC: 43 MG/DL — LOW (ref 45–65)
HGB BLD-MCNC: 12.4 G/DL — SIGNIFICANT CHANGE UP (ref 11.5–15.5)
HGB BLDV-MCNC: 12.8 G/DL — SIGNIFICANT CHANGE UP (ref 11.5–15.5)
IMM GRANULOCYTES NFR BLD AUTO: 0.1 % — SIGNIFICANT CHANGE UP (ref 0–1.5)
INR BLD: 2.56 — HIGH (ref 0.88–1.17)
LACTATE BLDV-MCNC: 1.5 MMOL/L — SIGNIFICANT CHANGE UP (ref 0.5–2)
LIPID PNL WITH DIRECT LDL SERPL: 53 MG/DL — SIGNIFICANT CHANGE UP
LYMPHOCYTES # BLD AUTO: 2.04 K/UL — SIGNIFICANT CHANGE UP (ref 1–3.3)
LYMPHOCYTES # BLD AUTO: 30 % — SIGNIFICANT CHANGE UP (ref 13–44)
MAGNESIUM SERPL-MCNC: 1.8 MG/DL — SIGNIFICANT CHANGE UP (ref 1.6–2.6)
MCHC RBC-ENTMCNC: 29.7 PG — SIGNIFICANT CHANGE UP (ref 27–34)
MCHC RBC-ENTMCNC: 30.8 % — LOW (ref 32–36)
MCV RBC AUTO: 96.4 FL — SIGNIFICANT CHANGE UP (ref 80–100)
MONOCYTES # BLD AUTO: 0.55 K/UL — SIGNIFICANT CHANGE UP (ref 0–0.9)
MONOCYTES NFR BLD AUTO: 8.1 % — SIGNIFICANT CHANGE UP (ref 2–14)
NEUTROPHILS # BLD AUTO: 3.61 K/UL — SIGNIFICANT CHANGE UP (ref 1.8–7.4)
NEUTROPHILS NFR BLD AUTO: 53 % — SIGNIFICANT CHANGE UP (ref 43–77)
NRBC # FLD: 0 K/UL — SIGNIFICANT CHANGE UP (ref 0–0)
NT-PROBNP SERPL-SCNC: 581.9 PG/ML — SIGNIFICANT CHANGE UP
PCO2 BLDV: 44 MMHG — SIGNIFICANT CHANGE UP (ref 41–51)
PH BLDV: 7.39 PH — SIGNIFICANT CHANGE UP (ref 7.32–7.43)
PHOSPHATE SERPL-MCNC: 3.3 MG/DL — SIGNIFICANT CHANGE UP (ref 2.5–4.5)
PLATELET # BLD AUTO: 164 K/UL — SIGNIFICANT CHANGE UP (ref 150–400)
PMV BLD: 10.6 FL — SIGNIFICANT CHANGE UP (ref 7–13)
PO2 BLDV: 67 MMHG — HIGH (ref 35–40)
POTASSIUM BLDV-SCNC: 3.8 MMOL/L — SIGNIFICANT CHANGE UP (ref 3.4–4.5)
POTASSIUM SERPL-MCNC: 4.7 MMOL/L — SIGNIFICANT CHANGE UP (ref 3.5–5.3)
POTASSIUM SERPL-SCNC: 4.7 MMOL/L — SIGNIFICANT CHANGE UP (ref 3.5–5.3)
PROT SERPL-MCNC: 6.4 G/DL — SIGNIFICANT CHANGE UP (ref 6–8.3)
PROTHROM AB SERPL-ACNC: 29.3 SEC — HIGH (ref 9.8–13.1)
RBC # BLD: 4.17 M/UL — SIGNIFICANT CHANGE UP (ref 3.8–5.2)
RBC # FLD: 12.7 % — SIGNIFICANT CHANGE UP (ref 10.3–14.5)
SAO2 % BLDV: 92.4 % — HIGH (ref 60–85)
SODIUM SERPL-SCNC: 139 MMOL/L — SIGNIFICANT CHANGE UP (ref 135–145)
TRIGL SERPL-MCNC: 83 MG/DL — SIGNIFICANT CHANGE UP (ref 10–149)
TSH SERPL-MCNC: 1.44 UIU/ML — SIGNIFICANT CHANGE UP (ref 0.27–4.2)
WBC # BLD: 6.81 K/UL — SIGNIFICANT CHANGE UP (ref 3.8–10.5)
WBC # FLD AUTO: 6.81 K/UL — SIGNIFICANT CHANGE UP (ref 3.8–10.5)

## 2019-08-31 PROCEDURE — 71046 X-RAY EXAM CHEST 2 VIEWS: CPT | Mod: 26

## 2019-08-31 PROCEDURE — 99233 SBSQ HOSP IP/OBS HIGH 50: CPT

## 2019-08-31 RX ORDER — WARFARIN SODIUM 2.5 MG/1
4 TABLET ORAL ONCE
Refills: 0 | Status: COMPLETED | OUTPATIENT
Start: 2019-08-31 | End: 2019-08-31

## 2019-08-31 RX ADMIN — LISINOPRIL 10 MILLIGRAM(S): 2.5 TABLET ORAL at 06:00

## 2019-08-31 RX ADMIN — PANTOPRAZOLE SODIUM 40 MILLIGRAM(S): 20 TABLET, DELAYED RELEASE ORAL at 06:00

## 2019-08-31 RX ADMIN — Medication 81 MILLIGRAM(S): at 12:50

## 2019-08-31 RX ADMIN — Medication 1000 UNIT(S): at 12:50

## 2019-08-31 RX ADMIN — WARFARIN SODIUM 4 MILLIGRAM(S): 2.5 TABLET ORAL at 19:02

## 2019-08-31 RX ADMIN — SIMVASTATIN 40 MILLIGRAM(S): 20 TABLET, FILM COATED ORAL at 22:03

## 2019-08-31 RX ADMIN — Medication 25 MILLIGRAM(S): at 06:00

## 2019-08-31 RX ADMIN — Medication 12.5 MILLIGRAM(S): at 06:00

## 2019-08-31 NOTE — PROGRESS NOTE ADULT - PROBLEM SELECTOR PLAN 4
-Screening EKG significant for biphasic Tw  -Per [facility] cardiology progress notes, 2/7/19, indication for the pharmacologic nuclear stress test was VIRAMONTES and dizziness  -Will need outpatient cardiology follow-up and stress test

## 2019-08-31 NOTE — PROGRESS NOTE ADULT - PROBLEM SELECTOR PLAN 1
Monitor on tele   TTE pending   MRI/MRA unable to be completed 2/2 MVR/AVR (?MRI compatible). Will discuss need with neurology as symptoms have resolved   Evaluated by neurology   Official dysphagia screen pending   PT/OT recs appreciated

## 2019-08-31 NOTE — OCCUPATIONAL THERAPY INITIAL EVALUATION ADULT - PERTINENT HX OF CURRENT PROBLEM, REHAB EVAL
Pt is a 63 y.o. female admitted with a transient episode of sudden inability to speak, right hemiparesis and gaze abnormality likely due to TIA vs CVA; Coincidental finding of hypercarbia;

## 2019-08-31 NOTE — PROGRESS NOTE ADULT - PROBLEM SELECTOR PLAN 3
pCO2=63 on VBG; Coincidental finding; Unclear etiology, ?drawn while patient was confused/retaining   -CXR pending  -repeat VBG is normal

## 2019-08-31 NOTE — PHYSICAL THERAPY INITIAL EVALUATION ADULT - PERTINENT HX OF CURRENT PROBLEM, REHAB EVAL
Patient is 63 year old female admitted with history of HTN, AFIB, CVA presents with sudden onset of weakness and altered mental status.

## 2019-08-31 NOTE — PHYSICAL THERAPY INITIAL EVALUATION ADULT - DID THE PATIENT HAVE SURGERY?
History    Chief complaint:  Painless progressive vision loss    Present Ilness/Diagnosis: Nuclear sclerotic Cataract    Past Medical History:  has a past medical history of Anticoagulant long-term use; Cataract; Hyperlipidemia; Hypertension; and Pancreatitis (05/2017).    Family History/Social History: refer to chart    Allergies: Review of patient's allergies indicates:  No Known Allergies    Current Medications: No current facility-administered medications for this encounter.     Current Outpatient Prescriptions:     aspirin 81 mg Tab, Take 1 tablet by mouth once daily at 6am. Every day, Disp: , Rfl:     fish oil-dha-epa (FISH OIL) 1,200-144-216 mg Cap, Take 1,000 mg by mouth once daily at 6am. Every day, Disp: , Rfl:     lisinopril 10 MG tablet, TAKE ONE TABLET BY MOUTH EVERY DAY, Disp: 90 tablet, Rfl: 1    MULTIVIT-IRON-MIN-FOLIC ACID 3,500-18-0.4 UNIT-MG-MG ORAL CHEW, Take 1 tablet by mouth once daily at 6am., Disp: , Rfl:     ofloxacin (OCUFLOX) 0.3 % ophthalmic solution, Place 1 drop into the right eye 3 (three) times daily., Disp: , Rfl:     prednisoLONE acetate (PRED FORTE) 1 % DrpS, Place 1 drop into the right eye 3 (three) times daily., Disp: , Rfl:     VIT A/VIT C/VIT E/ZINC/COPPER (PRESERVISION AREDS ORAL), Take 1 tablet by mouth once daily at 6am. , Disp: , Rfl:     Physical Exam    BP: Vital signs stable  General: No apparent distress  HEENT: nuclear sclerotic cataract  Lungs: adequate respirations  Heart: + pulses  Abdomen: soft  Rectal/pelvic: deferred    Impression: Visually significant Cataract    Plan: Phacoemulsification with implantation of Intraocular lens     n/a

## 2019-08-31 NOTE — PROGRESS NOTE ADULT - PROBLEM SELECTOR PLAN 2
[id number] risk stratification score 6  c/w Coumadin  INR is therapeutic  Medications confirmed, patient not on Metoprolol   Monitor on tele   Hx of MVR/AVR 10 years ago in [place]

## 2019-09-01 ENCOUNTER — TRANSCRIPTION ENCOUNTER (OUTPATIENT)
Age: 64
End: 2019-09-01

## 2019-09-01 ENCOUNTER — NON-APPOINTMENT (OUTPATIENT)
Age: 64
End: 2019-09-01

## 2019-09-01 ENCOUNTER — OUTPATIENT (OUTPATIENT)
Dept: OUTPATIENT SERVICES | Facility: HOSPITAL | Age: 64
LOS: 1 days | End: 2019-09-01
Payer: MEDICARE

## 2019-09-01 VITALS
DIASTOLIC BLOOD PRESSURE: 86 MMHG | OXYGEN SATURATION: 100 % | HEART RATE: 66 BPM | TEMPERATURE: 98 F | RESPIRATION RATE: 18 BRPM | SYSTOLIC BLOOD PRESSURE: 136 MMHG

## 2019-09-01 LAB
ANION GAP SERPL CALC-SCNC: 11 MMO/L — SIGNIFICANT CHANGE UP (ref 7–14)
BUN SERPL-MCNC: 19 MG/DL — SIGNIFICANT CHANGE UP (ref 7–23)
CALCIUM SERPL-MCNC: 9.4 MG/DL — SIGNIFICANT CHANGE UP (ref 8.4–10.5)
CHLORIDE SERPL-SCNC: 104 MMOL/L — SIGNIFICANT CHANGE UP (ref 98–107)
CO2 SERPL-SCNC: 26 MMOL/L — SIGNIFICANT CHANGE UP (ref 22–31)
CREAT SERPL-MCNC: 1.01 MG/DL — SIGNIFICANT CHANGE UP (ref 0.5–1.3)
GLUCOSE SERPL-MCNC: 118 MG/DL — HIGH (ref 70–99)
HCT VFR BLD CALC: 43.2 % — SIGNIFICANT CHANGE UP (ref 34.5–45)
HCV AB S/CO SERPL IA: 0.13 S/CO — SIGNIFICANT CHANGE UP (ref 0–0.99)
HCV AB SERPL-IMP: SIGNIFICANT CHANGE UP
HGB BLD-MCNC: 13.1 G/DL — SIGNIFICANT CHANGE UP (ref 11.5–15.5)
INR BLD: 2.35 — HIGH (ref 0.88–1.17)
MAGNESIUM SERPL-MCNC: 1.8 MG/DL — SIGNIFICANT CHANGE UP (ref 1.6–2.6)
MCHC RBC-ENTMCNC: 29.6 PG — SIGNIFICANT CHANGE UP (ref 27–34)
MCHC RBC-ENTMCNC: 30.3 % — LOW (ref 32–36)
MCV RBC AUTO: 97.5 FL — SIGNIFICANT CHANGE UP (ref 80–100)
NRBC # FLD: 0 K/UL — SIGNIFICANT CHANGE UP (ref 0–0)
PHOSPHATE SERPL-MCNC: 3.9 MG/DL — SIGNIFICANT CHANGE UP (ref 2.5–4.5)
PLATELET # BLD AUTO: 179 K/UL — SIGNIFICANT CHANGE UP (ref 150–400)
PMV BLD: 11.1 FL — SIGNIFICANT CHANGE UP (ref 7–13)
POTASSIUM SERPL-MCNC: 4.1 MMOL/L — SIGNIFICANT CHANGE UP (ref 3.5–5.3)
POTASSIUM SERPL-SCNC: 4.1 MMOL/L — SIGNIFICANT CHANGE UP (ref 3.5–5.3)
PROTHROM AB SERPL-ACNC: 27.5 SEC — HIGH (ref 9.8–13.1)
RBC # BLD: 4.43 M/UL — SIGNIFICANT CHANGE UP (ref 3.8–5.2)
RBC # FLD: 12.5 % — SIGNIFICANT CHANGE UP (ref 10.3–14.5)
SODIUM SERPL-SCNC: 141 MMOL/L — SIGNIFICANT CHANGE UP (ref 135–145)
WBC # BLD: 7.01 K/UL — SIGNIFICANT CHANGE UP (ref 3.8–10.5)
WBC # FLD AUTO: 7.01 K/UL — SIGNIFICANT CHANGE UP (ref 3.8–10.5)

## 2019-09-01 PROCEDURE — G9001: CPT

## 2019-09-01 PROCEDURE — 93306 TTE W/DOPPLER COMPLETE: CPT | Mod: 26

## 2019-09-01 PROCEDURE — 99239 HOSP IP/OBS DSCHRG MGMT >30: CPT

## 2019-09-01 RX ORDER — WARFARIN SODIUM 2.5 MG/1
4 TABLET ORAL ONCE
Refills: 0 | Status: COMPLETED | OUTPATIENT
Start: 2019-09-01 | End: 2019-09-01

## 2019-09-01 RX ORDER — WARFARIN SODIUM 2.5 MG/1
1 TABLET ORAL
Qty: 0 | Refills: 0 | DISCHARGE

## 2019-09-01 RX ORDER — PANTOPRAZOLE SODIUM 20 MG/1
1 TABLET, DELAYED RELEASE ORAL
Qty: 30 | Refills: 0
Start: 2019-09-01 | End: 2019-09-30

## 2019-09-01 RX ORDER — WARFARIN SODIUM 2.5 MG/1
1 TABLET ORAL
Qty: 30 | Refills: 0
Start: 2019-09-01 | End: 2019-09-30

## 2019-09-01 RX ORDER — SIMVASTATIN 20 MG/1
1 TABLET, FILM COATED ORAL
Qty: 30 | Refills: 0
Start: 2019-09-01 | End: 2019-09-30

## 2019-09-01 RX ORDER — ASPIRIN/CALCIUM CARB/MAGNESIUM 324 MG
1 TABLET ORAL
Qty: 30 | Refills: 0
Start: 2019-09-01 | End: 2019-09-30

## 2019-09-01 RX ORDER — SIMVASTATIN 20 MG/1
1 TABLET, FILM COATED ORAL
Qty: 0 | Refills: 0 | DISCHARGE

## 2019-09-01 RX ORDER — MAGNESIUM OXIDE 400 MG ORAL TABLET 241.3 MG
400 TABLET ORAL ONCE
Refills: 0 | Status: COMPLETED | OUTPATIENT
Start: 2019-09-01 | End: 2019-09-01

## 2019-09-01 RX ORDER — LISINOPRIL 2.5 MG/1
1 TABLET ORAL
Qty: 30 | Refills: 0
Start: 2019-09-01 | End: 2019-09-30

## 2019-09-01 RX ADMIN — Medication 1000 UNIT(S): at 11:40

## 2019-09-01 RX ADMIN — WARFARIN SODIUM 4 MILLIGRAM(S): 2.5 TABLET ORAL at 17:15

## 2019-09-01 RX ADMIN — PANTOPRAZOLE SODIUM 40 MILLIGRAM(S): 20 TABLET, DELAYED RELEASE ORAL at 05:59

## 2019-09-01 RX ADMIN — Medication 25 MILLIGRAM(S): at 05:59

## 2019-09-01 RX ADMIN — Medication 81 MILLIGRAM(S): at 11:40

## 2019-09-01 RX ADMIN — LISINOPRIL 10 MILLIGRAM(S): 2.5 TABLET ORAL at 05:59

## 2019-09-01 RX ADMIN — MAGNESIUM OXIDE 400 MG ORAL TABLET 400 MILLIGRAM(S): 241.3 TABLET ORAL at 11:40

## 2019-09-01 NOTE — SWALLOW BEDSIDE ASSESSMENT ADULT - SWALLOW EVAL: DIAGNOSIS
1. Functional oral phase for puree consistency, regular solids, and thin liquids marked by functional mastication for solids, adequate bolus manipulation and functional oral transit time 2. Functional pharyngeal phase for all consistencies presented marked by adequate laryngeal elevation upon palpation and NO overt s/s of laryngeal penetration/aspiration noted

## 2019-09-01 NOTE — DISCHARGE NOTE PROVIDER - NSDCFUADDAPPT_GEN_ALL_CORE_FT
Please follow up with Carolyne Alexander NP. Please follow up at [address] [place] [address] with Carolyne Cole NP  [phone]. Someone from their office will call you with an appointment. If you do not receive a call, please call [phone] to make an appointment.  Please follow up with your PCP on 9/3 as scheduled.  Please follow up with your Cardiologist and/or Cardiology clinic in 1-2 weeks.

## 2019-09-01 NOTE — PROGRESS NOTE ADULT - PROBLEM SELECTOR PLAN 2
[id number]-VOTe risk stratification score 6  c/w Coumadin  INR is therapeutic  Medications confirmed, patient not on Metoprolol   Monitor on tele   Hx of MVR/AVR 10 years ago in [place]

## 2019-09-01 NOTE — DISCHARGE NOTE NURSING/CASE MANAGEMENT/SOCIAL WORK - PATIENT PORTAL LINK FT
You can access the FollowMyHealth Patient Portal offered by [facility] by registering at the following website: [email or web address]. By joining [facility]’s FollowMyHealth portal, you will also be able to view your health information using other applications (apps) compatible with our system.

## 2019-09-01 NOTE — DISCHARGE NOTE PROVIDER - CARE PROVIDER_API CALL
Mateus Garza)  Internal Medicine  [address] [place] [address]  Phone: [phone]  Fax: [phone]  Follow Up Time:     Carolyne Cole (NP; RN)  NP in [address]  [place] [address]  Phone: [phone]  Fax: [phone]  Follow Up Time:

## 2019-09-01 NOTE — PROGRESS NOTE ADULT - PROBLEM SELECTOR PLAN 1
Monitor on tele   TTE pending   MRI/MRA unable to be completed 2/2 MVR/AVR (?MRI compatible). Will defer at this time as unable to obtain information about MVR at this time  PT/OT recs appreciated

## 2019-09-01 NOTE — SWALLOW BEDSIDE ASSESSMENT ADULT - COMMENTS
Clinical Bedside Swallow Evaluation order received. Upon arrival to unit, RN reports patient off unit for echo. This service will f/u as schedule permits.
Patient is a "63 Female with Hx of AFIB on AC HTN CVA CAD MI BIO MVR AND AVR admitted with transient Rt sided weakness while at stress test labs (no meds were given prior to event) Symptoms resolved in 40 minuted and TPA was not given".  CXR completed on 8/31/19 reported "clear lungs; no pleural effusions or pneumothorax". CTH completed reported "no acute intracranial hemorrhage or mass; chronic R cerebellar infarct".     Patient seen upright at bedside with son present. [employer] offered, though patient declined and preferred son to translate. Patient was alert/awake and verbally responsive to simple questions. Patient able to follow simple directions.

## 2019-09-01 NOTE — DISCHARGE NOTE NURSING/CASE MANAGEMENT/SOCIAL WORK - NSDCPEPTSTRK_GEN_ALL_CORE
Call 911 for stroke/Need for follow up after discharge/Prescribed medications/Stroke education booklet/Signs and symptoms of stroke/Risk factors for stroke/Stroke support groups for patients, families, and friends/Stroke warning signs and symptoms

## 2019-09-01 NOTE — CHART NOTE - NSCHARTNOTEFT_GEN_A_CORE
MRI MRA H&N unable to be done since pt had a MVR and AVR. Spoke with Neurology who said MRI is not needed since her symptoms have resolved. As per Neurology will treat like a TIA with aspirin/plavix and wait on the echo. MRI MRA H&N unable to be done since pt had a MVR and AVR. Spoke with Neurology who said MRI is not needed since her symptoms have resolved. As per Neurology can continue with Coumadin and wait on the echo. If echo is normal she can be discharged with a neurology follow up.

## 2019-09-01 NOTE — PROGRESS NOTE ADULT - REASON FOR ADMISSION
Inability to speak with right hemiparesis;

## 2019-09-01 NOTE — PROGRESS NOTE ADULT - SUBJECTIVE AND OBJECTIVE BOX
Patient is a 63y old  Female who presents with a chief complaint of Inability to speak with right hemiparesis; (30 Aug 2019 19:08)      Subjective: translation provided by son at bedside per patient request. Patient feels well and would like to go home. Denies headaches, chest pain, sob. Per son, patient is back to baseline.     MEDICATIONS  (STANDING):  aspirin enteric coated 81 milliGRAM(s) Oral daily  cholecalciferol 1000 Unit(s) Oral daily  hydrochlorothiazide 25 milliGRAM(s) Oral daily  lisinopril 10 milliGRAM(s) Oral daily  pantoprazole    Tablet 40 milliGRAM(s) Oral before breakfast  simvastatin 40 milliGRAM(s) Oral at bedtime  warfarin 4 milliGRAM(s) Oral once    MEDICATIONS  (PRN):        Objective:    Vitals: Vital Signs Last 24 Hrs  T(C): 36.7 (08-31-19 @ 13:16), Max: 36.7 (08-31-19 @ 03:21)  T(F): 98 (08-31-19 @ 13:16), Max: 98 (08-31-19 @ 03:21)  HR: 55 (08-31-19 @ 13:16) (55 - 74)  BP: 116/61 (08-31-19 @ 13:16) (116/61 - 143/90)  BP(mean): --  RR: 18 (08-31-19 @ 13:16) (16 - 18)  SpO2: 100% (08-31-19 @ 13:16) (99% - 100%)            I&O's Summary      PHYSICAL EXAM:  GENERAL: NAD, well-groomed, well-developed  HEAD:  Atraumatic, Normocephalic  EYES: EOMI, PERRLA, conjunctiva and sclera clear  ENMT: Moist mucous membranes, Good dentition, No lesions  CHEST/LUNG: Clear to percussion bilaterally; No rales, rhonchi, wheezing, or rubs  HEART: irregular irregular   ABDOMEN: Soft, Nontender, Nondistended; Bowel sounds present  EXTREMITIES:  2+ Peripheral Pulses, No clubbing, cyanosis, or edema  LYMPH: No lymphadenopathy noted  SKIN: No rashes or lesions  NERVOUS SYSTEM:  Alert & Oriented X3, Good concentration; Motor Strength 5/5 B/L upper and lower extremities                                          LABS:  08-31    139  |  104  |  13  ----------------------------<  108<H>  4.7   |  26  |  0.77  08-30    139  |  104  |  15  ----------------------------<  98  4.1   |  27  |  0.84    Ca    9.1      31 Aug 2019 07:28  Ca    9.4      30 Aug 2019 12:50  Phos  3.3     08-31  Mg     1.8     08-31    TPro  6.4  /  Alb  3.5  /  TBili  0.2  /  DBili  x   /  AST  20  /  ALT  16  /  AlkPhos  81  08-31  TPro  7.4  /  Alb  3.8  /  TBili  0.2  /  DBili  x   /  AST  24  /  ALT  17  /  AlkPhos  85  08-30      PT/INR - ( 30 Aug 2019 12:17 )   PT: 29.3 SEC;   INR: 2.56          PTT - ( 30 Aug 2019 12:17 )  PTT:42.0 SEC                                        12.4   6.81  )-----------( 164      ( 31 Aug 2019 07:28 )             40.2                         12.6   8.46  )-----------( 169      ( 30 Aug 2019 12:50 )             41.3     CAPILLARY BLOOD GLUCOSE          RADIOLOGY & ADDITIONAL TESTS:    Imaging Personally Reviewed:  [X] YES  [ ] NO      Consultants involved in case:   Consultant(s) Notes Reviewed:  [ X] YES  [ ] NO:   Care Discussed with Consultants/Other Providers [ ] YES  [ ] NO
Neurology Progress    ROXY [id number]    HPI:  64yo Female with hx of HTN, Pre-DM, dyslipidemia, CAD MI in 2009, A-FIB on A/C, BIO (as per family) AVR and MVR and Hx of CVA (right cerebellar infarct 11years ago). presenting to [facility] ER from STRESS LAB for sudden onset of weakness and altered mental status. According to family and the patient, today around 1100 this morning, at stress test lab, while awaiting an outpatient stress test (was on a stretcher getting IV placed) became altered with abrupt lack of verbal output, reported lateral gaze deviation, right lower facial droop and right upper and right lower extremity weakness weakness. Episode lasted for about 30 minutes and gradually Pt returned to her baseline. Code stroke was called and Pt was rushed to ED to have CT HEAD. CT head noncontrast demonstrates old right cerebellar infarct with no evidence of acute intracranial process. CTA of Head and Neck revealed no occlusion, significant stenosis or other vascular abnormality identified. Her deficits have essentially resolved in less then 40 minutes, and therefore she was excluded from IV TPA.  Prior to today's event Pt reports feeling normal self with no complains, but long standing exertional dyspnea as well as dry positional cough. Per [facility] cardiology progress notes, 2/7/19, indication for the pharmacologic nuclear stress test was VIRAMONTES and dizziness;    Pt reports no fever or chills, no N/V/D, no GI complains, and reports no chest pain dizziness or palpitations.   Pt reports Hx of CVA in 2008 with associated right residual weakness and speech difficulty that completely resolved. (30 Aug 2019 19:08)      Past Medical History  Cerebrovascular accident (CVA)  History of stroke  History of MI (myocardial infarction)  Atrial fibrillation  HLD (hyperlipidemia)  HTN (hypertension)      Past Surgical History  S/P MVR (mitral valve replacement)  S/P AVR (aortic valve replacement)      MEDICATIONS    aspirin enteric coated 81 milliGRAM(s) Oral daily  cholecalciferol 1000 Unit(s) Oral daily  hydrochlorothiazide 25 milliGRAM(s) Oral daily  lisinopril 10 milliGRAM(s) Oral daily  pantoprazole    Tablet 40 milliGRAM(s) Oral before breakfast  simvastatin 40 milliGRAM(s) Oral at bedtime  warfarin 4 milliGRAM(s) Oral once         Family history: No history of dementia, strokes, or seizures   FAMILY HISTORY:  No pertinent family history in first degree relatives    SOCIAL HISTORY -- No history of tobacco or alcohol use     Allergies    No Known Allergies    Intolerances            Vital Signs Last 24 Hrs  T(C): 36.7 (01 Sep 2019 05:56), Max: 36.7 (31 Aug 2019 13:16)  T(F): 98.1 (01 Sep 2019 05:56), Max: 98.1 (01 Sep 2019 05:56)  HR: 57 (01 Sep 2019 05:56) (55 - 65)  BP: 131/68 (01 Sep 2019 05:56) (113/70 - 131/68)  BP(mean): --  RR: 16 (01 Sep 2019 05:56) (16 - 18)  SpO2: 100% (01 Sep 2019 05:56) (100% - 100%)        On Neurological Examination:    Mental Status - Patient is alert, awake, oriented X3. .   Follows commands well and able to answer questions appropriately. Mood and affect  normal  Follow simple commands able to repeat  able to name.  Speech - Fluent no Dysarthria  no  Aphasia                              Cranial Nerves - Extraocular muscle intact  DESTINEE Facial symmetry Tongue midline, CnV1to V3 intact gross hearing intact      Motor Exam -   Right upper  5/5 throughout  Left upper 5/5 throughtout  Right lower- 5/5 throughout  Left lower 5/5 throughout  Coordination -finger to nose intact  Muscle tone - is normal all over. No asymmetry is seen.      Sensory    Bilateral intact to light touch    Gait -  normal  no ataxia     GENERAL Exam:     Nontoxic , No Acute Distress   	  HEENT:  normocephalic, atraumatic  		  LUNGS:	Clear bilaterally  No Wheeze      VASCULAR: no carotid brui  	  HEART:	 Normal S1S2   No murmur RRR        	  MUSCULOSKELETAL: Normal Range of Motion  	   SKIN:      Normal   No Ecchymosis               LABS:  CBC Full  -  ( 01 Sep 2019 07:02 )  WBC Count : 7.01 K/uL  RBC Count : 4.43 M/uL  Hemoglobin : 13.1 g/dL  Hematocrit : 43.2 %  Platelet Count - Automated : 179 K/uL  Mean Cell Volume : 97.5 fL  Mean Cell Hemoglobin : 29.6 pg  Mean Cell Hemoglobin Concentration : 30.3 %  Auto Neutrophil # : x  Auto Lymphocyte # : x  Auto Monocyte # : x  Auto Eosinophil # : x  Auto Basophil # : x  Auto Neutrophil % : x  Auto Lymphocyte % : x  Auto Monocyte % : x  Auto Eosinophil % : x  Auto Basophil % : x      09-01    141  |  104  |  19  ----------------------------<  118<H>  4.1   |  26  |  1.01    Ca    9.4      01 Sep 2019 07:02  Phos  3.9     09-01  Mg     1.8     09-01    TPro  6.4  /  Alb  3.5  /  TBili  0.2  /  DBili  x   /  AST  20  /  ALT  16  /  AlkPhos  81  08-31    Hemoglobin A1C:     LIVER FUNCTIONS - ( 31 Aug 2019 07:28 )  Alb: 3.5 g/dL / Pro: 6.4 g/dL / ALK PHOS: 81 u/L / ALT: 16 u/L / AST: 20 u/L / GGT: x           Vitamin B12   PT/INR - ( 01 Sep 2019 07:02 )   PT: 27.5 SEC;   INR: 2.35          PTT - ( 31 Aug 2019 17:54 )  PTT:42.6 SEC      RADIOLOGY    EKG            schoenberg 
Patient is a 63y old  Female who presents with a chief complaint of Inability to speak with right hemiparesis; (01 Sep 2019 12:22)      Subjective: translation provided by patient's son at bedside per patient request. Currently denies chest pain, palpitations or SOB. Feels well and would like to go home.     MEDICATIONS  (STANDING):  aspirin enteric coated 81 milliGRAM(s) Oral daily  cholecalciferol 1000 Unit(s) Oral daily  hydrochlorothiazide 25 milliGRAM(s) Oral daily  lisinopril 10 milliGRAM(s) Oral daily  pantoprazole    Tablet 40 milliGRAM(s) Oral before breakfast  simvastatin 40 milliGRAM(s) Oral at bedtime  warfarin 4 milliGRAM(s) Oral once    MEDICATIONS  (PRN):        Objective:    Vitals: Vital Signs Last 24 Hrs  T(C): 36.7 (09-01-19 @ 05:56), Max: 36.7 (09-01-19 @ 00:46)  T(F): 98.1 (09-01-19 @ 05:56), Max: 98.1 (09-01-19 @ 05:56)  HR: 57 (09-01-19 @ 05:56) (57 - 65)  BP: 131/68 (09-01-19 @ 05:56) (113/70 - 131/68)  BP(mean): --  RR: 16 (09-01-19 @ 05:56) (16 - 17)  SpO2: 100% (09-01-19 @ 05:56) (100% - 100%)            I&O's Summary      PHYSICAL EXAM:  GENERAL: NAD, well-groomed, well-developed  HEAD:  Atraumatic, Normocephalic  EYES: EOMI, PERRLA, conjunctiva and sclera clear  ENMT: No tonsillar erythema, exudates, or enlargement; Moist mucous membranes, Good dentition, No lesions  NECK: Supple, No JVD, Normal thyroid  CHEST/LUNG: Clear to percussion bilaterally; No rales, rhonchi, wheezing, or rubs  HEART: irregularly irregular   ABDOMEN: Soft, Nontender, Nondistended; Bowel sounds present  EXTREMITIES:  2+ Peripheral Pulses, No clubbing, cyanosis, or edema  LYMPH: No lymphadenopathy noted  SKIN: No rashes or lesions  NERVOUS SYSTEM:  Alert & Oriented X3, Good concentration; Motor Strength 5/5 B/L upper and lower extremities                                     LABS:  09-01    141  |  104  |  19  ----------------------------<  118<H>  4.1   |  26  |  1.01  08-31    139  |  104  |  13  ----------------------------<  108<H>  4.7   |  26  |  0.77  08-30    139  |  104  |  15  ----------------------------<  98  4.1   |  27  |  0.84    Ca    9.4      01 Sep 2019 07:02  Ca    9.1      31 Aug 2019 07:28  Ca    9.4      30 Aug 2019 12:50  Phos  3.9     09-01  Mg     1.8     09-01    TPro  6.4  /  Alb  3.5  /  TBili  0.2  /  DBili  x   /  AST  20  /  ALT  16  /  AlkPhos  81  08-31  TPro  7.4  /  Alb  3.8  /  TBili  0.2  /  DBili  x   /  AST  24  /  ALT  17  /  AlkPhos  85  08-30      PT/INR - ( 01 Sep 2019 07:02 )   PT: 27.5 SEC;   INR: 2.35          PTT - ( 31 Aug 2019 17:54 )  PTT:42.6 SEC                                        13.1   7.01  )-----------( 179      ( 01 Sep 2019 07:02 )             43.2                         12.4   6.81  )-----------( 164      ( 31 Aug 2019 07:28 )             40.2                         12.6   8.46  )-----------( 169      ( 30 Aug 2019 12:50 )             41.3     CAPILLARY BLOOD GLUCOSE          RADIOLOGY & ADDITIONAL TESTS:    Imaging Personally Reviewed:  [ X] YES  [ ] NO      Consultants involved in case:   Consultant(s) Notes Reviewed:  [X ] YES  [ ] NO:   Care Discussed with Consultants/Other Providers [ ] YES  [ ] NO

## 2019-09-01 NOTE — DISCHARGE NOTE PROVIDER - NSFOLLOWUPCLINICS_GEN_ALL_ED_FT
[facility]  Cardiology  [address] [place] [address]  Phone: [phone]  Fax:   Follow Up Time:

## 2019-09-01 NOTE — PROGRESS NOTE ADULT - PROBLEM SELECTOR PLAN 3
pCO2=63 on VBG; Coincidental finding; Unclear etiology, ?drawn while patient was confused/retaining   -CXR w/ clear lungs  -repeat VBG is normal

## 2019-09-01 NOTE — DISCHARGE NOTE PROVIDER - NSDCFUSCHEDAPPT_GEN_ALL_CORE_FT
ROXY MIRANDA ; 09/03/2019 ; N[place] [id number] [facility] ROXY MIRANDA ; 09/03/2019 ; NPP Intmed OP [id number] [facility] ROXY MIRANDA ; 09/03/2019 ; NPP Intmed OP [id number] [facility]

## 2019-09-01 NOTE — SWALLOW BEDSIDE ASSESSMENT ADULT - SWALLOW EVAL: RECOMMENDED FEEDING/EATING TECHNIQUES
position upright (90 degrees)/allow for swallow between intakes/oral hygiene/small sips/bites/alternate food with liquid/maintain upright posture during/after eating for 30 mins

## 2019-09-01 NOTE — DISCHARGE NOTE PROVIDER - NSDCCPCAREPLAN_GEN_ALL_CORE_FT
PRINCIPAL DISCHARGE DIAGNOSIS  Diagnosis: TIA (transient ischemic attack)  Assessment and Plan of Treatment: You had a CT done that was normal. You were recommened to have a MRI done however this is unable to be done since you had a mitral valve replacement and aortic valve replacement. You had an echo done that was unchanged from your previous echo. Please continue to take Coumadin 4mg daily. Please follow up with your PCP every 3 months to check your INR. You were started on Simvastatin 40mg at bedtime to control your cholesterol. Please follow up at [address] [place] [address] with Carolyne Cole NP  [phone]. Someone from their office will call you with an appointment. If you do not receive a call, please call [phone] to make an appointment. Please follow up with your Cardiologist as outpatient to get your stress test done. PRINCIPAL DISCHARGE DIAGNOSIS  Diagnosis: TIA (transient ischemic attack)  Assessment and Plan of Treatment: You had a CT done that was normal. You were recommened to have a MRI done however this is unable to be done since you had a mitral valve replacement and aortic valve replacement. You had an echo done that was unchanged from your previous echo. Please continue to take Coumadin 4mg daily. Please continue to take aspirin 81mg daily. Please follow up with your PCP every week to check your INR. You were started on Simvastatin 40mg at bedtime to control your cholesterol. Please follow up at [address] [place] [address] with Carolyne Cole NP  [phone]. Someone from their office will call you with an appointment. If you do not receive a call, please call [phone] to make an appointment. Please follow up with your Cardiologist as outpatient to get your stress test done.

## 2019-09-01 NOTE — PROGRESS NOTE ADULT - ATTENDING COMMENTS
Addendum:    TTE reviewed and looks unchanged from previous. No need for further inpatient work-up of TIA per neurology. Patient stable for outpatient follow-up with neurology and cardiology, as well as PCP. Outpatient PT.     > 34 minutes spent on discharge planning.

## 2019-09-01 NOTE — SWALLOW BEDSIDE ASSESSMENT ADULT - ASR SWALLOW ASPIRATION MONITOR
fever/pneumonia/position upright (90Y)/cough/throat clearing/gurgly voice/upper respiratory infection/change of breathing pattern/oral hygiene

## 2019-09-01 NOTE — PROGRESS NOTE ADULT - ASSESSMENT
64yo female with Hx of HTN Pre DM Type 2, CAD, CVA, A-FIB on A/C, Bio MVR and AVR per recent TTE a/w a transient episode of sudden inability to speak, right hemiparesis and gaze abnormality likely due to TIA vs CVA; Coincidental finding of hypercarbia.
64yo Female with hx of HTN, Pre-DM, dyslipidemia, CAD MI in 2009, A-FIB on A/C, BIO (as per family) AVR and MVR and Hx of CVA (right cerebellar infarct 11years ago). presenting to [facility] ER from [facility]ESS [facility] for sudden onset of weakness and altered mental status. Suspect stroke but not able to get MRI.  Patient much improved with fluent speech as per son.     control of her AFIb and anticoagualtion
64yo female with Hx of HTN Pre DM Type 2, CAD, CVA, A-FIB on A/C, Bio MVR and AVR per recent TTE a/w a transient episode of sudden inability to speak, right hemiparesis and gaze abnormality likely due to TIA vs CVA; Coincidental finding of hypercarbia.

## 2019-09-01 NOTE — DISCHARGE NOTE PROVIDER - CARE PROVIDERS DIRECT ADDRESSES
[email or web address]

## 2019-09-01 NOTE — PROGRESS NOTE ADULT - PROBLEM SELECTOR PLAN 4
-Screening EKG significant for biphasic Tw  -Per [facility] cardiology progress notes, 2/7/19, indication for the pharmacologic nuclear stress test was VIRAMONTES and dizziness  -Will need outpatient cardiology follow-up and stress test

## 2019-09-01 NOTE — DISCHARGE NOTE NURSING/CASE MANAGEMENT/SOCIAL WORK - NSDCFUADDAPPT_GEN_ALL_CORE_FT
Please follow up at [address] [place] [address] with Carolyne Cole NP  [phone]. Someone from their office will call you with an appointment. If you do not receive a call, please call [phone] to make an appointment.  Please follow up with your PCP on 9/3 as scheduled.  Please follow up with your Cardiologist and/or Cardiology clinic in 1-2 weeks.

## 2019-09-01 NOTE — DISCHARGE NOTE PROVIDER - HOSPITAL COURSE
63 Female with Hx of AFIB on AC HTN CVA CAD MI BIO MVR AND AVR admited with transient Rt sided weakness while at stress test labs (no meds wer given prior to event) Symptoms resolved in 40 minuted and TPA was not given         1. TIA - ECG AFIB 73 with Biphasic T inferior lead (OLD)     - CT HEAD and CTA H&N No acute intracranial hemorrhage, mass effect, or CT evidence of acute intracranial pathology oclusion or stenosis     - MRI/MRA unable to be completed 2/2 MVR/AVR. As per neurology, MRI not needed. Pt will follow up with Carolyne Cole as outpatient.     - Echo unchanged from previous.         2. Atrial fibrillation - Will resume home Coumadin for now, with BB. [id number] risk stratification score 6        3. Abnormal EKG- Screening EKG significant for biphasic Tw. Per [facility] cardiology progress notes, 2/7/19, indication for the pharmacologic nuclear stress test was VIRAMONTES and dizziness. Will need outpatient cardiology follow-up and stress test.         Case discussed with Dr. Figueroa, pt medically stable for discharge home with outpt PT. (Script given to pt for PT) 63 Female with Hx of AFIB on AC HTN CVA CAD MI BIO MVR AND AVR admited with transient Rt sided weakness while at stress test labs (no meds wer given prior to event) Symptoms resolved in 40 minuted and TPA was not given         1. TIA - ECG AFIB 73 with Biphasic T inferior lead (OLD)     - CT HEAD and CTA H&N No acute intracranial hemorrhage, mass effect, or CT evidence of acute intracranial pathology oclusion or stenosis     - MRI/MRA unable to be completed 2/2 MVR/AVR. As per neurology, MRI not needed. Pt will follow up with Carolyne Cole as outpatient.     - Echo unchanged from previous.         2. Atrial fibrillation - Will resume home Coumadin for now, with BB. [id number] risk stratification score 6        3. Abnormal EKG- Screening EKG significant for biphasic Tw. Per [facility] cardiology progress notes, 2/7/19, indication for the pharmacologic nuclear stress test was VIRAMONTES and dizziness. Will need outpatient cardiology follow-up and stress test.         New prescription sent to pharmacy for Simvastatin. Pt needed refills for her old medications so HCTZ, lisinopril, and Pantoprazole were sent to the pharmacy as well.        Case discussed with Dr. Figueroa, pt medically stable for discharge home with outpt PT. (Script given to pt for PT)

## 2019-09-04 DIAGNOSIS — Z71.89 OTHER SPECIFIED COUNSELING: ICD-10-CM

## 2019-09-04 PROBLEM — I63.9 CEREBRAL INFARCTION, UNSPECIFIED: Chronic | Status: ACTIVE | Noted: 2019-08-30

## 2019-09-06 NOTE — DISCUSSION/SUMMARY
[FreeTextEntry1] : Patient was admitted for stroke. Patient has an appointment with the clinic on 9/9/19. [ED] : a call from ED

## 2019-09-09 ENCOUNTER — APPOINTMENT (OUTPATIENT)
Dept: INTERNAL MEDICINE | Facility: CLINIC | Age: 64
End: 2019-09-09

## 2019-09-09 ENCOUNTER — OUTPATIENT (OUTPATIENT)
Dept: OUTPATIENT SERVICES | Facility: HOSPITAL | Age: 64
LOS: 1 days | End: 2019-09-09

## 2019-09-09 DIAGNOSIS — I48.91 UNSPECIFIED ATRIAL FIBRILLATION: ICD-10-CM

## 2019-09-09 DIAGNOSIS — Z79.01 LONG TERM (CURRENT) USE OF ANTICOAGULANTS: ICD-10-CM

## 2019-09-09 LAB
INR PPP: 2.8 RATIO
POCT-PROTHROMBIN TIME: 33.6 SECS

## 2019-09-09 NOTE — DISCUSSION/SUMMARY
[FreeTextEntry1] : Patient with recent hospitalization due to RT sided weakness in the setting of subhterpatuic INR. NO TPA given. Head CT negative. Transiet RT sided weakness, now resolved. Coumadin regimen changed to 4 mg daily during hospitalization. INR today at goal of 2.8. Continue current regimen. Patient does not have post hospital discharge apt set up. Discussed RTC in 2 weeks for INR check and PCP appt. No overt signs of bleeding no changes in diet. \par \par PC, PGY3.

## 2019-09-23 ENCOUNTER — APPOINTMENT (OUTPATIENT)
Dept: INTERNAL MEDICINE | Facility: CLINIC | Age: 64
End: 2019-09-23

## 2019-09-23 ENCOUNTER — OUTPATIENT (OUTPATIENT)
Dept: OUTPATIENT SERVICES | Facility: HOSPITAL | Age: 64
LOS: 1 days | End: 2019-09-23

## 2019-09-23 DIAGNOSIS — Z79.01 LONG TERM (CURRENT) USE OF ANTICOAGULANTS: ICD-10-CM

## 2019-09-23 DIAGNOSIS — I48.91 UNSPECIFIED ATRIAL FIBRILLATION: ICD-10-CM

## 2019-09-23 LAB
INR PPP: 3.2 RATIO
POCT-PROTHROMBIN TIME: 38.5 SECS

## 2019-09-30 ENCOUNTER — APPOINTMENT (OUTPATIENT)
Dept: INTERNAL MEDICINE | Facility: CLINIC | Age: 64
End: 2019-09-30

## 2019-09-30 ENCOUNTER — OUTPATIENT (OUTPATIENT)
Dept: OUTPATIENT SERVICES | Facility: HOSPITAL | Age: 64
LOS: 1 days | End: 2019-09-30

## 2019-09-30 DIAGNOSIS — Z79.01 LONG TERM (CURRENT) USE OF ANTICOAGULANTS: ICD-10-CM

## 2019-09-30 DIAGNOSIS — I48.91 UNSPECIFIED ATRIAL FIBRILLATION: ICD-10-CM

## 2019-10-01 LAB
INR PPP: 2.5 RATIO
POCT-PROTHROMBIN TIME: 30.1 SECS
QUALITY CONTROL: YES

## 2019-10-11 ENCOUNTER — RX RENEWAL (OUTPATIENT)
Age: 64
End: 2019-10-11

## 2019-10-11 ENCOUNTER — APPOINTMENT (OUTPATIENT)
Dept: INTERNAL MEDICINE | Facility: CLINIC | Age: 64
End: 2019-10-11

## 2019-10-14 ENCOUNTER — OUTPATIENT (OUTPATIENT)
Dept: OUTPATIENT SERVICES | Facility: HOSPITAL | Age: 64
LOS: 1 days | End: 2019-10-14

## 2019-10-14 ENCOUNTER — APPOINTMENT (OUTPATIENT)
Dept: INTERNAL MEDICINE | Facility: CLINIC | Age: 64
End: 2019-10-14

## 2019-10-14 ENCOUNTER — MED ADMIN CHARGE (OUTPATIENT)
Age: 64
End: 2019-10-14

## 2019-10-15 DIAGNOSIS — Z79.01 LONG TERM (CURRENT) USE OF ANTICOAGULANTS: ICD-10-CM

## 2019-10-15 DIAGNOSIS — I48.91 UNSPECIFIED ATRIAL FIBRILLATION: ICD-10-CM

## 2019-10-17 LAB
INR PPP: 2.5 RATIO
POCT-PROTHROMBIN TIME: 29.8 SECS
QUALITY CONTROL: YES

## 2019-10-28 ENCOUNTER — APPOINTMENT (OUTPATIENT)
Dept: INTERNAL MEDICINE | Facility: CLINIC | Age: 64
End: 2019-10-28

## 2019-11-06 ENCOUNTER — OUTPATIENT (OUTPATIENT)
Dept: OUTPATIENT SERVICES | Facility: HOSPITAL | Age: 64
LOS: 1 days | End: 2019-11-06

## 2019-11-06 ENCOUNTER — RESULT CHARGE (OUTPATIENT)
Age: 64
End: 2019-11-06

## 2019-11-06 ENCOUNTER — APPOINTMENT (OUTPATIENT)
Dept: INTERNAL MEDICINE | Facility: CLINIC | Age: 64
End: 2019-11-06

## 2019-11-12 LAB
INR PPP: 2 RATIO
POCT-PROTHROMBIN TIME: 24.2 SECS
QUALITY CONTROL: YES

## 2019-11-13 DIAGNOSIS — Z79.01 LONG TERM (CURRENT) USE OF ANTICOAGULANTS: ICD-10-CM

## 2019-11-13 DIAGNOSIS — I48.91 UNSPECIFIED ATRIAL FIBRILLATION: ICD-10-CM

## 2019-11-18 ENCOUNTER — APPOINTMENT (OUTPATIENT)
Dept: INTERNAL MEDICINE | Facility: CLINIC | Age: 64
End: 2019-11-18

## 2019-11-18 ENCOUNTER — OUTPATIENT (OUTPATIENT)
Dept: OUTPATIENT SERVICES | Facility: HOSPITAL | Age: 64
LOS: 1 days | End: 2019-11-18

## 2019-11-18 DIAGNOSIS — I48.91 UNSPECIFIED ATRIAL FIBRILLATION: ICD-10-CM

## 2019-11-18 DIAGNOSIS — Z79.01 LONG TERM (CURRENT) USE OF ANTICOAGULANTS: ICD-10-CM

## 2019-11-22 LAB
INR PPP: 2 RATIO
POCT-PROTHROMBIN TIME: 24.6 SECS
QUALITY CONTROL: YES

## 2020-02-07 NOTE — ED ADULT NURSE NOTE - NS PRO AD BILL OF RIGHTS
Sodium (mmol/L)   Date Value   02/06/2020 141   12/12/2019 139     Potassium (mmol/L)   Date Value   02/06/2020 4.2   12/12/2019 4.8     Chloride (mmol/L)   Date Value   02/06/2020 107   12/12/2019 106     Glucose (mg/dL)   Date Value   02/06/2020 119 (H)   12/12/2019 110 (H)     CALCIUM (mg/dL)   Date Value   12/12/2019 8.9     Calcium (mg/dL)   Date Value   02/06/2020 8.6     Carbon Dioxide (mmol/L)   Date Value   02/06/2020 29   12/12/2019 29     BUN (mg/dL)   Date Value   02/06/2020 11   12/12/2019 12     Creatinine (mg/dL)   Date Value   02/06/2020 0.73   12/12/2019 0.91     Called and notified patient of results. He verbalized understanding and denied any questions/concerns at this time.    Yes

## 2020-02-28 ENCOUNTER — OUTPATIENT (OUTPATIENT)
Dept: OUTPATIENT SERVICES | Facility: HOSPITAL | Age: 65
LOS: 1 days | End: 2020-02-28

## 2020-02-28 ENCOUNTER — LABORATORY RESULT (OUTPATIENT)
Age: 65
End: 2020-02-28

## 2020-02-28 ENCOUNTER — APPOINTMENT (OUTPATIENT)
Dept: INTERNAL MEDICINE | Facility: CLINIC | Age: 65
End: 2020-02-28

## 2020-03-02 DIAGNOSIS — I48.91 UNSPECIFIED ATRIAL FIBRILLATION: ICD-10-CM

## 2020-03-02 DIAGNOSIS — Z51.81 ENCOUNTER FOR THERAPEUTIC DRUG LEVEL MONITORING: ICD-10-CM

## 2020-03-02 DIAGNOSIS — Z79.01 LONG TERM (CURRENT) USE OF ANTICOAGULANTS: ICD-10-CM

## 2020-05-20 ENCOUNTER — OUTPATIENT (OUTPATIENT)
Dept: OUTPATIENT SERVICES | Facility: HOSPITAL | Age: 65
LOS: 1 days | End: 2020-05-20

## 2020-05-20 ENCOUNTER — APPOINTMENT (OUTPATIENT)
Dept: INTERNAL MEDICINE | Facility: CLINIC | Age: 65
End: 2020-05-20

## 2020-05-20 LAB
INR PPP: 2.1 RATIO
POCT-PROTHROMBIN TIME: 25.7 SECS

## 2020-05-27 ENCOUNTER — APPOINTMENT (OUTPATIENT)
Dept: INTERNAL MEDICINE | Facility: CLINIC | Age: 65
End: 2020-05-27

## 2020-05-27 ENCOUNTER — EMERGENCY (EMERGENCY)
Facility: HOSPITAL | Age: 65
LOS: 1 days | Discharge: HOME | End: 2020-05-27
Attending: EMERGENCY MEDICINE | Admitting: EMERGENCY MEDICINE
Payer: MEDICARE

## 2020-05-27 VITALS
DIASTOLIC BLOOD PRESSURE: 55 MMHG | RESPIRATION RATE: 16 BRPM | TEMPERATURE: 98 F | OXYGEN SATURATION: 97 % | SYSTOLIC BLOOD PRESSURE: 147 MMHG | HEART RATE: 65 BPM

## 2020-05-27 VITALS
TEMPERATURE: 98 F | HEART RATE: 78 BPM | SYSTOLIC BLOOD PRESSURE: 152 MMHG | DIASTOLIC BLOOD PRESSURE: 102 MMHG | OXYGEN SATURATION: 100 %

## 2020-05-27 DIAGNOSIS — I48.91 UNSPECIFIED ATRIAL FIBRILLATION: ICD-10-CM

## 2020-05-27 DIAGNOSIS — Z79.01 LONG TERM (CURRENT) USE OF ANTICOAGULANTS: ICD-10-CM

## 2020-05-27 LAB
ALBUMIN SERPL ELPH-MCNC: 4.1 G/DL — SIGNIFICANT CHANGE UP (ref 3.3–5)
ALP SERPL-CCNC: 75 U/L — SIGNIFICANT CHANGE UP (ref 40–120)
ALT FLD-CCNC: 17 U/L — SIGNIFICANT CHANGE UP (ref 4–33)
ANION GAP SERPL CALC-SCNC: 9 MMO/L — SIGNIFICANT CHANGE UP (ref 7–14)
APPEARANCE UR: CLEAR — SIGNIFICANT CHANGE UP
APTT BLD: 42.1 SEC — HIGH (ref 27.5–36.3)
AST SERPL-CCNC: 16 U/L — SIGNIFICANT CHANGE UP (ref 4–32)
BASOPHILS # BLD AUTO: 0.11 K/UL — SIGNIFICANT CHANGE UP (ref 0–0.2)
BASOPHILS NFR BLD AUTO: 1.3 % — SIGNIFICANT CHANGE UP (ref 0–2)
BILIRUB SERPL-MCNC: 0.3 MG/DL — SIGNIFICANT CHANGE UP (ref 0.2–1.2)
BILIRUB UR-MCNC: NEGATIVE — SIGNIFICANT CHANGE UP
BLOOD UR QL VISUAL: NEGATIVE — SIGNIFICANT CHANGE UP
BUN SERPL-MCNC: 11 MG/DL — SIGNIFICANT CHANGE UP (ref 7–23)
CALCIUM SERPL-MCNC: 9.5 MG/DL — SIGNIFICANT CHANGE UP (ref 8.4–10.5)
CHLORIDE SERPL-SCNC: 104 MMOL/L — SIGNIFICANT CHANGE UP (ref 98–107)
CO2 SERPL-SCNC: 27 MMOL/L — SIGNIFICANT CHANGE UP (ref 22–31)
COLOR SPEC: SIGNIFICANT CHANGE UP
CREAT SERPL-MCNC: 0.93 MG/DL — SIGNIFICANT CHANGE UP (ref 0.5–1.3)
EOSINOPHIL # BLD AUTO: 0.5 K/UL — SIGNIFICANT CHANGE UP (ref 0–0.5)
EOSINOPHIL NFR BLD AUTO: 6.1 % — HIGH (ref 0–6)
GLUCOSE SERPL-MCNC: 98 MG/DL — SIGNIFICANT CHANGE UP (ref 70–99)
GLUCOSE UR-MCNC: NEGATIVE — SIGNIFICANT CHANGE UP
HCT VFR BLD CALC: 41.8 % — SIGNIFICANT CHANGE UP (ref 34.5–45)
HGB BLD-MCNC: 13 G/DL — SIGNIFICANT CHANGE UP (ref 11.5–15.5)
IMM GRANULOCYTES NFR BLD AUTO: 0.4 % — SIGNIFICANT CHANGE UP (ref 0–1.5)
INR BLD: 3.29 — HIGH (ref 0.88–1.17)
KETONES UR-MCNC: NEGATIVE — SIGNIFICANT CHANGE UP
LEUKOCYTE ESTERASE UR-ACNC: NEGATIVE — SIGNIFICANT CHANGE UP
LYMPHOCYTES # BLD AUTO: 2.32 K/UL — SIGNIFICANT CHANGE UP (ref 1–3.3)
LYMPHOCYTES # BLD AUTO: 28.3 % — SIGNIFICANT CHANGE UP (ref 13–44)
MCHC RBC-ENTMCNC: 29.8 PG — SIGNIFICANT CHANGE UP (ref 27–34)
MCHC RBC-ENTMCNC: 31.1 % — LOW (ref 32–36)
MCV RBC AUTO: 95.9 FL — SIGNIFICANT CHANGE UP (ref 80–100)
MONOCYTES # BLD AUTO: 0.67 K/UL — SIGNIFICANT CHANGE UP (ref 0–0.9)
MONOCYTES NFR BLD AUTO: 8.2 % — SIGNIFICANT CHANGE UP (ref 2–14)
NEUTROPHILS # BLD AUTO: 4.57 K/UL — SIGNIFICANT CHANGE UP (ref 1.8–7.4)
NEUTROPHILS NFR BLD AUTO: 55.7 % — SIGNIFICANT CHANGE UP (ref 43–77)
NITRITE UR-MCNC: NEGATIVE — SIGNIFICANT CHANGE UP
NRBC # FLD: 0 K/UL — SIGNIFICANT CHANGE UP (ref 0–0)
PH UR: 7 — SIGNIFICANT CHANGE UP (ref 5–8)
PLATELET # BLD AUTO: 174 K/UL — SIGNIFICANT CHANGE UP (ref 150–400)
PMV BLD: 10.6 FL — SIGNIFICANT CHANGE UP (ref 7–13)
POTASSIUM SERPL-MCNC: 4.3 MMOL/L — SIGNIFICANT CHANGE UP (ref 3.5–5.3)
POTASSIUM SERPL-SCNC: 4.3 MMOL/L — SIGNIFICANT CHANGE UP (ref 3.5–5.3)
PROT SERPL-MCNC: 7.4 G/DL — SIGNIFICANT CHANGE UP (ref 6–8.3)
PROT UR-MCNC: NEGATIVE — SIGNIFICANT CHANGE UP
PROTHROM AB SERPL-ACNC: 38.9 SEC — HIGH (ref 9.8–13.1)
RBC # BLD: 4.36 M/UL — SIGNIFICANT CHANGE UP (ref 3.8–5.2)
RBC # FLD: 13 % — SIGNIFICANT CHANGE UP (ref 10.3–14.5)
SODIUM SERPL-SCNC: 140 MMOL/L — SIGNIFICANT CHANGE UP (ref 135–145)
SP GR SPEC: 1.01 — SIGNIFICANT CHANGE UP (ref 1–1.04)
TROPONIN T, HIGH SENSITIVITY: < 6 NG/L — SIGNIFICANT CHANGE UP (ref ?–14)
UROBILINOGEN FLD QL: NORMAL — SIGNIFICANT CHANGE UP
WBC # BLD: 8.2 K/UL — SIGNIFICANT CHANGE UP (ref 3.8–10.5)
WBC # FLD AUTO: 8.2 K/UL — SIGNIFICANT CHANGE UP (ref 3.8–10.5)

## 2020-05-27 PROCEDURE — 93010 ELECTROCARDIOGRAM REPORT: CPT

## 2020-05-27 PROCEDURE — 70450 CT HEAD/BRAIN W/O DYE: CPT | Mod: 26

## 2020-05-27 PROCEDURE — 71046 X-RAY EXAM CHEST 2 VIEWS: CPT | Mod: 26

## 2020-05-27 PROCEDURE — 99284 EMERGENCY DEPT VISIT MOD MDM: CPT | Mod: 25

## 2020-05-27 PROCEDURE — 99283 EMERGENCY DEPT VISIT LOW MDM: CPT

## 2020-05-27 NOTE — ED PROVIDER NOTE - OBJECTIVE STATEMENT
63 yo F, nonsmoker with PMH of AVR and MVR on coumadin, A-fib, CVA, HTN, HLD, presents to ER c/o generalized headache x 1 week. Pt states having generalized headache, 8/10, currently resolved, intermittent, non-radiating, no aggravating factors, better with Tylenol. Reports Tylenol makes her feel sleepy. Admits headache is different than her previous headache, no trauma/injury, Reports her body feels heavy. Denies any fever, chills, dizziness, n/v/d, chest pain, sob, dysuria, abdominal pain, weakness, numbness, or any other complaints.     Creole translation: [id number]

## 2020-05-27 NOTE — CONSULT NOTE ADULT - SUBJECTIVE AND OBJECTIVE BOX
CHIEF COMPLAINT: Called to evaluate patient with atrial fibrillation and slow VR with pauses up to 2 secs    HISTORY OF PRESENT ILLNESS:      PAST MEDICAL & SURGICAL HISTORY:  Cerebrovascular accident (CVA)  History of stroke: 2008 hospitalized in [facility]  History of MI (myocardial infarction): 8-9 years ago in [facility]  Atrial fibrillation: on coumadin  HLD (hyperlipidemia)  HTN (hypertension)  S/P MVR (mitral valve replacement): 1/09 metal in [place]  S/P AVR (aortic valve replacement): 1/09 metal in [place]    PREVIOUS DIAGNOSTIC TESTING:    Echocardiogram:      MEDICATIONS:        FAMILY HISTORY:  No pertinent family history in first degree relatives      SOCIAL HISTORY:      [-] Smoker  [-] Alcohol  [-] Drugs    Allergies    No Known Allergies    Intolerances    	    REVIEW OF SYSTEMS:  CONSTITUTIONAL: No fever, weight loss, or fatigue  EYES: No eye pain, visual disturbances, or discharge  ENMT:  No difficulty hearing, tinnitus, vertigo; No sinus or throat pain  NECK: No pain or stiffness  RESPIRATORY: No cough, wheezing, chills or hemoptysis; No Shortness of Breath  CARDIOVASCULAR: No chest pain, palpitations, passing out, dizziness, or leg swelling  GASTROINTESTINAL: No abdominal or epigastric pain. No nausea, vomiting, or hematemesis; No diarrhea or constipation. No melena or hematochezia.  GENITOURINARY: No dysuria, frequency, hematuria, or incontinence  NEUROLOGICAL: No headaches, memory loss, loss of strength, numbness, or tremors  SKIN: No itching, burning, rashes, or lesions   LYMPH Nodes: No enlarged glands  ENDOCRINE: No heat or cold intolerance; No hair loss  MUSCULOSKELETAL: No joint pain or swelling; No muscle, back, or extremity pain  PSYCHIATRIC: No depression, anxiety, mood swings, or difficulty sleeping  HEME/LYMPH: No easy bruising, or bleeding gums  ALLERY AND IMMUNOLOGIC: No hives or eczema	    [ ] All others negative	  [ ] Unable to obtain    PHYSICAL EXAM:  T(C): 36.8 (05-27-20 @ 12:22), Max: 36.8 (05-27-20 @ 12:22)  HR: 65 (05-27-20 @ 12:22) (65 - 78)  BP: 147/55 (05-27-20 @ 12:22) (147/55 - 154/85)  RR: 16 (05-27-20 @ 12:22) (16 - 16)  SpO2: 97% (05-27-20 @ 12:22) (97% - 100%)  Wt(kg): --  I&O's Summary      Appearance: Normal	  HEENT:   Normal oral mucosa, PERRL, EOMI	  Lymphatic: No lymphadenopathy  Cardiovascular: Normal S1 S2, No JVD, No murmurs, No edema  Respiratory: Lungs clear to auscultation	  Psychiatry: A & O x 3, Mood & affect appropriate  Gastrointestinal:  Soft, Non-tender, + BS	  Skin: No rashes, No ecchymoses, No cyanosis	  Neurologic: Non-focal  Extremities: Normal range of motion, No clubbing, cyanosis or edema  Vascular: Peripheral pulses palpable 2+ bilaterally    TELEMETRY: 	    ECG:  	  RADIOLOGY:  OTHER: 	  	  LABS:	 	    CARDIAC MARKERS:                                  13.0   8.20  )-----------( 174      ( 27 May 2020 09:55 )             41.8     05-27    140  |  104  |  11  ----------------------------<  98  4.3   |  27  |  0.93    Ca    9.5      27 May 2020 09:55    TPro  7.4  /  Alb  4.1  /  TBili  0.3  /  DBili  x   /  AST  16  /  ALT  17  /  AlkPhos  75  05-27    proBNP:   Lipid Profile:   HgA1c:   TSH: CHIEF COMPLAINT: Called to evaluate patient with atrial fibrillation and slow VR with pauses up to 2 secs    HISTORY OF PRESENT ILLNESS:  63 yo F, nonsmoker with PMH of AVR and MVR on coumadin, A-fib, CVA, HTN, HLD, presented to ER c/o generalized headache x 1 week. Patient states having generalized headache, 8/10, currently resolved, intermittent, non-radiating, no aggravating factors, better with Tylenol. Reports Tylenol makes her feel sleepy. Admits headache is different than her previous headache, no trauma/injury, Reports her body feels heavy. Denies any fever, chills, dizziness, n/v/d, chest pain, sob, dysuria, abdominal pain, weakness, numbness, or any other complaints. Telemetry and EKG revealed atrial fibrillation with VR 50s with up to 2 sec pauses. Patient denies any associated dizziness, palpitations, near syncope or syncope. Prior medical records indicate that patient had atrial fibrillation since 2005 and has been maintained on Coumadin.     PAST MEDICAL & SURGICAL HISTORY:  Cerebrovascular accident (CVA)  History of stroke: 2008 hospitalized in [facility]  History of MI (myocardial infarction): 8-9 years ago in [facility]  Atrial fibrillation: on coumadin  HLD (hyperlipidemia)  HTN (hypertension)  S/P MVR (mitral valve replacement): 1/09 metal in [place]  S/P AVR (aortic valve replacement): 1/09 metal in [place]    PREVIOUS DIAGNOSTIC TESTING:    Echocardiogram: 9/1/2019:   DIMENSIONS:  Dimensions:     Normal Values:  LA:     4.3 cm    2.0 - 4.0 cm  Ao:     2.6 cm    2.0 - 3.8 cm  SEPTUM: 1.1 cm    0.6 - 1.2 cm  PWT:    0.9 cm    0.6 - 1.1 cm  LVIDd:  4.6 cm    3.0 - 5.6 cm  LVIDs:  3.4 cm    1.8 - 4.0 cm  Derived Variables:  LVMI: 98 g/m2  RWT: 0.39  Fractional short: 26 %  Ejection Fraction (Kenroyicholtz): 51 %  ------------------------------------------------------------------------  OBSERVATIONS:  Mitral Valve: Bioprosthetic mitral valve replacement. Mean  transmitral valve gradient equals 6 mm Hg, which is  elevated even in the setting of a prosthetic valve.  Aortic Root: Normal aortic root.  Aortic Valve: Bioprosthetic aortic valve replacement. Peak  transaortic valve gradient equals 27 mm Hg, mean  transaortic valve gradient equals 17 mm Hg, which is  probably normal in the presence of a prosthetic valve.  Left Atrium: Severely dilated left atrium.  LA volume index  = 64 cc/m2.  Left Ventricle: Normal left ventricular systolic function.  No segmental wall motion abnormalities. Septal motion  consistent with post operative state. Normal left  ventricular internal dimensions and wall thicknesses.  Right Heart: Normal right atrium. Normal right ventricular  size and function. Normal tricuspid valve.  Moderate  tricuspid regurgitation. Normal pulmonic valve.  Pericardium/PleuraNormal pericardium with no pericardial  effusion.  Hemodynamic: Estimated right ventricular systolic pressure  equals 46 mm Hg, assuming right atrial pressure equals 10  mm Hg, consistent with mild pulmonary hypertension.  ------------------------------------------------------------------------  CONCLUSIONS:  1. Bioprosthetic mitral valve replacement. Mean transmitral  valve gradient equals 6 mm Hg, which is elevated even in  the setting of a prosthetic valve.  2. Bioprosthetic aortic valve replacement. Peak transaortic  valve gradient equals 27 mm Hg, mean transaortic valve  gradient equals 17 mm Hg, which is probably normal in the  presence of a prosthetic valve.  3. Normal left ventricular systolic function. No segmental  wall motion abnormalities. Septal motion consistent with  post operative state.  4. Normal right ventricular size and function.  5. Normal tricuspid valve.  Moderate tricuspid  regurgitation.  6. Estimated pulmonary artery systolic pressure equals 46  mm Hg, assuming right atrial pressure equals 10  mm Hg,  consistent with mild pulmonary hypertension.  *** Compared with echocardiogram of 1/31/2019, no  significant changes noted.        MEDICATIONS:  Lisinopril  Hydrochlorothiazide      FAMILY HISTORY:  No pertinent family history in first degree relatives      SOCIAL HISTORY:      [-] Smoker  [-] Alcohol  [-] Drugs    Allergies    No Known Allergies    Intolerances    	    REVIEW OF SYSTEMS:  CONSTITUTIONAL: No fever, weight loss, + fatigue  EYES: No eye pain, visual disturbances, or discharge  ENMT:  No difficulty hearing, tinnitus, vertigo; No sinus or throat pain  NECK: No pain or stiffness  RESPIRATORY: No cough, wheezing, chills or hemoptysis; No Shortness of Breath  CARDIOVASCULAR: No chest pain, palpitations, passing out, dizziness, or leg swelling  GASTROINTESTINAL: No abdominal or epigastric pain. No nausea, vomiting, or hematemesis; No diarrhea or constipation. No melena or hematochezia.  GENITOURINARY: No dysuria, frequency, hematuria, or incontinence  NEUROLOGICAL: No memory loss, loss of strength, numbness, or tremors, + head ache  SKIN: No itching, burning, rashes, or lesions   LYMPH Nodes: No enlarged glands  ENDOCRINE: No heat or cold intolerance; No hair loss  MUSCULOSKELETAL: No joint pain or swelling; No muscle, back, or extremity pain  PSYCHIATRIC: No depression, anxiety, mood swings, or difficulty sleeping  HEME/LYMPH: No easy bruising, or bleeding gums  ALLERY AND IMMUNOLOGIC: No hives or eczema	    [ ] All others negative	  [ ] Unable to obtain    PHYSICAL EXAM:  T(C): 36.8 (05-27-20 @ 12:22), Max: 36.8 (05-27-20 @ 12:22)  HR: 65 (05-27-20 @ 12:22) (65 - 78)  BP: 147/55 (05-27-20 @ 12:22) (147/55 - 154/85)  RR: 16 (05-27-20 @ 12:22) (16 - 16)  SpO2: 97% (05-27-20 @ 12:22) (97% - 100%)  Wt(kg): --  I&O's Summary      Appearance: Normal	  HEENT:   Normal oral mucosa, PERRL, EOMI	  Lymphatic: No lymphadenopathy  Cardiovascular: Irregulare rate andrhythm, No JVD, No murmurs, No edema  Respiratory: Lungs clear to auscultation	  Psychiatry: A & O x 3, Mood & affect appropriate  Gastrointestinal:  Soft, Non-tender, + BS	  Skin: No rashes, No ecchymoses, No cyanosis	  Neurologic: Non-focal  Extremities: Normal range of motion, No clubbing, cyanosis or edema  Vascular: Peripheral pulses palpable 2+ bilaterally    TELEMETRY: 	Atrial fibrillation with VR 50s, 2sec pause    ECG:  Atrial fibrillation with VR 50s	  RADIOLOGY:  OTHER: 	  	  LABS:	 	    CARDIAC MARKERS:                          13.0   8.20  )-----------( 174      ( 27 May 2020 09:55 )             41.8     05-27    140  |  104  |  11  ----------------------------<  98  4.3   |  27  |  0.93    Ca    9.5      27 May 2020 09:55    TPro  7.4  /  Alb  4.1  /  TBili  0.3  /  DBili  x   /  AST  16  /  ALT  17  /  AlkPhos  75  05-27      TSH: CHIEF COMPLAINT: Called to evaluate patient with atrial fibrillation and slow VR with pauses up to 2 secs    HISTORY OF PRESENT ILLNESS:  65 yo F, nonsmoker with PMH of AVR and MVR on coumadin, A-fib, CVA, HTN, HLD, presented to ER c/o generalized headache x 1 week. Patient states having generalized headache, 8/10, currently resolved, intermittent, non-radiating, no aggravating factors, better with Tylenol. Reports Tylenol makes her feel sleepy. Admits headache is different than her previous headache, no trauma/injury, Reports her body feels heavy. Denies any fever, chills, dizziness, n/v/d, chest pain, sob, dysuria, abdominal pain, weakness, numbness, or any other complaints. Telemetry and EKG revealed atrial fibrillation with VR 50s with up to 2 sec pauses. Patient denies any associated dizziness, palpitations, near syncope or syncope. Prior medical records indicate that patient had atrial fibrillation since 2005 and has been maintained on Coumadin.   Patient is Creole speaking. Used  ID # [id number]    PAST MEDICAL & SURGICAL HISTORY:  Cerebrovascular accident (CVA)  History of stroke: 2008 hospitalized in [facility]  History of MI (myocardial infarction): 8-9 years ago in [facility]  Atrial fibrillation: on coumadin  HLD (hyperlipidemia)  HTN (hypertension)  S/P MVR (mitral valve replacement): 1/09 metal in [place]  S/P AVR (aortic valve replacement): 1/09 metal in [place]    PREVIOUS DIAGNOSTIC TESTING:    Echocardiogram: 9/1/2019:   DIMENSIONS:  Dimensions:     Normal Values:  LA:     4.3 cm    2.0 - 4.0 cm  Ao:     2.6 cm    2.0 - 3.8 cm  SEPTUM: 1.1 cm    0.6 - 1.2 cm  PWT:    0.9 cm    0.6 - 1.1 cm  LVIDd:  4.6 cm    3.0 - 5.6 cm  LVIDs:  3.4 cm    1.8 - 4.0 cm  Derived Variables:  LVMI: 98 g/m2  RWT: 0.39  Fractional short: 26 %  Ejection Fraction (Teicholtz): 51 %  ------------------------------------------------------------------------  OBSERVATIONS:  Mitral Valve: Bioprosthetic mitral valve replacement. Mean  transmitral valve gradient equals 6 mm Hg, which is  elevated even in the setting of a prosthetic valve.  Aortic Root: Normal aortic root.  Aortic Valve: Bioprosthetic aortic valve replacement. Peak  transaortic valve gradient equals 27 mm Hg, mean  transaortic valve gradient equals 17 mm Hg, which is  probably normal in the presence of a prosthetic valve.  Left Atrium: Severely dilated left atrium.  LA volume index  = 64 cc/m2.  Left Ventricle: Normal left ventricular systolic function.  No segmental wall motion abnormalities. Septal motion  consistent with post operative state. Normal left  ventricular internal dimensions and wall thicknesses.  Right Heart: Normal right atrium. Normal right ventricular  size and function. Normal tricuspid valve.  Moderate  tricuspid regurgitation. Normal pulmonic valve.  Pericardium/PleuraNormal pericardium with no pericardial  effusion.  Hemodynamic: Estimated right ventricular systolic pressure  equals 46 mm Hg, assuming right atrial pressure equals 10  mm Hg, consistent with mild pulmonary hypertension.  ------------------------------------------------------------------------  CONCLUSIONS:  1. Bioprosthetic mitral valve replacement. Mean transmitral  valve gradient equals 6 mm Hg, which is elevated even in  the setting of a prosthetic valve.  2. Bioprosthetic aortic valve replacement. Peak transaortic  valve gradient equals 27 mm Hg, mean transaortic valve  gradient equals 17 mm Hg, which is probably normal in the  presence of a prosthetic valve.  3. Normal left ventricular systolic function. No segmental  wall motion abnormalities. Septal motion consistent with  post operative state.  4. Normal right ventricular size and function.  5. Normal tricuspid valve.  Moderate tricuspid  regurgitation.  6. Estimated pulmonary artery systolic pressure equals 46  mm Hg, assuming right atrial pressure equals 10  mm Hg,  consistent with mild pulmonary hypertension.  *** Compared with echocardiogram of 1/31/2019, no  significant changes noted.        MEDICATIONS:  Lisinopril  Hydrochlorothiazide      FAMILY HISTORY:  No pertinent family history in first degree relatives      SOCIAL HISTORY:      [-] Smoker  [-] Alcohol  [-] Drugs    Allergies    No Known Allergies    Intolerances    	    REVIEW OF SYSTEMS:  CONSTITUTIONAL: No fever, weight loss, + fatigue  EYES: No eye pain, visual disturbances, or discharge  ENMT:  No difficulty hearing, tinnitus, vertigo; No sinus or throat pain  NECK: No pain or stiffness  RESPIRATORY: No cough, wheezing, chills or hemoptysis; No Shortness of Breath  CARDIOVASCULAR: No chest pain, palpitations, passing out, dizziness, or leg swelling  GASTROINTESTINAL: No abdominal or epigastric pain. No nausea, vomiting, or hematemesis; No diarrhea or constipation. No melena or hematochezia.  GENITOURINARY: No dysuria, frequency, hematuria, or incontinence  NEUROLOGICAL: No memory loss, loss of strength, numbness, or tremors, + head ache  SKIN: No itching, burning, rashes, or lesions   LYMPH Nodes: No enlarged glands  ENDOCRINE: No heat or cold intolerance; No hair loss  MUSCULOSKELETAL: No joint pain or swelling; No muscle, back, or extremity pain  PSYCHIATRIC: No depression, anxiety, mood swings, or difficulty sleeping  HEME/LYMPH: No easy bruising, or bleeding gums  ALLERY AND IMMUNOLOGIC: No hives or eczema	    [ ] All others negative	  [ ] Unable to obtain    PHYSICAL EXAM:  T(C): 36.8 (05-27-20 @ 12:22), Max: 36.8 (05-27-20 @ 12:22)  HR: 65 (05-27-20 @ 12:22) (65 - 78)  BP: 147/55 (05-27-20 @ 12:22) (147/55 - 154/85)  RR: 16 (05-27-20 @ 12:22) (16 - 16)  SpO2: 97% (05-27-20 @ 12:22) (97% - 100%)  Wt(kg): --  I&O's Summary      Appearance: Normal	  HEENT:   Normal oral mucosa, PERRL, EOMI	  Lymphatic: No lymphadenopathy  Cardiovascular: Irregulare rate andrhythm, No JVD, No murmurs, No edema  Respiratory: Lungs clear to auscultation	  Psychiatry: A & O x 3, Mood & affect appropriate  Gastrointestinal:  Soft, Non-tender, + BS	  Skin: No rashes, No ecchymoses, No cyanosis	  Neurologic: Non-focal  Extremities: Normal range of motion, No clubbing, cyanosis or edema  Vascular: Peripheral pulses palpable 2+ bilaterally    TELEMETRY: 	Atrial fibrillation with VR 50s, 2sec pause    ECG:  Atrial fibrillation with VR 50s	  RADIOLOGY:  OTHER: 	  	  LABS:	 	    CARDIAC MARKERS:                          13.0   8.20  )-----------( 174      ( 27 May 2020 09:55 )             41.8     05-27    140  |  104  |  11  ----------------------------<  98  4.3   |  27  |  0.93    Ca    9.5      27 May 2020 09:55    TPro  7.4  /  Alb  4.1  /  TBili  0.3  /  DBili  x   /  AST  16  /  ALT  17  /  AlkPhos  75  05-27      TSH:

## 2020-05-27 NOTE — CONSULT NOTE ADULT - ATTENDING COMMENTS
65 yo F, nonsmoker with PMH of AVR and MVR on coumadin, A-fib, CVA, HTN, HLD, presented to ER c/o generalized headache x 1 week which is resolved now. Found to have atrial fibrillation with slow VR and asymptomatic pause up to 2secs. Prior records show that patient has been in atrial fibrillation since 2015 and is on Coumadin. Discussed with patient regarding possible MARSHA/DCCV and patient refused at this time. Not on any AV ken blockers  - Continue anticoagulation with Coumadin. CHADS2 Vasc score= ; Age, sex, HTN, CVA  - continue to  Hold AV ken blockers   - Will follow up

## 2020-05-27 NOTE — CONSULT NOTE ADULT - ASSESSMENT
63 yo F, nonsmoker with PMH of AVR and MVR on coumadin, A-fib, CVA, HTN, HLD, presented to ER c/o generalized headache x 1 week which is resolved now. Found to have atrial fibrillation with slow VR and asymptomatic pause up to 2secs. Prior records show that patient has been in atrial fibrillation since 2015 and is on Coumadin. Discussed with patient regarding possible MARSHA/DCCV and patient refused at this time. Not on any AV ken blockers  - Continue anticoagulation with Coumadin. CHADS2 Vasc score= ; Age, sex, HTN, CVA  - continue to  Hold AV ken blockers   - Will follow up and will d/w Dr. Ambrosio

## 2020-05-27 NOTE — ED ADULT NURSE NOTE - OBJECTIVE STATEMENT
Pt. A&Ox3, primarily creole speaking, c/o intermittent headache and lethargy x 1 wk. States "I feel heavy". Denies head trauma. Denies weakness or numbness. Has full strength of extremities. No facial droop or drift noted. Denies cp, abdominal pain,, n/v/d/ or fevers. MD at bedside for eval. VS done as charted, breathing well on RA. Respirations even and unlabored. Will continue to monitor.

## 2020-05-27 NOTE — ED PROVIDER NOTE - NS CPE EDP MUSC CERVICAL LOC
no midline spine tenderness, +left paraspinal tenderness, no erythema, no edema, no obvious deformity b/l.

## 2020-05-27 NOTE — ED PROVIDER NOTE - PATIENT PORTAL LINK FT
You can access the FollowMyHealth Patient Portal offered by [facility] by registering at the following website: [email or web address]. By joining [facility]’s FollowMyHealth portal, you will also be able to view your health information using other applications (apps) compatible with our system.

## 2020-05-27 NOTE — ED ADULT NURSE NOTE - NSIMPLEMENTINTERV_GEN_ALL_ED
Implemented All Fall Risk Interventions:  [place] to call system. Call bell, personal items and telephone within reach. Instruct patient to call for assistance. Room bathroom lighting operational. Non-slip footwear when patient is off stretcher. Physically safe environment: no spills, clutter or unnecessary equipment. Stretcher in lowest position, wheels locked, appropriate side rails in place. Provide visual cue, wrist band, yellow gown, etc. Monitor gait and stability. Monitor for mental status changes and reorient to person, place, and time. Review medications for side effects contributing to fall risk. Reinforce activity limits and safety measures with patient and family.

## 2020-05-27 NOTE — ED PROVIDER NOTE - PMH
Atrial fibrillation  on coumadin  Cerebrovascular accident (CVA)    History of MI (myocardial infarction)  8-9 years ago in [facility]  History of stroke  2008 hospitalized in [facility]  HLD (hyperlipidemia)    HTN (hypertension)

## 2020-05-27 NOTE — ED PROVIDER NOTE - NSFOLLOWUPINSTRUCTIONS_ED_ALL_ED_FT
Rest, drink plenty of fluids.  Advance activity as tolerated.  Continue all previously prescribed medications as directed. Take Tylenol 650mg (Two 325 mg pills) every 4-6 hours as needed for pain.  Follow up with your primary care physician and cardiology in 48-72 hours- bring copies of your results.  Return to the ER for worsening or persistent symptoms, and/or ANY NEW OR CONCERNING SYMPTOMS. If you have issues obtaining follow up, please call: [phone] to obtain a doctor or specialist who takes your insurance in your area. Rest, drink plenty of fluids.  Advance activity as tolerated.  Continue all previously prescribed medications as directed. Take Tylenol 650mg (Two 325 mg pills) every 4-6 hours as needed for pain.  Follow up with your primary care physician and cardiology in 48-72 hours- bring copies of your results. Cardiology [phone], call to make appointment.  Return to the ER for worsening or persistent symptoms, and/or ANY NEW OR CONCERNING SYMPTOMS. If you have issues obtaining follow up, please call: [phone] to obtain a doctor or specialist who takes your insurance in your area.

## 2020-05-27 NOTE — ED PROVIDER NOTE - PROGRESS NOTE DETAILS
ANGUS VALDEZ: spoke with daughter on the phone, confirmed heart valve replacement on coumadin 6mg increased over the weekend due to low INR; states pt's not eating much, been in bed most the time, took tylenol w/ relief of headache. ANGUS VALDEZ: spoke with daughter on phone, states patient has been in bed most the time, sleepy, eating less, not as active as she used to be; has intermittent headache, better w/ Tylenol but wake up w/ headache; admits on coumadin 4mg, but increased to 6mg over the weekend due to low INR; CT head negative. UA neg. Pt has 2 sec pause on tele, Afib, rate 48-70 on tele. CDU and EP consulted for evaluation. Will continue to monitor and reassess. vanessa: pt stable, asymptomatic pauses with no acute symptoms. head ct neg. discussed case with daughter who will f/u with cardio and pmd. inr therapeutic PA COURTNEY: Patient reassessed, sitting comfortably in bed in NAD, watching TV, denies any complaints. States feeling better, symptoms improved. Discussed with attending, patient can be discharged home to f/u with PCP and card. The patient was given verbal and written discharge instructions. Specifically, instructions when to return to the ED and when to seek follow-up from their pcp was discussed. Any specialty follow-up was discussed, including how to make an appointment.  Instructions were discussed in simple, plain language and was understood by the patient. The patient understands that should their symptoms worsen or any new symptoms arise, they should return to the ED immediately for further evaluation. All pt's questions were answered. Patient verbalizes understanding. Creole translation used. PA COURTNEY: Patient reassessed, sitting comfortably in bed in NAD, watching TV, denies any complaints. Pending EP eval. Will continue to monitor and reassess. PA COURTNEY: pt seen by EP, no acute intervention, recommends f/u [phone] cardiology. Discussed with attending, patient can be discharged home to f/u with PCP and card. The patient was given verbal and written discharge instructions. Specifically, instructions when to return to the ED and when to seek follow-up from their pcp was discussed. Any specialty follow-up was discussed, including how to make an appointment.  Instructions were discussed in simple, plain language and was understood by the patient. The patient understands that should their symptoms worsen or any new symptoms arise, they should return to the ED immediately for further evaluation. All pt's questions were answered. Patient verbalizes understanding.    Creole translation: [id number]

## 2020-05-27 NOTE — ED ADULT TRIAGE NOTE - CHIEF COMPLAINT QUOTE
Pt c/o headache and lethargy x1 week. Pt creole speaking, daughter to translate. As per daughter pt is usually very active. Pt over the last week has been in and out of sleep all day. Pt daughter states her and  were sick but never got tested for COVID. PMH Valve replacement (on Coumadin). Pt denies visual changes

## 2020-05-27 NOTE — ED PROVIDER NOTE - CLINICAL SUMMARY MEDICAL DECISION MAKING FREE TEXT BOX
63 yo F, nonsmoker with PMH of AVR and MVR on coumadin, A-fib, CVA, HTN, HLD, presents to ER c/o generalized headache x 1 week. Pt is AAOx3, acute atraumatic headache, no trauma/injury, on coumadin, no focal neuro deficits, will obtain CT head to r/o ICH; Pt's heaviness in body, no chest pain/sob; EKG w/ A-fib will obtain labs,   ; 63 yo F, nonsmoker with PMH of AVR and MVR on coumadin, A-fib, CVA, HTN, HLD, presents to ER c/o generalized headache x 1 week. Pt is AAOx3, acute atraumatic headache, no trauma/injury, on coumadin, no focal neuro deficits, will obtain CT head to r/o ICH; Pt's heaviness in body, no chest pain/sob; EKG w/ A-fib will obtain labs, trop, CXR, CT head and reassess. Pt refused pain med at this time.

## 2020-05-27 NOTE — ED ADULT NURSE REASSESSMENT NOTE - NS ED NURSE REASSESS COMMENT FT1
Pt. became bradycardic into 40s lasting only approx. 25 seconds going back to 60s after. Pt. has no complaints at this time. Will continue to monitor. Pt. became bradycardic into 40s lasting only approx. 25 seconds going back to 60s after. Pt. has no complaints at this time. MD Rankin made aware. Will continue to monitor.

## 2020-05-27 NOTE — ED PROVIDER NOTE - CPE EDP EYES NORM
Bed: 06  Expected date: 2/20/20  Expected time:   Means of arrival:   Comments:  Suzie Villagomez 51  02/20/20 1139 normal...

## 2020-05-27 NOTE — ED ADULT NURSE REASSESSMENT NOTE - NS ED NURSE REASSESS COMMENT FT1
Pt. had 2.38 second pause. MD Rankin made aware. Pt. appears comfortable with no complaints. Will continue to monitor.

## 2020-05-27 NOTE — ED PROVIDER NOTE - ATTENDING CONTRIBUTION TO CARE
I performed a face to face bedside interview with patient regarding history of present illness, review of symptoms and past medical history. I completed an independent physical exam.  I have discussed patient's plan of care.   I agree with note as stated above, having amended the EMR as needed to reflect my findings. I have discussed the assessment and plan of care.  This includes during the time I functioned as the attending physician for this patient.  Attending Contribution to Care: agree with plan of PA. 63 yo F, nonsmoker with PMH of AVR and MVR on coumadin, A-fib, CVA, HTN, HLD, presents to ER c/o generalized headache x 1 week. Pt states having generalized headache, 8/10, currently resolved, intermittent, non-radiating, no aggravating factors, better with Tylenol. Reports Tylenol makes her feel sleepy. Admits headache is different than her previous headache, no trauma/injury, Reports her body feels heavy. Denies any fever, chills, dizziness, n/v/d, chest pain, sob, dysuria, abdominal pain, weakness, numbness, or any other complaints.   I performed a history and physical exam of the patient and discussed their management with the PA. I reviewed the PA's note and agree with the documented findings and plan of care. I have edited as appropriate. My medical decision making and observations are found above.

## 2020-05-28 LAB
CULTURE RESULTS: SIGNIFICANT CHANGE UP
SARS-COV-2 RNA SPEC QL NAA+PROBE: SIGNIFICANT CHANGE UP
SPECIMEN SOURCE: SIGNIFICANT CHANGE UP

## 2020-05-28 NOTE — DISCUSSION/SUMMARY
[ED Visit] : a visit to ED [FreeTextEntry1] : Ms. Cano was seen in the ED on 5/27 for HA, dizziness x1 week. In brief, this is a 64F with PMH of AVR and MVR on coumadin, A-fib, CVA, HTN, HLD, presented to ER c/o generalized headache x 1 week. She was found to have atrial fibrillation with slow VR and asymptomatic pause up to 2secs. Discussed with patient regarding possible MARSHA/DCCV and patient refused at this time. Not on any AV ken blockers  and slightly supratherapeutic INR. Imaging and laboratory studies were otherwise unremarkable. Called and spoke to patient's daughter, Vernon ([phone]) who states mother is doing okay, sleeping currently. Confirmed appointment for tomorrow.

## 2020-05-29 ENCOUNTER — OUTPATIENT (OUTPATIENT)
Dept: OUTPATIENT SERVICES | Facility: HOSPITAL | Age: 65
LOS: 1 days | End: 2020-05-29

## 2020-05-29 ENCOUNTER — APPOINTMENT (OUTPATIENT)
Dept: INTERNAL MEDICINE | Facility: CLINIC | Age: 65
End: 2020-05-29
Payer: MEDICARE

## 2020-05-29 VITALS — HEIGHT: 63 IN

## 2020-05-29 DIAGNOSIS — Z86.79 PERSONAL HISTORY OF OTHER DISEASES OF THE CIRCULATORY SYSTEM: ICD-10-CM

## 2020-05-29 DIAGNOSIS — Z87.898 PERSONAL HISTORY OF OTHER SPECIFIED CONDITIONS: ICD-10-CM

## 2020-05-29 DIAGNOSIS — R79.1 ABNORMAL COAGULATION PROFILE: ICD-10-CM

## 2020-05-29 DIAGNOSIS — Z01.419 ENCOUNTER FOR GYNECOLOGICAL EXAMINATION (GENERAL) (ROUTINE) W/OUT ABNORMAL FINDINGS: ICD-10-CM

## 2020-05-29 DIAGNOSIS — Z80.0 FAMILY HISTORY OF MALIGNANT NEOPLASM OF DIGESTIVE ORGANS: ICD-10-CM

## 2020-05-29 PROCEDURE — 99443: CPT | Mod: GE,95

## 2020-05-29 RX ORDER — CEPHALEXIN 500 MG
1 CAPSULE ORAL
Qty: 20 | Refills: 0
Start: 2020-05-29 | End: 2020-06-07

## 2020-05-29 NOTE — ED POST DISCHARGE NOTE - DETAILS
daughter contacted and indicated she would  prescription for patient; Keflex prescribed daughter (Vernon Manuel) contacted and indicated she would  prescription for patient; Keflex prescribed

## 2020-06-01 DIAGNOSIS — Z51.81 ENCOUNTER FOR THERAPEUTIC DRUG LEVEL MONITORING: ICD-10-CM

## 2020-06-01 DIAGNOSIS — I48.91 UNSPECIFIED ATRIAL FIBRILLATION: ICD-10-CM

## 2020-06-01 DIAGNOSIS — R79.1 ABNORMAL COAGULATION PROFILE: ICD-10-CM

## 2020-06-01 DIAGNOSIS — R06.02 SHORTNESS OF BREATH: ICD-10-CM

## 2020-06-01 NOTE — ADDENDUM
[FreeTextEntry1] : reviewed with resident via telephone due to covid19 Agree with residents evaluation and plan marianna corrigan

## 2020-06-01 NOTE — HISTORY OF PRESENT ILLNESS
[Home] : at home, [unfilled] , at the time of the visit. [Medical Office: ([facility])___] : at the medical office located in  [Verbal consent obtained from patient] : the patient, [unfilled] [Family Member] : family member [FreeTextEntry3] : daughter, Zeynepdavid [FreeTextEntry1] : post- ER follow up [de-identified] : History provided by patient's daughter, Vernon at the request of the patient. \par 63 yo F, nonsmoker with PMH of AVR and MVR on coumadin, A-fib, CVA (2011), TIA in September, HTN, HLD, presented to ER c/o generalized headache x 1 week which is resolved now. Found to have atrial fibrillation with slow VR and asymptomatic pause up to 2secs. Prior records show that patient has been in atrial fibrillation since 2015 and is on Coumadin. Discussed with patient regarding possible MARSHA/DCCV and patient refused at this time. Not on any AV ken blockers. Per Vernon, patient refuses everything if not discussed with Vernon first. \par For the last two weeks patient has been having shortness of breath with exertion, more than usual as well as sometimes at rest. Mild chest pain. No nausea, numbness, tingling, or vomiting. She never had the nuclear stress test that she was scheduled for in September because she had a TIA at the time of the test. Per records, she had not yet been injected with the nuclear material, but per Vernon's brother and the patient, she actually had gotten the medication prior to the TIA symptoms and unresponsiveness. They are very weary of having a repeat of the same and have not seen cardiology since. \par In regards to her INR, on 5/27 it was 3.29. Patient takes 4mg Coumadin for 5 days and 6mg for two days. She will follow up in clinic next week for repeat INR.\par She has intermittent leg swelling that improves with elevation of legs. \par Patient lives with her daughter (Vernon, who is a PCA on [employer] at [employer]), son-in-law, and 10 year old granddaughter, Anahi. Ms. Cano does cook and clean the house and gets along with all of the household members. Vernon takes care of her completely with the help of her family and often either she or her  have to take time off to take her to appointments.Ms. Cano does not speak any English. MMSE in 2017 was consistent with dementia, likely vascular given her past strokes.

## 2020-06-01 NOTE — PHYSICAL EXAM
[de-identified] : Patient answering in Creole to daughter in full sentences without having to stop to take a breath.  [de-identified] : Unable to assess as interview telephonic. [de-identified] : Unable to assess as interview telephonic. [de-identified] : Unable to assess as interview telephonic. [de-identified] : Unable to assess as interview telephonic. [de-identified] : Patient speaking full sentences. [de-identified] : Unable to assess as interview telephonic.

## 2020-06-01 NOTE — REVIEW OF SYSTEMS
[Lower Ext Edema] : lower extremity edema [Dyspnea on Exertion] : dyspnea on exertion [Chills] : no chills [Discharge] : no discharge [Fever] : no fever [Pain] : no pain [Earache] : no earache [Hearing Loss] : no hearing loss [Cough] : no cough [Wheezing] : no wheezing [Chest Pain] : no chest pain [Abdominal Pain] : no abdominal pain [Nausea] : no nausea [Vomiting] : no vomiting [Dysuria] : no dysuria [Hematuria] : no hematuria

## 2020-06-01 NOTE — PHYSICAL EXAM
[de-identified] : Patient answering in Creole to daughter in full sentences without having to stop to take a breath.  [de-identified] : Unable to assess as interview telephonic. [de-identified] : Unable to assess as interview telephonic. [de-identified] : Unable to assess as interview telephonic. [de-identified] : Unable to assess as interview telephonic. [de-identified] : Patient speaking full sentences. [de-identified] : Unable to assess as interview telephonic.

## 2020-06-01 NOTE — HISTORY OF PRESENT ILLNESS
[Home] : at home, [unfilled] , at the time of the visit. [Medical Office: ([facility])___] : at the medical office located in  [Verbal consent obtained from patient] : the patient, [unfilled] [Family Member] : family member [FreeTextEntry3] : daughter, Zeynepdavid [FreeTextEntry1] : post- ER follow up [de-identified] : History provided by patient's daughter, Vernon at the request of the patient. \par 63 yo F, nonsmoker with PMH of AVR and MVR on coumadin, A-fib, CVA (2011), TIA in September, HTN, HLD, presented to ER c/o generalized headache x 1 week which is resolved now. Found to have atrial fibrillation with slow VR and asymptomatic pause up to 2secs. Prior records show that patient has been in atrial fibrillation since 2015 and is on Coumadin. Discussed with patient regarding possible MARSHA/DCCV and patient refused at this time. Not on any AV ken blockers. Per Venron, patient refuses everything if not discussed with Vernon first. \par For the last two weeks patient has been having shortness of breath with exertion, more than usual as well as sometimes at rest. Mild chest pain. No nausea, numbness, tingling, or vomiting. She never had the nuclear stress test that she was scheduled for in September because she had a TIA at the time of the test. Per records, she had not yet been injected with the nuclear material, but per Vernon's brother and the patient, she actually had gotten the medication prior to the TIA symptoms and unresponsiveness. They are very weary of having a repeat of the same and have not seen cardiology since. \par In regards to her INR, on 5/27 it was 3.29. Patient takes 4mg Coumadin for 5 days and 6mg for two days. She will follow up in clinic next week for repeat INR.\par She has intermittent leg swelling that improves with elevation of legs. \par Patient lives with her daughter (Vernon, who is a PCA on [employer] at [employer]), son-in-law, and 10 year old granddaughter, Anahi. Ms. Cano does cook and clean the house and gets along with all of the household members. Vernon takes care of her completely with the help of her family and often either she or her  have to take time off to take her to appointments.Ms. Cano does not speak any English. MMSE in 2017 was consistent with dementia, likely vascular given her past strokes.

## 2020-06-01 NOTE — REVIEW OF SYSTEMS
[Lower Ext Edema] : lower extremity edema [Dyspnea on Exertion] : dyspnea on exertion [Discharge] : no discharge [Fever] : no fever [Chills] : no chills [Hearing Loss] : no hearing loss [Pain] : no pain [Earache] : no earache [Cough] : no cough [Wheezing] : no wheezing [Chest Pain] : no chest pain [Abdominal Pain] : no abdominal pain [Nausea] : no nausea [Dysuria] : no dysuria [Vomiting] : no vomiting [Hematuria] : no hematuria

## 2020-06-01 NOTE — ASSESSMENT
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 Patient last seen by cardiology in February and had an aborted nucear stress test.\par -Will refer back to cardiology for follow up as patient still needs a stress test.\par -Last TTE from 9/2019 showing mild pulmonary HTN so unlikely to be contributing.\par -EF 51%; still preserved, but LA enlarged likely due to the changes from the RHD. BNP in august was 500s so likely some component of heart failure.\par \par #2 second pause:\par -Patient has follow up appointment with EP on 6/9 with Dr. Ambrosio.\par -May benefit from ablation.\par -Continuing to hold BB and CCB.\par \par #Supratherapeutic INR:\par -INR 3.29 on 5/27. Patient to follow up next week for INR check.\par \par #HCM:\par -Last FIT negative in 2017. Will order again to be picked up during INR visit. \par -Last Mammogram in 2018. Will refer. \par -Last PAP in 2015. Will refer. \par -WIll need Shingrix.\par \par Total visit time: 35 minutes\par Discussed with Dr. Garza\par RTC 3 months.\par \par Nevin Glass MD\par [facility] PGY-3\par \par \par

## 2020-06-02 RX ORDER — CEPHALEXIN 500 MG
1 CAPSULE ORAL
Qty: 20 | Refills: 0
Start: 2020-06-02 | End: 2020-06-11

## 2020-06-05 ENCOUNTER — OUTPATIENT (OUTPATIENT)
Dept: OUTPATIENT SERVICES | Facility: HOSPITAL | Age: 65
LOS: 1 days | End: 2020-06-05

## 2020-06-05 ENCOUNTER — APPOINTMENT (OUTPATIENT)
Dept: INTERNAL MEDICINE | Facility: CLINIC | Age: 65
End: 2020-06-05

## 2020-06-09 ENCOUNTER — NON-APPOINTMENT (OUTPATIENT)
Age: 65
End: 2020-06-09

## 2020-06-09 ENCOUNTER — APPOINTMENT (OUTPATIENT)
Dept: ELECTROPHYSIOLOGY | Facility: CLINIC | Age: 65
End: 2020-06-09
Payer: MEDICARE

## 2020-06-09 VITALS
BODY MASS INDEX: 32.07 KG/M2 | DIASTOLIC BLOOD PRESSURE: 73 MMHG | OXYGEN SATURATION: 98 % | HEIGHT: 63 IN | WEIGHT: 181 LBS | SYSTOLIC BLOOD PRESSURE: 126 MMHG | HEART RATE: 63 BPM | TEMPERATURE: 97.8 F

## 2020-06-09 DIAGNOSIS — Z79.01 LONG TERM (CURRENT) USE OF ANTICOAGULANTS: ICD-10-CM

## 2020-06-09 DIAGNOSIS — I48.91 UNSPECIFIED ATRIAL FIBRILLATION: ICD-10-CM

## 2020-06-09 DIAGNOSIS — Z51.81 ENCOUNTER FOR THERAPEUTIC DRUG LEVEL MONITORING: ICD-10-CM

## 2020-06-09 PROCEDURE — 99214 OFFICE O/P EST MOD 30 MIN: CPT

## 2020-06-09 PROCEDURE — 93000 ELECTROCARDIOGRAM COMPLETE: CPT

## 2020-06-09 NOTE — HISTORY OF PRESENT ILLNESS
[FreeTextEntry1] : \par Natalie Cano is a 63y/o woman with Hx of  AVR and MVR (bioprosthetic), permanent atrial fibrillation (on Coumadin), CVA, HTN, HLD, and recent ER visit with noted atrial fibrillation with slow VR and asymptomatic pause up to 2secs, who presents today for routine f/u. Was recently in the ED for headaches. Noted to have afib with slow ventricular response rates and 2 sec pauses, asymptomatic. Prior records show that patient has been in atrial fibrillation since 2015. Discussed with patient regarding possible MARSHA/DCCV and patient refused at the time. Not on any AV ken blockers and instructed to remain off AV nodals given bradycardia. Has been feeling increased dyspnea on exertion and noticing LE edema. Also does have dizziness and feeling lightheaded which has caused falls but no LOC. No recent ECHO. Last ECHO 2014 with normal LVEF. Denies chest pain, palpitations, SOB at rest, and no syncope.\par

## 2020-06-09 NOTE — DISCUSSION/SUMMARY
[FreeTextEntry1] : Natalie Cano is a 65y/o woman with Hx of  AVR and MVR (bioprosthetic), permanent atrial fibrillation (on Coumadin), CVA, HTN, HLD, and recent ER visit with noted atrial fibrillation with slow VR and asymptomatic pause up to 2secs, who presents today for routine f/u. \par \par Impression:\par \par 1. Permanent atrial fibrillation: EKG today shows afib. Recent ER visit with slow ventricular response rates and 2 sec pauses. Given dizziness and near syncope, recommend CardioNet to assess for presence of symptomatic bradycardia and determine possible need for PPM ([facility] preferred). \par \par 2. VIRAMONTES: given new/worsening dyspnea and LE edema, recommend ECHO to assess LVEF. Rx provided. \par \par 3. HTN: resume oral antihypertensives as prescribed. Encouraged heart healthy diet, sodium restriction, and weight loss. Continue regular f/u with Cardiologist for further HTN management.\par \par 4. HLD: resume statin therapy as prescribed and regular f/u with Cardiologist for routine lipid monitoring and management.\par \par \par Sincerely,\par \par Jefferson Ambrosio MD

## 2020-06-09 NOTE — PHYSICAL EXAM
[General Appearance - Well Developed] : well developed [Normal Appearance] : normal appearance [Well Groomed] : well groomed [General Appearance - Well Nourished] : well nourished [No Deformities] : no deformities [General Appearance - In No Acute Distress] : no acute distress [Eyelids - No Xanthelasma] : the eyelids demonstrated no xanthelasmas [Normal Conjunctiva] : the conjunctiva exhibited no abnormalities [Normal Oral Mucosa] : normal oral mucosa [Normal Jugular Venous A Waves Present] : normal jugular venous A waves present [No Oral Pallor] : no oral pallor [No Oral Cyanosis] : no oral cyanosis [Normal Jugular Venous V Waves Present] : normal jugular venous V waves present [No Jugular Venous Jang A Waves] : no jugular venous jang A waves [Murmurs] : no murmurs present [Heart Sounds] : normal S1 and S2 [Respiration, Rhythm And Depth] : normal respiratory rhythm and effort [Exaggerated Use Of Accessory Muscles For Inspiration] : no accessory muscle use [Auscultation Breath Sounds / Voice Sounds] : lungs were clear to auscultation bilaterally [Abdomen Soft] : soft [Abdomen Tenderness] : non-tender [Abdomen Mass (___ Cm)] : no abdominal mass palpated [Gait - Sufficient For Exercise Testing] : the gait was sufficient for exercise testing [Abnormal Walk] : normal gait [Nail Clubbing] : no clubbing of the fingernails [Cyanosis, Localized] : no localized cyanosis [Petechial Hemorrhages (___cm)] : no petechial hemorrhages [Skin Color & Pigmentation] : normal skin color and pigmentation [] : no rash [No Venous Stasis] : no venous stasis [Skin Lesions] : no skin lesions [No Skin Ulcers] : no skin ulcer [No Xanthoma] : no  xanthoma was observed [Oriented To Time, Place, And Person] : oriented to person, place, and time [Affect] : the affect was normal [Mood] : the mood was normal [No Anxiety] : not feeling anxious [FreeTextEntry1] : irregular rate/rhythm

## 2020-06-09 NOTE — REASON FOR VISIT
[Discharge Date: ___] : Discharge Date: [unfilled] [Atrial Fibrillation] : atrial fibrillation [Follow-Up - From Hospitalization] : follow-up of a recent hospitalization for

## 2020-06-12 ENCOUNTER — APPOINTMENT (OUTPATIENT)
Dept: INTERNAL MEDICINE | Facility: CLINIC | Age: 65
End: 2020-06-12

## 2020-06-24 ENCOUNTER — OUTPATIENT (OUTPATIENT)
Dept: OUTPATIENT SERVICES | Facility: HOSPITAL | Age: 65
LOS: 1 days | End: 2020-06-24

## 2020-06-24 ENCOUNTER — APPOINTMENT (OUTPATIENT)
Dept: INTERNAL MEDICINE | Facility: CLINIC | Age: 65
End: 2020-06-24

## 2020-06-24 VITALS — TEMPERATURE: 95.8 F

## 2020-06-24 DIAGNOSIS — Z79.01 LONG TERM (CURRENT) USE OF ANTICOAGULANTS: ICD-10-CM

## 2020-06-24 DIAGNOSIS — Z51.81 ENCOUNTER FOR THERAPEUTIC DRUG LEVEL MONITORING: ICD-10-CM

## 2020-06-24 DIAGNOSIS — I48.91 UNSPECIFIED ATRIAL FIBRILLATION: ICD-10-CM

## 2020-07-07 ENCOUNTER — APPOINTMENT (OUTPATIENT)
Dept: OBGYN | Facility: HOSPITAL | Age: 65
End: 2020-07-07

## 2020-07-10 ENCOUNTER — APPOINTMENT (OUTPATIENT)
Dept: INTERNAL MEDICINE | Facility: CLINIC | Age: 65
End: 2020-07-10

## 2020-07-10 ENCOUNTER — OUTPATIENT (OUTPATIENT)
Dept: OUTPATIENT SERVICES | Facility: HOSPITAL | Age: 65
LOS: 1 days | End: 2020-07-10

## 2020-07-10 VITALS — TEMPERATURE: 97.4 F | HEART RATE: 65 BPM | OXYGEN SATURATION: 99 %

## 2020-07-10 DIAGNOSIS — Z79.01 LONG TERM (CURRENT) USE OF ANTICOAGULANTS: ICD-10-CM

## 2020-07-10 DIAGNOSIS — I48.91 UNSPECIFIED ATRIAL FIBRILLATION: ICD-10-CM

## 2020-07-10 DIAGNOSIS — Z51.81 ENCOUNTER FOR THERAPEUTIC DRUG LEVEL MONITORING: ICD-10-CM

## 2020-07-10 LAB
INR PPP: 3.2 RATIO
POCT-PROTHROMBIN TIME: 38.8 SECS
QUALITY CONTROL: YES

## 2020-07-24 ENCOUNTER — APPOINTMENT (OUTPATIENT)
Dept: INTERNAL MEDICINE | Facility: CLINIC | Age: 65
End: 2020-07-24

## 2020-07-24 ENCOUNTER — OUTPATIENT (OUTPATIENT)
Dept: OUTPATIENT SERVICES | Facility: HOSPITAL | Age: 65
LOS: 1 days | End: 2020-07-24

## 2020-07-24 VITALS — HEART RATE: 68 BPM | OXYGEN SATURATION: 99 %

## 2020-07-24 VITALS — TEMPERATURE: 97.9 F

## 2020-07-24 LAB
INR PPP: 3.2 RATIO
POCT-PROTHROMBIN TIME: 38.1 SECS

## 2020-07-27 DIAGNOSIS — Z79.01 LONG TERM (CURRENT) USE OF ANTICOAGULANTS: ICD-10-CM

## 2020-07-27 DIAGNOSIS — I48.91 UNSPECIFIED ATRIAL FIBRILLATION: ICD-10-CM

## 2020-07-27 DIAGNOSIS — Z51.81 ENCOUNTER FOR THERAPEUTIC DRUG LEVEL MONITORING: ICD-10-CM

## 2020-07-28 ENCOUNTER — APPOINTMENT (OUTPATIENT)
Dept: CV DIAGNOSITCS | Facility: HOSPITAL | Age: 65
End: 2020-07-28
Payer: MEDICARE

## 2020-07-28 PROCEDURE — 93306 TTE W/DOPPLER COMPLETE: CPT | Mod: 26

## 2020-08-03 ENCOUNTER — APPOINTMENT (OUTPATIENT)
Dept: INTERNAL MEDICINE | Facility: CLINIC | Age: 65
End: 2020-08-03

## 2020-08-03 ENCOUNTER — OUTPATIENT (OUTPATIENT)
Dept: OUTPATIENT SERVICES | Facility: HOSPITAL | Age: 65
LOS: 1 days | End: 2020-08-03

## 2020-08-03 VITALS — TEMPERATURE: 98.3 F

## 2020-08-03 VITALS — HEART RATE: 70 BPM | OXYGEN SATURATION: 99 %

## 2020-08-03 LAB
INR PPP: 2.8 RATIO
POCT-PROTHROMBIN TIME: 33.3 SECS

## 2020-08-04 DIAGNOSIS — I48.91 UNSPECIFIED ATRIAL FIBRILLATION: ICD-10-CM

## 2020-08-04 DIAGNOSIS — Z51.81 ENCOUNTER FOR THERAPEUTIC DRUG LEVEL MONITORING: ICD-10-CM

## 2020-08-04 DIAGNOSIS — Z79.01 LONG TERM (CURRENT) USE OF ANTICOAGULANTS: ICD-10-CM

## 2020-08-10 ENCOUNTER — APPOINTMENT (OUTPATIENT)
Dept: INTERNAL MEDICINE | Facility: CLINIC | Age: 65
End: 2020-08-10

## 2020-08-12 ENCOUNTER — APPOINTMENT (OUTPATIENT)
Dept: INTERNAL MEDICINE | Facility: CLINIC | Age: 65
End: 2020-08-12

## 2020-08-26 ENCOUNTER — OUTPATIENT (OUTPATIENT)
Dept: OUTPATIENT SERVICES | Facility: HOSPITAL | Age: 65
LOS: 1 days | End: 2020-08-26

## 2020-08-26 ENCOUNTER — APPOINTMENT (OUTPATIENT)
Dept: INTERNAL MEDICINE | Facility: CLINIC | Age: 65
End: 2020-08-26

## 2020-08-26 VITALS — RESPIRATION RATE: 16 BRPM | OXYGEN SATURATION: 99 % | HEART RATE: 85 BPM

## 2020-08-26 DIAGNOSIS — Z51.81 ENCOUNTER FOR THERAPEUTIC DRUG LEVEL MONITORING: ICD-10-CM

## 2020-08-26 DIAGNOSIS — Z79.01 LONG TERM (CURRENT) USE OF ANTICOAGULANTS: ICD-10-CM

## 2020-08-26 DIAGNOSIS — I48.91 UNSPECIFIED ATRIAL FIBRILLATION: ICD-10-CM

## 2020-08-26 LAB
INR PPP: 1.9 RATIO
POCT-PROTHROMBIN TIME: 23 SECS

## 2020-09-03 ENCOUNTER — APPOINTMENT (OUTPATIENT)
Dept: OBGYN | Facility: HOSPITAL | Age: 65
End: 2020-09-03

## 2020-09-04 ENCOUNTER — OUTPATIENT (OUTPATIENT)
Dept: OUTPATIENT SERVICES | Facility: HOSPITAL | Age: 65
LOS: 1 days | End: 2020-09-04

## 2020-09-04 ENCOUNTER — APPOINTMENT (OUTPATIENT)
Dept: INTERNAL MEDICINE | Facility: CLINIC | Age: 65
End: 2020-09-04

## 2020-09-04 VITALS
HEIGHT: 63 IN | RESPIRATION RATE: 15 BRPM | OXYGEN SATURATION: 96 % | BODY MASS INDEX: 32.07 KG/M2 | WEIGHT: 181 LBS | HEART RATE: 84 BPM

## 2020-09-04 LAB
INR PPP: 2.4 RATIO
POCT-PROTHROMBIN TIME: 29.1 SECS
QUALITY CONTROL: YES

## 2020-09-08 DIAGNOSIS — I48.91 UNSPECIFIED ATRIAL FIBRILLATION: ICD-10-CM

## 2020-09-08 DIAGNOSIS — Z79.01 LONG TERM (CURRENT) USE OF ANTICOAGULANTS: ICD-10-CM

## 2020-09-08 DIAGNOSIS — Z51.81 ENCOUNTER FOR THERAPEUTIC DRUG LEVEL MONITORING: ICD-10-CM

## 2020-09-09 ENCOUNTER — APPOINTMENT (OUTPATIENT)
Dept: INTERNAL MEDICINE | Facility: CLINIC | Age: 65
End: 2020-09-09

## 2020-09-11 ENCOUNTER — APPOINTMENT (OUTPATIENT)
Dept: INTERNAL MEDICINE | Facility: CLINIC | Age: 65
End: 2020-09-11

## 2020-09-21 ENCOUNTER — APPOINTMENT (OUTPATIENT)
Dept: INTERNAL MEDICINE | Facility: CLINIC | Age: 65
End: 2020-09-21

## 2020-09-21 ENCOUNTER — OUTPATIENT (OUTPATIENT)
Dept: OUTPATIENT SERVICES | Facility: HOSPITAL | Age: 65
LOS: 1 days | End: 2020-09-21

## 2020-09-21 VITALS — OXYGEN SATURATION: 97 % | RESPIRATION RATE: 16 BRPM | HEART RATE: 85 BPM | TEMPERATURE: 98.4 F

## 2020-09-21 DIAGNOSIS — Z79.01 LONG TERM (CURRENT) USE OF ANTICOAGULANTS: ICD-10-CM

## 2020-09-21 DIAGNOSIS — Z79.01 ENCOUNTER FOR THERAPEUTIC DRUG LVL MONITORING: ICD-10-CM

## 2020-09-21 DIAGNOSIS — Z51.81 ENCOUNTER FOR THERAPEUTIC DRUG LVL MONITORING: ICD-10-CM

## 2020-09-21 LAB
INR PPP: 1.8 RATIO
POCT-PROTHROMBIN TIME: 21.9 SECS

## 2020-09-25 DIAGNOSIS — I48.91 UNSPECIFIED ATRIAL FIBRILLATION: ICD-10-CM

## 2020-09-25 DIAGNOSIS — Z79.01 LONG TERM (CURRENT) USE OF ANTICOAGULANTS: ICD-10-CM

## 2020-09-25 DIAGNOSIS — Z51.81 ENCOUNTER FOR THERAPEUTIC DRUG LEVEL MONITORING: ICD-10-CM

## 2020-09-30 ENCOUNTER — RESULT CHARGE (OUTPATIENT)
Age: 65
End: 2020-09-30

## 2020-09-30 ENCOUNTER — APPOINTMENT (OUTPATIENT)
Dept: INTERNAL MEDICINE | Facility: CLINIC | Age: 65
End: 2020-09-30

## 2020-09-30 ENCOUNTER — OUTPATIENT (OUTPATIENT)
Dept: OUTPATIENT SERVICES | Facility: HOSPITAL | Age: 65
LOS: 1 days | End: 2020-09-30

## 2020-09-30 VITALS — OXYGEN SATURATION: 98 % | HEART RATE: 87 BPM

## 2020-09-30 VITALS — TEMPERATURE: 96.6 F

## 2020-09-30 LAB
INR PPP: 2.5 RATIO
POCT-PROTHROMBIN TIME: 30.2 SECS
QUALITY CONTROL: YES

## 2020-10-16 ENCOUNTER — APPOINTMENT (OUTPATIENT)
Dept: INTERNAL MEDICINE | Facility: CLINIC | Age: 65
End: 2020-10-16
Payer: MEDICARE

## 2020-10-16 ENCOUNTER — MED ADMIN CHARGE (OUTPATIENT)
Age: 65
End: 2020-10-16

## 2020-10-16 ENCOUNTER — OUTPATIENT (OUTPATIENT)
Dept: OUTPATIENT SERVICES | Facility: HOSPITAL | Age: 65
LOS: 1 days | End: 2020-10-16

## 2020-10-16 VITALS
DIASTOLIC BLOOD PRESSURE: 80 MMHG | RESPIRATION RATE: 16 BRPM | OXYGEN SATURATION: 99 % | SYSTOLIC BLOOD PRESSURE: 128 MMHG | WEIGHT: 180 LBS | HEIGHT: 63 IN | HEART RATE: 84 BPM | BODY MASS INDEX: 31.89 KG/M2

## 2020-10-16 VITALS — TEMPERATURE: 97 F

## 2020-10-16 DIAGNOSIS — I67.9 CEREBROVASCULAR DISEASE, UNSPECIFIED: ICD-10-CM

## 2020-10-16 DIAGNOSIS — Z23 ENCOUNTER FOR IMMUNIZATION: ICD-10-CM

## 2020-10-16 DIAGNOSIS — I10 ESSENTIAL (PRIMARY) HYPERTENSION: ICD-10-CM

## 2020-10-16 DIAGNOSIS — Z00.00 ENCOUNTER FOR GENERAL ADULT MEDICAL EXAMINATION WITHOUT ABNORMAL FINDINGS: ICD-10-CM

## 2020-10-16 DIAGNOSIS — I48.21 PERMANENT ATRIAL FIBRILLATION: ICD-10-CM

## 2020-10-16 DIAGNOSIS — Z95.3 PRESENCE OF XENOGENIC HEART VALVE: ICD-10-CM

## 2020-10-16 PROCEDURE — 99214 OFFICE O/P EST MOD 30 MIN: CPT | Mod: GC

## 2020-10-19 ENCOUNTER — APPOINTMENT (OUTPATIENT)
Dept: INTERNAL MEDICINE | Facility: CLINIC | Age: 65
End: 2020-10-19

## 2020-11-02 ENCOUNTER — NON-APPOINTMENT (OUTPATIENT)
Age: 65
End: 2020-11-02

## 2020-11-03 ENCOUNTER — NON-APPOINTMENT (OUTPATIENT)
Age: 65
End: 2020-11-03

## 2020-11-03 RX ORDER — FLUTICASONE PROPIONATE 50 UG/1
50 SPRAY, METERED NASAL TWICE DAILY
Qty: 3 | Refills: 3 | Status: ACTIVE | COMMUNITY
Start: 2020-10-16 | End: 1900-01-01

## 2020-12-20 NOTE — HEALTH RISK ASSESSMENT
[Intercurrent ED visits] : went to ED [No] : No [No falls in past year] : Patient reported no falls in the past year [0] : 1) Little interest or pleasure doing things: Not at all (0) [] : No [[id number]] : 0

## 2020-12-20 NOTE — HISTORY OF PRESENT ILLNESS
[FreeTextEntry1] : "We were told to follow up after starting Eliquis." [de-identified] : Patient Tuvaluan-Creole speaking, declined  services, preferred translation by daughter, Vernon at the request of the patient.  \par \par 64 year old woman, PMH of AVR and MVR, Afib  previously on coumadin however transitioned to Eliquis 2.5 bid for the past two weeks,  CVA (2011), TIA in September with no residual deficits, HTN, HLD, presenting for follow up visit. \par \par Patient endorses continued shortness of breath with exertion and at rest, denies palpitations, chest pain, syncope, falls, LE edema. Patient has not had had MARSHA/DCCV, nuclear stress test, saw EP Dr. Ambrosio who recommended a micra PPM, patient still hesitant to move forward with more studies/intervention. Patient is now on Eliquis 2.5mg bid for the past two weeks, denies any hematuria, hematochezia, melena, hematemesis. \par Patient endorses dry cough over the past few days, without associated fevers, chills, rhinorrhea.\par \par Patient lives with her daughter (Vernon, who is a PCA on [employer] at [employer] son-in-law, and 10 year old granddaughter, Anahi. She is funcitonal in ADLs and iADLs, patient cooks and clean the house, baths her self, walks without assistance. Patient does not speak English, relies on family members for translation. MMSE in 2017 was consistent with dementia, likely vascular given her past strokes, however unclear if this diagnosis is definitive. Patient able to attend to iADLs, including managing finances and medications, does not appear to have declining cognitive function.

## 2020-12-20 NOTE — PHYSICAL EXAM
[No Acute Distress] : no acute distress [Well Developed] : well developed [Well Nourished] : well nourished [PERRL] : pupils equal round and reactive to light [EOMI] : extraocular movements intact [Normal Sclera/Conjunctiva] : normal sclera/conjunctiva [No JVD] : no jugular venous distention [Normal Oropharynx] : the oropharynx was normal [No Lymphadenopathy] : no lymphadenopathy [Supple] : supple [No Accessory Muscle Use] : no accessory muscle use [No Respiratory Distress] : no respiratory distress  [Normal Rate] : normal rate  [Clear to Auscultation] : lungs were clear to auscultation bilaterally [Regular Rhythm] : with a regular rhythm [Normal S1, S2] : normal S1 and S2 [No Edema] : there was no peripheral edema [Soft] : abdomen soft [Normal Supraclavicular Nodes] : no supraclavicular lymphadenopathy [Normal Posterior Cervical Nodes] : no posterior cervical lymphadenopathy [Normal Anterior Cervical Nodes] : no anterior cervical lymphadenopathy [No Joint Swelling] : no joint swelling [Coordination Grossly Intact] : coordination grossly intact [Grossly Normal Strength/Tone] : grossly normal strength/tone [No Focal Deficits] : no focal deficits [Normal Gait] : normal gait [Speech Grossly Normal] : speech grossly normal [Memory Grossly Normal] : memory grossly normal [Alert and Oriented x3] : oriented to person, place, and time [Normal Mood] : the mood was normal [de-identified] : Markedly swollen turbinates, pale, appears consistent w/ allergic rhinitis [de-identified] : Unable to assess as interview telephonic.

## 2020-12-20 NOTE — REVIEW OF SYSTEMS
[Dyspnea on Exertion] : dyspnea on exertion [Shortness Of Breath] : shortness of breath [Cough] : cough [Fever] : no fever [Chills] : no chills [Fatigue] : no fatigue [Night Sweats] : no night sweats [Discharge] : no discharge [Pain] : no pain [Earache] : no earache [Hearing Loss] : no hearing loss [Nosebleed] : no nosebleeds [Nasal Discharge] : no nasal discharge [Sore Throat] : no sore throat [Postnasal Drip] : no postnasal drip [Chest Pain] : no chest pain [Palpitations] : no palpitations [Lower Ext Edema] : no lower extremity edema [Wheezing] : no wheezing [Abdominal Pain] : no abdominal pain [Nausea] : no nausea [Constipation] : no constipation [Diarrhea] : diarrhea [Vomiting] : no vomiting [Melena] : no melena [Dysuria] : no dysuria [Hematuria] : no hematuria [Headache] : no headache [Dizziness] : no dizziness [Fainting] : no fainting [Confusion] : no confusion [Memory Loss] : no memory loss

## 2020-12-20 NOTE — ASSESSMENT
[FreeTextEntry1] : 64 year old woman, PMH of AVR and MVR, Afib  previously on coumadin however transitioned to Eliquis 2.5 bid for the past two weeks,  CVA (2011), TIA in September with no residual deficits, HTN, HLD, presenting for follow up visit. \par \par \par #VIRAMONTES:\par -Concerning for ischemic heart disease. Patient last seen by cardiology in February and had an aborted nuclear stress test.\par -Still awaiting TTE, stress test, f/u with cardiology \par \par #2 second pause:\par -Patient saw Dr. Ambrosio, recommended PPM (Micra) however patient hesitant\par -Continuing to hold BB and CCB.\par \par #Afib\par -c/w Eliquis 2.5 bid\par \par #HCM:\par -Last FIT negative in 2017. Will order again. \par -Last Mammogram in 2018. Will refer. \par -Last PAP in 2015. Will refer. \par - Shingrix sent to pharmacy\par - DEXA referral made\par - patient received flu and PPSV23 vaccine today \par -Rx flonase (swollen turbinates, cough)\par \par Discussed with Dr. Trejo\par RTC 3 months to follow with regular PCP (Dr. Robertson)\par \par Rita Roper MD PGY-1 \par \par \par \par

## 2021-02-11 ENCOUNTER — APPOINTMENT (OUTPATIENT)
Dept: INTERNAL MEDICINE | Facility: CLINIC | Age: 66
End: 2021-02-11
Payer: MEDICARE

## 2021-02-11 VITALS — SYSTOLIC BLOOD PRESSURE: 130 MMHG | DIASTOLIC BLOOD PRESSURE: 86 MMHG

## 2021-02-11 VITALS
WEIGHT: 180 LBS | HEIGHT: 63 IN | SYSTOLIC BLOOD PRESSURE: 151 MMHG | TEMPERATURE: 98.3 F | HEART RATE: 84 BPM | OXYGEN SATURATION: 98 % | DIASTOLIC BLOOD PRESSURE: 81 MMHG | BODY MASS INDEX: 31.89 KG/M2

## 2021-02-11 DIAGNOSIS — Z11.59 ENCOUNTER FOR SCREENING FOR OTHER VIRAL DISEASES: ICD-10-CM

## 2021-02-11 DIAGNOSIS — Z12.4 ENCOUNTER FOR SCREENING FOR MALIGNANT NEOPLASM OF CERVIX: ICD-10-CM

## 2021-02-11 PROCEDURE — G0439: CPT | Mod: CS

## 2021-02-11 PROCEDURE — 36415 COLL VENOUS BLD VENIPUNCTURE: CPT

## 2021-02-11 RX ORDER — ZOSTER VACCINE RECOMBINANT, ADJUVANTED 50 MCG/0.5
50 KIT INTRAMUSCULAR
Qty: 1 | Refills: 1 | Status: ACTIVE | COMMUNITY
Start: 2021-02-11 | End: 1900-01-01

## 2021-02-16 LAB
25(OH)D3 SERPL-MCNC: 38.1 NG/ML
ALBUMIN SERPL ELPH-MCNC: 4 G/DL
ALP BLD-CCNC: 97 U/L
ALT SERPL-CCNC: 12 U/L
ANION GAP SERPL CALC-SCNC: 11 MMOL/L
AST SERPL-CCNC: 16 U/L
BASOPHILS # BLD AUTO: 0.13 K/UL
BASOPHILS NFR BLD AUTO: 1.6 %
BILIRUB SERPL-MCNC: 0.3 MG/DL
BUN SERPL-MCNC: 15 MG/DL
CALCIUM SERPL-MCNC: 9.8 MG/DL
CHLORIDE SERPL-SCNC: 102 MMOL/L
CHOLEST SERPL-MCNC: 110 MG/DL
CO2 SERPL-SCNC: 26 MMOL/L
CREAT SERPL-MCNC: 1.01 MG/DL
EOSINOPHIL # BLD AUTO: 0.42 K/UL
EOSINOPHIL NFR BLD AUTO: 5.3 %
ESTIMATED AVERAGE GLUCOSE: 146 MG/DL
GLUCOSE SERPL-MCNC: 95 MG/DL
HAV IGM SER QL: NONREACTIVE
HBA1C MFR BLD HPLC: 6.7 %
HBV CORE IGM SER QL: NONREACTIVE
HBV SURFACE AG SER QL: NONREACTIVE
HCT VFR BLD CALC: 41.4 %
HCV AB SER QL: NONREACTIVE
HCV S/CO RATIO: 0.13 S/CO
HDLC SERPL-MCNC: 51 MG/DL
HGB BLD-MCNC: 12.8 G/DL
HIV1+2 AB SPEC QL IA.RAPID: NONREACTIVE
IMM GRANULOCYTES NFR BLD AUTO: 0.4 %
LDLC SERPL CALC-MCNC: 47 MG/DL
LYMPHOCYTES # BLD AUTO: 3.13 K/UL
LYMPHOCYTES NFR BLD AUTO: 39.5 %
MAN DIFF?: NORMAL
MCHC RBC-ENTMCNC: 30 PG
MCHC RBC-ENTMCNC: 30.9 GM/DL
MCV RBC AUTO: 97.2 FL
MONOCYTES # BLD AUTO: 0.64 K/UL
MONOCYTES NFR BLD AUTO: 8.1 %
NEUTROPHILS # BLD AUTO: 3.57 K/UL
NEUTROPHILS NFR BLD AUTO: 45.1 %
NONHDLC SERPL-MCNC: 59 MG/DL
PLATELET # BLD AUTO: 137 K/UL
POTASSIUM SERPL-SCNC: 4.6 MMOL/L
PROT SERPL-MCNC: 7.1 G/DL
RBC # BLD: 4.26 M/UL
RBC # FLD: 12.4 %
SARS-COV-2 IGG SERPL IA-ACNC: 4.41 INDEX
SARS-COV-2 IGG SERPL QL IA: POSITIVE
SODIUM SERPL-SCNC: 139 MMOL/L
TRIGL SERPL-MCNC: 62 MG/DL
TSH SERPL-ACNC: 0.81 UIU/ML
WBC # FLD AUTO: 7.92 K/UL

## 2021-02-22 ENCOUNTER — APPOINTMENT (OUTPATIENT)
Dept: CARDIOLOGY | Facility: CLINIC | Age: 66
End: 2021-02-22

## 2021-02-23 ENCOUNTER — OUTPATIENT (OUTPATIENT)
Dept: OUTPATIENT SERVICES | Facility: HOSPITAL | Age: 66
LOS: 1 days | End: 2021-02-23
Payer: MEDICARE

## 2021-02-23 ENCOUNTER — APPOINTMENT (OUTPATIENT)
Dept: RADIOLOGY | Facility: IMAGING CENTER | Age: 66
End: 2021-02-23
Payer: MEDICARE

## 2021-02-23 ENCOUNTER — APPOINTMENT (OUTPATIENT)
Dept: MAMMOGRAPHY | Facility: IMAGING CENTER | Age: 66
End: 2021-02-23
Payer: MEDICARE

## 2021-02-23 ENCOUNTER — APPOINTMENT (OUTPATIENT)
Dept: OBGYN | Facility: CLINIC | Age: 66
End: 2021-02-23
Payer: MEDICARE

## 2021-02-23 ENCOUNTER — RESULT REVIEW (OUTPATIENT)
Age: 66
End: 2021-02-23

## 2021-02-23 DIAGNOSIS — Z00.00 ENCOUNTER FOR GENERAL ADULT MEDICAL EXAMINATION WITHOUT ABNORMAL FINDINGS: ICD-10-CM

## 2021-02-23 DIAGNOSIS — Z12.39 ENCOUNTER FOR OTHER SCREENING FOR MALIGNANT NEOPLASM OF BREAST: ICD-10-CM

## 2021-02-23 PROCEDURE — 77080 DXA BONE DENSITY AXIAL: CPT | Mod: 26

## 2021-02-23 PROCEDURE — 77067 SCR MAMMO BI INCL CAD: CPT | Mod: 26

## 2021-02-23 PROCEDURE — 77080 DXA BONE DENSITY AXIAL: CPT

## 2021-02-23 PROCEDURE — 77063 BREAST TOMOSYNTHESIS BI: CPT | Mod: 26

## 2021-02-23 PROCEDURE — 77063 BREAST TOMOSYNTHESIS BI: CPT

## 2021-02-23 PROCEDURE — 77067 SCR MAMMO BI INCL CAD: CPT

## 2021-02-23 PROCEDURE — 99387 INIT PM E/M NEW PAT 65+ YRS: CPT | Mod: GY

## 2021-04-07 ENCOUNTER — APPOINTMENT (OUTPATIENT)
Dept: ANTEPARTUM | Facility: CLINIC | Age: 66
End: 2021-04-07
Payer: MEDICARE

## 2021-04-07 ENCOUNTER — APPOINTMENT (OUTPATIENT)
Dept: OBGYN | Facility: CLINIC | Age: 66
End: 2021-04-07
Payer: MEDICARE

## 2021-04-07 PROCEDURE — 76857 US EXAM PELVIC LIMITED: CPT

## 2021-04-07 PROCEDURE — ZZZZZ: CPT

## 2021-04-07 PROCEDURE — 76830 TRANSVAGINAL US NON-OB: CPT

## 2021-04-07 PROCEDURE — 58100 BIOPSY OF UTERUS LINING: CPT

## 2021-04-13 ENCOUNTER — APPOINTMENT (OUTPATIENT)
Dept: CARDIOLOGY | Facility: CLINIC | Age: 66
End: 2021-04-13
Payer: MEDICARE

## 2021-04-13 ENCOUNTER — NON-APPOINTMENT (OUTPATIENT)
Age: 66
End: 2021-04-13

## 2021-04-13 VITALS
BODY MASS INDEX: 31.89 KG/M2 | WEIGHT: 180 LBS | HEIGHT: 63 IN | HEART RATE: 80 BPM | DIASTOLIC BLOOD PRESSURE: 84 MMHG | SYSTOLIC BLOOD PRESSURE: 130 MMHG | OXYGEN SATURATION: 99 %

## 2021-04-13 PROCEDURE — 99205 OFFICE O/P NEW HI 60 MIN: CPT

## 2021-04-13 PROCEDURE — 93000 ELECTROCARDIOGRAM COMPLETE: CPT

## 2021-04-13 PROCEDURE — 36415 COLL VENOUS BLD VENIPUNCTURE: CPT

## 2021-04-16 ENCOUNTER — NON-APPOINTMENT (OUTPATIENT)
Age: 66
End: 2021-04-16

## 2021-04-16 LAB
ALBUMIN SERPL ELPH-MCNC: 4.2 G/DL
ALP BLD-CCNC: 95 U/L
ALT SERPL-CCNC: 12 U/L
ANION GAP SERPL CALC-SCNC: 9 MMOL/L
AST SERPL-CCNC: 17 U/L
BASOPHILS # BLD AUTO: 0.12 K/UL
BASOPHILS NFR BLD AUTO: 1.5 %
BILIRUB SERPL-MCNC: 0.4 MG/DL
BUN SERPL-MCNC: 17 MG/DL
CALCIUM SERPL-MCNC: 10.1 MG/DL
CHLORIDE SERPL-SCNC: 103 MMOL/L
CHOLEST SERPL-MCNC: 120 MG/DL
CO2 SERPL-SCNC: 30 MMOL/L
CREAT SERPL-MCNC: 0.91 MG/DL
EOSINOPHIL # BLD AUTO: 0.41 K/UL
EOSINOPHIL NFR BLD AUTO: 5.2 %
ESTIMATED AVERAGE GLUCOSE: 140 MG/DL
GLUCOSE SERPL-MCNC: 161 MG/DL
HBA1C MFR BLD HPLC: 6.5 %
HCT VFR BLD CALC: 42.7 %
HDLC SERPL-MCNC: 50 MG/DL
HGB BLD-MCNC: 13.4 G/DL
IMM GRANULOCYTES NFR BLD AUTO: 0.4 %
LDH SERPL-CCNC: 321 U/L
LDLC SERPL CALC-MCNC: 52 MG/DL
LYMPHOCYTES # BLD AUTO: 2.28 K/UL
LYMPHOCYTES NFR BLD AUTO: 29 %
MAN DIFF?: NORMAL
MCHC RBC-ENTMCNC: 30.2 PG
MCHC RBC-ENTMCNC: 31.4 GM/DL
MCV RBC AUTO: 96.4 FL
MONOCYTES # BLD AUTO: 0.5 K/UL
MONOCYTES NFR BLD AUTO: 6.4 %
NEUTROPHILS # BLD AUTO: 4.53 K/UL
NEUTROPHILS NFR BLD AUTO: 57.5 %
NONHDLC SERPL-MCNC: 69 MG/DL
NT-PROBNP SERPL-MCNC: 562 PG/ML
PLATELET # BLD AUTO: 143 K/UL
POTASSIUM SERPL-SCNC: 4.2 MMOL/L
PROT SERPL-MCNC: 7.3 G/DL
RBC # BLD: 4.43 M/UL
RBC # FLD: 12.8 %
SODIUM SERPL-SCNC: 141 MMOL/L
TRIGL SERPL-MCNC: 87 MG/DL
TSH SERPL-ACNC: 1.53 UIU/ML
WBC # FLD AUTO: 7.87 K/UL

## 2021-04-29 NOTE — ED PROVIDER NOTE - ENMT, MLM
Reference ECG taken Airway patent, Nasal mucosa clear. Mouth with normal mucosa. Throat has no vesicles, no oropharyngeal exudates and uvula is midline.

## 2021-05-03 ENCOUNTER — OUTPATIENT (OUTPATIENT)
Dept: OUTPATIENT SERVICES | Facility: HOSPITAL | Age: 66
LOS: 1 days | End: 2021-05-03

## 2021-05-03 VITALS
HEIGHT: 61.5 IN | HEART RATE: 72 BPM | WEIGHT: 182.1 LBS | TEMPERATURE: 99 F | SYSTOLIC BLOOD PRESSURE: 120 MMHG | OXYGEN SATURATION: 98 % | DIASTOLIC BLOOD PRESSURE: 80 MMHG | RESPIRATION RATE: 16 BRPM

## 2021-05-03 DIAGNOSIS — N93.9 ABNORMAL UTERINE AND VAGINAL BLEEDING, UNSPECIFIED: ICD-10-CM

## 2021-05-03 DIAGNOSIS — I48.21 PERMANENT ATRIAL FIBRILLATION: ICD-10-CM

## 2021-05-03 DIAGNOSIS — K08.9 DISORDER OF TEETH AND SUPPORTING STRUCTURES, UNSPECIFIED: ICD-10-CM

## 2021-05-03 DIAGNOSIS — R06.83 SNORING: ICD-10-CM

## 2021-05-03 DIAGNOSIS — E11.9 TYPE 2 DIABETES MELLITUS WITHOUT COMPLICATIONS: ICD-10-CM

## 2021-05-03 DIAGNOSIS — I10 ESSENTIAL (PRIMARY) HYPERTENSION: ICD-10-CM

## 2021-05-03 LAB
HCT VFR BLD CALC: 42.4 % — SIGNIFICANT CHANGE UP (ref 34.5–45)
HGB BLD-MCNC: 13.3 G/DL — SIGNIFICANT CHANGE UP (ref 11.5–15.5)
MCHC RBC-ENTMCNC: 29.6 PG — SIGNIFICANT CHANGE UP (ref 27–34)
MCHC RBC-ENTMCNC: 31.4 GM/DL — LOW (ref 32–36)
MCV RBC AUTO: 94.2 FL — SIGNIFICANT CHANGE UP (ref 80–100)
NRBC # BLD: 0 /100 WBCS — SIGNIFICANT CHANGE UP
NRBC # FLD: 0 K/UL — SIGNIFICANT CHANGE UP
PLATELET # BLD AUTO: 145 K/UL — LOW (ref 150–400)
RBC # BLD: 4.5 M/UL — SIGNIFICANT CHANGE UP (ref 3.8–5.2)
RBC # FLD: 12.5 % — SIGNIFICANT CHANGE UP (ref 10.3–14.5)
WBC # BLD: 7.75 K/UL — SIGNIFICANT CHANGE UP (ref 3.8–10.5)
WBC # FLD AUTO: 7.75 K/UL — SIGNIFICANT CHANGE UP (ref 3.8–10.5)

## 2021-05-03 RX ORDER — ASPIRIN/CALCIUM CARB/MAGNESIUM 324 MG
1 TABLET ORAL
Qty: 0 | Refills: 0 | DISCHARGE

## 2021-05-03 RX ORDER — METOPROLOL TARTRATE 50 MG
1 TABLET ORAL
Qty: 0 | Refills: 0 | DISCHARGE

## 2021-05-03 NOTE — H&P PST ADULT - NSICDXPROBLEM_GEN_ALL_CORE_FT
PROBLEM DIAGNOSES  Problem: Abnormal uterine and vaginal bleeding, unspecified  Assessment and Plan: preop for D&C hysteroscopy polypectomy with myosure on 5/12/21  preop instructions given to daughter and pt. they both verbalized understanding  GI prophylaxis provided  pt is scheduled for COVID testing preop      Problem: HTN (hypertension)  Assessment and Plan: pt will take blood pressure meds Lisinopril AM of surgery as prescribed      Problem: Permanent atrial fibrillation  Assessment and Plan: cardiac clearance requested ( Afib, HTN, HLD, h/o Aortic/mitral valve disease s/p replacement in 2009)  pt's daugter instructed to get instructions from cardiologist regarding last dose of eliquis preop     Problem: Type 2 diabetes mellitus  Assessment and Plan: metformin to be held 24 hours preop  accu check to be assessed on admission          PROBLEM DIAGNOSES  Problem: Abnormal uterine and vaginal bleeding, unspecified  Assessment and Plan: preop for D&C hysteroscopy polypectomy with myosure on 5/12/21  preop instructions given to daughter and pt. daughter verbalized understanding  GI prophylaxis provided  pt is scheduled for COVID testing preop      Problem: HTN (hypertension)  Assessment and Plan: pt will take blood pressure meds Lisinopril AM of surgery as prescribed      Problem: Permanent atrial fibrillation  Assessment and Plan: cardiac clearance requested ( dyspnea on exertion, Afib on eliquis, HTN, HLD, h/o Aortic/mitral valve disease s/p replacement in 2009)  pt's daugter instructed to get instructions from cardiologist regarding last dose of eliquis preop     Problem: Type 2 diabetes mellitus  Assessment and Plan: metformin to be held 24 hours preop  accu check to be assessed on admission       Problem: Snoring  Assessment and Plan: SANTHOSH precautions, OR booking notified via fax      Problem: Dental disorder  Assessment and Plan: pt with loose tooth on exam  dental clearance requested- daughter did not have dentist contact information at time of visit. pending information

## 2021-05-03 NOTE — H&P PST ADULT - NEGATIVE NEUROLOGICAL SYMPTOMS
no weakness/no paresthesias/no focal seizures/no syncope/no vertigo/no difficulty walking/no confusion

## 2021-05-03 NOTE — H&P PST ADULT - LAST STRESS TEST
2020 as per daughter "my mother coded during the stress test" 2020 as per daughter "my mother coded during the stress test".

## 2021-05-03 NOTE — H&P PST ADULT - CARDIOVASCULAR SYMPTOMS
occasional palpitations/palpitations/dyspnea on exertion/peripheral edema pt reports occasional palpitations/palpitations/dyspnea on exertion/peripheral edema

## 2021-05-03 NOTE — H&P PST ADULT - GASTROINTESTINAL DETAILS
soft/nontender/no masses palpable/bowel sounds normal soft/nontender/no masses palpable/bowel sounds normal/no rebound tenderness/no guarding

## 2021-05-03 NOTE — H&P PST ADULT - HISTORY OF PRESENT ILLNESS
64 y/o Creole speaking female with Afib on Eliquis, Type 2 DM, HTN, HLD, CVA in 2011, TIA in Sept 2020 and Aortic Valve and Mitral Valve replacement in 2009 PST preop for dilation curettage hysteroscopy polypectomy with myosure. preop dx of abnormal uterine bleeding and vaginal bleeding. as per daughter pt presented to the GYN reporting post menopausal bleeding and pelvic cramping x3 weeks.     ** pt offered pacific  services but declined asking we use daughter for PST interview.

## 2021-05-03 NOTE — H&P PST ADULT - NEUROLOGICAL COMMENTS
h/o CVA in 2011 and TIA in 2020 with residual weakness. h/o CVA in 2011 and TIA in 2020 with residual weakness. pt has difficulty holding a pen to write her name

## 2021-05-03 NOTE — H&P PST ADULT - NSICDXPASTSURGICALHX_GEN_ALL_CORE_FT
PAST SURGICAL HISTORY:  S/P AVR (aortic valve replacement)     S/P MVR (mitral valve replacement)

## 2021-05-03 NOTE — H&P PST ADULT - NSICDXFAMILYHX_GEN_ALL_CORE_FT
FAMILY HISTORY:  Sibling  Still living? Unknown  FH: prostate cancer, Age at diagnosis: Age Unknown

## 2021-05-03 NOTE — H&P PST ADULT - NSICDXPASTMEDICALHX_GEN_ALL_CORE_FT
PAST MEDICAL HISTORY:  Abnormal uterine and vaginal bleeding     Atrial fibrillation on eliquis    Cerebrovascular accident (CVA)     Dyspnea on exertion     History of MI (myocardial infarction) 8-9 years ago in [facility]    History of stroke 2008 hospitalized in [facility]    History of TIA (transient ischemic attack) sept 2020    HLD (hyperlipidemia)     HTN (hypertension)     Long term current use of anticoagulant     Memory loss     S/P aortic valve replacement with bioprosthetic valve     S/P mitral valve replacement with bioprosthetic valve     Type 2 diabetes mellitus

## 2021-05-03 NOTE — H&P PST ADULT - ATTENDING COMMENTS
Agree with above note. Patient originally presented to the outpatient office with postmenopausal bleeding, ultrasound showing endometrial stripe of 18.95mm, EMB showing fragments of endometrial polyp. Given postmenopausal status, plan for hysteroscopy, D&C, polypectomy.   LEDA Conroy MD

## 2021-05-07 PROBLEM — Z86.73 PERSONAL HISTORY OF TRANSIENT ISCHEMIC ATTACK (TIA), AND CEREBRAL INFARCTION WITHOUT RESIDUAL DEFICITS: Chronic | Status: ACTIVE | Noted: 2021-05-03

## 2021-05-07 PROBLEM — N93.9 ABNORMAL UTERINE AND VAGINAL BLEEDING, UNSPECIFIED: Chronic | Status: ACTIVE | Noted: 2021-05-03

## 2021-05-07 PROBLEM — E11.9 TYPE 2 DIABETES MELLITUS WITHOUT COMPLICATIONS: Chronic | Status: ACTIVE | Noted: 2021-05-03

## 2021-05-07 PROBLEM — R41.3 OTHER AMNESIA: Chronic | Status: ACTIVE | Noted: 2021-05-03

## 2021-05-07 PROBLEM — Z79.01 LONG TERM (CURRENT) USE OF ANTICOAGULANTS: Chronic | Status: ACTIVE | Noted: 2021-05-03

## 2021-05-07 PROBLEM — R06.00 DYSPNEA, UNSPECIFIED: Chronic | Status: ACTIVE | Noted: 2021-05-03

## 2021-05-09 ENCOUNTER — APPOINTMENT (OUTPATIENT)
Dept: DISASTER EMERGENCY | Facility: CLINIC | Age: 66
End: 2021-05-09

## 2021-05-11 ENCOUNTER — APPOINTMENT (OUTPATIENT)
Dept: CARDIOLOGY | Facility: CLINIC | Age: 66
End: 2021-05-11

## 2021-05-17 ENCOUNTER — RX RENEWAL (OUTPATIENT)
Age: 66
End: 2021-05-17

## 2021-06-02 ENCOUNTER — APPOINTMENT (OUTPATIENT)
Dept: GASTROENTEROLOGY | Facility: CLINIC | Age: 66
End: 2021-06-02

## 2021-07-15 ENCOUNTER — RX RENEWAL (OUTPATIENT)
Age: 66
End: 2021-07-15

## 2021-07-19 ENCOUNTER — APPOINTMENT (OUTPATIENT)
Dept: INTERNAL MEDICINE | Facility: CLINIC | Age: 66
End: 2021-07-19
Payer: MEDICARE

## 2021-07-19 VITALS
HEART RATE: 73 BPM | SYSTOLIC BLOOD PRESSURE: 136 MMHG | WEIGHT: 180 LBS | TEMPERATURE: 98 F | OXYGEN SATURATION: 98 % | HEIGHT: 63 IN | DIASTOLIC BLOOD PRESSURE: 82 MMHG | BODY MASS INDEX: 31.89 KG/M2

## 2021-07-19 DIAGNOSIS — L30.4 ERYTHEMA INTERTRIGO: ICD-10-CM

## 2021-07-19 PROCEDURE — 99212 OFFICE O/P EST SF 10 MIN: CPT

## 2021-07-19 RX ORDER — NYSTATIN 100000 1/G
100000 POWDER TOPICAL 3 TIMES DAILY
Qty: 1 | Refills: 0 | Status: ACTIVE | COMMUNITY
Start: 2021-07-19 | End: 1900-01-01

## 2021-08-24 ENCOUNTER — APPOINTMENT (OUTPATIENT)
Dept: INTERNAL MEDICINE | Facility: CLINIC | Age: 66
End: 2021-08-24

## 2021-09-09 ENCOUNTER — RX RENEWAL (OUTPATIENT)
Age: 66
End: 2021-09-09

## 2021-09-30 ENCOUNTER — APPOINTMENT (OUTPATIENT)
Dept: OBGYN | Facility: CLINIC | Age: 66
End: 2021-09-30
Payer: MEDICARE

## 2021-09-30 VITALS — SYSTOLIC BLOOD PRESSURE: 142 MMHG | WEIGHT: 180 LBS | DIASTOLIC BLOOD PRESSURE: 89 MMHG | BODY MASS INDEX: 31.89 KG/M2

## 2021-09-30 VITALS — BODY MASS INDEX: 31.89 KG/M2 | WEIGHT: 180 LBS | SYSTOLIC BLOOD PRESSURE: 142 MMHG | DIASTOLIC BLOOD PRESSURE: 89 MMHG

## 2021-09-30 DIAGNOSIS — Z01.419 ENCOUNTER FOR GYNECOLOGICAL EXAMINATION (GENERAL) (ROUTINE) W/OUT ABNORMAL FINDINGS: ICD-10-CM

## 2021-09-30 PROCEDURE — 76857 US EXAM PELVIC LIMITED: CPT

## 2021-09-30 PROCEDURE — 99215 OFFICE O/P EST HI 40 MIN: CPT

## 2021-09-30 PROCEDURE — 76830 TRANSVAGINAL US NON-OB: CPT

## 2021-09-30 NOTE — PLAN
[FreeTextEntry1] : 66 y/o female presenting for annual exam:\par -f/u pap, GC/CT, and bd affirm.\par -pt had mammogram done this year- WNL as per patient\par - endometrium 18mm thick with a 3.2mm cyst on left ovary- similar to sono done in 04/2021- see official sono reports\par -Patient expresses interest in having the surgery done that she cancelled in May. Patient will f/u with Dr. Conroy, who her surgery was originally scheudled with.\par

## 2021-09-30 NOTE — HISTORY OF PRESENT ILLNESS
[Patient reported mammogram was normal] : Patient reported mammogram was normal [Patient reported PAP Smear was normal] : Patient reported PAP Smear was normal [No] : Patient does not have concerns regarding sex [Previously active] : previously active [FreeTextEntry1] : Patient is a 64 y/o presenting for an annual visit.Patients daughter is at office translating for patient. Patient c/o vaginal itching x 2 weeks. Patient denies vaginal odor or discharge. Denies urinary urgency, frequency and dysuria. Patient states she has a history of post menopausal bleeding. An EMB was performed and patient was recommended for hysteroscopy, polypectomy, and d&c. Patient cancelled the surgery last minute. Patient states that she has not had any irregular bleeding since. [Mammogramdate] : 2021 [PapSmeardate] : 2020

## 2021-10-04 LAB
C TRACH RRNA SPEC QL NAA+PROBE: NOT DETECTED
CANDIDA VAG CYTO: NOT DETECTED
G VAGINALIS+PREV SP MTYP VAG QL MICRO: NOT DETECTED
HPV 16 E6+E7 MRNA CVX QL NAA+PROBE: NOT DETECTED
HPV HIGH+LOW RISK DNA PNL CVX: NOT DETECTED
HPV18+45 E6+E7 MRNA CVX QL NAA+PROBE: NOT DETECTED
N GONORRHOEA RRNA SPEC QL NAA+PROBE: NOT DETECTED
SOURCE AMPLIFICATION: NORMAL
T VAGINALIS VAG QL WET PREP: NOT DETECTED

## 2021-10-05 LAB — CYTOLOGY CVX/VAG DOC THIN PREP: NORMAL

## 2021-10-15 ENCOUNTER — APPOINTMENT (OUTPATIENT)
Dept: OBGYN | Facility: CLINIC | Age: 66
End: 2021-10-15
Payer: MEDICARE

## 2021-10-15 VITALS
SYSTOLIC BLOOD PRESSURE: 147 MMHG | DIASTOLIC BLOOD PRESSURE: 78 MMHG | WEIGHT: 184 LBS | HEIGHT: 63 IN | BODY MASS INDEX: 32.6 KG/M2

## 2021-10-15 DIAGNOSIS — N95.0 POSTMENOPAUSAL BLEEDING: ICD-10-CM

## 2021-10-15 DIAGNOSIS — R93.89 ABNORMAL FINDINGS ON DIAGNOSTIC IMAGING OF OTHER SPECIFIED BODY STRUCTURES: ICD-10-CM

## 2021-10-15 PROCEDURE — 99212 OFFICE O/P EST SF 10 MIN: CPT

## 2021-10-15 NOTE — PHYSICAL EXAM
[Appropriately responsive] : appropriately responsive [Alert] : alert [No Acute Distress] : no acute distress [Soft] : soft [Non-tender] : non-tender [Oriented x3] : oriented x3 [FreeTextEntry5] : Normal respiratory effort

## 2021-10-15 NOTE — PLAN
[FreeTextEntry1] : 66 y/o F with postmenopausal bleeding, thickened endometrium opting for D&C with operative hysteroscopy \par - Patient to be scheduled for D&C hysteroscopy , polypectomy with Myosure\par -Risks (bleeding, infection, damage to surrounding structures including uterine perforation), benefits and alternatives discussed\par - All questions/concerns addressed\par -medical/dental clearance to be obtained and PST and Covid testing to be scheduled \par \par

## 2021-10-15 NOTE — REASON FOR VISIT
[Follow-Up] : a follow-up evaluation of [Postmenopausal Bleeding] : postmenopausal bleeding [Family Member] : family member

## 2021-10-21 ENCOUNTER — APPOINTMENT (OUTPATIENT)
Dept: CARDIOLOGY | Facility: CLINIC | Age: 66
End: 2021-10-21

## 2021-11-04 ENCOUNTER — APPOINTMENT (OUTPATIENT)
Dept: INTERNAL MEDICINE | Facility: CLINIC | Age: 66
End: 2021-11-04
Payer: MEDICARE

## 2021-11-04 ENCOUNTER — APPOINTMENT (OUTPATIENT)
Dept: CARDIOLOGY | Facility: CLINIC | Age: 66
End: 2021-11-04
Payer: MEDICARE

## 2021-11-04 ENCOUNTER — NON-APPOINTMENT (OUTPATIENT)
Age: 66
End: 2021-11-04

## 2021-11-04 VITALS
HEART RATE: 74 BPM | DIASTOLIC BLOOD PRESSURE: 74 MMHG | BODY MASS INDEX: 31.54 KG/M2 | SYSTOLIC BLOOD PRESSURE: 137 MMHG | TEMPERATURE: 98.7 F | HEIGHT: 63 IN | OXYGEN SATURATION: 96 % | WEIGHT: 178 LBS

## 2021-11-04 VITALS
HEART RATE: 72 BPM | BODY MASS INDEX: 31.53 KG/M2 | WEIGHT: 178 LBS | DIASTOLIC BLOOD PRESSURE: 88 MMHG | OXYGEN SATURATION: 100 % | SYSTOLIC BLOOD PRESSURE: 137 MMHG

## 2021-11-04 DIAGNOSIS — Z01.810 ENCOUNTER FOR PREPROCEDURAL CARDIOVASCULAR EXAMINATION: ICD-10-CM

## 2021-11-04 DIAGNOSIS — Z01.818 ENCOUNTER FOR OTHER PREPROCEDURAL EXAMINATION: ICD-10-CM

## 2021-11-04 DIAGNOSIS — R42 DIZZINESS AND GIDDINESS: ICD-10-CM

## 2021-11-04 DIAGNOSIS — H53.8 OTHER VISUAL DISTURBANCES: ICD-10-CM

## 2021-11-04 PROCEDURE — 99214 OFFICE O/P EST MOD 30 MIN: CPT | Mod: 25

## 2021-11-04 PROCEDURE — 36415 COLL VENOUS BLD VENIPUNCTURE: CPT

## 2021-11-04 PROCEDURE — 99215 OFFICE O/P EST HI 40 MIN: CPT

## 2021-11-04 PROCEDURE — 93000 ELECTROCARDIOGRAM COMPLETE: CPT

## 2021-11-04 NOTE — REVIEW OF SYSTEMS
[Dyspnea on exertion] : dyspnea during exertion [Abdominal Pain] : abdominal pain [Abn Vaginal Bleeding] : unexplained vaginal bleeding [see HPI] : see HPI [Negative] : Heme/Lymph [Recent Weight Gain (___ Lbs)] : no recent weight gain [Feeling Fatigued] : not feeling fatigued [Recent Weight Loss (___ Lbs)] : no recent weight loss [Chest  Pressure] : no chest pressure [Chest Pain] : no chest pain [Lower Ext Edema] : no extremity edema [Palpitations] : no palpitations [Nausea] : no nausea [Vomiting] : no vomiting [Heartburn] : no heartburn [Change in Appetite] : no change in appetite [Change In The Stool] : no change in stool [Dysuria] : no dysuria [Incontinence] : no incontinence [Pelvic Pain] : no pelvic pain [Dizziness] : no dizziness
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
## 2021-11-04 NOTE — PHYSICAL EXAM
[General Appearance - Well Developed] : well developed [Normal Appearance] : normal appearance [Well Groomed] : well groomed [General Appearance - Well Nourished] : well nourished [No Deformities] : no deformities [General Appearance - In No Acute Distress] : no acute distress [Normal Conjunctiva] : the conjunctiva exhibited no abnormalities [Eyelids - No Xanthelasma] : the eyelids demonstrated no xanthelasmas [Normal Oral Mucosa] : normal oral mucosa [No Oral Pallor] : no oral pallor [No Oral Cyanosis] : no oral cyanosis [Normal Jugular Venous V Waves Present] : normal jugular venous V waves present [No Jugular Venous Jang A Waves] : no jugular venous jang A waves [Heart Rate And Rhythm] : heart rate and rhythm were normal [Heart Sounds] : normal S1 and S2 [Respiration, Rhythm And Depth] : normal respiratory rhythm and effort [Exaggerated Use Of Accessory Muscles For Inspiration] : no accessory muscle use [Auscultation Breath Sounds / Voice Sounds] : lungs were clear to auscultation bilaterally [Abdomen Soft] : soft [Abdomen Tenderness] : non-tender [Abdomen Mass (___ Cm)] : no abdominal mass palpated [Abnormal Walk] : normal gait [Gait - Sufficient For Exercise Testing] : the gait was sufficient for exercise testing [Nail Clubbing] : no clubbing of the fingernails [Cyanosis, Localized] : no localized cyanosis [Petechial Hemorrhages (___cm)] : no petechial hemorrhages [Skin Color & Pigmentation] : normal skin color and pigmentation [] : no rash [No Venous Stasis] : no venous stasis [Skin Lesions] : no skin lesions [No Skin Ulcers] : no skin ulcer [No Xanthoma] : no  xanthoma was observed [Oriented To Time, Place, And Person] : oriented to person, place, and time [Affect] : the affect was normal [Mood] : the mood was normal [No Anxiety] : not feeling anxious [FreeTextEntry1] : Not depressed

## 2021-11-05 ENCOUNTER — NON-APPOINTMENT (OUTPATIENT)
Age: 66
End: 2021-11-05

## 2021-11-05 LAB
25(OH)D3 SERPL-MCNC: 37.8 NG/ML
ALBUMIN SERPL ELPH-MCNC: 4.2 G/DL
ALP BLD-CCNC: 97 U/L
ALT SERPL-CCNC: 11 U/L
ANION GAP SERPL CALC-SCNC: 11 MMOL/L
AST SERPL-CCNC: 17 U/L
BASOPHILS # BLD AUTO: 0.12 K/UL
BASOPHILS NFR BLD AUTO: 1.6 %
BILIRUB SERPL-MCNC: 0.3 MG/DL
BUN SERPL-MCNC: 12 MG/DL
CALCIUM SERPL-MCNC: 9.9 MG/DL
CHLORIDE SERPL-SCNC: 105 MMOL/L
CHOLEST SERPL-MCNC: 105 MG/DL
CO2 SERPL-SCNC: 26 MMOL/L
CREAT SERPL-MCNC: 1.04 MG/DL
CREAT SPEC-SCNC: 202 MG/DL
EOSINOPHIL # BLD AUTO: 0.44 K/UL
EOSINOPHIL NFR BLD AUTO: 6 %
ESTIMATED AVERAGE GLUCOSE: 146 MG/DL
GLUCOSE SERPL-MCNC: 111 MG/DL
HBA1C MFR BLD HPLC: 6.7 %
HCT VFR BLD CALC: 42.5 %
HDLC SERPL-MCNC: 49 MG/DL
HGB BLD-MCNC: 13.4 G/DL
IMM GRANULOCYTES NFR BLD AUTO: 0.4 %
LDLC SERPL CALC-MCNC: 41 MG/DL
LYMPHOCYTES # BLD AUTO: 2.36 K/UL
LYMPHOCYTES NFR BLD AUTO: 32.4 %
MAN DIFF?: NORMAL
MCHC RBC-ENTMCNC: 30.8 PG
MCHC RBC-ENTMCNC: 31.5 GM/DL
MCV RBC AUTO: 97.7 FL
MICROALBUMIN 24H UR DL<=1MG/L-MCNC: 2.1 MG/DL
MICROALBUMIN/CREAT 24H UR-RTO: 11 MG/G
MONOCYTES # BLD AUTO: 0.56 K/UL
MONOCYTES NFR BLD AUTO: 7.7 %
NEUTROPHILS # BLD AUTO: 3.77 K/UL
NEUTROPHILS NFR BLD AUTO: 51.9 %
NONHDLC SERPL-MCNC: 55 MG/DL
PLATELET # BLD AUTO: 156 K/UL
POTASSIUM SERPL-SCNC: 5.4 MMOL/L
PROT SERPL-MCNC: 7.3 G/DL
RBC # BLD: 4.35 M/UL
RBC # FLD: 12.5 %
SODIUM SERPL-SCNC: 141 MMOL/L
TRIGL SERPL-MCNC: 72 MG/DL
TSH SERPL-ACNC: 0.78 UIU/ML
WBC # FLD AUTO: 7.28 K/UL

## 2021-11-08 ENCOUNTER — RX RENEWAL (OUTPATIENT)
Age: 66
End: 2021-11-08

## 2021-11-10 ENCOUNTER — APPOINTMENT (OUTPATIENT)
Dept: CARDIOLOGY | Facility: CLINIC | Age: 66
End: 2021-11-10
Payer: MEDICARE

## 2021-11-10 PROCEDURE — 93306 TTE W/DOPPLER COMPLETE: CPT

## 2021-11-15 ENCOUNTER — OUTPATIENT (OUTPATIENT)
Dept: OUTPATIENT SERVICES | Facility: HOSPITAL | Age: 66
LOS: 1 days | End: 2021-11-15
Payer: MEDICARE

## 2021-11-15 VITALS
TEMPERATURE: 98 F | HEIGHT: 60 IN | SYSTOLIC BLOOD PRESSURE: 125 MMHG | RESPIRATION RATE: 19 BRPM | OXYGEN SATURATION: 99 % | HEART RATE: 63 BPM | DIASTOLIC BLOOD PRESSURE: 88 MMHG | WEIGHT: 164.91 LBS

## 2021-11-15 DIAGNOSIS — N93.9 ABNORMAL UTERINE AND VAGINAL BLEEDING, UNSPECIFIED: ICD-10-CM

## 2021-11-15 DIAGNOSIS — Z86.79 PERSONAL HISTORY OF OTHER DISEASES OF THE CIRCULATORY SYSTEM: ICD-10-CM

## 2021-11-15 DIAGNOSIS — E11.9 TYPE 2 DIABETES MELLITUS WITHOUT COMPLICATIONS: ICD-10-CM

## 2021-11-15 LAB
A1C WITH ESTIMATED AVERAGE GLUCOSE RESULT: 6.5 % — HIGH (ref 4–5.6)
ANION GAP SERPL CALC-SCNC: 8 MMOL/L — SIGNIFICANT CHANGE UP (ref 7–14)
BUN SERPL-MCNC: 15 MG/DL — SIGNIFICANT CHANGE UP (ref 7–23)
CALCIUM SERPL-MCNC: 9.9 MG/DL — SIGNIFICANT CHANGE UP (ref 8.4–10.5)
CHLORIDE SERPL-SCNC: 103 MMOL/L — SIGNIFICANT CHANGE UP (ref 98–107)
CO2 SERPL-SCNC: 29 MMOL/L — SIGNIFICANT CHANGE UP (ref 22–31)
CREAT SERPL-MCNC: 0.95 MG/DL — SIGNIFICANT CHANGE UP (ref 0.5–1.3)
ESTIMATED AVERAGE GLUCOSE: 140 — SIGNIFICANT CHANGE UP
GLUCOSE SERPL-MCNC: 97 MG/DL — SIGNIFICANT CHANGE UP (ref 70–99)
HCT VFR BLD CALC: 44.1 % — SIGNIFICANT CHANGE UP (ref 34.5–45)
HGB BLD-MCNC: 13.6 G/DL — SIGNIFICANT CHANGE UP (ref 11.5–15.5)
MCHC RBC-ENTMCNC: 29.7 PG — SIGNIFICANT CHANGE UP (ref 27–34)
MCHC RBC-ENTMCNC: 30.8 GM/DL — LOW (ref 32–36)
MCV RBC AUTO: 96.3 FL — SIGNIFICANT CHANGE UP (ref 80–100)
NRBC # BLD: 0 /100 WBCS — SIGNIFICANT CHANGE UP
NRBC # FLD: 0 K/UL — SIGNIFICANT CHANGE UP
PLATELET # BLD AUTO: 143 K/UL — LOW (ref 150–400)
POTASSIUM SERPL-MCNC: 4.2 MMOL/L — SIGNIFICANT CHANGE UP (ref 3.5–5.3)
POTASSIUM SERPL-SCNC: 4.2 MMOL/L — SIGNIFICANT CHANGE UP (ref 3.5–5.3)
RBC # BLD: 4.58 M/UL — SIGNIFICANT CHANGE UP (ref 3.8–5.2)
RBC # FLD: 12.4 % — SIGNIFICANT CHANGE UP (ref 10.3–14.5)
SODIUM SERPL-SCNC: 140 MMOL/L — SIGNIFICANT CHANGE UP (ref 135–145)
WBC # BLD: 7.96 K/UL — SIGNIFICANT CHANGE UP (ref 3.8–10.5)
WBC # FLD AUTO: 7.96 K/UL — SIGNIFICANT CHANGE UP (ref 3.8–10.5)

## 2021-11-15 PROCEDURE — 93010 ELECTROCARDIOGRAM REPORT: CPT

## 2021-11-15 RX ORDER — SODIUM CHLORIDE 9 MG/ML
1000 INJECTION, SOLUTION INTRAVENOUS
Refills: 0 | Status: DISCONTINUED | OUTPATIENT
Start: 2021-12-01 | End: 2021-12-15

## 2021-11-15 NOTE — H&P PST ADULT - NSICDXPASTSURGICALHX_GEN_ALL_CORE_FT
PAST SURGICAL HISTORY:  S/P AVR (aortic valve replacement) 2009 valve    S/P MVR (mitral valve replacement) tissue valve 2009
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
## 2021-11-15 NOTE — H&P PST ADULT - DENTITION
pt with loose posterior left upper molar. denies dentures.  Pt reports she will see dentist at [facility] clinic for extraction.  Daughter advised to make appt as soon as  possible

## 2021-11-15 NOTE — H&P PST ADULT - HISTORY OF PRESENT ILLNESS
64 y/o female with hx of vaginal bleeding x1 month.  D&C was scheduled may 2021, pt cancelled surgery "due to fear", per daughter.  Daughter also reports pt had appt with dentist to remove loose tooth,  and she cancelled that appt as well.  Daughter reports pt is ready to keep her appt' s  Scheduled for Dilation Curettage Hysteroscopy, polypectom6 wit  myosure.  66 y/o Creole speaking  female, presents to PST  with hx of vaginal bleeding x1 month.  D&C was scheduled may 2021, pt cancelled surgery "due to fear", per daughter.  Daughter also reports pt had appt with dentist to remove loose tooth,  and she cancelled that appt as well.  Daughter reports pt is ready to keep her appt' s  Scheduled for Dilation Curettage Hysteroscopy, polypectom6 wit  myosure.  66 y/o Creole speaking  female, presents to PST  with hx of vaginal bleeding x1 month.  D&C was scheduled may 2021, pt cancelled surgery "due to fear", per daughter.  Daughter also reports pt had appt with dentist to remove loose tooth,  and she cancelled that appt as well.  Daughter reports pt is ready to keep her appt' s  Scheduled for Dilation Curettage Hysteroscopy, polypectomy with  Myosure.

## 2021-11-15 NOTE — H&P PST ADULT - PROBLEM SELECTOR PLAN 3
Pt reports she waw Card. for pre-op eval, no Eliquis isntructions given.  I called Card. office, spoke with Rita and advised that pt waiting fo rEliquis pre-op instructions.  Card will call pt with instructins.   email to surgeon regarding above Pt reports she saw Card. for pre-op eval, no Eliquis instructions given.  I called [facility]. office, spoke with Rita and advised that pt waiting fo Eliquis pre-op instructions.  Card will call pt with instructions.   email to surgeon regarding above Pt reports she saw [facility]. for pre-op eval, no Eliquis instructions given.  I called [facility]. office, spoke with Rita and advised that pt waiting for Eliquis pre-op instructions.  Cardiologist  will call pt with instructions, per Rita.   email to surgeon regarding above

## 2021-11-15 NOTE — H&P PST ADULT - ASSESSMENT
64 y/o Creole speaking  female, presents to PST  with hx of vaginal bleeding x1 month.  D&C was scheduled may 2021, pt cancelled surgery "due to fear", per daughter.  Daughter also reports pt had appt with dentist to remove loose tooth,  and she cancelled that appt as well.  Daughter reports pt is ready to keep her appt' s  Scheduled for Dilation Curettage Hysteroscopy, polypectom6 wit  myosure.  27 y/o male with hx of left testicular pain x several years, recently worsening  dx with Scrotal varices.  Scheduled for Left varicocelectomy ligation.

## 2021-11-15 NOTE — H&P PST ADULT - PROBLEM SELECTOR PLAN 2
NO Metformin  morning of surgery, accuchek on admission No Metformin  morning of surgery, accuchek on admission

## 2021-11-15 NOTE — H&P PST ADULT - SOURCE OF INFORMATION, PROFILE
Vernon, daughter translated at PST as per her request/patient/chart(s) Vernon, daughter translated at PST as per her request/patient/family/chart(s)

## 2021-11-15 NOTE — H&P PST ADULT - GENITOURINARY COMMENTS
hx of vaginal bleeding x1 month.  D&C was scheduled may 2021, pt cancelled surgery "due to fear", per daughter.  Daughter also reports pt had appt with dentist to remove loose tooth,  and she cancelled that appt as well.  Daughter reports pt is ready to keep her appt' s  Scheduled for Dilation Curettage Hysteroscopy, polypectom6 wit  myosure.

## 2021-11-15 NOTE — H&P PST ADULT - ATTENDING COMMENTS
Agree with above. Pt with postmenopausal bleeding, thickened endometrium on ultrasound. Plan for hysteroscopy, D&C, possible myosure polypectomy.    LEDA Conroy MD Agree with above. Pt with postmenopausal bleeding, thickened endometrium on ultrasound. Plan for hysteroscopy, D&C, possible myosure polypectomy. Written consent obtained.     LEDA Conroy MD

## 2021-11-17 NOTE — REVIEW OF SYSTEMS
[Feeling Fatigued] : feeling fatigued [Weight Loss (___ Lbs)] : [unfilled] ~Ulb weight loss [Dyspnea on exertion] : dyspnea during exertion [Abn Vaginal Bleeding] : unexplained vaginal bleeding [Negative] : Heme/Lymph [Chest Discomfort] : no chest discomfort [Lower Ext Edema] : no extremity edema [Palpitations] : no palpitations [Orthopnea] : no orthopnea [PND] : no PND [Syncope] : no syncope [FreeTextEntry8] : Vaginal bleeding [de-identified] : Previous stroke and TIA [de-identified] : Possibly depressed.  Afraid to get vaccine for Covid

## 2021-11-17 NOTE — PHYSICAL EXAM
[General Appearance - Well Developed] : well developed [Normal Appearance] : normal appearance [Well Groomed] : well groomed [General Appearance - Well Nourished] : well nourished [No Deformities] : no deformities [General Appearance - In No Acute Distress] : no acute distress [Normal Conjunctiva] : the conjunctiva exhibited no abnormalities [Eyelids - No Xanthelasma] : the eyelids demonstrated no xanthelasmas [Normal Oral Mucosa] : normal oral mucosa [No Oral Pallor] : no oral pallor [No Oral Cyanosis] : no oral cyanosis [Normal Jugular Venous V Waves Present] : normal jugular venous V waves present [No Jugular Venous Jang A Waves] : no jugular venous jang A waves [Heart Rate And Rhythm] : heart rate and rhythm were normal [Heart Sounds] : normal S1 and S2 [Respiration, Rhythm And Depth] : normal respiratory rhythm and effort [Exaggerated Use Of Accessory Muscles For Inspiration] : no accessory muscle use [Auscultation Breath Sounds / Voice Sounds] : lungs were clear to auscultation bilaterally [Abdomen Soft] : soft [Abdomen Tenderness] : non-tender [Abdomen Mass (___ Cm)] : no abdominal mass palpated [Abnormal Walk] : normal gait [Gait - Sufficient For Exercise Testing] : the gait was sufficient for exercise testing [Nail Clubbing] : no clubbing of the fingernails [Cyanosis, Localized] : no localized cyanosis [Petechial Hemorrhages (___cm)] : no petechial hemorrhages [Skin Color & Pigmentation] : normal skin color and pigmentation [] : no rash [No Venous Stasis] : no venous stasis [Skin Lesions] : no skin lesions [No Skin Ulcers] : no skin ulcer [No Xanthoma] : no  xanthoma was observed [Oriented To Time, Place, And Person] : oriented to person, place, and time [Affect] : the affect was normal [Mood] : the mood was normal [No Anxiety] : not feeling anxious [FreeTextEntry1] : Not depressed according to the daughter
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
## 2021-11-17 NOTE — REVIEW OF SYSTEMS
[Feeling Fatigued] : feeling fatigued [Weight Loss (___ Lbs)] : [unfilled] ~Ulb weight loss [Dyspnea on exertion] : dyspnea during exertion [Abn Vaginal Bleeding] : unexplained vaginal bleeding [Negative] : Heme/Lymph [Chest Discomfort] : no chest discomfort [Lower Ext Edema] : no extremity edema [Palpitations] : no palpitations [Orthopnea] : no orthopnea [PND] : no PND [Syncope] : no syncope [FreeTextEntry8] : Vaginal bleeding [de-identified] : Previous stroke and TIA [de-identified] : Possibly depressed.  Afraid to get vaccine for Covid

## 2021-11-17 NOTE — DISCUSSION/SUMMARY
[Procedure Low Risk] : the procedure risk is low [Patient Intermediate Risk] : the patient is an intermediate risk [As per surgery] : as per surgery [Optimized for Surgery] : the patient is optimized for surgery [Continue] : Continue medications as currently directed [FreeTextEntry3] : Ideal would be if the procedure could be done while she remained on Eliquis.  If necessary to stop it 2 days should be more than enough and it should be restarted as soon as possible given her atrial fibrillation and to bioprosthetic heart valves. [FreeTextEntry1] : The patient has 2 bioprosthetic valves and a probable bypass graft.  According to the daughter she is somewhat limited but has no findings of congestive heart failure.  Echo a year and a half ago had normal LV and RV function although there did seem to be a borderline gradient across her bioprosthetic mitral valve.  Echocardiogram was repeated and is unchanged and acceptable (see below).  Blood tests acceptable as well and discussed between patient and her internist.  More important is that she get vaccinated for Covid and I urged the patient and daughter in no uncertain terms how important it was in her condition.\par Addendum.  Patient had echo on November 10 which was for the most part unchanged from previously a year and a half ago.  Labs were acceptable with totally normal renal function.  Ideally procedure could be done on Eliquis but if not it should be held for no more than 2 days and then restarted as soon as possible given the atrial fibrillation and the 2 prosthetic valves.
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
## 2021-11-19 ENCOUNTER — NON-APPOINTMENT (OUTPATIENT)
Age: 66
End: 2021-11-19

## 2021-11-27 ENCOUNTER — APPOINTMENT (OUTPATIENT)
Dept: DISASTER EMERGENCY | Facility: CLINIC | Age: 66
End: 2021-11-27

## 2021-11-27 DIAGNOSIS — Z01.818 ENCOUNTER FOR OTHER PREPROCEDURAL EXAMINATION: ICD-10-CM

## 2021-11-27 PROBLEM — Z95.3 PRESENCE OF XENOGENIC HEART VALVE: Chronic | Status: ACTIVE | Noted: 2021-05-03

## 2021-11-27 LAB — SARS-COV-2 N GENE NPH QL NAA+PROBE: NOT DETECTED

## 2021-11-30 ENCOUNTER — RESULT REVIEW (OUTPATIENT)
Age: 66
End: 2021-11-30

## 2021-11-30 ENCOUNTER — TRANSCRIPTION ENCOUNTER (OUTPATIENT)
Age: 66
End: 2021-11-30

## 2021-11-30 NOTE — ASU PATIENT PROFILE, ADULT - FALL HARM RISK - UNIVERSAL INTERVENTIONS
Bed in lowest position, wheels locked, appropriate side rails in place/Call bell, personal items and telephone in reach/Instruct patient to call for assistance before getting out of bed or chair/Non-slip footwear when patient is out of bed/[place] to call system/Physically safe environment - no spills, clutter or unnecessary equipment/Purposeful Proactive Rounding/Room/bathroom lighting operational, light cord in reach

## 2021-11-30 NOTE — ASU PATIENT PROFILE, ADULT - NSICDXPASTMEDICALHX_GEN_ALL_CORE_FT
PAST MEDICAL HISTORY:  Abnormal uterine and vaginal bleeding     Atrial fibrillation on eliquis    Cerebrovascular accident (CVA)     Dyspnea on exertion     History of MI (myocardial infarction) 8-9 years ago in [facility]    History of stroke 2008 hospitalized in [facility]    History of TIA (transient ischemic attack) sept 2020    HLD (hyperlipidemia)     HTN (hypertension)     Long term current use of anticoagulant     Memory loss     S/P aortic valve replacement with bioprosthetic valve 2009    S/P mitral valve replacement with bioprosthetic valve 2009 "tissue valve per daughter"    Type 2 diabetes mellitus

## 2021-12-01 ENCOUNTER — OUTPATIENT (OUTPATIENT)
Dept: OUTPATIENT SERVICES | Facility: HOSPITAL | Age: 66
LOS: 1 days | Discharge: HOME | End: 2021-12-01
Payer: MEDICARE

## 2021-12-01 VITALS
OXYGEN SATURATION: 100 % | HEIGHT: 60 IN | TEMPERATURE: 99 F | WEIGHT: 164.91 LBS | RESPIRATION RATE: 18 BRPM | SYSTOLIC BLOOD PRESSURE: 141 MMHG | DIASTOLIC BLOOD PRESSURE: 90 MMHG | HEART RATE: 76 BPM

## 2021-12-01 VITALS — RESPIRATION RATE: 16 BRPM | OXYGEN SATURATION: 97 % | HEART RATE: 72 BPM

## 2021-12-01 DIAGNOSIS — N93.9 ABNORMAL UTERINE AND VAGINAL BLEEDING, UNSPECIFIED: ICD-10-CM

## 2021-12-01 LAB — GLUCOSE BLDC GLUCOMTR-MCNC: 96 MG/DL — SIGNIFICANT CHANGE UP (ref 70–99)

## 2021-12-01 PROCEDURE — 58558 HYSTEROSCOPY BIOPSY: CPT

## 2021-12-01 NOTE — ASU DISCHARGE PLAN (ADULT/PEDIATRIC) - NS MD DC FALL RISK RISK
For information on Fall & Injury Prevention, visit: [email or web address] OR  [email or web address] OR  https://www.cdc.gov/steadi/patient.html

## 2021-12-01 NOTE — ASU DISCHARGE PLAN (ADULT/PEDIATRIC) - CARE PROVIDER_API CALL
Cora Conroy)  Domenica GIBBS  [phone] [place] [address]  Phone: [phone]  Fax: [phone]  Follow Up Time: 2 weeks

## 2021-12-08 LAB — SURGICAL PATHOLOGY STUDY: SIGNIFICANT CHANGE UP

## 2021-12-21 ENCOUNTER — APPOINTMENT (OUTPATIENT)
Dept: OBGYN | Facility: CLINIC | Age: 66
End: 2021-12-21
Payer: MEDICARE

## 2021-12-21 VITALS
DIASTOLIC BLOOD PRESSURE: 80 MMHG | SYSTOLIC BLOOD PRESSURE: 135 MMHG | BODY MASS INDEX: 32.07 KG/M2 | WEIGHT: 181 LBS | HEIGHT: 63 IN

## 2021-12-21 DIAGNOSIS — Z98.890 OTHER SPECIFIED POSTPROCEDURAL STATES: ICD-10-CM

## 2021-12-21 PROCEDURE — 99212 OFFICE O/P EST SF 10 MIN: CPT

## 2021-12-22 PROBLEM — Z98.890 POST-OPERATIVE STATE: Status: ACTIVE | Noted: 2021-12-22

## 2022-01-03 NOTE — PATIENT PROFILE ADULT - DO YOU FEEL UNSAFE AT HOME, WORK, OR SCHOOL?
Here is my soonest opening now. Situation: Time on hold  Provider: Chidi Pérez [[id number]]  Date: Wed 3/2/2022  Time: 1:00 p - 2:00 p  Reason: Adelfo Hutton (Fadia Fuchs new video)      Has until 1.10 to respond or will be given to another patient. Patient and guardian must be on video. no

## 2022-01-18 ENCOUNTER — APPOINTMENT (OUTPATIENT)
Dept: OBGYN | Facility: CLINIC | Age: 67
End: 2022-01-18
Payer: MEDICARE

## 2022-01-18 VITALS
WEIGHT: 179 LBS | HEIGHT: 63 IN | DIASTOLIC BLOOD PRESSURE: 79 MMHG | BODY MASS INDEX: 31.71 KG/M2 | SYSTOLIC BLOOD PRESSURE: 142 MMHG

## 2022-01-18 PROCEDURE — 58300 INSERT INTRAUTERINE DEVICE: CPT

## 2022-02-14 ENCOUNTER — APPOINTMENT (OUTPATIENT)
Dept: OBGYN | Facility: CLINIC | Age: 67
End: 2022-02-14
Payer: MEDICARE

## 2022-02-14 ENCOUNTER — APPOINTMENT (OUTPATIENT)
Dept: ANTEPARTUM | Facility: CLINIC | Age: 67
End: 2022-02-14
Payer: MEDICARE

## 2022-02-14 VITALS — DIASTOLIC BLOOD PRESSURE: 74 MMHG | SYSTOLIC BLOOD PRESSURE: 146 MMHG | WEIGHT: 176 LBS | BODY MASS INDEX: 31.18 KG/M2

## 2022-02-14 DIAGNOSIS — Z30.431 ENCOUNTER FOR ROUTINE CHECKING OF INTRAUTERINE CONTRACEPTIVE DEVICE: ICD-10-CM

## 2022-02-14 PROCEDURE — 99213 OFFICE O/P EST LOW 20 MIN: CPT

## 2022-02-14 PROCEDURE — 76830 TRANSVAGINAL US NON-OB: CPT

## 2022-02-14 PROCEDURE — 76856 US EXAM PELVIC COMPLETE: CPT

## 2022-02-14 NOTE — HISTORY OF PRESENT ILLNESS
[FreeTextEntry1] : The patient is a 66 y.o. with endometrial hyperplasia without atypia, Mirena IUD in place presenting today for a follow up visit. She is doing well. She reports continued bleeding but denies any pain, difficulty urinating. Her bleeding is light.

## 2022-02-14 NOTE — PLAN
[FreeTextEntry1] : 66 y.o. with Mirena IUD in place for endometrial hyperplasia w/o atypia\par -Mirena IUD in position by sonogram\par -f/u in 2 months for repeat EMB\par

## 2022-04-01 NOTE — ED PROVIDER NOTE - PROGRESS NOTE
Implemented All Fall Risk Interventions:  [place] to call system. Call bell, personal items and telephone within reach. Instruct patient to call for assistance. Room bathroom lighting operational. Non-slip footwear when patient is off stretcher. Physically safe environment: no spills, clutter or unnecessary equipment. Stretcher in lowest position, wheels locked, appropriate side rails in place. Provide visual cue, wrist band, yellow gown, etc. Monitor gait and stability. Monitor for mental status changes and reorient to person, place, and time. Review medications for side effects contributing to fall risk. Reinforce activity limits and safety measures with patient and family.
Stable.

## 2022-04-11 ENCOUNTER — APPOINTMENT (OUTPATIENT)
Dept: OBGYN | Facility: CLINIC | Age: 67
End: 2022-04-11
Payer: MEDICARE

## 2022-04-11 VITALS — SYSTOLIC BLOOD PRESSURE: 123 MMHG | DIASTOLIC BLOOD PRESSURE: 84 MMHG | BODY MASS INDEX: 31.18 KG/M2 | WEIGHT: 176 LBS

## 2022-04-11 PROBLEM — Z11.59 SCREENING FOR VIRAL DISEASE: Status: ACTIVE | Noted: 2021-02-11

## 2022-04-11 PROCEDURE — 58100 BIOPSY OF UTERUS LINING: CPT

## 2022-04-11 NOTE — PLAN
[FreeTextEntry1] : 65 y/o F presenting for EMB for endometrial hyperplasia f/u\par -f/u biopsy results\par -NSAIDS and Tylenol PRN for discomfort\par -Patient advised to call the office with fevers, abdominal discharge and heavy vaginal bleeding\par -f/u 3 months for repeat EMB as long as pathology normal\par

## 2022-04-11 NOTE — PROCEDURE
[Endometrial Biopsy] : Endometrial biopsy [Time out performed] : Pre-procedure time out performed.  Patient's name, date of birth and procedure confirmed. [Consent Obtained] : Consent obtained [F/U Hyperplasia] : follow-up for endometrial hyperplasia [Risks] : risks [Benefits] : benefits [Alternatives] : alternatives [Patient] : patient [Infection] : infection [Bleeding] : bleeding [Allergic Reaction] : allergic reaction [Uterine Perforation] : uterine perforation [Pain] : pain [Betadine] : Betadine [None] : none [Tenaculum] : Tenaculum [Easy Passage] : Easy passage [Sounded to ___ cm] : sounded to [unfilled] ~Ucm [Anteverted] : anteverted [Moderate] : moderate [Sent to Pathology] : placed in buffered formalin and sent for pathology [Tolerated Well] : Patient tolerated the procedure well [No Complications] : No complications [de-identified] : Vernon (daughter) [LMPDate] : postmenopausal [de-identified] : allis [de-identified] : completed under ultrasound guidance

## 2022-04-18 ENCOUNTER — APPOINTMENT (OUTPATIENT)
Dept: OBGYN | Facility: CLINIC | Age: 67
End: 2022-04-18
Payer: MEDICARE

## 2022-04-18 VITALS — WEIGHT: 177 LBS | DIASTOLIC BLOOD PRESSURE: 86 MMHG | BODY MASS INDEX: 31.35 KG/M2 | SYSTOLIC BLOOD PRESSURE: 142 MMHG

## 2022-04-18 DIAGNOSIS — R10.2 PELVIC AND PERINEAL PAIN: ICD-10-CM

## 2022-04-18 PROCEDURE — 99213 OFFICE O/P EST LOW 20 MIN: CPT

## 2022-04-18 NOTE — HISTORY OF PRESENT ILLNESS
[FreeTextEntry1] : The patient is a 66 y.o. s/p EMB on 4/11 presenting today with lower abdominal cramping. She states that the cramping started on Friday. It is intermittently strong/painful but is usually mild. She denies fevers/chills, changes in bowel movements. She has not tried any medication to help with the pain. She reports spotting, which is similar to before her EMB. She states that the pain was the worst yesterday but seems to have improved today.

## 2022-04-18 NOTE — PLAN
[FreeTextEntry1] : 66 y.o. with lower abdominal cramping s/p EMB\par -Normal exam today. IUD in situ by ultrasound. Pt does have 2cm simple ovarian cyst, stable in size from prior exams. Low concern for PID, perforation due to EMB. Discussed concerning symptoms including worsening cramping, heavy vaginal bleeding, fevers/chills. Will continue to monitor closely. Recommend Tylenol and hot packs for discomfort\par -will call with results of EMB\par -f/u PRN

## 2022-04-18 NOTE — PHYSICAL EXAM
[Appropriately responsive] : appropriately responsive [Alert] : alert [No Acute Distress] : no acute distress [No Murmurs] : no murmurs [Soft] : soft [Non-tender] : non-tender [No HSM] : No HSM [No Lesions] : no lesions [No Mass] : no mass [Oriented x3] : oriented x3 [FreeTextEntry3] : supple [FreeTextEntry5] : Normal respiratory effort [Normal] : normal [Tenderness] : nontender

## 2022-05-13 ENCOUNTER — NON-APPOINTMENT (OUTPATIENT)
Age: 67
End: 2022-05-13

## 2022-05-14 LAB — CORE LAB BIOPSY: NORMAL

## 2022-06-06 ENCOUNTER — RX RENEWAL (OUTPATIENT)
Age: 67
End: 2022-06-06

## 2022-06-08 ENCOUNTER — RX RENEWAL (OUTPATIENT)
Age: 67
End: 2022-06-08

## 2022-06-13 ENCOUNTER — APPOINTMENT (OUTPATIENT)
Dept: CARDIOLOGY | Facility: CLINIC | Age: 67
End: 2022-06-13

## 2022-07-22 ENCOUNTER — APPOINTMENT (OUTPATIENT)
Dept: INTERNAL MEDICINE | Facility: CLINIC | Age: 67
End: 2022-07-22

## 2022-09-27 ENCOUNTER — APPOINTMENT (OUTPATIENT)
Dept: OBGYN | Facility: CLINIC | Age: 67
End: 2022-09-27

## 2022-09-27 VITALS — BODY MASS INDEX: 30.29 KG/M2 | SYSTOLIC BLOOD PRESSURE: 160 MMHG | DIASTOLIC BLOOD PRESSURE: 83 MMHG | WEIGHT: 171 LBS

## 2022-09-27 PROCEDURE — 58100 BIOPSY OF UTERUS LINING: CPT

## 2022-09-27 NOTE — PLAN
[FreeTextEntry1] : 66 y.o. with endometrial hyperplasia w/o atypia presenting for a follow up EMB\par -Mirena IUD in place\par -Pelvic sonogram performed which showed a 3cm left adnexal cyst, stable in size from prior sonograms\par -f/u EMB. If negative, will need repeat EMB in 6 months\par

## 2022-09-27 NOTE — PROCEDURE
[Endometrial Biopsy] : Endometrial biopsy [Time out performed] : Pre-procedure time out performed.  Patient's name, date of birth and procedure confirmed. [Consent Obtained] : Consent obtained [F/U Hyperplasia] : follow-up for endometrial hyperplasia [Risks] : risks [Benefits] : benefits [Alternatives] : alternatives [Infection] : infection [Bleeding] : bleeding [Allergic Reaction] : allergic reaction [Uterine Perforation] : uterine perforation [Pain] : pain [N/A] : pregnancy test not applicable [Betadine] : Betadine [None] : none [Easy Passage] : Easy passage [Sounded to ___ cm] : sounded to [unfilled] ~Ucm [Anteverted] : anteverted [Moderate] : moderate [Sent to Pathology] : placed in buffered formalin and sent for pathology [No Complications] : No complications

## 2022-09-28 ENCOUNTER — APPOINTMENT (OUTPATIENT)
Dept: OBGYN | Facility: CLINIC | Age: 67
End: 2022-09-28

## 2022-10-06 ENCOUNTER — NON-APPOINTMENT (OUTPATIENT)
Age: 67
End: 2022-10-06

## 2022-10-11 ENCOUNTER — NON-APPOINTMENT (OUTPATIENT)
Age: 67
End: 2022-10-11

## 2022-11-06 NOTE — ED PROVIDER NOTE - PROGRESS NOTE4
3-year-old female up-to-date with vaccines with no medical history, presenting to emergency room with mother for 24 hours of fever at home with 1 episode of posttussive emesis.  Also has cough and congestion.  No vomiting unrelated to coughing or eating.  No diarrhea.
Stable.

## 2022-12-01 ENCOUNTER — APPOINTMENT (OUTPATIENT)
Dept: INTERNAL MEDICINE | Facility: CLINIC | Age: 67
End: 2022-12-01

## 2022-12-01 VITALS
OXYGEN SATURATION: 97 % | BODY MASS INDEX: 30.3 KG/M2 | SYSTOLIC BLOOD PRESSURE: 148 MMHG | TEMPERATURE: 98.9 F | DIASTOLIC BLOOD PRESSURE: 88 MMHG | WEIGHT: 171 LBS | HEART RATE: 88 BPM | HEIGHT: 63 IN

## 2022-12-01 VITALS — SYSTOLIC BLOOD PRESSURE: 146 MMHG | DIASTOLIC BLOOD PRESSURE: 80 MMHG

## 2022-12-01 DIAGNOSIS — H92.03 OTALGIA, BILATERAL: ICD-10-CM

## 2022-12-01 DIAGNOSIS — I67.9 CEREBROVASCULAR DISEASE, UNSPECIFIED: ICD-10-CM

## 2022-12-01 PROCEDURE — 99213 OFFICE O/P EST LOW 20 MIN: CPT | Mod: 25

## 2022-12-01 PROCEDURE — 36415 COLL VENOUS BLD VENIPUNCTURE: CPT

## 2022-12-01 PROCEDURE — 90662 IIV NO PRSV INCREASED AG IM: CPT

## 2022-12-01 PROCEDURE — G0008: CPT

## 2022-12-02 LAB
ALBUMIN SERPL ELPH-MCNC: 4.3 G/DL
ALP BLD-CCNC: 95 U/L
ALT SERPL-CCNC: 6 U/L
ANION GAP SERPL CALC-SCNC: 10 MMOL/L
AST SERPL-CCNC: 15 U/L
BASOPHILS # BLD AUTO: 0.11 K/UL
BASOPHILS NFR BLD AUTO: 1.4 %
BILIRUB SERPL-MCNC: 0.3 MG/DL
BUN SERPL-MCNC: 16 MG/DL
CALCIUM SERPL-MCNC: 9.9 MG/DL
CHLORIDE SERPL-SCNC: 103 MMOL/L
CHOLEST SERPL-MCNC: 111 MG/DL
CO2 SERPL-SCNC: 28 MMOL/L
CREAT SERPL-MCNC: 0.96 MG/DL
CREAT SPEC-SCNC: 286 MG/DL
EGFR: 64 ML/MIN/1.73M2
EOSINOPHIL # BLD AUTO: 0.43 K/UL
EOSINOPHIL NFR BLD AUTO: 5.5 %
ESTIMATED AVERAGE GLUCOSE: 146 MG/DL
GLUCOSE SERPL-MCNC: 149 MG/DL
HBA1C MFR BLD HPLC: 6.7 %
HCT VFR BLD CALC: 41.1 %
HDLC SERPL-MCNC: 48 MG/DL
HGB BLD-MCNC: 12.9 G/DL
IMM GRANULOCYTES NFR BLD AUTO: 0.3 %
LDLC SERPL CALC-MCNC: 44 MG/DL
LYMPHOCYTES # BLD AUTO: 2.31 K/UL
LYMPHOCYTES NFR BLD AUTO: 29.4 %
MAN DIFF?: NORMAL
MCHC RBC-ENTMCNC: 30.2 PG
MCHC RBC-ENTMCNC: 31.4 GM/DL
MCV RBC AUTO: 96.3 FL
MICROALBUMIN 24H UR DL<=1MG/L-MCNC: 3.7 MG/DL
MICROALBUMIN/CREAT 24H UR-RTO: 13 MG/G
MONOCYTES # BLD AUTO: 0.56 K/UL
MONOCYTES NFR BLD AUTO: 7.1 %
NEUTROPHILS # BLD AUTO: 4.43 K/UL
NEUTROPHILS NFR BLD AUTO: 56.3 %
NONHDLC SERPL-MCNC: 63 MG/DL
PLATELET # BLD AUTO: 143 K/UL
POTASSIUM SERPL-SCNC: 4.5 MMOL/L
PROT SERPL-MCNC: 7.4 G/DL
RBC # BLD: 4.27 M/UL
RBC # FLD: 12.8 %
SODIUM SERPL-SCNC: 141 MMOL/L
TRIGL SERPL-MCNC: 95 MG/DL
TSH SERPL-ACNC: 1.35 UIU/ML
VIT B12 SERPL-MCNC: 206 PG/ML
WBC # FLD AUTO: 7.86 K/UL

## 2022-12-07 ENCOUNTER — RX RENEWAL (OUTPATIENT)
Age: 67
End: 2022-12-07

## 2022-12-13 ENCOUNTER — APPOINTMENT (OUTPATIENT)
Dept: MAMMOGRAPHY | Facility: IMAGING CENTER | Age: 67
End: 2022-12-13

## 2022-12-19 ENCOUNTER — APPOINTMENT (OUTPATIENT)
Dept: CARDIOLOGY | Facility: CLINIC | Age: 67
End: 2022-12-19

## 2023-02-18 LAB — CORE LAB BIOPSY: NORMAL

## 2023-02-22 ENCOUNTER — RX RENEWAL (OUTPATIENT)
Age: 68
End: 2023-02-22

## 2023-03-23 ENCOUNTER — RESULT REVIEW (OUTPATIENT)
Age: 68
End: 2023-03-23

## 2023-03-23 ENCOUNTER — OUTPATIENT (OUTPATIENT)
Dept: OUTPATIENT SERVICES | Facility: HOSPITAL | Age: 68
LOS: 1 days | End: 2023-03-23
Payer: MEDICARE

## 2023-03-23 ENCOUNTER — APPOINTMENT (OUTPATIENT)
Dept: MAMMOGRAPHY | Facility: IMAGING CENTER | Age: 68
End: 2023-03-23
Payer: MEDICARE

## 2023-03-23 DIAGNOSIS — Z00.00 ENCOUNTER FOR GENERAL ADULT MEDICAL EXAMINATION WITHOUT ABNORMAL FINDINGS: ICD-10-CM

## 2023-03-23 PROCEDURE — 77067 SCR MAMMO BI INCL CAD: CPT

## 2023-03-23 PROCEDURE — 77063 BREAST TOMOSYNTHESIS BI: CPT | Mod: 26

## 2023-03-23 PROCEDURE — 77063 BREAST TOMOSYNTHESIS BI: CPT

## 2023-03-23 PROCEDURE — 77067 SCR MAMMO BI INCL CAD: CPT | Mod: 26

## 2023-03-24 ENCOUNTER — NON-APPOINTMENT (OUTPATIENT)
Age: 68
End: 2023-03-24

## 2023-03-24 ENCOUNTER — APPOINTMENT (OUTPATIENT)
Dept: INTERNAL MEDICINE | Facility: CLINIC | Age: 68
End: 2023-03-24
Payer: MEDICARE

## 2023-03-24 VITALS
WEIGHT: 167 LBS | DIASTOLIC BLOOD PRESSURE: 97 MMHG | HEART RATE: 81 BPM | TEMPERATURE: 98.1 F | SYSTOLIC BLOOD PRESSURE: 167 MMHG | OXYGEN SATURATION: 98 % | HEIGHT: 63 IN | BODY MASS INDEX: 29.59 KG/M2

## 2023-03-24 VITALS — DIASTOLIC BLOOD PRESSURE: 86 MMHG | SYSTOLIC BLOOD PRESSURE: 136 MMHG

## 2023-03-24 DIAGNOSIS — Z13.820 ENCOUNTER FOR SCREENING FOR OSTEOPOROSIS: ICD-10-CM

## 2023-03-24 DIAGNOSIS — Z11.1 ENCOUNTER FOR SCREENING FOR RESPIRATORY TUBERCULOSIS: ICD-10-CM

## 2023-03-24 DIAGNOSIS — R68.89 OTHER GENERAL SYMPTOMS AND SIGNS: ICD-10-CM

## 2023-03-24 DIAGNOSIS — Z12.39 ENCOUNTER FOR OTHER SCREENING FOR MALIGNANT NEOPLASM OF BREAST: ICD-10-CM

## 2023-03-24 DIAGNOSIS — M85.80 OTHER SPECIFIED DISORDERS OF BONE DENSITY AND STRUCTURE, UNSPECIFIED SITE: ICD-10-CM

## 2023-03-24 DIAGNOSIS — G43.909 MIGRAINE, UNSPECIFIED, NOT INTRACTABLE, W/OUT STATUS MIGRAINOSUS: ICD-10-CM

## 2023-03-24 DIAGNOSIS — Z00.00 ENCOUNTER FOR GENERAL ADULT MEDICAL EXAMINATION W/OUT ABNORMAL FINDINGS: ICD-10-CM

## 2023-03-24 PROCEDURE — 36415 COLL VENOUS BLD VENIPUNCTURE: CPT

## 2023-03-24 PROCEDURE — G0439: CPT

## 2023-03-24 PROCEDURE — 93000 ELECTROCARDIOGRAM COMPLETE: CPT | Mod: 59

## 2023-03-27 LAB
25(OH)D3 SERPL-MCNC: 36.2 NG/ML
ALBUMIN SERPL ELPH-MCNC: 4.2 G/DL
ALP BLD-CCNC: 104 U/L
ALT SERPL-CCNC: 39 U/L
ANION GAP SERPL CALC-SCNC: 11 MMOL/L
AST SERPL-CCNC: 29 U/L
BASOPHILS # BLD AUTO: 0.11 K/UL
BASOPHILS NFR BLD AUTO: 1.6 %
BILIRUB SERPL-MCNC: 0.6 MG/DL
BUN SERPL-MCNC: 14 MG/DL
CALCIUM SERPL-MCNC: 9.9 MG/DL
CHLORIDE SERPL-SCNC: 105 MMOL/L
CHOLEST SERPL-MCNC: 96 MG/DL
CO2 SERPL-SCNC: 25 MMOL/L
CREAT SERPL-MCNC: 0.95 MG/DL
CREAT SPEC-SCNC: 124 MG/DL
EGFR: 65 ML/MIN/1.73M2
EOSINOPHIL # BLD AUTO: 0.47 K/UL
EOSINOPHIL NFR BLD AUTO: 7 %
ESTIMATED AVERAGE GLUCOSE: 131 MG/DL
GLUCOSE SERPL-MCNC: 92 MG/DL
HBA1C MFR BLD HPLC: 6.2 %
HCT VFR BLD CALC: 40.6 %
HDLC SERPL-MCNC: 50 MG/DL
HGB BLD-MCNC: 12.2 G/DL
IMM GRANULOCYTES NFR BLD AUTO: 0.1 %
LDLC SERPL CALC-MCNC: 37 MG/DL
LYMPHOCYTES # BLD AUTO: 1.96 K/UL
LYMPHOCYTES NFR BLD AUTO: 29.2 %
MAN DIFF?: NORMAL
MCHC RBC-ENTMCNC: 29.6 PG
MCHC RBC-ENTMCNC: 30 GM/DL
MCV RBC AUTO: 98.5 FL
MICROALBUMIN 24H UR DL<=1MG/L-MCNC: 4 MG/DL
MICROALBUMIN/CREAT 24H UR-RTO: 32 MG/G
MONOCYTES # BLD AUTO: 0.5 K/UL
MONOCYTES NFR BLD AUTO: 7.4 %
NEUTROPHILS # BLD AUTO: 3.67 K/UL
NEUTROPHILS NFR BLD AUTO: 54.7 %
NONHDLC SERPL-MCNC: 47 MG/DL
PLATELET # BLD AUTO: 144 K/UL
POTASSIUM SERPL-SCNC: 4.2 MMOL/L
PROT SERPL-MCNC: 7 G/DL
RBC # BLD: 4.12 M/UL
RBC # FLD: 13.2 %
SODIUM SERPL-SCNC: 142 MMOL/L
TRIGL SERPL-MCNC: 49 MG/DL
TSH SERPL-ACNC: 1.4 UIU/ML
VIT B12 SERPL-MCNC: 1283 PG/ML
WBC # FLD AUTO: 6.72 K/UL

## 2023-03-30 ENCOUNTER — APPOINTMENT (OUTPATIENT)
Dept: CARDIOLOGY | Facility: CLINIC | Age: 68
End: 2023-03-30
Payer: MEDICARE

## 2023-03-30 VITALS — HEART RATE: 76 BPM | SYSTOLIC BLOOD PRESSURE: 163 MMHG | DIASTOLIC BLOOD PRESSURE: 75 MMHG

## 2023-03-30 VITALS
HEART RATE: 77 BPM | WEIGHT: 167 LBS | BODY MASS INDEX: 29.59 KG/M2 | DIASTOLIC BLOOD PRESSURE: 80 MMHG | SYSTOLIC BLOOD PRESSURE: 173 MMHG | OXYGEN SATURATION: 98 % | HEIGHT: 63 IN

## 2023-03-30 DIAGNOSIS — R06.02 SHORTNESS OF BREATH: ICD-10-CM

## 2023-03-30 DIAGNOSIS — I51.9 HEART DISEASE, UNSPECIFIED: ICD-10-CM

## 2023-03-30 LAB
M TB IFN-G BLD-IMP: NEGATIVE
QUANTIFERON TB PLUS MITOGEN MINUS NIL: 8.36 IU/ML
QUANTIFERON TB PLUS NIL: 0.03 IU/ML
QUANTIFERON TB PLUS TB1 MINUS NIL: 0.02 IU/ML
QUANTIFERON TB PLUS TB2 MINUS NIL: 0.03 IU/ML

## 2023-03-30 PROCEDURE — 99214 OFFICE O/P EST MOD 30 MIN: CPT

## 2023-03-30 NOTE — PHYSICAL EXAM
[General Appearance - Well Developed] : well developed [Normal Appearance] : normal appearance [Well Groomed] : well groomed [General Appearance - Well Nourished] : well nourished [No Deformities] : no deformities [General Appearance - In No Acute Distress] : no acute distress [Normal Conjunctiva] : the conjunctiva exhibited no abnormalities [Eyelids - No Xanthelasma] : the eyelids demonstrated no xanthelasmas [Normal Oral Mucosa] : normal oral mucosa [No Oral Pallor] : no oral pallor [No Oral Cyanosis] : no oral cyanosis [Normal Jugular Venous V Waves Present] : normal jugular venous V waves present [No Jugular Venous Jang A Waves] : no jugular venous jang A waves [Respiration, Rhythm And Depth] : normal respiratory rhythm and effort [Exaggerated Use Of Accessory Muscles For Inspiration] : no accessory muscle use [Auscultation Breath Sounds / Voice Sounds] : lungs were clear to auscultation bilaterally [Heart Rate And Rhythm] : heart rate and rhythm were normal [Heart Sounds] : normal S1 and S2 [Abdomen Soft] : soft [Abdomen Tenderness] : non-tender [Abdomen Mass (___ Cm)] : no abdominal mass palpated [Abnormal Walk] : normal gait [Gait - Sufficient For Exercise Testing] : the gait was sufficient for exercise testing [Nail Clubbing] : no clubbing of the fingernails [Cyanosis, Localized] : no localized cyanosis [Petechial Hemorrhages (___cm)] : no petechial hemorrhages [Skin Color & Pigmentation] : normal skin color and pigmentation [] : no rash [No Venous Stasis] : no venous stasis [Skin Lesions] : no skin lesions [No Skin Ulcers] : no skin ulcer [No Xanthoma] : no  xanthoma was observed [Oriented To Time, Place, And Person] : oriented to person, place, and time [Affect] : the affect was normal [Mood] : the mood was normal [No Anxiety] : not feeling anxious [FreeTextEntry1] : Not depressed according to the daughter

## 2023-03-30 NOTE — REASON FOR VISIT
[FreeTextEntry1] : 67 year old Citizen of [place] Creole speaking female with history of hypetension, hyperlipidema, s/p AVR/MVR 2008 and paroxysmal atrial fibrillation on coumadin therapy. \par
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
## 2023-03-30 NOTE — REVIEW OF SYSTEMS
[Feeling Fatigued] : feeling fatigued [Dyspnea on exertion] : dyspnea during exertion [Negative] : Heme/Lymph [Weight Loss (___ Lbs)] : no recent weight loss [Chest Discomfort] : no chest discomfort [Lower Ext Edema] : no extremity edema [Palpitations] : no palpitations [Orthopnea] : no orthopnea [PND] : no PND [Syncope] : no syncope [Abn Vaginal Bleeding] : no unexplained vaginal bleeding [FreeTextEntry8] : Vaginal bleeding [de-identified] : Previous stroke and TIA [de-identified] : Possibly depressed.  Afraid to get vaccine for Covid

## 2023-04-10 ENCOUNTER — APPOINTMENT (OUTPATIENT)
Dept: OBGYN | Facility: CLINIC | Age: 68
End: 2023-04-10
Payer: MEDICARE

## 2023-04-10 VITALS — SYSTOLIC BLOOD PRESSURE: 161 MMHG | WEIGHT: 168 LBS | BODY MASS INDEX: 29.76 KG/M2 | DIASTOLIC BLOOD PRESSURE: 81 MMHG

## 2023-04-10 DIAGNOSIS — N85.00 ENDOMETRIAL HYPERPLASIA, UNSPECIFIED: ICD-10-CM

## 2023-04-10 PROCEDURE — 58100 BIOPSY OF UTERUS LINING: CPT

## 2023-04-10 NOTE — PLAN
[FreeTextEntry1] : 67 y.o. with history of endometrial hyperplasia w/o atypia, Mirena IUD in place presenting for endometrial biopsy\par -Biopsy performed under u/s guidance with scant tissue noted\par -Will call patient with results\par -f/u 1 year if EMB normal\par

## 2023-04-19 LAB — CORE LAB BIOPSY: NORMAL

## 2023-04-21 ENCOUNTER — OUTPATIENT (OUTPATIENT)
Dept: OUTPATIENT SERVICES | Facility: HOSPITAL | Age: 68
LOS: 1 days | End: 2023-04-21
Payer: MEDICARE

## 2023-04-21 ENCOUNTER — APPOINTMENT (OUTPATIENT)
Dept: RADIOLOGY | Facility: IMAGING CENTER | Age: 68
End: 2023-04-21
Payer: MEDICARE

## 2023-04-21 DIAGNOSIS — Z13.820 ENCOUNTER FOR SCREENING FOR OSTEOPOROSIS: ICD-10-CM

## 2023-04-21 PROCEDURE — 77085 DXA BONE DENSITY AXL VRT FX: CPT | Mod: 26

## 2023-04-21 PROCEDURE — 77080 DXA BONE DENSITY AXIAL: CPT

## 2023-04-21 PROCEDURE — 77085 DXA BONE DENSITY AXL VRT FX: CPT

## 2023-04-25 ENCOUNTER — NON-APPOINTMENT (OUTPATIENT)
Age: 68
End: 2023-04-25

## 2023-04-28 ENCOUNTER — APPOINTMENT (OUTPATIENT)
Dept: CARDIOLOGY | Facility: CLINIC | Age: 68
End: 2023-04-28
Payer: MEDICARE

## 2023-04-28 VITALS
HEART RATE: 75 BPM | HEIGHT: 63 IN | DIASTOLIC BLOOD PRESSURE: 78 MMHG | BODY MASS INDEX: 29.23 KG/M2 | OXYGEN SATURATION: 100 % | WEIGHT: 165 LBS | SYSTOLIC BLOOD PRESSURE: 168 MMHG

## 2023-04-28 VITALS — HEART RATE: 70 BPM | SYSTOLIC BLOOD PRESSURE: 150 MMHG | DIASTOLIC BLOOD PRESSURE: 90 MMHG

## 2023-04-28 DIAGNOSIS — R06.09 OTHER FORMS OF DYSPNEA: ICD-10-CM

## 2023-04-28 DIAGNOSIS — I36.1 NONRHEUMATIC TRICUSPID (VALVE) INSUFFICIENCY: ICD-10-CM

## 2023-04-28 PROCEDURE — 93306 TTE W/DOPPLER COMPLETE: CPT

## 2023-04-28 PROCEDURE — 36415 COLL VENOUS BLD VENIPUNCTURE: CPT

## 2023-04-28 PROCEDURE — 99214 OFFICE O/P EST MOD 30 MIN: CPT | Mod: 25

## 2023-04-28 NOTE — REASON FOR VISIT
[FreeTextEntry1] : 67 year old [place] Creole speaking female with history of hypetension, hyperlipidema, s/p AVR/MVR 2008 and paroxysmal atrial fibrillation on coumadin therapy. \par

## 2023-04-28 NOTE — REVIEW OF SYSTEMS
[Feeling Fatigued] : feeling fatigued [Dyspnea on exertion] : dyspnea during exertion [Negative] : Heme/Lymph [Weight Loss (___ Lbs)] : [unfilled] ~Ulb weight loss [Chest Discomfort] : no chest discomfort [Lower Ext Edema] : no extremity edema [Palpitations] : no palpitations [Orthopnea] : no orthopnea [PND] : no PND [Syncope] : no syncope [Abn Vaginal Bleeding] : no unexplained vaginal bleeding [FreeTextEntry8] : Vaginal bleeding [de-identified] : Previous stroke and TIA [de-identified] : Possibly depressed.  Afraid to get vaccine for Covid

## 2023-04-28 NOTE — PHYSICAL EXAM
[General Appearance - Well Developed] : well developed [Normal Appearance] : normal appearance [Well Groomed] : well groomed [General Appearance - Well Nourished] : well nourished [No Deformities] : no deformities [General Appearance - In No Acute Distress] : no acute distress [Normal Conjunctiva] : the conjunctiva exhibited no abnormalities [Eyelids - No Xanthelasma] : the eyelids demonstrated no xanthelasmas [Normal Oral Mucosa] : normal oral mucosa [No Oral Pallor] : no oral pallor [No Oral Cyanosis] : no oral cyanosis [Normal Jugular Venous V Waves Present] : normal jugular venous V waves present [No Jugular Venous Jang A Waves] : no jugular venous jang A waves [Respiration, Rhythm And Depth] : normal respiratory rhythm and effort [Exaggerated Use Of Accessory Muscles For Inspiration] : no accessory muscle use [Auscultation Breath Sounds / Voice Sounds] : lungs were clear to auscultation bilaterally [Heart Rate And Rhythm] : heart rate and rhythm were normal [Heart Sounds] : normal S1 and S2 [Abdomen Tenderness] : non-tender [Abdomen Soft] : soft [Abdomen Mass (___ Cm)] : no abdominal mass palpated [Abnormal Walk] : normal gait [Gait - Sufficient For Exercise Testing] : the gait was sufficient for exercise testing [Nail Clubbing] : no clubbing of the fingernails [Cyanosis, Localized] : no localized cyanosis [Petechial Hemorrhages (___cm)] : no petechial hemorrhages [] : no rash [Skin Color & Pigmentation] : normal skin color and pigmentation [No Venous Stasis] : no venous stasis [Skin Lesions] : no skin lesions [No Skin Ulcers] : no skin ulcer [No Xanthoma] : no  xanthoma was observed [Oriented To Time, Place, And Person] : oriented to person, place, and time [Affect] : the affect was normal [Mood] : the mood was normal [No Anxiety] : not feeling anxious [FreeTextEntry1] : Not depressed according to the daughter
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
## 2023-05-01 LAB
ALBUMIN SERPL ELPH-MCNC: 4.2 G/DL
ALP BLD-CCNC: 98 U/L
ALT SERPL-CCNC: 26 U/L
ANION GAP SERPL CALC-SCNC: 12 MMOL/L
AST SERPL-CCNC: 30 U/L
BASOPHILS # BLD AUTO: 0.1 K/UL
BASOPHILS NFR BLD AUTO: 1.4 %
BILIRUB SERPL-MCNC: 0.4 MG/DL
BUN SERPL-MCNC: 17 MG/DL
CALCIUM SERPL-MCNC: 9.7 MG/DL
CHLORIDE SERPL-SCNC: 104 MMOL/L
CHOLEST SERPL-MCNC: 95 MG/DL
CO2 SERPL-SCNC: 25 MMOL/L
CREAT SERPL-MCNC: 0.92 MG/DL
EGFR: 67 ML/MIN/1.73M2
EOSINOPHIL # BLD AUTO: 0.3 K/UL
EOSINOPHIL NFR BLD AUTO: 4.2 %
ESTIMATED AVERAGE GLUCOSE: 143 MG/DL
GLUCOSE SERPL-MCNC: 151 MG/DL
HBA1C MFR BLD HPLC: 6.6 %
HCT VFR BLD CALC: 39.3 %
HDLC SERPL-MCNC: 49 MG/DL
HGB BLD-MCNC: 12.4 G/DL
IMM GRANULOCYTES NFR BLD AUTO: 0.3 %
LDH SERPL-CCNC: 344 U/L
LDLC SERPL CALC-MCNC: 38 MG/DL
LYMPHOCYTES # BLD AUTO: 2.28 K/UL
LYMPHOCYTES NFR BLD AUTO: 31.7 %
MAN DIFF?: NORMAL
MCHC RBC-ENTMCNC: 30.2 PG
MCHC RBC-ENTMCNC: 31.6 GM/DL
MCV RBC AUTO: 95.6 FL
MONOCYTES # BLD AUTO: 0.52 K/UL
MONOCYTES NFR BLD AUTO: 7.2 %
NEUTROPHILS # BLD AUTO: 3.98 K/UL
NEUTROPHILS NFR BLD AUTO: 55.2 %
NONHDLC SERPL-MCNC: 46 MG/DL
NT-PROBNP SERPL-MCNC: 1072 PG/ML
PLATELET # BLD AUTO: 144 K/UL
POTASSIUM SERPL-SCNC: 4.2 MMOL/L
PROT SERPL-MCNC: 6.7 G/DL
RBC # BLD: 4.11 M/UL
RBC # FLD: 13.2 %
SODIUM SERPL-SCNC: 141 MMOL/L
TRIGL SERPL-MCNC: 44 MG/DL
TSH SERPL-ACNC: 1.48 UIU/ML
WBC # FLD AUTO: 7.2 K/UL

## 2023-06-23 ENCOUNTER — RX RENEWAL (OUTPATIENT)
Age: 68
End: 2023-06-23

## 2023-07-26 ENCOUNTER — APPOINTMENT (OUTPATIENT)
Dept: GASTROENTEROLOGY | Facility: CLINIC | Age: 68
End: 2023-07-26
Payer: MEDICARE

## 2023-07-26 ENCOUNTER — NON-APPOINTMENT (OUTPATIENT)
Age: 68
End: 2023-07-26

## 2023-07-26 VITALS
HEART RATE: 83 BPM | BODY MASS INDEX: 29.59 KG/M2 | DIASTOLIC BLOOD PRESSURE: 91 MMHG | HEIGHT: 63 IN | WEIGHT: 167 LBS | SYSTOLIC BLOOD PRESSURE: 166 MMHG

## 2023-07-26 DIAGNOSIS — Z12.11 ENCOUNTER FOR SCREENING FOR MALIGNANT NEOPLASM OF COLON: ICD-10-CM

## 2023-07-26 DIAGNOSIS — R10.9 UNSPECIFIED ABDOMINAL PAIN: ICD-10-CM

## 2023-07-26 PROCEDURE — 99204 OFFICE O/P NEW MOD 45 MIN: CPT

## 2023-07-26 RX ORDER — POLYETHYLENE GLYCOL 3350 AND ELECTROLYTES WITH LEMON FLAVOR 236; 22.74; 6.74; 5.86; 2.97 G/4L; G/4L; G/4L; G/4L; G/4L
236 POWDER, FOR SOLUTION ORAL
Qty: 1 | Refills: 0 | Status: ACTIVE | COMMUNITY
Start: 2023-07-26 | End: 1900-01-01

## 2023-07-26 NOTE — PHYSICAL EXAM
[Alert] : alert [No Acute Distress] : no acute distress [Sclera] : the sclera and conjunctiva were normal [Hearing Threshold Finger Rub Not [place]] : hearing was normal [Normal Appearance] : the appearance of the neck was normal [No Respiratory Distress] : no respiratory distress [Auscultation Breath Sounds / Voice Sounds] : lungs were clear to auscultation bilaterally [Heart Rate And Rhythm] : heart rate was normal and rhythm regular [Normal S1, S2] : normal S1 and S2 [Bowel Sounds] : normal bowel sounds [Supraclavicular Lymph Nodes Enlarged Bilaterally] : no supraclavicular lymphadenopathy [Cervical Lymph Nodes Enlarged Anterior Bilaterally] : no anterior cervical lymphadenopathy [No CVA Tenderness] : no CVA  tenderness [Abnormal Walk] : normal gait [No Focal Deficits] : no focal deficits [Oriented To Time, Place, And Person] : oriented to person, place, and time

## 2023-07-26 NOTE — ASSESSMENT
[FreeTextEntry1] : 1.  Encounter for colon cancer screening in average risk patient.  Previous colonoscopy more than 10 years ago.\par 2.  Intermittent abdominal pain, improved with BMs.  May be related to constipation, colonic spasm or Gyn etiology.  May have lactose or other food intolerance.  Other etiologies to be excluded by colonoscopy.\par 3.  Atrial fibrillation on Eliquis.\par 4.  Status post bioprosthetic AVR and MVR.\par 5.  History of CVA.\par 6.  Diabetes mellitus.\par 7.  HTN.\par 8.  Moderate pulmonary HTN.\par \par Recs:\par - Recent labs reviewed.  Hb: 12.4, CMP: WNL.\par - Patient was advised to increase dietary fiber or use a fiber supplement to minimize constipation.\par - Patient was advised to monitor abdominal pain episodes with dietary intake.\par - Patient was advised to undergo colonoscopy in the hospital setting- procedure, risks, benefits, and alternatives were explained. Patient agreeable. Brochure given. PEG prep.  Hold Eliquis as per Cardiology.

## 2023-07-26 NOTE — HISTORY OF PRESENT ILLNESS
[FreeTextEntry1] : Natalie presents to the office today with her daughter for evaluation for colon cancer screening.  She also has abdominal pain.  She is Creole-speaking and requests her daughter to translate for her.\par \par The patient currently denies any complaints but her daughter reports that she intermittently complains of abdominal pain at home.  When she has pain, she usually feels a strong urge to defecate and after having a BM, the pain dissipates.  She moves her bowels every other day and denies any blood in her stool.  She has not noted any food triggers for her pain.  She is able to complete her meals and denies any dysphagia or nausea.  Her weight has been stable.  She denies any family history of colon cancer.  She had a prior colonoscopy about 10 years ago.\par \par The patient has a history of a bioprosthetic aortic and mitral valve.  She is currently taking Eliquis for atrial fibrillation.

## 2023-09-20 ENCOUNTER — RX RENEWAL (OUTPATIENT)
Age: 68
End: 2023-09-20

## 2023-09-21 NOTE — ED PROVIDER NOTE - OBJECTIVE STATEMENT
61 yr old female with hx of afib, HTN, HL, CVA presents with 1 week of diffuse abdominal pain.  Denies fever, vomiting, diarrhea.  Denies urinary symptoms.  Denies prior abdominal surgeries.  Had normal BM yesterday.
PROVIDER:[TOKEN:[[id number]]]

## 2023-09-28 ENCOUNTER — APPOINTMENT (OUTPATIENT)
Dept: PAIN MANAGEMENT | Facility: CLINIC | Age: 68
End: 2023-09-28
Payer: MEDICARE

## 2023-09-28 VITALS
HEIGHT: 63 IN | DIASTOLIC BLOOD PRESSURE: 75 MMHG | WEIGHT: 170 LBS | SYSTOLIC BLOOD PRESSURE: 178 MMHG | BODY MASS INDEX: 30.12 KG/M2 | HEART RATE: 76 BPM

## 2023-09-28 PROCEDURE — 99204 OFFICE O/P NEW MOD 45 MIN: CPT

## 2023-10-05 NOTE — ED ADULT TRIAGE NOTE - PAIN RATING/NUMBER SCALE (0-10): ACTIVITY
0 Dupixent Counseling: I discussed with the patient the risks of dupilumab including but not limited to eye infection and irritation, cold sores, injection site reactions, worsening of asthma, allergic reactions and increased risk of parasitic infection.  Live vaccines should be avoided while taking dupilumab. Dupilumab will also interact with certain medications such as warfarin and cyclosporine. The patient understands that monitoring is required and they must alert us or the primary physician if symptoms of infection or other concerning signs are noted.

## 2023-12-12 ENCOUNTER — INPATIENT (INPATIENT)
Facility: HOSPITAL | Age: 68
LOS: 3 days | Discharge: HOME HEALTH | End: 2023-12-16
Attending: HOSPITALIST | Admitting: HOSPITALIST
Payer: MEDICARE

## 2023-12-12 VITALS
DIASTOLIC BLOOD PRESSURE: 65 MMHG | SYSTOLIC BLOOD PRESSURE: 130 MMHG | TEMPERATURE: 98 F | OXYGEN SATURATION: 98 % | HEART RATE: 85 BPM | RESPIRATION RATE: 18 BRPM

## 2023-12-12 DIAGNOSIS — R06.02 SHORTNESS OF BREATH: ICD-10-CM

## 2023-12-12 LAB
ALBUMIN SERPL ELPH-MCNC: 3.7 G/DL — SIGNIFICANT CHANGE UP (ref 3.3–5)
ALBUMIN SERPL ELPH-MCNC: 3.7 G/DL — SIGNIFICANT CHANGE UP (ref 3.3–5)
ALP SERPL-CCNC: 99 U/L — SIGNIFICANT CHANGE UP (ref 40–120)
ALP SERPL-CCNC: 99 U/L — SIGNIFICANT CHANGE UP (ref 40–120)
ALT FLD-CCNC: 99 U/L — HIGH (ref 4–33)
ALT FLD-CCNC: 99 U/L — HIGH (ref 4–33)
ANION GAP SERPL CALC-SCNC: 9 MMOL/L — SIGNIFICANT CHANGE UP (ref 7–14)
ANION GAP SERPL CALC-SCNC: 9 MMOL/L — SIGNIFICANT CHANGE UP (ref 7–14)
APPEARANCE UR: CLEAR — SIGNIFICANT CHANGE UP
APPEARANCE UR: CLEAR — SIGNIFICANT CHANGE UP
AST SERPL-CCNC: 70 U/L — HIGH (ref 4–32)
AST SERPL-CCNC: 70 U/L — HIGH (ref 4–32)
BACTERIA # UR AUTO: NEGATIVE /HPF — SIGNIFICANT CHANGE UP
BACTERIA # UR AUTO: NEGATIVE /HPF — SIGNIFICANT CHANGE UP
BASOPHILS # BLD AUTO: 0.13 K/UL — SIGNIFICANT CHANGE UP (ref 0–0.2)
BASOPHILS # BLD AUTO: 0.13 K/UL — SIGNIFICANT CHANGE UP (ref 0–0.2)
BASOPHILS NFR BLD AUTO: 1.8 % — SIGNIFICANT CHANGE UP (ref 0–2)
BASOPHILS NFR BLD AUTO: 1.8 % — SIGNIFICANT CHANGE UP (ref 0–2)
BILIRUB SERPL-MCNC: 0.6 MG/DL — SIGNIFICANT CHANGE UP (ref 0.2–1.2)
BILIRUB SERPL-MCNC: 0.6 MG/DL — SIGNIFICANT CHANGE UP (ref 0.2–1.2)
BILIRUB UR-MCNC: NEGATIVE — SIGNIFICANT CHANGE UP
BILIRUB UR-MCNC: NEGATIVE — SIGNIFICANT CHANGE UP
BLOOD GAS VENOUS COMPREHENSIVE RESULT: SIGNIFICANT CHANGE UP
BLOOD GAS VENOUS COMPREHENSIVE RESULT: SIGNIFICANT CHANGE UP
BUN SERPL-MCNC: 14 MG/DL — SIGNIFICANT CHANGE UP (ref 7–23)
BUN SERPL-MCNC: 14 MG/DL — SIGNIFICANT CHANGE UP (ref 7–23)
CALCIUM SERPL-MCNC: 9.4 MG/DL — SIGNIFICANT CHANGE UP (ref 8.4–10.5)
CALCIUM SERPL-MCNC: 9.4 MG/DL — SIGNIFICANT CHANGE UP (ref 8.4–10.5)
CAST: 0 /LPF — SIGNIFICANT CHANGE UP (ref 0–4)
CAST: 0 /LPF — SIGNIFICANT CHANGE UP (ref 0–4)
CHLORIDE SERPL-SCNC: 103 MMOL/L — SIGNIFICANT CHANGE UP (ref 98–107)
CHLORIDE SERPL-SCNC: 103 MMOL/L — SIGNIFICANT CHANGE UP (ref 98–107)
CO2 SERPL-SCNC: 28 MMOL/L — SIGNIFICANT CHANGE UP (ref 22–31)
CO2 SERPL-SCNC: 28 MMOL/L — SIGNIFICANT CHANGE UP (ref 22–31)
COLOR SPEC: YELLOW — SIGNIFICANT CHANGE UP
COLOR SPEC: YELLOW — SIGNIFICANT CHANGE UP
CREAT SERPL-MCNC: 0.9 MG/DL — SIGNIFICANT CHANGE UP (ref 0.5–1.3)
CREAT SERPL-MCNC: 0.9 MG/DL — SIGNIFICANT CHANGE UP (ref 0.5–1.3)
DIFF PNL FLD: NEGATIVE — SIGNIFICANT CHANGE UP
DIFF PNL FLD: NEGATIVE — SIGNIFICANT CHANGE UP
EGFR: 69 ML/MIN/1.73M2 — SIGNIFICANT CHANGE UP
EGFR: 69 ML/MIN/1.73M2 — SIGNIFICANT CHANGE UP
EOSINOPHIL # BLD AUTO: 0.38 K/UL — SIGNIFICANT CHANGE UP (ref 0–0.5)
EOSINOPHIL # BLD AUTO: 0.38 K/UL — SIGNIFICANT CHANGE UP (ref 0–0.5)
EOSINOPHIL NFR BLD AUTO: 5.2 % — SIGNIFICANT CHANGE UP (ref 0–6)
EOSINOPHIL NFR BLD AUTO: 5.2 % — SIGNIFICANT CHANGE UP (ref 0–6)
FLUAV AG NPH QL: SIGNIFICANT CHANGE UP
FLUAV AG NPH QL: SIGNIFICANT CHANGE UP
FLUBV AG NPH QL: SIGNIFICANT CHANGE UP
FLUBV AG NPH QL: SIGNIFICANT CHANGE UP
GLUCOSE SERPL-MCNC: 89 MG/DL — SIGNIFICANT CHANGE UP (ref 70–99)
GLUCOSE SERPL-MCNC: 89 MG/DL — SIGNIFICANT CHANGE UP (ref 70–99)
GLUCOSE UR QL: NEGATIVE MG/DL — SIGNIFICANT CHANGE UP
GLUCOSE UR QL: NEGATIVE MG/DL — SIGNIFICANT CHANGE UP
HCT VFR BLD CALC: 40.8 % — SIGNIFICANT CHANGE UP (ref 34.5–45)
HCT VFR BLD CALC: 40.8 % — SIGNIFICANT CHANGE UP (ref 34.5–45)
HGB BLD-MCNC: 12.6 G/DL — SIGNIFICANT CHANGE UP (ref 11.5–15.5)
HGB BLD-MCNC: 12.6 G/DL — SIGNIFICANT CHANGE UP (ref 11.5–15.5)
IANC: 3.69 K/UL — SIGNIFICANT CHANGE UP (ref 1.8–7.4)
IANC: 3.69 K/UL — SIGNIFICANT CHANGE UP (ref 1.8–7.4)
IMM GRANULOCYTES NFR BLD AUTO: 0.4 % — SIGNIFICANT CHANGE UP (ref 0–0.9)
IMM GRANULOCYTES NFR BLD AUTO: 0.4 % — SIGNIFICANT CHANGE UP (ref 0–0.9)
KETONES UR-MCNC: NEGATIVE MG/DL — SIGNIFICANT CHANGE UP
KETONES UR-MCNC: NEGATIVE MG/DL — SIGNIFICANT CHANGE UP
LEUKOCYTE ESTERASE UR-ACNC: ABNORMAL
LEUKOCYTE ESTERASE UR-ACNC: ABNORMAL
LYMPHOCYTES # BLD AUTO: 2.41 K/UL — SIGNIFICANT CHANGE UP (ref 1–3.3)
LYMPHOCYTES # BLD AUTO: 2.41 K/UL — SIGNIFICANT CHANGE UP (ref 1–3.3)
LYMPHOCYTES # BLD AUTO: 33.2 % — SIGNIFICANT CHANGE UP (ref 13–44)
LYMPHOCYTES # BLD AUTO: 33.2 % — SIGNIFICANT CHANGE UP (ref 13–44)
MCHC RBC-ENTMCNC: 29.9 PG — SIGNIFICANT CHANGE UP (ref 27–34)
MCHC RBC-ENTMCNC: 29.9 PG — SIGNIFICANT CHANGE UP (ref 27–34)
MCHC RBC-ENTMCNC: 30.9 GM/DL — LOW (ref 32–36)
MCHC RBC-ENTMCNC: 30.9 GM/DL — LOW (ref 32–36)
MCV RBC AUTO: 96.7 FL — SIGNIFICANT CHANGE UP (ref 80–100)
MCV RBC AUTO: 96.7 FL — SIGNIFICANT CHANGE UP (ref 80–100)
MONOCYTES # BLD AUTO: 0.62 K/UL — SIGNIFICANT CHANGE UP (ref 0–0.9)
MONOCYTES # BLD AUTO: 0.62 K/UL — SIGNIFICANT CHANGE UP (ref 0–0.9)
MONOCYTES NFR BLD AUTO: 8.5 % — SIGNIFICANT CHANGE UP (ref 2–14)
MONOCYTES NFR BLD AUTO: 8.5 % — SIGNIFICANT CHANGE UP (ref 2–14)
NEUTROPHILS # BLD AUTO: 3.69 K/UL — SIGNIFICANT CHANGE UP (ref 1.8–7.4)
NEUTROPHILS # BLD AUTO: 3.69 K/UL — SIGNIFICANT CHANGE UP (ref 1.8–7.4)
NEUTROPHILS NFR BLD AUTO: 50.9 % — SIGNIFICANT CHANGE UP (ref 43–77)
NEUTROPHILS NFR BLD AUTO: 50.9 % — SIGNIFICANT CHANGE UP (ref 43–77)
NITRITE UR-MCNC: NEGATIVE — SIGNIFICANT CHANGE UP
NITRITE UR-MCNC: NEGATIVE — SIGNIFICANT CHANGE UP
NRBC # BLD: 0 /100 WBCS — SIGNIFICANT CHANGE UP (ref 0–0)
NRBC # BLD: 0 /100 WBCS — SIGNIFICANT CHANGE UP (ref 0–0)
NRBC # FLD: 0 K/UL — SIGNIFICANT CHANGE UP (ref 0–0)
NRBC # FLD: 0 K/UL — SIGNIFICANT CHANGE UP (ref 0–0)
NT-PROBNP SERPL-SCNC: 2134 PG/ML — HIGH
NT-PROBNP SERPL-SCNC: 2134 PG/ML — HIGH
PH UR: 7.5 — SIGNIFICANT CHANGE UP (ref 5–8)
PH UR: 7.5 — SIGNIFICANT CHANGE UP (ref 5–8)
PLATELET # BLD AUTO: 120 K/UL — LOW (ref 150–400)
PLATELET # BLD AUTO: 120 K/UL — LOW (ref 150–400)
POTASSIUM SERPL-MCNC: 4.4 MMOL/L — SIGNIFICANT CHANGE UP (ref 3.5–5.3)
POTASSIUM SERPL-MCNC: 4.4 MMOL/L — SIGNIFICANT CHANGE UP (ref 3.5–5.3)
POTASSIUM SERPL-SCNC: 4.4 MMOL/L — SIGNIFICANT CHANGE UP (ref 3.5–5.3)
POTASSIUM SERPL-SCNC: 4.4 MMOL/L — SIGNIFICANT CHANGE UP (ref 3.5–5.3)
PROT SERPL-MCNC: 6.6 G/DL — SIGNIFICANT CHANGE UP (ref 6–8.3)
PROT SERPL-MCNC: 6.6 G/DL — SIGNIFICANT CHANGE UP (ref 6–8.3)
PROT UR-MCNC: NEGATIVE MG/DL — SIGNIFICANT CHANGE UP
PROT UR-MCNC: NEGATIVE MG/DL — SIGNIFICANT CHANGE UP
RBC # BLD: 4.22 M/UL — SIGNIFICANT CHANGE UP (ref 3.8–5.2)
RBC # BLD: 4.22 M/UL — SIGNIFICANT CHANGE UP (ref 3.8–5.2)
RBC # FLD: 12.8 % — SIGNIFICANT CHANGE UP (ref 10.3–14.5)
RBC # FLD: 12.8 % — SIGNIFICANT CHANGE UP (ref 10.3–14.5)
RBC CASTS # UR COMP ASSIST: 0 /HPF — SIGNIFICANT CHANGE UP (ref 0–4)
RBC CASTS # UR COMP ASSIST: 0 /HPF — SIGNIFICANT CHANGE UP (ref 0–4)
RSV RNA NPH QL NAA+NON-PROBE: SIGNIFICANT CHANGE UP
RSV RNA NPH QL NAA+NON-PROBE: SIGNIFICANT CHANGE UP
SARS-COV-2 RNA SPEC QL NAA+PROBE: SIGNIFICANT CHANGE UP
SARS-COV-2 RNA SPEC QL NAA+PROBE: SIGNIFICANT CHANGE UP
SODIUM SERPL-SCNC: 140 MMOL/L — SIGNIFICANT CHANGE UP (ref 135–145)
SODIUM SERPL-SCNC: 140 MMOL/L — SIGNIFICANT CHANGE UP (ref 135–145)
SP GR SPEC: 1.01 — SIGNIFICANT CHANGE UP (ref 1–1.03)
SP GR SPEC: 1.01 — SIGNIFICANT CHANGE UP (ref 1–1.03)
SQUAMOUS # UR AUTO: 0 /HPF — SIGNIFICANT CHANGE UP (ref 0–5)
SQUAMOUS # UR AUTO: 0 /HPF — SIGNIFICANT CHANGE UP (ref 0–5)
TROPONIN T, HIGH SENSITIVITY RESULT: 13 NG/L — SIGNIFICANT CHANGE UP
TROPONIN T, HIGH SENSITIVITY RESULT: 13 NG/L — SIGNIFICANT CHANGE UP
TROPONIN T, HIGH SENSITIVITY RESULT: 14 NG/L — SIGNIFICANT CHANGE UP
TROPONIN T, HIGH SENSITIVITY RESULT: 14 NG/L — SIGNIFICANT CHANGE UP
UROBILINOGEN FLD QL: 0.2 MG/DL — SIGNIFICANT CHANGE UP (ref 0.2–1)
UROBILINOGEN FLD QL: 0.2 MG/DL — SIGNIFICANT CHANGE UP (ref 0.2–1)
WBC # BLD: 7.26 K/UL — SIGNIFICANT CHANGE UP (ref 3.8–10.5)
WBC # BLD: 7.26 K/UL — SIGNIFICANT CHANGE UP (ref 3.8–10.5)
WBC # FLD AUTO: 7.26 K/UL — SIGNIFICANT CHANGE UP (ref 3.8–10.5)
WBC # FLD AUTO: 7.26 K/UL — SIGNIFICANT CHANGE UP (ref 3.8–10.5)
WBC UR QL: 0 /HPF — SIGNIFICANT CHANGE UP (ref 0–5)
WBC UR QL: 0 /HPF — SIGNIFICANT CHANGE UP (ref 0–5)

## 2023-12-12 PROCEDURE — 99285 EMERGENCY DEPT VISIT HI MDM: CPT | Mod: GC

## 2023-12-12 PROCEDURE — 71045 X-RAY EXAM CHEST 1 VIEW: CPT | Mod: 26

## 2023-12-12 RX ORDER — TRANEXAMIC ACID 100 MG/ML
1000 INJECTION, SOLUTION INTRAVENOUS ONCE
Refills: 0 | Status: DISCONTINUED | OUTPATIENT
Start: 2023-12-12 | End: 2023-12-12

## 2023-12-12 NOTE — ED PROVIDER NOTE - OBJECTIVE STATEMENT
67-year-old female, history of A-fib, on AC, MVR, AVR, MI, CVA, CAD, shortness of breath, and palpitations.  Contratry o the triage note the patient reports that she is not having any headache today or fevers.  The patient reports over the past 2 weeks she is becoming dyspneic on exertion with everything she does.  The patient reports that she is not able to walk up the stairs without becoming tired, she is not able to tie her shoes without becoming tired.  The patient reports prior to this she was not feeling this way.  The patient reports about 2 weeks ago she did have some congestion and sore throat.  The patient denies any chest pain.  The patient denies fevers, chills, chest pain, headache, visual changes, nausea, vomiting, urinary symptoms, diarrhea. 67-year-old female, history of A-fib, on AC, MVR, AVR, MI, CVA, CAD, presenting shortness of breath, and palpitations.  Contratry o the triage note the patient reports that she is not having any headache today or fevers.  The patient reports over the past 2 weeks she is becoming dyspneic on exertion with everything she does.  The patient reports that she is not able to walk up the stairs without becoming tired, she is not able to tie her shoes without becoming tired.  The patient reports prior to this she was not feeling this way.  The patient reports about 2 weeks ago she did have some congestion and sore throat.  The patient denies any chest pain.  The patient denies fevers, chills, chest pain, headache, visual changes, nausea, vomiting, urinary symptoms, diarrhea.

## 2023-12-12 NOTE — ED PROVIDER NOTE - ATTENDING CONTRIBUTION TO CARE
66 yo F with h/o A-fib, on AC, MVR, AVR, MI, CVA, CAD who presents to the ED with 1 week of shortness of breath and fatigue. Per family pt is usually very active but over the past week has been staying in bed more. Has also been c/o intermittent chest pain and trouble breathing. SOB is on exertion- today couldn't even walk to the kitchen in her house without having to stop and catch her breath. No known h/o CHF but is on lasix and reports that she has been out of her lasix for the past week. EKG with T wave inversions in inferior and lateral leads- changed from prior. Plan for labs including troponin, CXR. Will require admission for further cardiac work-up

## 2023-12-12 NOTE — ED PROVIDER NOTE - CLINICAL SUMMARY MEDICAL DECISION MAKING FREE TEXT BOX
67-year-old female, history of A-fib, on AC, MVR, AVR, MI, CVA, CAD, shortness of breath, and palpitations. The patient denies fevers, chills, chest pain, headache, visual changes, nausea, vomiting, urinary symptoms, diarrhea. VSS. PE. no acute findings.     Differentials not limited to ACS, CHF, viral URI, AR, MR, electrolyte derangement, hematological derangements.  Will obtain basic labs, cardiac labs, BNP, chest x-ray, EKG.  Dispo pending labs imaging and reassessment. Patient likelt TBA.

## 2023-12-12 NOTE — ED ADULT TRIAGE NOTE - BEFAST BALANCE
Maxim Brantley  MRN [id number]   1949    No chief complaint on file.     HPI    NOTE:  PATIENT WAS GIVEN/MADE AN APPT IN THE COUMADIN CLINIC BY MISTAKE. HE IS NOT TAKING COUMADIN. HE IS ON ELIQUIS.    Patient has refused continued Diabetes Education; see previous notes.    Patient states he has tried to get a hold of Nephrologist but couldn't get through at all to one, and was told \"different things\" each time he called the other.     He also claims he cannot get ahold of anyone to make appt to see Dr Kaba.     Pt states depression is significant. But no hopelessness, anhedonia or SI. When asked why he is not following through w medical appts. \"It just seems like such a hassle.\"   States he is having nightmares of his time in the hospital, eg Dr Barrera chasing him around the hospital.   Pt is taking 10mg of Fluoxetine: I had instructed him to take 20md daily at last OV, and had written instructions in detail.    Pt needs follow up labs- BMP. He states he fasted today. (Not instructed to do so).     Review of Systems    ALLERGIES:  No Known Allergies    No outpatient medications have been marked as taking for the 21 encounter (Anti-Coag) with Cady Gray PA-C.       Patient Active Problem List   Diagnosis   • Asthma   • Background diabetic retinopathy associated with type 2 diabetes mellitus (CMS/HCC)   • Skin lesion of right leg   • Depression   • Gout   • History of esophageal reflux   • Hyperlipidemia   • Essential hypertension   • Morbid obesity (CMS/HCC)   • Ventral hernia   • Encounter for screening for malignant neoplasm of colon   • Normocytic anemia   • Neutrophilic leukocytosis   • Fall at home   • Injury of left foot   • Hypertrophy of uvula   • Type 2 diabetes mellitus with ophthalmic complication, without long-term current use of insulin (CMS/HCC)   • Edema of both legs   • Multiple falls   • Patient cannot afford medications   • Gait difficulty   • Acute gout of left elbow   • Urinary tract  infection with hematuria   • Functional enuresis   • Sciatica of right side   • Chronic atrial fibrillation (CMS/HCC)   • Glomerulonephritis   • MARCO (acute kidney injury) (CMS/HCC)   • Hospital discharge follow-up       Physical Exam   Constitutional: He is oriented to person, place, and time.   Cardiovascular: An irregularly irregular rhythm present.   Pulmonary/Chest: No accessory muscle usage. Tachypnea (after the exertion of walking in to exam room (w walker). Normal RR within 1-2 minutes after sitting) noted. No respiratory distress. He has no wheezes.   Neurological: He is alert and oriented to person, place, and time.   Skin: Skin is warm and dry. He is not diaphoretic.   Psychiatric: His speech is normal and behavior is normal. His mood appears not anxious. He exhibits a depressed mood (mild to mod). He expresses no suicidal ideation.       Diagnoses and all orders for this visit:    Chronic atrial fibrillation (CMS/HCC)    MARCO (acute kidney injury) (CMS/[facility])    Depression, unspecified depression type    Other orders  -     fluoxetine (PROzac) 40 MG capsule; Take 1 capsule by mouth daily.        Chronic atrial fibrillation (CMS/HCC)  Continue Eliquis.  Condition will be reassessed at next OV here within the next week, also w cardiologist.    MARCO (acute kidney injury) (CMS/[facility])  Check BMP today    Depression  Increase dose to 40mg daily.  New Rx for this strength tablet sent to pharmacy  Instructions written in detail and reviewed w patient who expresses understanding and agreement.      Time spent Obtaining and/or reviewing separately obtained history, Performing a medically appropriate examination and/or evaluation, Counseling and educating the patient/family/caregiver, Ordering medications, tests, or procedures, Referring and communicating with other health care professionals (when not and Documenting clinical information in the electronic or other health record; duration 23 minutes.  Electronically signed  by: Cady Gray PA-C       No

## 2023-12-12 NOTE — ED ADULT NURSE NOTE - NSFALLUNIVINTERV_ED_ALL_ED
Bed/Stretcher in lowest position, wheels locked, appropriate side rails in place/Call bell, personal items and telephone in reach/Instruct patient to call for assistance before getting out of bed/chair/stretcher/Non-slip footwear applied when patient is off stretcher/[place] to call system/Physically safe environment - no spills, clutter or unnecessary equipment/Purposeful proactive rounding/Room/bathroom lighting operational, light cord in reach Bed/Stretcher in lowest position, wheels locked, appropriate side rails in place/Call bell, personal items and telephone in reach/Instruct patient to call for assistance before getting out of bed/chair/stretcher/Non-slip footwear applied when patient is off stretcher/[place] to call system/Physically safe environment - no spills, clutter or unnecessary equipment/Purposeful proactive rounding/Room/bathroom lighting operational, light cord in reach

## 2023-12-12 NOTE — ED ADULT NURSE REASSESSMENT NOTE - NS ED NURSE REASSESS COMMENT FT1
report given from nurse , patient laying in semi fowlers position on the stretcher. patient alert and oriented times four. patient denies shortness of breath, chest pain, nausea, vomiting, chill, fever. Patient normal sinus on the monitor. Respirations equal and adequate. Patients IV patent, no signs of infiltration. Safety measures in place, call bell within reach. Patient stable upon leaving the room. report given from nurse , patient laying in semi fowlers position on the stretcher. patient alert and oriented times four. patient denies shortness of breath, chest pain, nausea, vomiting, chill, fever. Patient afib on the monitor on the monitor. Respirations equal and adequate. Patients IV patent, no signs of infiltration. Safety measures in place, call bell within reach. Patient stable upon leaving the room.

## 2023-12-12 NOTE — ED ADULT NURSE NOTE - OBJECTIVE STATEMENT
patient presents to ed c/o flu like symptoms. daughter at bedside reports patient has been feeling weak, subjective fevers, cp, and sob x 2 weeks. denies n/v.

## 2023-12-12 NOTE — ED ADULT NURSE NOTE - NSICDXPASTMEDICALHX_GEN_ALL_CORE_FT
PAST MEDICAL HISTORY:  Abnormal uterine and vaginal bleeding     Atrial fibrillation on eliquis    Cerebrovascular accident (CVA)     Dyspnea on exertion     History of MI (myocardial infarction) 8-9 years ago in [facility]    History of stroke 2008 hospitalized in [facility]    History of TIA (transient ischemic attack) sept 2020    HLD (hyperlipidemia)     HTN (hypertension)     Long term current use of anticoagulant     Memory loss     S/P aortic valve replacement with bioprosthetic valve 2009    S/P mitral valve replacement with bioprosthetic valve 2009 "tissue valve per daughter"    Type 2 diabetes mellitus      PAST MEDICAL HISTORY:  Abnormal uterine and vaginal bleeding     Atrial fibrillation on eliquis    Cerebrovascular accident (CVA)     Dyspnea on exertion     History of MI (myocardial infarction) 8-9 years ago in [facility]    History of stroke 2008 hospitalized in [facility]    History of TIA (transient ischemic attack) sept 2020    HLD (hyperlipidemia)     HTN (hypertension)     Long term current use of anticoagulant     Memory loss     S/P aortic valve replacement with bioprosthetic valve 2009    S/P mitral valve replacement with bioprosthetic valve 2009 "tissue valve per daughter"    Type 2 diabetes mellitus

## 2023-12-12 NOTE — ED PROVIDER NOTE - NSICDXPASTMEDICALHX_GEN_ALL_CORE_FT
PAST MEDICAL HISTORY:  Abnormal uterine and vaginal bleeding     Atrial fibrillation on eliquis    Cerebrovascular accident (CVA)     Dyspnea on exertion     History of MI (myocardial infarction) 8-9 years ago in [facility]    History of stroke 2008 hospitalized in [facility]    History of TIA (transient ischemic attack) sept 2020    HLD (hyperlipidemia)     HTN (hypertension)     Long term current use of anticoagulant     Memory loss     S/P aortic valve replacement with bioprosthetic valve 2009    S/P mitral valve replacement with bioprosthetic valve 2009 "tissue valve per daughter"    Type 2 diabetes mellitus      PAST MEDICAL HISTORY:  Abnormal uterine and vaginal bleeding     Atrial fibrillation on eliquis    Cerebrovascular accident (CVA)     Dyspnea on exertion     History of MI (myocardial infarction) 8-9 years ago in [facility]    History of stroke 2008 hospitalized in [facility]    History of TIA (transient ischemic attack) sept 2020    HLD (hyperlipidemia)     HTN (hypertension)     Long term current use of anticoagulant     Memory loss     S/P aortic valve replacement with bioprosthetic valve 2009    S/P mitral valve replacement with bioprosthetic valve 2009 "tissue valve per daughter"    Type 2 diabetes mellitus

## 2023-12-12 NOTE — ED ADULT TRIAGE NOTE - CHIEF COMPLAINT QUOTE
pt living with DM type 2, c/o of headaches, fever and sob for 2 weeks, pt denies any chest pain, no neuro deficits noted

## 2023-12-12 NOTE — ED PROVIDER NOTE - PHYSICAL EXAMINATION
GENERAL: Awake, alert, NAD  HEENT: NC/AT, moist mucous membranes, PERRL, EOMI  LUNGS: CTAB, no wheezes or crackles   CARDIAC: RRR, no m/r/g  ABDOMEN: Soft, non tender, non distended, no rebound, no guarding  BACK: No midline spinal tenderness, no CVA tenderness  EXT: +BL LE edema +1 leg swelling, no calf tenderness, 2+ DP pulses bilaterally, no deformities.  NEURO: A&Ox3. Moving all extremities.  SKIN: Warm and dry. No rash.  PSYCH: Normal affect.

## 2023-12-13 DIAGNOSIS — R10.11 RIGHT UPPER QUADRANT PAIN: ICD-10-CM

## 2023-12-13 DIAGNOSIS — Z98.890 OTHER SPECIFIED POSTPROCEDURAL STATES: ICD-10-CM

## 2023-12-13 DIAGNOSIS — R73.03 PREDIABETES: ICD-10-CM

## 2023-12-13 DIAGNOSIS — I25.10 ATHEROSCLEROTIC HEART DISEASE OF NATIVE CORONARY ARTERY WITHOUT ANGINA PECTORIS: ICD-10-CM

## 2023-12-13 DIAGNOSIS — R06.09 OTHER FORMS OF DYSPNEA: ICD-10-CM

## 2023-12-13 DIAGNOSIS — Z29.9 ENCOUNTER FOR PROPHYLACTIC MEASURES, UNSPECIFIED: ICD-10-CM

## 2023-12-13 DIAGNOSIS — E11.9 TYPE 2 DIABETES MELLITUS WITHOUT COMPLICATIONS: ICD-10-CM

## 2023-12-13 DIAGNOSIS — I10 ESSENTIAL (PRIMARY) HYPERTENSION: ICD-10-CM

## 2023-12-13 DIAGNOSIS — I48.91 UNSPECIFIED ATRIAL FIBRILLATION: ICD-10-CM

## 2023-12-13 DIAGNOSIS — D69.6 THROMBOCYTOPENIA, UNSPECIFIED: ICD-10-CM

## 2023-12-13 LAB
A1C WITH ESTIMATED AVERAGE GLUCOSE RESULT: 6.2 % — HIGH (ref 4–5.6)
A1C WITH ESTIMATED AVERAGE GLUCOSE RESULT: 6.2 % — HIGH (ref 4–5.6)
ALBUMIN SERPL ELPH-MCNC: 3.9 G/DL — SIGNIFICANT CHANGE UP (ref 3.3–5)
ALBUMIN SERPL ELPH-MCNC: 3.9 G/DL — SIGNIFICANT CHANGE UP (ref 3.3–5)
ALP SERPL-CCNC: 112 U/L — SIGNIFICANT CHANGE UP (ref 40–120)
ALP SERPL-CCNC: 112 U/L — SIGNIFICANT CHANGE UP (ref 40–120)
ALT FLD-CCNC: 106 U/L — HIGH (ref 4–33)
ALT FLD-CCNC: 106 U/L — HIGH (ref 4–33)
ANION GAP SERPL CALC-SCNC: 10 MMOL/L — SIGNIFICANT CHANGE UP (ref 7–14)
ANION GAP SERPL CALC-SCNC: 10 MMOL/L — SIGNIFICANT CHANGE UP (ref 7–14)
APTT BLD: 33.1 SEC — SIGNIFICANT CHANGE UP (ref 24.5–35.6)
APTT BLD: 33.1 SEC — SIGNIFICANT CHANGE UP (ref 24.5–35.6)
AST SERPL-CCNC: 81 U/L — HIGH (ref 4–32)
AST SERPL-CCNC: 81 U/L — HIGH (ref 4–32)
BASOPHILS # BLD AUTO: 0.11 K/UL — SIGNIFICANT CHANGE UP (ref 0–0.2)
BASOPHILS # BLD AUTO: 0.11 K/UL — SIGNIFICANT CHANGE UP (ref 0–0.2)
BASOPHILS NFR BLD AUTO: 1.5 % — SIGNIFICANT CHANGE UP (ref 0–2)
BASOPHILS NFR BLD AUTO: 1.5 % — SIGNIFICANT CHANGE UP (ref 0–2)
BILIRUB SERPL-MCNC: 0.6 MG/DL — SIGNIFICANT CHANGE UP (ref 0.2–1.2)
BILIRUB SERPL-MCNC: 0.6 MG/DL — SIGNIFICANT CHANGE UP (ref 0.2–1.2)
BUN SERPL-MCNC: 19 MG/DL — SIGNIFICANT CHANGE UP (ref 7–23)
BUN SERPL-MCNC: 19 MG/DL — SIGNIFICANT CHANGE UP (ref 7–23)
CALCIUM SERPL-MCNC: 9.3 MG/DL — SIGNIFICANT CHANGE UP (ref 8.4–10.5)
CALCIUM SERPL-MCNC: 9.3 MG/DL — SIGNIFICANT CHANGE UP (ref 8.4–10.5)
CHLORIDE SERPL-SCNC: 105 MMOL/L — SIGNIFICANT CHANGE UP (ref 98–107)
CHLORIDE SERPL-SCNC: 105 MMOL/L — SIGNIFICANT CHANGE UP (ref 98–107)
CO2 SERPL-SCNC: 25 MMOL/L — SIGNIFICANT CHANGE UP (ref 22–31)
CO2 SERPL-SCNC: 25 MMOL/L — SIGNIFICANT CHANGE UP (ref 22–31)
CREAT SERPL-MCNC: 0.95 MG/DL — SIGNIFICANT CHANGE UP (ref 0.5–1.3)
CREAT SERPL-MCNC: 0.95 MG/DL — SIGNIFICANT CHANGE UP (ref 0.5–1.3)
CULTURE RESULTS: SIGNIFICANT CHANGE UP
CULTURE RESULTS: SIGNIFICANT CHANGE UP
EGFR: 65 ML/MIN/1.73M2 — SIGNIFICANT CHANGE UP
EGFR: 65 ML/MIN/1.73M2 — SIGNIFICANT CHANGE UP
EOSINOPHIL # BLD AUTO: 0.47 K/UL — SIGNIFICANT CHANGE UP (ref 0–0.5)
EOSINOPHIL # BLD AUTO: 0.47 K/UL — SIGNIFICANT CHANGE UP (ref 0–0.5)
EOSINOPHIL NFR BLD AUTO: 6.4 % — HIGH (ref 0–6)
EOSINOPHIL NFR BLD AUTO: 6.4 % — HIGH (ref 0–6)
ESTIMATED AVERAGE GLUCOSE: 131 — SIGNIFICANT CHANGE UP
ESTIMATED AVERAGE GLUCOSE: 131 — SIGNIFICANT CHANGE UP
GLUCOSE SERPL-MCNC: 88 MG/DL — SIGNIFICANT CHANGE UP (ref 70–99)
GLUCOSE SERPL-MCNC: 88 MG/DL — SIGNIFICANT CHANGE UP (ref 70–99)
HCT VFR BLD CALC: 40.3 % — SIGNIFICANT CHANGE UP (ref 34.5–45)
HCT VFR BLD CALC: 40.3 % — SIGNIFICANT CHANGE UP (ref 34.5–45)
HGB BLD-MCNC: 12.7 G/DL — SIGNIFICANT CHANGE UP (ref 11.5–15.5)
HGB BLD-MCNC: 12.7 G/DL — SIGNIFICANT CHANGE UP (ref 11.5–15.5)
IANC: 3.49 K/UL — SIGNIFICANT CHANGE UP (ref 1.8–7.4)
IANC: 3.49 K/UL — SIGNIFICANT CHANGE UP (ref 1.8–7.4)
IMM GRANULOCYTES NFR BLD AUTO: 0.3 % — SIGNIFICANT CHANGE UP (ref 0–0.9)
IMM GRANULOCYTES NFR BLD AUTO: 0.3 % — SIGNIFICANT CHANGE UP (ref 0–0.9)
INR BLD: 1.44 RATIO — HIGH (ref 0.85–1.18)
INR BLD: 1.44 RATIO — HIGH (ref 0.85–1.18)
LYMPHOCYTES # BLD AUTO: 2.67 K/UL — SIGNIFICANT CHANGE UP (ref 1–3.3)
LYMPHOCYTES # BLD AUTO: 2.67 K/UL — SIGNIFICANT CHANGE UP (ref 1–3.3)
LYMPHOCYTES # BLD AUTO: 36.3 % — SIGNIFICANT CHANGE UP (ref 13–44)
LYMPHOCYTES # BLD AUTO: 36.3 % — SIGNIFICANT CHANGE UP (ref 13–44)
MAGNESIUM SERPL-MCNC: 1.9 MG/DL — SIGNIFICANT CHANGE UP (ref 1.6–2.6)
MAGNESIUM SERPL-MCNC: 1.9 MG/DL — SIGNIFICANT CHANGE UP (ref 1.6–2.6)
MCHC RBC-ENTMCNC: 29.9 PG — SIGNIFICANT CHANGE UP (ref 27–34)
MCHC RBC-ENTMCNC: 29.9 PG — SIGNIFICANT CHANGE UP (ref 27–34)
MCHC RBC-ENTMCNC: 31.5 GM/DL — LOW (ref 32–36)
MCHC RBC-ENTMCNC: 31.5 GM/DL — LOW (ref 32–36)
MCV RBC AUTO: 94.8 FL — SIGNIFICANT CHANGE UP (ref 80–100)
MCV RBC AUTO: 94.8 FL — SIGNIFICANT CHANGE UP (ref 80–100)
MONOCYTES # BLD AUTO: 0.59 K/UL — SIGNIFICANT CHANGE UP (ref 0–0.9)
MONOCYTES # BLD AUTO: 0.59 K/UL — SIGNIFICANT CHANGE UP (ref 0–0.9)
MONOCYTES NFR BLD AUTO: 8 % — SIGNIFICANT CHANGE UP (ref 2–14)
MONOCYTES NFR BLD AUTO: 8 % — SIGNIFICANT CHANGE UP (ref 2–14)
NEUTROPHILS # BLD AUTO: 3.49 K/UL — SIGNIFICANT CHANGE UP (ref 1.8–7.4)
NEUTROPHILS # BLD AUTO: 3.49 K/UL — SIGNIFICANT CHANGE UP (ref 1.8–7.4)
NEUTROPHILS NFR BLD AUTO: 47.5 % — SIGNIFICANT CHANGE UP (ref 43–77)
NEUTROPHILS NFR BLD AUTO: 47.5 % — SIGNIFICANT CHANGE UP (ref 43–77)
NRBC # BLD: 0 /100 WBCS — SIGNIFICANT CHANGE UP (ref 0–0)
NRBC # BLD: 0 /100 WBCS — SIGNIFICANT CHANGE UP (ref 0–0)
NRBC # FLD: 0 K/UL — SIGNIFICANT CHANGE UP (ref 0–0)
NRBC # FLD: 0 K/UL — SIGNIFICANT CHANGE UP (ref 0–0)
PHOSPHATE SERPL-MCNC: 3.3 MG/DL — SIGNIFICANT CHANGE UP (ref 2.5–4.5)
PHOSPHATE SERPL-MCNC: 3.3 MG/DL — SIGNIFICANT CHANGE UP (ref 2.5–4.5)
PLATELET # BLD AUTO: 121 K/UL — LOW (ref 150–400)
PLATELET # BLD AUTO: 121 K/UL — LOW (ref 150–400)
POTASSIUM SERPL-MCNC: 4 MMOL/L — SIGNIFICANT CHANGE UP (ref 3.5–5.3)
POTASSIUM SERPL-MCNC: 4 MMOL/L — SIGNIFICANT CHANGE UP (ref 3.5–5.3)
POTASSIUM SERPL-SCNC: 4 MMOL/L — SIGNIFICANT CHANGE UP (ref 3.5–5.3)
POTASSIUM SERPL-SCNC: 4 MMOL/L — SIGNIFICANT CHANGE UP (ref 3.5–5.3)
PROT SERPL-MCNC: 6.5 G/DL — SIGNIFICANT CHANGE UP (ref 6–8.3)
PROT SERPL-MCNC: 6.5 G/DL — SIGNIFICANT CHANGE UP (ref 6–8.3)
PROTHROM AB SERPL-ACNC: 16 SEC — HIGH (ref 9.5–13)
PROTHROM AB SERPL-ACNC: 16 SEC — HIGH (ref 9.5–13)
RBC # BLD: 4.25 M/UL — SIGNIFICANT CHANGE UP (ref 3.8–5.2)
RBC # BLD: 4.25 M/UL — SIGNIFICANT CHANGE UP (ref 3.8–5.2)
RBC # FLD: 12.8 % — SIGNIFICANT CHANGE UP (ref 10.3–14.5)
RBC # FLD: 12.8 % — SIGNIFICANT CHANGE UP (ref 10.3–14.5)
SODIUM SERPL-SCNC: 140 MMOL/L — SIGNIFICANT CHANGE UP (ref 135–145)
SODIUM SERPL-SCNC: 140 MMOL/L — SIGNIFICANT CHANGE UP (ref 135–145)
SPECIMEN SOURCE: SIGNIFICANT CHANGE UP
SPECIMEN SOURCE: SIGNIFICANT CHANGE UP
WBC # BLD: 7.35 K/UL — SIGNIFICANT CHANGE UP (ref 3.8–10.5)
WBC # BLD: 7.35 K/UL — SIGNIFICANT CHANGE UP (ref 3.8–10.5)
WBC # FLD AUTO: 7.35 K/UL — SIGNIFICANT CHANGE UP (ref 3.8–10.5)
WBC # FLD AUTO: 7.35 K/UL — SIGNIFICANT CHANGE UP (ref 3.8–10.5)

## 2023-12-13 PROCEDURE — 12345: CPT | Mod: NC

## 2023-12-13 PROCEDURE — 99223 1ST HOSP IP/OBS HIGH 75: CPT | Mod: GC

## 2023-12-13 PROCEDURE — 93306 TTE W/DOPPLER COMPLETE: CPT | Mod: 26

## 2023-12-13 PROCEDURE — 76700 US EXAM ABDOM COMPLETE: CPT | Mod: 26

## 2023-12-13 PROCEDURE — 99223 1ST HOSP IP/OBS HIGH 75: CPT

## 2023-12-13 RX ORDER — GLUCAGON INJECTION, SOLUTION 0.5 MG/.1ML
1 INJECTION, SOLUTION SUBCUTANEOUS ONCE
Refills: 0 | Status: DISCONTINUED | OUTPATIENT
Start: 2023-12-13 | End: 2023-12-16

## 2023-12-13 RX ORDER — DEXTROSE 50 % IN WATER 50 %
25 SYRINGE (ML) INTRAVENOUS ONCE
Refills: 0 | Status: DISCONTINUED | OUTPATIENT
Start: 2023-12-13 | End: 2023-12-16

## 2023-12-13 RX ORDER — SODIUM CHLORIDE 9 MG/ML
1000 INJECTION, SOLUTION INTRAVENOUS
Refills: 0 | Status: DISCONTINUED | OUTPATIENT
Start: 2023-12-13 | End: 2023-12-16

## 2023-12-13 RX ORDER — SIMVASTATIN 20 MG/1
10 TABLET, FILM COATED ORAL AT BEDTIME
Refills: 0 | Status: DISCONTINUED | OUTPATIENT
Start: 2023-12-13 | End: 2023-12-16

## 2023-12-13 RX ORDER — FUROSEMIDE 40 MG
40 TABLET ORAL EVERY 12 HOURS
Refills: 0 | Status: DISCONTINUED | OUTPATIENT
Start: 2023-12-13 | End: 2023-12-15

## 2023-12-13 RX ORDER — LANOLIN ALCOHOL/MO/W.PET/CERES
3 CREAM (GRAM) TOPICAL AT BEDTIME
Refills: 0 | Status: DISCONTINUED | OUTPATIENT
Start: 2023-12-13 | End: 2023-12-16

## 2023-12-13 RX ORDER — HYDRALAZINE HCL 50 MG
25 TABLET ORAL THREE TIMES A DAY
Refills: 0 | Status: DISCONTINUED | OUTPATIENT
Start: 2023-12-13 | End: 2023-12-16

## 2023-12-13 RX ORDER — INSULIN LISPRO 100/ML
VIAL (ML) SUBCUTANEOUS
Refills: 0 | Status: DISCONTINUED | OUTPATIENT
Start: 2023-12-13 | End: 2023-12-16

## 2023-12-13 RX ORDER — ACETAMINOPHEN 500 MG
650 TABLET ORAL ONCE
Refills: 0 | Status: DISCONTINUED | OUTPATIENT
Start: 2023-12-13 | End: 2023-12-13

## 2023-12-13 RX ORDER — APIXABAN 2.5 MG/1
5 TABLET, FILM COATED ORAL
Refills: 0 | Status: DISCONTINUED | OUTPATIENT
Start: 2023-12-13 | End: 2023-12-14

## 2023-12-13 RX ORDER — DEXTROSE 50 % IN WATER 50 %
15 SYRINGE (ML) INTRAVENOUS ONCE
Refills: 0 | Status: DISCONTINUED | OUTPATIENT
Start: 2023-12-13 | End: 2023-12-16

## 2023-12-13 RX ORDER — FUROSEMIDE 40 MG
20 TABLET ORAL ONCE
Refills: 0 | Status: COMPLETED | OUTPATIENT
Start: 2023-12-13 | End: 2023-12-13

## 2023-12-13 RX ORDER — LISINOPRIL 2.5 MG/1
10 TABLET ORAL DAILY
Refills: 0 | Status: DISCONTINUED | OUTPATIENT
Start: 2023-12-13 | End: 2023-12-13

## 2023-12-13 RX ORDER — DEXTROSE 50 % IN WATER 50 %
12.5 SYRINGE (ML) INTRAVENOUS ONCE
Refills: 0 | Status: DISCONTINUED | OUTPATIENT
Start: 2023-12-13 | End: 2023-12-16

## 2023-12-13 RX ORDER — LISINOPRIL 2.5 MG/1
40 TABLET ORAL DAILY
Refills: 0 | Status: DISCONTINUED | OUTPATIENT
Start: 2023-12-13 | End: 2023-12-14

## 2023-12-13 RX ORDER — FUROSEMIDE 40 MG
40 TABLET ORAL DAILY
Refills: 0 | Status: DISCONTINUED | OUTPATIENT
Start: 2023-12-13 | End: 2023-12-13

## 2023-12-13 RX ORDER — ISOSORBIDE DINITRATE 5 MG/1
10 TABLET ORAL THREE TIMES A DAY
Refills: 0 | Status: DISCONTINUED | OUTPATIENT
Start: 2023-12-13 | End: 2023-12-16

## 2023-12-13 RX ADMIN — APIXABAN 5 MILLIGRAM(S): 2.5 TABLET, FILM COATED ORAL at 05:27

## 2023-12-13 RX ADMIN — APIXABAN 5 MILLIGRAM(S): 2.5 TABLET, FILM COATED ORAL at 18:13

## 2023-12-13 RX ADMIN — Medication 40 MILLIGRAM(S): at 18:13

## 2023-12-13 RX ADMIN — Medication 25 MILLIGRAM(S): at 21:57

## 2023-12-13 RX ADMIN — Medication 40 MILLIGRAM(S): at 11:19

## 2023-12-13 RX ADMIN — ISOSORBIDE DINITRATE 10 MILLIGRAM(S): 5 TABLET ORAL at 18:12

## 2023-12-13 RX ADMIN — Medication 20 MILLIGRAM(S): at 05:27

## 2023-12-13 RX ADMIN — SIMVASTATIN 10 MILLIGRAM(S): 20 TABLET, FILM COATED ORAL at 21:56

## 2023-12-13 RX ADMIN — LISINOPRIL 10 MILLIGRAM(S): 2.5 TABLET ORAL at 05:27

## 2023-12-13 NOTE — H&P ADULT - PROBLEM SELECTOR PLAN 9
DVT:   Diet:   Dispo: DVT: eliquis 5 mg BID  Diet: DASH, fluid-restricted   Dispo: pending improvement, PT/OT DVT: eliquis 5 mg BID  Diet: DASH/TLC   Dispo: pending improvement, PT/OT

## 2023-12-13 NOTE — H&P ADULT - NSHPLABSRESULTS_GEN_ALL_CORE
.  LABS:                         12.6   7.26  )-----------( 120      ( 12 Dec 2023 14:30 )             40.8         140  |  103  |  14  ----------------------------<  89  4.4   |  28  |  0.90    Ca    9.4      12 Dec 2023 14:30    TPro  6.6  /  Alb  3.7  /  TBili  0.6  /  DBili  x   /  AST  70<H>  /  ALT  99<H>  /  AlkPhos  99        Urinalysis Basic - ( 12 Dec 2023 18:49 )    Color: Yellow / Appearance: Clear / S.006 / pH: x  Gluc: x / Ketone: Negative mg/dL  / Bili: Negative / Urobili: 0.2 mg/dL   Blood: x / Protein: Negative mg/dL / Nitrite: Negative   Leuk Esterase: Trace / RBC: 0 /HPF / WBC 0 /HPF   Sq Epi: x / Non Sq Epi: 0 /HPF / Bacteria: Negative /HPF            RADIOLOGY, EKG & ADDITIONAL TESTS: Reviewed.

## 2023-12-13 NOTE — PROVIDER CONTACT NOTE (OTHER) - REASON
[place] [facility] [place] ok to transport to [facility] off tele

## 2023-12-13 NOTE — H&P ADULT - PROBLEM SELECTOR PLAN 6
Home meds: HCTZ 12.5 mg QD, lisinopril 20 mg QD    Plan:  - vitals q4h  - continue home HCTZ 12.5 mg QD  - continue home lisinopril 20 mg QD Home meds: HCTZ 12.5 mg QD, lisinopril 20 mg QD    Plan:  - vitals q4h  - continue home lisinopril 20 mg QD  - hold home HCTZ, may reintroduce pending BP trends after lasix

## 2023-12-13 NOTE — H&P ADULT - NSICDXPASTMEDICALHX_GEN_ALL_CORE_FT
PAST MEDICAL HISTORY:  Abnormal uterine and vaginal bleeding     Atrial fibrillation on eliquis    Cerebrovascular accident (CVA)     Dyspnea on exertion     History of MI (myocardial infarction) 8-9 years ago in [facility]    History of stroke 2008 hospitalized in [facility]    History of TIA (transient ischemic attack) sept 2020    HLD (hyperlipidemia)     HTN (hypertension)     Long term current use of anticoagulant     Memory loss     S/P aortic valve replacement with bioprosthetic valve 2009    S/P mitral valve replacement with bioprosthetic valve 2009 "tissue valve per daughter"    Type 2 diabetes mellitus      PAST MEDICAL HISTORY:  Abnormal uterine and vaginal bleeding     Atrial fibrillation on eliquis    Cerebrovascular accident (CVA)     Dyspnea on exertion     History of MI (myocardial infarction) 8-9 years ago in [facility]    History of stroke 2008 hospitalized in [facility]    History of TIA (transient ischemic attack) sept 2020    HLD (hyperlipidemia)     HTN (hypertension)     Long term current use of anticoagulant     Memory loss     S/P aortic valve replacement with bioprosthetic valve 2009    S/P mitral valve replacement with bioprosthetic valve 2009 "tissue valve per daughter"    Type 2 diabetes mellitus

## 2023-12-13 NOTE — H&P ADULT - ATTENDING COMMENTS
Pt with AF on Eliquis, bioprosthetic AVR, CAD, CVA p/w VIRAMONTES.  On exam: pt in mild to moderate respiratory distress. Heart RRR, lungs CTA without rales.  Abd s/nt/nd BS normal.  1+ LE edema  labs reviewed pBNP 2134  EKG tracing reviewed: AF  CXR film reviewed: clear lungs  Dyspnea possibly 2/2 decompensated CHF.    Will monitor on tele  Diurese with IV lasix  check TTE  possible stress test

## 2023-12-13 NOTE — PATIENT PROFILE ADULT - FUNCTIONAL ASSESSMENT - BASIC MOBILITY 6.
3-calculated by average/Not able to assess (calculate score using [facility] averaging method)  3-calculated by average/Not able to assess (calculate score using [facility] averaging method)

## 2023-12-13 NOTE — H&P ADULT - NSHPPHYSICALEXAM_GEN_ALL_CORE
T(C): 36.6 (12-13-23 @ 01:20), Max: 36.7 (12-12-23 @ 12:13)  HR: 70 (12-13-23 @ 01:20) (63 - 85)  BP: 176/88 (12-13-23 @ 01:20) (130/65 - 176/88)  RR: 16 (12-13-23 @ 01:20) (16 - 18)  SpO2: 100% (12-13-23 @ 01:20) (98% - 100%)    CONSTITUTIONAL: tachypneic, non-toxic  EYES: PERRLA and symmetric, EOMI, No conjunctival or scleral injection, non-icteric  ENMT: Oral mucosa with moist membranes. Normal dentition; no pharyngeal injection or exudates  NECK: Supple, symmetric and without tracheal deviation   RESP: tachypneic, no use of accessory muscles; CTA b/l, no WRR, slightly decreased air movement L upper lung fields  CV: RRR, no MRG; no JVD. 1-2+ pitting edema BLE, symmetric  GI: obese, Soft, TTP RUQ, negative galloway's sign, no rebound, no guarding; no palpable masses  LYMPH: No cervical LAD or tenderness  MSK: No digital clubbing or cyanosis, normal muscle bulk/tone. No lower extremity tenderness  SKIN: No rashes or ulcers noted; no subcutaneous nodules or induration palpable  NEURO: cranial nerves grossly intact, no gross focal deficits, sensation intact in upper and lower extremities b/l to light touch, moves all extremities against gravity  PSYCH: Appropriate insight/judgmnt; A+O x 3, mood and affect appropriate, recent/remote memory intact T(C): 36.6 (12-13-23 @ 01:20), Max: 36.7 (12-12-23 @ 12:13)  HR: 70 (12-13-23 @ 01:20) (63 - 85)  BP: 176/88 (12-13-23 @ 01:20) (130/65 - 176/88)  RR: 16 (12-13-23 @ 01:20) (16 - 18)  SpO2: 100% (12-13-23 @ 01:20) (98% - 100%)    CONSTITUTIONAL: tachypneic, non-toxic  EYES: PERRLA and symmetric, EOMI, No conjunctival or scleral injection, non-icteric  ENMT: Oral mucosa with moist membranes. Normal dentition; no pharyngeal injection or exudates  NECK: Supple, symmetric and without tracheal deviation   RESP: tachypneic, no use of accessory muscles; CTA b/l, no WRR, slightly decreased air movement L upper lung fields  CV: irregularly irregular, no MRG; no JVD. 1-2+ pitting edema BLE, symmetric  GI: obese, Soft, TTP RUQ, negative galloway's sign, no rebound, no guarding; no palpable masses  LYMPH: No cervical LAD or tenderness  MSK: No digital clubbing or cyanosis, normal muscle bulk/tone. No lower extremity tenderness  SKIN: No rashes or ulcers noted; no subcutaneous nodules or induration palpable  NEURO: cranial nerves grossly intact, no gross focal deficits, sensation intact in upper and lower extremities b/l to light touch, moves all extremities against gravity  PSYCH: Appropriate insight/judgmnt; A+O x 3, mood and affect appropriate, recent/remote memory intact

## 2023-12-13 NOTE — CONSULT NOTE ADULT - SUBJECTIVE AND OBJECTIVE BOX
Cardiology Consult Note    HPI:  67-year-old female w/ hx of Afib (on eliquis), MVR/AVR, CAD w/ event 2009, CVA (R cerebellar infarct 11 years ago), T2DM, HTN, and HLD presenting for shortness of breath on exertion and fatigue.     States dyspnea over the past 2 weeks that is progressively worsening. Trouble doing tasks around her home due to SOB.     Of note, outpatient cardiology records with EKG demonstrating transient 2:1 AV block, variable rates on   holter monitoring from 40s to 190s along with sinus pauses. Possible cardiac arrest? PPM recommended previously but patient declined.       Additional ROS (if any):    MEDICATIONS  (STANDING):  apixaban 5 milliGRAM(s) Oral two times a day  dextrose 5%. 1000 milliLiter(s) (100 mL/Hr) IV Continuous <Continuous>  dextrose 5%. 1000 milliLiter(s) (50 mL/Hr) IV Continuous <Continuous>  dextrose 50% Injectable 25 Gram(s) IV Push once  dextrose 50% Injectable 25 Gram(s) IV Push once  dextrose 50% Injectable 12.5 Gram(s) IV Push once  furosemide   Injectable 40 milliGRAM(s) IV Push every 12 hours  glucagon  Injectable 1 milliGRAM(s) IntraMuscular once  insulin lispro (ADMELOG) corrective regimen sliding scale   SubCutaneous three times a day before meals  lisinopril 10 milliGRAM(s) Oral daily  simvastatin 10 milliGRAM(s) Oral at bedtime    MEDICATIONS  (PRN):  dextrose Oral Gel 15 Gram(s) Oral once PRN Blood Glucose LESS THAN 70 milliGRAM(s)/deciliter  melatonin 3 milliGRAM(s) Oral at bedtime PRN Insomnia          PHYSICAL EXAM:  Vital Signs Last 24 Hrs  T(C): 36.2 (13 Dec 2023 05:20), Max: 36.7 (12 Dec 2023 20:46)  T(F): 97.2 (13 Dec 2023 05:20), Max: 98 (12 Dec 2023 20:46)  HR: 54 (13 Dec 2023 05:40) (54 - 77)  BP: 147/67 (13 Dec 2023 05:40) (147/67 - 184/62)  BP(mean): --  RR: 16 (13 Dec 2023 05:20) (16 - 18)  SpO2: 100% (13 Dec 2023 05:20) (100% - 100%)    Parameters below as of 13 Dec 2023 05:20  Patient On (Oxygen Delivery Method): room air        I&O's Summary    12 Dec 2023 07:01  -  13 Dec 2023 07:00  --------------------------------------------------------  IN: 200 mL / OUT: 300 mL / NET: -100 mL        General: NAD, non-toxic appearing   HEENT: PERRLA, EOMi, no scleral icterus  CV: RRR, normal S1 and S2, no m/r/g  Lungs: normal respiratory effort. CTAB, no wheezes, rales, or rhonchi  Abd: soft, nontender, nondistended  Ext: no edema, 2+ peripheral pulses   Pysch: AAOx3, appropriate affect   Neuro: grossly non-focal, moving all extremities spontaneously   Skin: no rashes or lesions     LABS:  CAPILLARY BLOOD GLUCOSE      POCT Blood Glucose.: 88 mg/dL (13 Dec 2023 08:45)  POCT Blood Glucose.: 108 mg/dL (13 Dec 2023 01:51)  POCT Blood Glucose.: 119 mg/dL (13 Dec 2023 00:55)  POCT Blood Glucose.: 129 mg/dL (12 Dec 2023 21:53)  POCT Blood Glucose.: 104 mg/dL (12 Dec 2023 18:05)                              12.7   7.35  )-----------( 121      ( 13 Dec 2023 06:04 )             40.3       WBC Trend: 7.35<--, 7.26<--  Hb Trend: 12.7<--, 12.6<--    12-13    140  |  105  |  19  ----------------------------<  88  4.0   |  25  |  0.95    Ca    9.3      13 Dec 2023 06:04  Phos  3.3     12-13  Mg     1.90     12-13    TPro  6.5  /  Alb  3.9  /  TBili  0.6  /  DBili  x   /  AST  81<H>  /  ALT  106<H>  /  AlkPhos  112  12-13    PT/INR - ( 13 Dec 2023 06:04 )   PT: 16.0 sec;   INR: 1.44 ratio         PTT - ( 13 Dec 2023 06:04 )  PTT:33.1 sec      Urinalysis Basic - ( 13 Dec 2023 06:04 )    Color: x / Appearance: x / SG: x / pH: x  Gluc: 88 mg/dL / Ketone: x  / Bili: x / Urobili: x   Blood: x / Protein: x / Nitrite: x   Leuk Esterase: x / RBC: x / WBC x   Sq Epi: x / Non Sq Epi: x / Bacteria: x        Blood Gas Venous Comprehensive Result: DONE (12-12-23 @ 14:30)      RADIOLOGY & ADDITIONAL TESTS: Reviewed Cardiology Consult Note    HPI:  67-year-old female ([place] creole speaking) w/ hx of Afib (on eliquis), MVR/AVR, CAD w/ event 2009, CVA (R cerebellar infarct 11 years ago), T2DM, HTN, and HLD presenting for shortness of breath on exertion and fatigue.     States dyspnea over the past 2 weeks that is progressively worsening, with trouble doing tasks around her home due to SOB. However per chart review patient has had fatigue/dyspnea since at least April 2023. Additionally notes leg swelling, occasional cough that awakens her at night, uses 2 pillows to sleep. Denies chest pain/pressure, abdominal pain, NVD.     Of note, outpatient cardiology records with EKG demonstrating transient 2:1 AV block, variable rates on holter monitoring from 40s to 190s along with sinus pauses.       MEDICATIONS  (STANDING):  apixaban 5 milliGRAM(s) Oral two times a day  dextrose 5%. 1000 milliLiter(s) (100 mL/Hr) IV Continuous <Continuous>  dextrose 5%. 1000 milliLiter(s) (50 mL/Hr) IV Continuous <Continuous>  dextrose 50% Injectable 25 Gram(s) IV Push once  dextrose 50% Injectable 25 Gram(s) IV Push once  dextrose 50% Injectable 12.5 Gram(s) IV Push once  furosemide   Injectable 40 milliGRAM(s) IV Push every 12 hours  glucagon  Injectable 1 milliGRAM(s) IntraMuscular once  insulin lispro (ADMELOG) corrective regimen sliding scale   SubCutaneous three times a day before meals  lisinopril 10 milliGRAM(s) Oral daily  simvastatin 10 milliGRAM(s) Oral at bedtime    MEDICATIONS  (PRN):  dextrose Oral Gel 15 Gram(s) Oral once PRN Blood Glucose LESS THAN 70 milliGRAM(s)/deciliter  melatonin 3 milliGRAM(s) Oral at bedtime PRN Insomnia      PHYSICAL EXAM:  Vital Signs Last 24 Hrs  T(C): 36.2 (13 Dec 2023 05:20), Max: 36.7 (12 Dec 2023 20:46)  T(F): 97.2 (13 Dec 2023 05:20), Max: 98 (12 Dec 2023 20:46)  HR: 54 (13 Dec 2023 05:40) (54 - 77)  BP: 147/67 (13 Dec 2023 05:40) (147/67 - 184/62)  BP(mean): --  RR: 16 (13 Dec 2023 05:20) (16 - 18)  SpO2: 100% (13 Dec 2023 05:20) (100% - 100%)    Parameters below as of 13 Dec 2023 05:20  Patient On (Oxygen Delivery Method): room air        I&O's Summary    12 Dec 2023 07:01  -  13 Dec 2023 07:00  --------------------------------------------------------  IN: 200 mL / OUT: 300 mL / NET: -100 mL        General: NAD, non-toxic appearing   HEENT: PERRLA, EOMi, no scleral icterus  CV: RRR, normal S1 and S2, no murmurs   Lungs: Normal respiratory effort. Mild crackles at bases b/l, no wheezing.   Abd: soft, nontender, nondistended  Ext: 1+ pitting edema b/l  Pysch: AAOx3, appropriate affect   Neuro: grossly non-focal, moving all extremities spontaneously   Skin: no rashes or lesions     LABS:             12.7   7.35  )-----------( 121      ( 13 Dec 2023 06:04 )             40.3       WBC Trend: 7.35<--, 7.26<--  Hb Trend: 12.7<--, 12.6<--    12-13    140  |  105  |  19  ----------------------------<  88  4.0   |  25  |  0.95    Ca    9.3      13 Dec 2023 06:04  Phos  3.3     12-13  Mg     1.90     12-13    TPro  6.5  /  Alb  3.9  /  TBili  0.6  /  DBili  x   /  AST  81<H>  /  ALT  106<H>  /  AlkPhos  112  12-13    PT/INR - ( 13 Dec 2023 06:04 )   PT: 16.0 sec;   INR: 1.44 ratio         PTT - ( 13 Dec 2023 06:04 )  PTT:33.1 sec      Urinalysis Basic - ( 13 Dec 2023 06:04 )    Color: x / Appearance: x / SG: x / pH: x  Gluc: 88 mg/dL / Ketone: x  / Bili: x / Urobili: x   Blood: x / Protein: x / Nitrite: x   Leuk Esterase: x / RBC: x / WBC x   Sq Epi: x / Non Sq Epi: x / Bacteria: x        Blood Gas Venous Comprehensive Result: DONE (12-12-23 @ 14:30)      RADIOLOGY & ADDITIONAL TESTS: Reviewed    CXR: Clear lungs  TTE (2020):  < from: Transthoracic Echocardiogram (07.28.20 @ 11:20) >  CONCLUSIONS:  1. Bioprosthetic mitral valve replacement. The valve is  calcified and appears stenotic. Peak mitral valve gradient  equals 23 mm Hg, mean transmitral valve gradient equals 8  mm Hg, which is elevated even in the setting of a  prosthetic valve.  2. Bioprosthetic aortic valve replacement. Peak transaortic  valve gradient equals 21 mm Hg, mean transaortic valve  gradient equals 16 mm Hg, which is probably normal in the  presence of a prosthetic valve.  3. Normal left ventricular systolic function. No segmental  wall motion abnormalities.  4. Normal right ventricular size and function.  *** Compared with echocardiogram of 9/1/2019, prosthetic  mitral stenosis has progressed.    < end of copied text >   Cardiology Consult Note    HPI:  67-year-old female ([place] creole speaking) w/ hx of Afib (on eliquis), MVR/AVR, CAD w/ event 2009, CVA (R cerebellar infarct 11 years ago), T2DM, HTN, and HLD presenting for shortness of breath on exertion and fatigue.     States dyspnea over the past 2 weeks that is progressively worsening, with trouble doing tasks around her home due to SOB. However per chart review patient has had fatigue/dyspnea since at least April 2023. Additionally notes leg swelling, occasional cough that awakens her at night, uses 2 pillows to sleep. Denies chest pain/pressure, abdominal pain, NVD.     Of note, outpatient cardiology records with EKG demonstrating transient 2:1 AV block, variable rates on holter monitoring from 40s to 190s along with sinus pauses.       MEDICATIONS  (STANDING):  apixaban 5 milliGRAM(s) Oral two times a day  dextrose 5%. 1000 milliLiter(s) (100 mL/Hr) IV Continuous <Continuous>  dextrose 5%. 1000 milliLiter(s) (50 mL/Hr) IV Continuous <Continuous>  dextrose 50% Injectable 25 Gram(s) IV Push once  dextrose 50% Injectable 25 Gram(s) IV Push once  dextrose 50% Injectable 12.5 Gram(s) IV Push once  furosemide   Injectable 40 milliGRAM(s) IV Push every 12 hours  glucagon  Injectable 1 milliGRAM(s) IntraMuscular once  insulin lispro (ADMELOG) corrective regimen sliding scale   SubCutaneous three times a day before meals  lisinopril 10 milliGRAM(s) Oral daily  simvastatin 10 milliGRAM(s) Oral at bedtime    MEDICATIONS  (PRN):  dextrose Oral Gel 15 Gram(s) Oral once PRN Blood Glucose LESS THAN 70 milliGRAM(s)/deciliter  melatonin 3 milliGRAM(s) Oral at bedtime PRN Insomnia      PHYSICAL EXAM:  Vital Signs Last 24 Hrs  T(C): 36.2 (13 Dec 2023 05:20), Max: 36.7 (12 Dec 2023 20:46)  T(F): 97.2 (13 Dec 2023 05:20), Max: 98 (12 Dec 2023 20:46)  HR: 54 (13 Dec 2023 05:40) (54 - 77)  BP: 147/67 (13 Dec 2023 05:40) (147/67 - 184/62)  BP(mean): --  RR: 16 (13 Dec 2023 05:20) (16 - 18)  SpO2: 100% (13 Dec 2023 05:20) (100% - 100%)    Parameters below as of 13 Dec 2023 05:20  Patient On (Oxygen Delivery Method): room air        I&O's Summary    12 Dec 2023 07:01  -  13 Dec 2023 07:00  --------------------------------------------------------  IN: 200 mL / OUT: 300 mL / NET: -100 mL        General: NAD, non-toxic appearing   HEENT: PERRLA, EOMi, no scleral icterus  CV: RRR, normal S1 and S2, no murmurs   Lungs: Normal respiratory effort. Mild crackles at bases b/l, no wheezing.   Abd: soft, nontender, nondistended  Ext: 1+ pitting edema b/l  Pysch: AAOx3, appropriate affect   Neuro: grossly non-focal, moving all extremities spontaneously   Skin: no rashes or lesions     LABS:             12.7   7.35  )-----------( 121      ( 13 Dec 2023 06:04 )             40.3       WBC Trend: 7.35<--, 7.26<--  Hb Trend: 12.7<--, 12.6<--    12-13    140  |  105  |  19  ----------------------------<  88  4.0   |  25  |  0.95    Ca    9.3      13 Dec 2023 06:04  Phos  3.3     12-13  Mg     1.90     12-13    TPro  6.5  /  Alb  3.9  /  TBili  0.6  /  DBili  x   /  AST  81<H>  /  ALT  106<H>  /  AlkPhos  112  12-13    PT/INR - ( 13 Dec 2023 06:04 )   PT: 16.0 sec;   INR: 1.44 ratio         PTT - ( 13 Dec 2023 06:04 )  PTT:33.1 sec      Urinalysis Basic - ( 13 Dec 2023 06:04 )    Color: x / Appearance: x / SG: x / pH: x  Gluc: 88 mg/dL / Ketone: x  / Bili: x / Urobili: x   Blood: x / Protein: x / Nitrite: x   Leuk Esterase: x / RBC: x / WBC x   Sq Epi: x / Non Sq Epi: x / Bacteria: x        Blood Gas Venous Comprehensive Result: DONE (12-12-23 @ 14:30)      RADIOLOGY & ADDITIONAL TESTS: Reviewed    CXR: Clear lungs  TTE (2020):  < from: Transthoracic Echocardiogram (07.28.20 @ 11:20) >  CONCLUSIONS:  1. Bioprosthetic mitral valve replacement. The valve is  calcified and appears stenotic. Peak mitral valve gradient  equals 23 mm Hg, mean transmitral valve gradient equals 8  mm Hg, which is elevated even in the setting of a  prosthetic valve.  2. Bioprosthetic aortic valve replacement. Peak transaortic  valve gradient equals 21 mm Hg, mean transaortic valve  gradient equals 16 mm Hg, which is probably normal in the  presence of a prosthetic valve.  3. Normal left ventricular systolic function. No segmental  wall motion abnormalities.  4. Normal right ventricular size and function.  *** Compared with echocardiogram of 9/1/2019, prosthetic  mitral stenosis has progressed.    < end of copied text >

## 2023-12-13 NOTE — H&P ADULT - PROBLEM SELECTOR PLAN 1
P/w progressive VIRAMONTES, lower extremity edema  - CXR with enlarged cardiac silhouette, clear lungs  - ddx includes ADHF, ACS, AR/MR, pneumonia, COPD (no known COPD hx, non-smoker), PE.   - Suspect most likely cardiac etiology of dyspna, per outpatient records review previous TTEs with worsening gradients across bioprosthetic valves, LVEF ~50% but with severe LAE, moderate TR, RVSP 45 2021 -> 59 2023.   - EKG with TWI inferolateral leads however troponins negative  - Pro-BNP mildly elevated 2134  - WBC WNL, no focal consolidations on CXR to suggest PNA  - VBG 7.34/54/29 (mild hypercapnia) - could have some component of obstructive pulmonary disease    Plan:   - obtain TTE  - f/u D-dimer?   - duonebs q6h PRN   - cardiology consult in AM (pt follows with Dr. Skip Roberto)   - consider pulm consult pending TTE result, last TTE with RVSP 59  - lasix 20 mg IV x1 ?**   - monitor on tele  - K>4, Mg >2  - strict I/Os  - daily standing weights P/w progressive VIRAMONTES, lower extremity edema  - CXR with enlarged cardiac silhouette, clear lungs  - ddx includes ADHF, ACS, AR/MR,COPD (no known COPD hx, non-smoker), PE (less likely)   - Suspect most likely cardiac etiology, per outpatient records review previous TTEs with worsening gradients across bioprosthetic valves, LVEF ~50% but with severe LAE, moderate TR, RVSP 45 2021 -> 59 2023.   - EKG with TWI inferolateral leads, however troponins negative  - Pro-BNP 2134  - WBC WNL, no focal consolidations on CXR to suggest PNA  - VBG 7.34/54/29 (mild hypercapnia)    Plan:   - TTE ordered   - cardiology consult in AM (pt follows with Dr. Skip Roberto)   - lasix 20 mg IV x1  - monitor on tele  - K>4, Mg >2  - strict I/Os  - daily standing weights

## 2023-12-13 NOTE — H&P ADULT - NSHPREVIEWOFSYSTEMS_GEN_ALL_CORE
REVIEW OF SYSTEMS:    CONSTITUTIONAL: + weakness, no fevers or chills  EYES/ENT: No visual changes;  No vertigo or throat pain   NECK: No pain or stiffness  RESPIRATORY: + shortness of breath, occasional cough, no hemoptysis  CARDIOVASCULAR: No chest pain or palpitations  GASTROINTESTINAL: No abdominal or epigastric pain. No nausea, vomiting, or hematemesis; No diarrhea or constipation. No melena or hematochezia.  GENITOURINARY: No dysuria, frequency or hematuria  NEUROLOGICAL: No numbness or weakness  SKIN: No itching, rashes

## 2023-12-13 NOTE — PROGRESS NOTE ADULT - SUBJECTIVE AND OBJECTIVE BOX
Dr. Nasra Lyon  Pager [phone]    PROGRESS NOTE:     Patient is a 67y old  Female who presents with a chief complaint of dyspnea (13 Dec 2023 12:29)      SUBJECTIVE / OVERNIGHT EVENTS: denies chest pain or sob   ADDITIONAL REVIEW OF SYSTEMS: afebrile     MEDICATIONS  (STANDING):  apixaban 5 milliGRAM(s) Oral two times a day  dextrose 5%. 1000 milliLiter(s) (100 mL/Hr) IV Continuous <Continuous>  dextrose 5%. 1000 milliLiter(s) (50 mL/Hr) IV Continuous <Continuous>  dextrose 50% Injectable 25 Gram(s) IV Push once  dextrose 50% Injectable 12.5 Gram(s) IV Push once  dextrose 50% Injectable 25 Gram(s) IV Push once  furosemide   Injectable 40 milliGRAM(s) IV Push every 12 hours  glucagon  Injectable 1 milliGRAM(s) IntraMuscular once  insulin lispro (ADMELOG) corrective regimen sliding scale   SubCutaneous three times a day before meals  lisinopril 10 milliGRAM(s) Oral daily  simvastatin 10 milliGRAM(s) Oral at bedtime    MEDICATIONS  (PRN):  dextrose Oral Gel 15 Gram(s) Oral once PRN Blood Glucose LESS THAN 70 milliGRAM(s)/deciliter  melatonin 3 milliGRAM(s) Oral at bedtime PRN Insomnia      CAPILLARY BLOOD GLUCOSE      POCT Blood Glucose.: 88 mg/dL (13 Dec 2023 12:41)  POCT Blood Glucose.: 88 mg/dL (13 Dec 2023 08:45)  POCT Blood Glucose.: 108 mg/dL (13 Dec 2023 01:51)  POCT Blood Glucose.: 119 mg/dL (13 Dec 2023 00:55)  POCT Blood Glucose.: 129 mg/dL (12 Dec 2023 21:53)  POCT Blood Glucose.: 104 mg/dL (12 Dec 2023 18:05)    I&O's Summary    12 Dec 2023 07:01  -  13 Dec 2023 07:00  --------------------------------------------------------  IN: 200 mL / OUT: 300 mL / NET: -100 mL        PHYSICAL EXAM:  Vital Signs Last 24 Hrs  T(C): 36.2 (13 Dec 2023 05:20), Max: 36.7 (12 Dec 2023 20:46)  T(F): 97.2 (13 Dec 2023 05:20), Max: 98 (12 Dec 2023 20:46)  HR: 54 (13 Dec 2023 05:40) (54 - 77)  BP: 147/67 (13 Dec 2023 05:40) (147/67 - 184/62)  BP(mean): --  RR: 16 (13 Dec 2023 05:20) (16 - 18)  SpO2: 100% (13 Dec 2023 05:20) (100% - 100%)    Parameters below as of 13 Dec 2023 05:20  Patient On (Oxygen Delivery Method): room air      CONSTITUTIONAL: NAD, well-developed  RESPIRATORY: Normal respiratory effort; lungs are clear to auscultation bilaterally  CARDIOVASCULAR: Regular rate and rhythm, normal S1 and S2, no murmur/rub/gallop; 2+ pitting lower extremity edema; Peripheral pulses are 2+ bilaterally  ABDOMEN: minimal ruq ttp,  normoactive bowel sounds, no rebound/guarding; No hepatosplenomegaly  MUSCULOSKELETAL: no clubbing or cyanosis of digits; no joint swelling or tenderness to palpation  PSYCH: A+O to person, place, and time; affect appropriate    LABS:                        12.7   7.35  )-----------( 121      ( 13 Dec 2023 06:04 )             40.3     12-13    140  |  105  |  19  ----------------------------<  88  4.0   |  25  |  0.95    Ca    9.3      13 Dec 2023 06:04  Phos  3.3     12-13  Mg     1.90     12-13    TPro  6.5  /  Alb  3.9  /  TBili  0.6  /  DBili  x   /  AST  81<H>  /  ALT  106<H>  /  AlkPhos  112  12-13    PT/INR - ( 13 Dec 2023 06:04 )   PT: 16.0 sec;   INR: 1.44 ratio         PTT - ( 13 Dec 2023 06:04 )  PTT:33.1 sec      Urinalysis Basic - ( 13 Dec 2023 06:04 )    Color: x / Appearance: x / SG: x / pH: x  Gluc: 88 mg/dL / Ketone: x  / Bili: x / Urobili: x   Blood: x / Protein: x / Nitrite: x   Leuk Esterase: x / RBC: x / WBC x   Sq Epi: x / Non Sq Epi: x / Bacteria: x          RADIOLOGY & ADDITIONAL TESTS:  Results Reviewed:   Imaging Personally Reviewed:    < from: US Abdomen Complete (12.13.23 @ 12:25) >  Liver: Within normal limits.  Bile ducts: Normal caliber. Common bile duct measures 4 mm.  Gallbladder: Within normal limits.  Pancreas: Visualized portions are within normal limits.  Spleen: 6.8 cm. Within normal limits.  Right kidney: 10.2 cm. No hydronephrosis. Lower pole cyst measuring 1.7 x   1.2 x 1.2 cm  Left kidney: 10.0 cm. No hydronephrosis.  Ascites: None.  Aorta and IVC: Visualized portions are within normal limits.    IMPRESSION:  Etiology of elevated LFTs not elucidated.      < from: Xray Chest 1 View- PORTABLE-Urgent (12.12.23 @ 14:43) >  IMPRESSION:    Clear lungs.      Electrocardiogram Personally Reviewed:    COORDINATION OF CARE:  Care Discussed with Consultants/Other Providers [Y/N]: cardiology fellow, consult for acute on chronic chf  Prior or Outpatient Records Reviewed [Y/N]:   Dr. Nasra Lyon  Pager [phone]    PROGRESS NOTE:     Patient is a 67y old  Female who presents with a chief complaint of dyspnea (13 Dec 2023 12:29)      SUBJECTIVE / OVERNIGHT EVENTS: denies chest pain or sob   ADDITIONAL REVIEW OF SYSTEMS: afebrile     MEDICATIONS  (STANDING):  apixaban 5 milliGRAM(s) Oral two times a day  dextrose 5%. 1000 milliLiter(s) (100 mL/Hr) IV Continuous <Continuous>  dextrose 5%. 1000 milliLiter(s) (50 mL/Hr) IV Continuous <Continuous>  dextrose 50% Injectable 25 Gram(s) IV Push once  dextrose 50% Injectable 12.5 Gram(s) IV Push once  dextrose 50% Injectable 25 Gram(s) IV Push once  furosemide   Injectable 40 milliGRAM(s) IV Push every 12 hours  glucagon  Injectable 1 milliGRAM(s) IntraMuscular once  insulin lispro (ADMELOG) corrective regimen sliding scale   SubCutaneous three times a day before meals  lisinopril 10 milliGRAM(s) Oral daily  simvastatin 10 milliGRAM(s) Oral at bedtime    MEDICATIONS  (PRN):  dextrose Oral Gel 15 Gram(s) Oral once PRN Blood Glucose LESS THAN 70 milliGRAM(s)/deciliter  melatonin 3 milliGRAM(s) Oral at bedtime PRN Insomnia      CAPILLARY BLOOD GLUCOSE      POCT Blood Glucose.: 88 mg/dL (13 Dec 2023 12:41)  POCT Blood Glucose.: 88 mg/dL (13 Dec 2023 08:45)  POCT Blood Glucose.: 108 mg/dL (13 Dec 2023 01:51)  POCT Blood Glucose.: 119 mg/dL (13 Dec 2023 00:55)  POCT Blood Glucose.: 129 mg/dL (12 Dec 2023 21:53)  POCT Blood Glucose.: 104 mg/dL (12 Dec 2023 18:05)    I&O's Summary    12 Dec 2023 07:01  -  13 Dec 2023 07:00  --------------------------------------------------------  IN: 200 mL / OUT: 300 mL / NET: -100 mL        PHYSICAL EXAM:  Vital Signs Last 24 Hrs  T(C): 36.2 (13 Dec 2023 05:20), Max: 36.7 (12 Dec 2023 20:46)  T(F): 97.2 (13 Dec 2023 05:20), Max: 98 (12 Dec 2023 20:46)  HR: 54 (13 Dec 2023 05:40) (54 - 77)  BP: 147/67 (13 Dec 2023 05:40) (147/67 - 184/62)  BP(mean): --  RR: 16 (13 Dec 2023 05:20) (16 - 18)  SpO2: 100% (13 Dec 2023 05:20) (100% - 100%)    Parameters below as of 13 Dec 2023 05:20  Patient On (Oxygen Delivery Method): room air      CONSTITUTIONAL: NAD, well-developed  RESPIRATORY: Normal respiratory effort; lungs are clear to auscultation bilaterally  CARDIOVASCULAR: Regular rate and rhythm, normal S1 and S2, no murmur/rub/gallop; 2+ pitting lower extremity edema; Peripheral pulses are 2+ bilaterally  ABDOMEN: minimal ruq ttp,  normoactive bowel sounds, no rebound/guarding; No hepatosplenomegaly  MUSCULOSKELETAL: no clubbing or cyanosis of digits; no joint swelling or tenderness to palpation  PSYCH: A+O to person, place, and time; affect appropriate    LABS:                        12.7   7.35  )-----------( 121      ( 13 Dec 2023 06:04 )             40.3     12-13    140  |  105  |  19  ----------------------------<  88  4.0   |  25  |  0.95    Ca    9.3      13 Dec 2023 06:04  Phos  3.3     12-13  Mg     1.90     12-13    TPro  6.5  /  Alb  3.9  /  TBili  0.6  /  DBili  x   /  AST  81<H>  /  ALT  106<H>  /  AlkPhos  112  12-13    PT/INR - ( 13 Dec 2023 06:04 )   PT: 16.0 sec;   INR: 1.44 ratio         PTT - ( 13 Dec 2023 06:04 )  PTT:33.1 sec      Urinalysis Basic - ( 13 Dec 2023 06:04 )    Color: x / Appearance: x / SG: x / pH: x  Gluc: 88 mg/dL / Ketone: x  / Bili: x / Urobili: x   Blood: x / Protein: x / Nitrite: x   Leuk Esterase: x / RBC: x / WBC x   Sq Epi: x / Non Sq Epi: x / Bacteria: x          RADIOLOGY & ADDITIONAL TESTS:  Results Reviewed:   Imaging Personally Reviewed:    < from: US Abdomen Complete (12.13.23 @ 12:25) >  Liver: Within normal limits.  Bile ducts: Normal caliber. Common bile duct measures 4 mm.  Gallbladder: Within normal limits.  Pancreas: Visualized portions are within normal limits.  Spleen: 6.8 cm. Within normal limits.  Right kidney: 10.2 cm. No hydronephrosis. Lower pole cyst measuring 1.7 x   1.2 x 1.2 cm  Left kidney: 10.0 cm. No hydronephrosis.  Ascites: None.  Aorta and IVC: Visualized portions are within normal limits.    IMPRESSION:  Etiology of elevated LFTs not elucidated.      < from: Xray Chest 1 View- PORTABLE-Urgent (12.12.23 @ 14:43) >  IMPRESSION:    Clear lungs.      Electrocardiogram Personally Reviewed:    COORDINATION OF CARE:  Care Discussed with Consultants/Other Providers [Y/N]: cardiology fellow, consult for acute on chronic chf  Prior or Outpatient Records Reviewed [Y/N]:

## 2023-12-13 NOTE — CONSULT NOTE ADULT - ATTENDING COMMENTS
Pt with volume overload in the setting of a hx of increasing prosthetic valve gradients.    TTE with mildly reduced EF  -cont IV diuresis  -optimize GDMT - favor changing lisinopril to losartan  -caution with bblocker given hx of bradycardia and 2:1 block  -when euvolemic would repeat limited TTE to evaluate PA pressures and if still significantly elevated further evaluate etiology of pulmonary hypertension

## 2023-12-13 NOTE — H&P ADULT - PROBLEM SELECTOR PLAN 2
- s/p bioprosthetic MVR and AVR  - TTE 2021 and 2023, per outpatient cardiology records pt had increased gradients from 2021 to 2023 although full report not available for review    Plan:   - TTE  - cardiology consult, may need structural cardiology if TTE indicates bioprosthetic valve failure - s/p bioprosthetic MVR and AVR  - TTE 2021 and 2023, per outpatient cards note had worsening gradients across bioprosthetic valves from 2021->2023 although full report not avaiable for review    Plan:   - TTE  - cardiology consult in AM

## 2023-12-13 NOTE — H&P ADULT - NSICDXFAMILYHX_GEN_ALL_CORE_FT
FAMILY HISTORY:  Sibling  Still living? Unknown  FH: heart disease, Age at diagnosis: Age Unknown  FH: prostate cancer, Age at diagnosis: Age Unknown

## 2023-12-13 NOTE — H&P ADULT - PROBLEM SELECTOR PLAN 4
Pt not endorsing abdominal pain, however TTP RUQ on abdominal exam  - mildly elevated AST/ALT 70/99  - Tbili, alk phos WNL    Plan:   - trend AST/ALT, alk phos, Tbili  - PT/PTT, INR with AM labs  - RUQ US Pt not endorsing abdominal pain, however TTP RUQ on abdominal exam  - mildly elevated AST/ALT 70/99  - Tbili, alk phos WNL  - may have some component of congestive hepatopathy ISO R heart failure    Plan:   - trend AST/ALT, alk phos, Tbili, PT/INR and PTT   - RUQ US

## 2023-12-13 NOTE — H&P ADULT - ASSESSMENT
68 yo F with hx Afib on eliquis, MVR and AVR 2008, CAD with prior MI, R cerebellar CVA, p/w progressive dyspnea on exertion. BLE edema on exam, EKG with TWI in II, III, V5-6, trops negative. Pro-BNP 3134.  66 yo F with hx Afib on eliquis, MVR and AVR 2008, CAD with prior MI, R cerebellar CVA, p/w progressive dyspnea on exertion. BLE edema on exam, EKG with TWI in II, III, V5-6, trops negative. Pro-BNP 3134.  68 yo F with hx Afib on eliquis, MVR and AVR 2008, CAD with prior MI, R cerebellar CVA, p/w progressive dyspnea on exertion. BLE edema on exam, EKG with TWI in II, III, V5-6, trops negative. Pro-BNP 2134.  66 yo F with hx Afib on eliquis, MVR and AVR 2008, CAD with prior MI, R cerebellar CVA, p/w progressive dyspnea on exertion. BLE edema on exam, EKG with TWI in II, III, V5-6, trops negative. Pro-BNP 2134.

## 2023-12-13 NOTE — H&P ADULT - PROBLEM SELECTOR PLAN 3
- In Afib this admission, rates <110  - not on any rate control agents, previously had slow vent response  - on eliquis 5 mg QD (BID per outpatient rx review however pt states she only takes QHS)     Plan:  - monitor on tele  - AC: - In Afib this admission, rates <110  - not on any rate control agents, previously had slow vent response  - on eliquis 5 mg QD (BID per outpatient rx review however pt states she only takes QHS)     Plan:  - eliquis 5 mg BID (patient takes 5 mg QHS at home but no indication for reduced dose)   - monitor on tele

## 2023-12-13 NOTE — PROGRESS NOTE ADULT - PROBLEM SELECTOR PLAN 6
Home meds: HCTZ 12.5 mg QD, lisinopril 20 mg QD    Plan:  - vitals q4h  - continue home lisinopril 20 mg QD  - resume home HCTZ  - BP uncontrolled, start hydralazine 25 mg tid and isordil 10 tid, titrate prn Home meds: HCTZ 12.5 mg QD, lisinopril 20 mg QD    Plan:  - vitals q4h  - inc lisinopril to 40 mg qd  - resume home HCTZ  - BP uncontrolled, start hydralazine 25 mg tid and isordil 10 tid, titrate prn

## 2023-12-13 NOTE — H&P ADULT - PROBLEM SELECTOR PLAN 5
- previous MI in [facility] in ~2008, unclear if any interventions    Plan:  - continue statin  - f/u cardiology consult, may need ischemic eval this admission given presenting symptoms - previous MI in [facility] in ~2008, unclear if any interventions    Plan:  - continue statin  - f/u cardiology consult, may need ischemic eval this admission given presenting symptoms - ?previous MI in [facility] in ~2008, unclear if any interventions    Plan:  - continue statin  - continue eliquis  - f/u cardiology consult, may need ischemic eval this admission - ?previous MI in [facility] in ~2008, unclear if any interventions    Plan:  - continue statin  - continue eliquis  - f/u cardiology consult, may need ischemic eval this admission

## 2023-12-13 NOTE — PATIENT PROFILE ADULT - FALL HARM RISK - HARM RISK INTERVENTIONS
Assistance with ambulation/Assistance OOB with selected safe patient handling equipment/Communicate Risk of Fall with Harm to all staff/Reinforce activity limits and safety measures with patient and family/Tailored Fall Risk Interventions/Visual Cue: Yellow wristband and red socks/Bed in lowest position, wheels locked, appropriate side rails in place/Call bell, personal items and telephone in reach/Instruct patient to call for assistance before getting out of bed or chair/Non-slip footwear when patient is out of bed/[place] to call system/Physically safe environment - no spills, clutter or unnecessary equipment/Purposeful Proactive Rounding/Room/bathroom lighting operational, light cord in reach Assistance with ambulation/Assistance OOB with selected safe patient handling equipment/Communicate Risk of Fall with Harm to all staff/Reinforce activity limits and safety measures with patient and family/Tailored Fall Risk Interventions/Visual Cue: Yellow wristband and red socks/Bed in lowest position, wheels locked, appropriate side rails in place/Call bell, personal items and telephone in reach/Instruct patient to call for assistance before getting out of bed or chair/Non-slip footwear when patient is out of bed/[place] to call system/Physically safe environment - no spills, clutter or unnecessary equipment/Purposeful Proactive Rounding/Room/bathroom lighting operational, light cord in reach

## 2023-12-13 NOTE — H&P ADULT - HISTORY OF PRESENT ILLNESS
67-year-old female with hx Afib on eliquis, MVR and AVR, CAD with MI 2009, CVA (R cerebellar infarct 11 years ago), pre-DM, presenting to ED with VIRAMONTES and fatigue. Daughter at bedside assisting with history and translation, formal  offered however pt refusing formal  and prefers daughter to translate. Patient reports over the past 2 weeks she is becoming progressively more dyspneic on exertion, states she is having trouble doing tasks around her home due to shortness of breath. States she is still able to walk up the stairs in her home but needs to go much slower than usual due to SOB/fatigue. Patient's daughter states she and her brother often have to remind patient to sit down and take a break because she appears more dyspneic/tachypneic while doing ADLs. Patient endorses increased fatigue, states she feels tired all the time. Also endorses increased leg swelling. Daughter states she has been instructing patient to elevate legs at home to manage swelling. Patient has no known hx heart failure, does not monitor weight or vitals at home. Denies any chest pain or palpitations, denies chest pain with exertion. Also endorses occasional dizziness, denies any syncopal episodes. Endorses occasional cough, sometimes productive of yellow sputum. Denies nasal congestion, sore throat, fevers/chills, lymphadenopathy, headache. No sick contacts. Denies abdominal pain, n/v/d/c, dysuria. Denies any recent medication changes, states medication list from last outpatient appointments are up to date but cannot give list of medications from memory. Patient not on any loop diuretics at home, states she was never prescribed lasix/bumex/torsemide.     Of note, prior hospitalization in 2020 where pt found to have A fib with slow VR and asymptomatic pause, DCCV discussed but pt refused at that time.  67-year-old female with hx Afib on eliquis, MVR and AVR, CAD with MI 2009, CVA (R cerebellar infarct 11 years ago), pre-DM, presenting to ED with VIRAMONTES and fatigue. Daughter at bedside assisting with history and translation, formal  offered however pt refusing formal  and prefers daughter to translate. Patient reports over the past 2 weeks she is becoming progressively more dyspneic on exertion, states she is having trouble doing tasks around her home due to shortness of breath. States she is still able to walk up the stairs in her home but needs to go much slower than usual due to SOB/fatigue. Patient's daughter states she and her brother often have to remind patient to sit down and take a break because she appears more dyspneic/tachypneic while doing ADLs. Patient endorses increased fatigue, states she feels tired all the time. Also endorses increased leg swelling. Daughter states she has been instructing patient to elevate legs at home to manage swelling. Patient has no known hx heart failure, does not monitor weight or vitals at home. Denies any chest pain or palpitations, denies chest pain with exertion. Also endorses occasional dizziness, denies any syncopal episodes. Endorses occasional cough, sometimes productive of yellow sputum. Denies nasal congestion, sore throat, fevers/chills, lymphadenopathy, headache. No sick contacts. Denies abdominal pain, n/v/d/c, dysuria. Denies any recent medication changes, states medication list from last outpatient appointments are up to date but cannot give list of medications from memory. Patient not on any loop diuretics at home, states she was never prescribed lasix/bumex/torsemide.     Of note, outpatient cardiology records indicate that prior EKGs have demonstrated transient 2:1 AV block, prior holter with rates ranging from 46 to 186 with pauses up to 2.72 seconds, also with wide complex tachycardia. Micra PPM previously recommended, patient declined. TTE 11/2021 with severe LAE, mild LV systolic dysfunction hypokinesis of basal anterolateral and basal inferoseptum, inferior akinetic, LVEF 45-55%, moderate TR with RVSP 45. At that time pt was complaining of SOB and intermittent palpitations. Patient then had repeat echo in 3/2023 revealing increased gradient across bioprosthetic aortic valve, normal systolic function with concentric LVH and abnormal septal motion, RVSP 57 (increased from prior).  67-year-old female with hx Afib on eliquis, MVR and AVR, CAD with MI 2009, CVA (R cerebellar infarct 11 years ago), T2DM, presenting to ED with VIRAMONTES and fatigue. Daughter at bedside assisting with history and translation, formal  offered however pt refusing formal  and prefers daughter to translate. Patient reports over the past 2 weeks she is becoming progressively more dyspneic on exertion, states she is having trouble doing tasks around her home due to shortness of breath. States she is still able to walk up the stairs in her home but needs to go much slower than usual due to SOB/fatigue. Patient's daughter states she and her brother often have to remind patient to sit down and take a break because she appears more dyspneic/tachypneic while doing ADLs. Patient endorses increased fatigue, states she feels tired all the time. Also endorses increased leg swelling. Daughter states she has been instructing patient to elevate legs at home to manage swelling. Patient has no known hx heart failure, does not monitor weight or vitals at home. Denies any chest pain or palpitations, denies chest pain with exertion. Also endorses occasional dizziness, denies any syncopal episodes. Endorses occasional cough, sometimes productive of yellow sputum. Denies nasal congestion, sore throat, fevers/chills, lymphadenopathy, headache. No sick contacts. Denies abdominal pain, n/v/d/c, dysuria. Denies any recent medication changes, states medication list from last outpatient appointments are up to date but cannot give list of medications from memory. Patient not on any loop diuretics at home, states she was never prescribed lasix/bumex/torsemide.     Of note, outpatient cardiology records indicate that prior EKGs have demonstrated transient 2:1 AV block, prior holter with rates ranging from 46 to 186 with pauses up to 2.72 seconds, also with wide complex tachycardia. Micra PPM previously recommended, patient declined. TTE 11/2021 with severe LAE, mild LV systolic dysfunction hypokinesis of basal anterolateral and basal inferoseptum, inferior akinetic, LVEF 45-55%, moderate TR with RVSP 45. At that time pt was complaining of SOB and intermittent palpitations. Patient then had repeat echo in 3/2023 revealing increased gradient across bioprosthetic aortic valve, normal systolic function with concentric LVH and abnormal septal motion, RVSP 57 (increased from prior).

## 2023-12-13 NOTE — PROGRESS NOTE ADULT - ASSESSMENT
66 yo F with hx Afib on eliquis, MVR and AVR 2008, CAD with prior MI, R cerebellar CVA, p/w progressive dyspnea on exertion. BLE edema on exam, EKG with TWI in II, III, V5-6, trops negative. Pro-BNP 2134.

## 2023-12-13 NOTE — PATIENT PROFILE ADULT - RELATIONSHIP TO PATIENT
0730  Assumed care of pt from [facility], [address] [place] [address]. Resting quietly supine with sleep promoting eye mask in place, eupneic on O2 4 lpm with SpO2=92%  1300 Epistaxis incident. Pressure held on nose x 10 min, bleeding ceased promptly. Pt states she felt pressure inside her nose and blew it to clear then bleeding commenced. No history of epistaxis. Humidification bubbler added to nasal O2.  1600  No further epistaxis  1930  Bedside and Verbal shift change report given to Marco Clark RN (oncoming nurse) by James Dorman RN (offgoing nurse). Report included the following information SBAR, Kardex, Intake/Output, MAR, Accordion, Cardiac Rhythm SR/SB and Quality Measures. Daughter

## 2023-12-13 NOTE — PATIENT PROFILE ADULT - CAREGIVER ADDRESS
[address] [facility] [id number] [address] [facility] [id number]

## 2023-12-13 NOTE — PROGRESS NOTE ADULT - PROBLEM SELECTOR PLAN 5
- ?previous MI in [facility] in ~2008, unclear if any interventions  - pt with hx of tissue AVR/MVR    Plan:  - continue statin  - continue eliquis  -cardiology consulted f/u recs - ?previous MI in [facility] in ~2008, unclear if any interventions  - pt with hx of tissue AVR/MVR    Plan:  - continue statin  - continue eliquis  -cardiology consulted f/u recs

## 2023-12-13 NOTE — H&P ADULT - PROBLEM SELECTOR PLAN 7
Airway patent, Nasal mucosa clear. Mouth with normal mucosa. Throat has no vesicles, no oropharyngeal exudates and uvula is midline.
A1c 6.6 4/2023  - on metformin 500 mg QD    Plan:  - Hold home metformin  - ISS

## 2023-12-13 NOTE — CONSULT NOTE ADULT - ASSESSMENT
67-year-old female w/ hx of Afib (on eliquis), MVR/AVR, CAD w/ event 2009, CVA (R cerebellar infarct 11 years ago), T2DM, HTN, and HLD presenting for progressive SOB and fatigue.     Cardiac Studies:  TTE 7/28/2020: bioprosthetic mitral valve replacement, appears calcified and stenotic (mean valve gradient 8, peak 23), normal LV systolic function  TTE 9/1/2019: no significant changes   TTE 1/31/2019: bioprosthetic mitral valve replacement (mean valve gradient 7), bioprosthetic aortic valve (peak 18, mean 10), mild LAE, normal LV systolic function, mild pulmonary hypertension     # 67-year-old female w/ hx of Afib (on eliquis), MVR/AVR in 2009, ?CAD, CVA (R cerebellar infarct 11 years ago), T2DM, HTN, and HLD presenting for progressive SOB and fatigue.     Cardiac Studies:  TTE 7/28/2020: bioprosthetic mitral valve replacement, appears calcified and stenotic (mean valve gradient 8, peak 23), normal LV systolic function  TTE 9/1/2019: no significant changes   TTE 1/31/2019: bioprosthetic mitral valve replacement (mean valve gradient 7), bioprosthetic aortic valve (peak 18, mean 10), mild LAE, normal LV systolic function, mild pulmonary hypertension     #HF   - Hypervolemia, elevated proBNP  - Dyspnea i/s/o MVR/AVR in 2009, A-fib  - Could be 2/2 progressive valve disease vs. CAD vs. remodeling 2/2 a-fib    Recommendations:  - C/w lasix 40 mg BID with goal of net negative 1.5-2.5L  - Consider increasing lisinopril to 40 mg qd   - Monitor I&O, daily weights  - TTE to further evaluate cardiomyopathy    Recommendations not final until attending attestation.    67-year-old female w/ hx of Afib (on eliquis), MVR/AVR in 2009, ?CAD, CVA (R cerebellar infarct 11 years ago), T2DM, HTN, and HLD presenting for progressive SOB and fatigue.     Cardiac Studies:  TTE 7/28/2020: bioprosthetic mitral valve replacement, appears calcified and stenotic (mean valve gradient 8, peak 23), normal LV systolic function  TTE 9/1/2019: no significant changes   TTE 1/31/2019: bioprosthetic mitral valve replacement (mean valve gradient 7), bioprosthetic aortic valve (peak 18, mean 10), mild LAE, normal LV systolic function, mild pulmonary hypertension     #HF   - Hypervolemia, elevated proBNP  - Dyspnea i/s/o MVR/AVR in 2009, A-fib  - Could be 2/2 progressive valve disease vs. CAD vs. remodeling 2/2 a-fib    #A-fib  - Per outpatient records patient and family do not want pacemaker  - Continue tele  - Will speak with family further regarding goals     Recommendations:  - C/w lasix 40 mg BID with goal of net negative 1.5-2.5L  - Monitor I&O, daily weights  - Consider increasing lisinopril to 40 mg qd with appropriate holding parameters  - F/u TTE to evaluate cardiomyopathy    Recommendations not final until attending attestation.

## 2023-12-13 NOTE — H&P ADULT - PROBLEM SELECTOR PLAN 8
Mild thrombocytopenia at baseline per review of outpatient labs  -  on admission labs c/w baseline    Plan  - trend CBC  - monitor for bleeding

## 2023-12-14 ENCOUNTER — TRANSCRIPTION ENCOUNTER (OUTPATIENT)
Age: 68
End: 2023-12-14

## 2023-12-14 LAB
ANION GAP SERPL CALC-SCNC: 14 MMOL/L — SIGNIFICANT CHANGE UP (ref 7–14)
ANION GAP SERPL CALC-SCNC: 14 MMOL/L — SIGNIFICANT CHANGE UP (ref 7–14)
BUN SERPL-MCNC: 27 MG/DL — HIGH (ref 7–23)
BUN SERPL-MCNC: 27 MG/DL — HIGH (ref 7–23)
CALCIUM SERPL-MCNC: 9.7 MG/DL — SIGNIFICANT CHANGE UP (ref 8.4–10.5)
CALCIUM SERPL-MCNC: 9.7 MG/DL — SIGNIFICANT CHANGE UP (ref 8.4–10.5)
CHLORIDE SERPL-SCNC: 101 MMOL/L — SIGNIFICANT CHANGE UP (ref 98–107)
CHLORIDE SERPL-SCNC: 101 MMOL/L — SIGNIFICANT CHANGE UP (ref 98–107)
CO2 SERPL-SCNC: 27 MMOL/L — SIGNIFICANT CHANGE UP (ref 22–31)
CO2 SERPL-SCNC: 27 MMOL/L — SIGNIFICANT CHANGE UP (ref 22–31)
CREAT SERPL-MCNC: 1.01 MG/DL — SIGNIFICANT CHANGE UP (ref 0.5–1.3)
CREAT SERPL-MCNC: 1.01 MG/DL — SIGNIFICANT CHANGE UP (ref 0.5–1.3)
EGFR: 60 ML/MIN/1.73M2 — SIGNIFICANT CHANGE UP
EGFR: 60 ML/MIN/1.73M2 — SIGNIFICANT CHANGE UP
GLUCOSE SERPL-MCNC: 98 MG/DL — SIGNIFICANT CHANGE UP (ref 70–99)
GLUCOSE SERPL-MCNC: 98 MG/DL — SIGNIFICANT CHANGE UP (ref 70–99)
HCT VFR BLD CALC: 41.7 % — SIGNIFICANT CHANGE UP (ref 34.5–45)
HCT VFR BLD CALC: 41.7 % — SIGNIFICANT CHANGE UP (ref 34.5–45)
HGB BLD-MCNC: 13.7 G/DL — SIGNIFICANT CHANGE UP (ref 11.5–15.5)
HGB BLD-MCNC: 13.7 G/DL — SIGNIFICANT CHANGE UP (ref 11.5–15.5)
MAGNESIUM SERPL-MCNC: 2.1 MG/DL — SIGNIFICANT CHANGE UP (ref 1.6–2.6)
MAGNESIUM SERPL-MCNC: 2.1 MG/DL — SIGNIFICANT CHANGE UP (ref 1.6–2.6)
MCHC RBC-ENTMCNC: 30.4 PG — SIGNIFICANT CHANGE UP (ref 27–34)
MCHC RBC-ENTMCNC: 30.4 PG — SIGNIFICANT CHANGE UP (ref 27–34)
MCHC RBC-ENTMCNC: 32.9 GM/DL — SIGNIFICANT CHANGE UP (ref 32–36)
MCHC RBC-ENTMCNC: 32.9 GM/DL — SIGNIFICANT CHANGE UP (ref 32–36)
MCV RBC AUTO: 92.5 FL — SIGNIFICANT CHANGE UP (ref 80–100)
MCV RBC AUTO: 92.5 FL — SIGNIFICANT CHANGE UP (ref 80–100)
NRBC # BLD: 0 /100 WBCS — SIGNIFICANT CHANGE UP (ref 0–0)
NRBC # BLD: 0 /100 WBCS — SIGNIFICANT CHANGE UP (ref 0–0)
NRBC # FLD: 0 K/UL — SIGNIFICANT CHANGE UP (ref 0–0)
NRBC # FLD: 0 K/UL — SIGNIFICANT CHANGE UP (ref 0–0)
PHOSPHATE SERPL-MCNC: 4.2 MG/DL — SIGNIFICANT CHANGE UP (ref 2.5–4.5)
PHOSPHATE SERPL-MCNC: 4.2 MG/DL — SIGNIFICANT CHANGE UP (ref 2.5–4.5)
PLATELET # BLD AUTO: 137 K/UL — LOW (ref 150–400)
PLATELET # BLD AUTO: 137 K/UL — LOW (ref 150–400)
POTASSIUM SERPL-MCNC: 3.7 MMOL/L — SIGNIFICANT CHANGE UP (ref 3.5–5.3)
POTASSIUM SERPL-MCNC: 3.7 MMOL/L — SIGNIFICANT CHANGE UP (ref 3.5–5.3)
POTASSIUM SERPL-SCNC: 3.7 MMOL/L — SIGNIFICANT CHANGE UP (ref 3.5–5.3)
POTASSIUM SERPL-SCNC: 3.7 MMOL/L — SIGNIFICANT CHANGE UP (ref 3.5–5.3)
RBC # BLD: 4.51 M/UL — SIGNIFICANT CHANGE UP (ref 3.8–5.2)
RBC # BLD: 4.51 M/UL — SIGNIFICANT CHANGE UP (ref 3.8–5.2)
RBC # FLD: 12.9 % — SIGNIFICANT CHANGE UP (ref 10.3–14.5)
RBC # FLD: 12.9 % — SIGNIFICANT CHANGE UP (ref 10.3–14.5)
SODIUM SERPL-SCNC: 142 MMOL/L — SIGNIFICANT CHANGE UP (ref 135–145)
SODIUM SERPL-SCNC: 142 MMOL/L — SIGNIFICANT CHANGE UP (ref 135–145)
WBC # BLD: 8.75 K/UL — SIGNIFICANT CHANGE UP (ref 3.8–10.5)
WBC # BLD: 8.75 K/UL — SIGNIFICANT CHANGE UP (ref 3.8–10.5)
WBC # FLD AUTO: 8.75 K/UL — SIGNIFICANT CHANGE UP (ref 3.8–10.5)
WBC # FLD AUTO: 8.75 K/UL — SIGNIFICANT CHANGE UP (ref 3.8–10.5)

## 2023-12-14 PROCEDURE — 99232 SBSQ HOSP IP/OBS MODERATE 35: CPT

## 2023-12-14 PROCEDURE — 99233 SBSQ HOSP IP/OBS HIGH 50: CPT

## 2023-12-14 RX ORDER — SPIRONOLACTONE 25 MG/1
25 TABLET, FILM COATED ORAL DAILY
Refills: 0 | Status: DISCONTINUED | OUTPATIENT
Start: 2023-12-14 | End: 2023-12-16

## 2023-12-14 RX ORDER — LOSARTAN POTASSIUM 100 MG/1
100 TABLET, FILM COATED ORAL DAILY
Refills: 0 | Status: DISCONTINUED | OUTPATIENT
Start: 2023-12-15 | End: 2023-12-15

## 2023-12-14 RX ORDER — ACETAMINOPHEN 500 MG
650 TABLET ORAL EVERY 6 HOURS
Refills: 0 | Status: DISCONTINUED | OUTPATIENT
Start: 2023-12-14 | End: 2023-12-16

## 2023-12-14 RX ADMIN — ISOSORBIDE DINITRATE 10 MILLIGRAM(S): 5 TABLET ORAL at 17:45

## 2023-12-14 RX ADMIN — Medication 650 MILLIGRAM(S): at 10:00

## 2023-12-14 RX ADMIN — Medication 650 MILLIGRAM(S): at 09:11

## 2023-12-14 RX ADMIN — Medication 40 MILLIGRAM(S): at 05:06

## 2023-12-14 RX ADMIN — Medication 25 MILLIGRAM(S): at 13:22

## 2023-12-14 RX ADMIN — SPIRONOLACTONE 25 MILLIGRAM(S): 25 TABLET, FILM COATED ORAL at 07:40

## 2023-12-14 RX ADMIN — Medication 1: at 12:46

## 2023-12-14 RX ADMIN — ISOSORBIDE DINITRATE 10 MILLIGRAM(S): 5 TABLET ORAL at 05:06

## 2023-12-14 RX ADMIN — ISOSORBIDE DINITRATE 10 MILLIGRAM(S): 5 TABLET ORAL at 12:47

## 2023-12-14 RX ADMIN — APIXABAN 5 MILLIGRAM(S): 2.5 TABLET, FILM COATED ORAL at 05:05

## 2023-12-14 RX ADMIN — Medication 25 MILLIGRAM(S): at 05:06

## 2023-12-14 RX ADMIN — Medication 40 MILLIGRAM(S): at 17:47

## 2023-12-14 RX ADMIN — Medication 25 MILLIGRAM(S): at 21:34

## 2023-12-14 RX ADMIN — LISINOPRIL 40 MILLIGRAM(S): 2.5 TABLET ORAL at 05:06

## 2023-12-14 RX ADMIN — SIMVASTATIN 10 MILLIGRAM(S): 20 TABLET, FILM COATED ORAL at 21:34

## 2023-12-14 NOTE — DISCHARGE NOTE PROVIDER - NSDCFUSCHEDAPPT_GEN_ALL_CORE_FT
[facility]  [facility] [address]  Scheduled Appointment: 01/02/2024     [facility]  [facility] [address]  Scheduled Appointment: 01/02/2024

## 2023-12-14 NOTE — DISCHARGE NOTE PROVIDER - CARE PROVIDER_API CALL
Skip Roberto  Cardiovascular Disease  [address]  [place] [address]  Phone: [phone]  Fax: [phone]  Follow Up Time:    Skip Roberto  Cardiovascular Disease  [address]  [place] [address]  Phone: [phone]  Fax: [phone]  Follow Up Time:

## 2023-12-14 NOTE — DISCHARGE NOTE PROVIDER - HOSPITAL COURSE
66 yo F with hx Afib on eliquis, MVR and AVR 2008, CAD with prior MI, R cerebellar CVA, p/w progressive dyspnea on exertion. BLE edema on exam, EKG with TWI in II, III, V5-6, trops negative. Pro-BNP 2134. , a/w acute on chronic systolic CHF exacerbation      Problem/Plan - 1:  ·  Problem: Dyspnea on exertion.   ·  Plan: P/w progressive VIRAMONTES, lower extremity edema, clinical picture c/w acute on chronic systolic chf  - CXR with enlarged cardiac silhouette, clear lungs, troponins negative, probnp 2134  - c/w iv lasix diuresis 40 mg bid  -TTE (12/13) showed mildly decreased EF of 45-50%, global LV hypokinesis, valves unchanged from prior, severely dilated LA, RA; previous echo with worsening gradients across bioprosthetic valves, LVEF ~50% but with severe LAE, moderate TR, RVSP 45 2021 -> 59 2023.   - cardiology consulted, plan for LHC/RHC tomorrow, hold eliquis tonight  - started spironolactone 25 mg qd and start losartan 100mg qd tomorrow per Cards  - c/w iv lasix 40 bid, per Cards can transition to PO lasix tomorrow  - daily weight, strict I/O  -  monitor on tele  - K>4, Mg >2.     Problem/Plan - 2:  ·  Problem: S/P heart valve repair.   ·  Plan: - s/p bioprosthetic MVR and AVR  - TTE 2021 and 2023, per outpatient cards note had worsening gradients across bioprosthetic valves from 2021->2023     -Plan as aobve, plan for heart cath tomorrow.     Problem/Plan - 3:  ·  Problem: Atrial fibrillation.   ·  Plan: - In Afib this admission, rates <110  - not on any rate control agents, previously had slow vent response  - on eliquis 5 mg QD (BID per outpatient rx review however pt states she only takes QHS)     Plan:  - Hold eliquis tonight for heart cath tomorrow   - monitor on tele.     Problem/Plan - 4:  ·  Problem: RUQ pain.   ·  Plan: Pt not endorsing abdominal pain, however TTP RUQ on abdominal exam  - mildly elevated AST/ALT 70/99  - Tbili, alk phos WNL  - may have some component of congestive hepatopathy ISO R heart failure    Plan:   - trend AST/ALT, alk phos, Tbili, PT/INR and PTT   - RUQ US unremarkable liver and biliary duct.     Problem/Plan - 5:  ·  Problem: CAD (coronary artery disease).   ·  Plan: - ?previous MI in [facility] in ~2008, unclear if any interventions  - pt with hx of tissue AVR/MVR    Plan:  - continue asa, statin  - f/u cardiology.     Problem/Plan - 6:  ·  Problem: Hypertension.   ·  Plan: Home meds: HCTZ 12.5 mg QD, lisinopril 20 mg QD    Plan:  - vitals q4h  - changed lisinopril to losartan 100 qd per Cards  - resume home HCTZ  - BP improved with diuresis and addition of hydralazine 25 mg tid and isordil 10 tid, titrate prn.     Problem/Plan - 7:  ·  Problem: Type 2 diabetes mellitus.   ·  Plan: A1c 6.6 4/2023  - on metformin 500 mg QD    Plan:  - Hold home metformin  - ISS.   66 yo F with hx Afib on eliquis, MVR and AVR 2008, CAD with prior MI, R cerebellar CVA, p/w progressive dyspnea on exertion. BLE edema on exam, EKG with TWI in II, III, V5-6, trops negative. Pro-BNP 2134. , a/w acute on chronic systolic CHF exacerbation      Problem/Plan - 1:  ·  Problem: Dyspnea on exertion.   ·  Plan: P/w progressive VIRAMONTES, lower extremity edema, clinical picture c/w acute on chronic systolic chf  - CXR with enlarged cardiac silhouette, clear lungs, troponins negative, probnp 2134  - c/w iv lasix diuresis 40 mg bid  -TTE (12/13) showed mildly decreased EF of 45-50%, global LV hypokinesis, valves unchanged from prior, severely dilated LA, RA; previous echo with worsening gradients across bioprosthetic valves, LVEF ~50% but with severe LAE, moderate TR, RVSP 45 2021 -> 59 2023.   - cardiology consulted, plan for LHC/RHC tomorrow, hold eliquis tonight  - started spironolactone 25 mg qd and start losartan 100mg qd tomorrow per Cards  - c/w iv lasix 40 bid, per Cards can transition to PO lasix tomorrow  - daily weight, strict I/O  -  monitor on tele  - K>4, Mg >2.     Problem/Plan - 2:  ·  Problem: S/P heart valve repair.   ·  Plan: - s/p bioprosthetic MVR and AVR  - TTE 2021 and 2023, per outpatient cards note had worsening gradients across bioprosthetic valves from 2021->2023     -Plan as aobve, plan for heart cath tomorrow.     Problem/Plan - 3:  ·  Problem: Atrial fibrillation.   ·  Plan: - In Afib this admission, rates <110  - not on any rate control agents, previously had slow vent response  - on eliquis 5 mg QD (BID per outpatient rx review however pt states she only takes QHS)     Plan:  - Hold eliquis tonight for heart cath tomorrow   - monitor on tele.     Problem/Plan - 4:  ·  Problem: RUQ pain.   ·  Plan: Pt not endorsing abdominal pain, however TTP RUQ on abdominal exam  - mildly elevated AST/ALT 70/99  - Tbili, alk phos WNL  - may have some component of congestive hepatopathy ISO R heart failure    Plan:   - trend AST/ALT, alk phos, Tbili, PT/INR and PTT   - RUQ US unremarkable liver and biliary duct.     Problem/Plan - 5:  ·  Problem: CAD (coronary artery disease).   ·  Plan: - ?previous MI in [facility] in ~2008, unclear if any interventions  - pt with hx of tissue AVR/MVR    Plan:  - continue asa, statin  - f/u cardiology.     Problem/Plan - 6:  ·  Problem: Hypertension.   ·  Plan: Home meds: HCTZ 12.5 mg QD, lisinopril 20 mg QD    Plan:  - vitals q4h  - changed lisinopril to losartan 100 qd per Cards  - resume home HCTZ  - BP improved with diuresis and addition of hydralazine 25 mg tid and isordil 10 tid, titrate prn.     Problem/Plan - 7:  ·  Problem: Type 2 diabetes mellitus.   ·  Plan: A1c 6.6 4/2023  - on metformin 500 mg QD    Plan:  - Hold home metformin  - ISS.     66 yo F with hx Afib on eliquis, MVR and AVR 2008, CAD with prior MI, R cerebellar CVA, p/w progressive dyspnea on exertion. BLE edema on exam, EKG with TWI in II, III, V5-6, trops negative. Pro-BNP 2134. , a/w acute on chronic systolic CHF exacerbation        Problem/Plan - 1:  ·  Problem: Dyspnea on exertion.   ·  Plan: P/w progressive VIRAMONTES, lower extremity edema, clinical picture c/w acute on chronic systolic chf  - CXR with enlarged cardiac silhouette, clear lungs, troponins negative, probnp 2134  - s/p iv lasix diuresis 40 mg bid  -TTE (12/13) showed mildly decreased EF of 45-50%, global LV hypokinesis, valves unchanged from prior, severely dilated LA, RA; previous echo with worsening gradients across bioprosthetic valves, LVEF ~50% but with severe LAE, moderate TR, RVSP 45 2021 -> 59 2023.   - s/p RHC/LHC on 12/15 mild mLAD disease, RHC PCWP 15  - lasix changed to 40 mg po qd  - started on spironolactone 25 mg qd and Entresto 49/51 mg bid per Cards  - dispo: DC home today, updated pt and her daughter at bedside on1 2/16  , oupt f/u cardiology Dr. Skip Roberto.   Time spent on discharge 32 minutes coordinating discharge plan and discussing with patient and family.     Problem/Plan - 2:  ·  Problem: S/P heart valve repair.   ·  Plan: - s/p bioprosthetic MVR and AVR  - TTE 2021 and 2023, per outpatient cards note had worsening gradients across bioprosthetic valves from 2021->2023   -heart cath as above, mildly elevated wedge pressure and mild nonobstructive LAD disease, medical management.     Problem/Plan - 3:  ·  Problem: Atrial fibrillation.   ·  Plan: - In Afib this admission, rates <110  - not on any rate control agents, previously had slow vent response  - on eliquis 5 mg QD (BID per outpatient rx review however pt states she only takes QHS)     Plan:  - Resumed on eliquis post cath  - monitor on tele.     Problem/Plan - 4:  ·  Problem: RUQ pain.   ·  Plan: Pt not endorsing abdominal pain, however TTP RUQ on abdominal exam  - mildly elevated AST/ALT 70/99  - Tbili, alk phos WNL  - may have some component of congestive hepatopathy ISO R heart failure    Plan:   - trend AST/ALT, alk phos, Tbili, PT/INR and PTT   - RUQ US unremarkable liver and biliary duct.     Problem/Plan - 5:  ·  Problem: CAD (coronary artery disease).   ·  Plan: - ?previous MI in [facility] in ~2008, unclear if any interventions  - pt with hx of tissue AVR/MVR    Plan:  - continue asa, statin  - f/u cardiology.     Problem/Plan - 6:  ·  Problem: Hypertension.   ·  Plan: Home meds: HCTZ 12.5 mg QD, lisinopril 20 mg QD    Plan:  -BP controlled  - changed lisinopril to Entresto per Cards  - stop Hctz on discharge  - started hydralazine 25 mg tid and isordil 10 tid, titrate prn.     Problem/Plan - 7:  ·  Problem: Type 2 diabetes mellitus.   ·  Plan: A1c 6.2  - on metformin 500 mg QD    Plan:  - resume home metformin on DC  - ISS.     Problem/Plan - 8:  ·  Problem: Thrombocytopenia.   ·  Plan: Mild thrombocytopenia at baseline per review of outpatient labs  -  on admission labs c/w baseline   68 yo F with hx Afib on eliquis, MVR and AVR 2008, CAD with prior MI, R cerebellar CVA, p/w progressive dyspnea on exertion. BLE edema on exam, EKG with TWI in II, III, V5-6, trops negative. Pro-BNP 2134. , a/w acute on chronic systolic CHF exacerbation        Problem/Plan - 1:  ·  Problem: Dyspnea on exertion.   ·  Plan: P/w progressive VIRAMONTES, lower extremity edema, clinical picture c/w acute on chronic systolic chf  - CXR with enlarged cardiac silhouette, clear lungs, troponins negative, probnp 2134  - s/p iv lasix diuresis 40 mg bid  -TTE (12/13) showed mildly decreased EF of 45-50%, global LV hypokinesis, valves unchanged from prior, severely dilated LA, RA; previous echo with worsening gradients across bioprosthetic valves, LVEF ~50% but with severe LAE, moderate TR, RVSP 45 2021 -> 59 2023.   - s/p RHC/LHC on 12/15 mild mLAD disease, RHC PCWP 15  - lasix changed to 40 mg po qd  - started on spironolactone 25 mg qd and Entresto 49/51 mg bid per Cards  - dispo: DC home today, updated pt and her daughter at bedside on1 2/16  , oupt f/u cardiology Dr. Skip Roberto.   Time spent on discharge 32 minutes coordinating discharge plan and discussing with patient and family.     Problem/Plan - 2:  ·  Problem: S/P heart valve repair.   ·  Plan: - s/p bioprosthetic MVR and AVR  - TTE 2021 and 2023, per outpatient cards note had worsening gradients across bioprosthetic valves from 2021->2023   -heart cath as above, mildly elevated wedge pressure and mild nonobstructive LAD disease, medical management.     Problem/Plan - 3:  ·  Problem: Atrial fibrillation.   ·  Plan: - In Afib this admission, rates <110  - not on any rate control agents, previously had slow vent response  - on eliquis 5 mg QD (BID per outpatient rx review however pt states she only takes QHS)     Plan:  - Resumed on eliquis post cath  - monitor on tele.     Problem/Plan - 4:  ·  Problem: RUQ pain.   ·  Plan: Pt not endorsing abdominal pain, however TTP RUQ on abdominal exam  - mildly elevated AST/ALT 70/99  - Tbili, alk phos WNL  - may have some component of congestive hepatopathy ISO R heart failure    Plan:   - trend AST/ALT, alk phos, Tbili, PT/INR and PTT   - RUQ US unremarkable liver and biliary duct.     Problem/Plan - 5:  ·  Problem: CAD (coronary artery disease).   ·  Plan: - ?previous MI in [facility] in ~2008, unclear if any interventions  - pt with hx of tissue AVR/MVR    Plan:  - continue asa, statin  - f/u cardiology.     Problem/Plan - 6:  ·  Problem: Hypertension.   ·  Plan: Home meds: HCTZ 12.5 mg QD, lisinopril 20 mg QD    Plan:  -BP controlled  - changed lisinopril to Entresto per Cards  - stop Hctz on discharge  - started hydralazine 25 mg tid and isordil 10 tid, titrate prn.     Problem/Plan - 7:  ·  Problem: Type 2 diabetes mellitus.   ·  Plan: A1c 6.2  - on metformin 500 mg QD    Plan:  - resume home metformin on DC  - ISS.     Problem/Plan - 8:  ·  Problem: Thrombocytopenia.   ·  Plan: Mild thrombocytopenia at baseline per review of outpatient labs  -  on admission labs c/w baseline

## 2023-12-14 NOTE — PROGRESS NOTE ADULT - ASSESSMENT
68 yo F with hx Afib on eliquis, MVR and AVR 2008, CAD with prior MI, R cerebellar CVA, p/w progressive dyspnea on exertion. BLE edema on exam, EKG with TWI in II, III, V5-6, trops negative. Pro-BNP 2134. , a/w acute on chronic systolic CHF exacerbation

## 2023-12-14 NOTE — PROGRESS NOTE ADULT - SUBJECTIVE AND OBJECTIVE BOX
*******************************  Yulia Choi MD (PGY-1)  Internal Medicine  Contact via [employer] TEAMS  *******************************    INTERVAL HPI/OVERNIGHT EVENTS:      OBJECTIVE  T(C): 37 (12-14-23 @ 05:01), Max: 37 (12-13-23 @ 21:22)  HR: 89 (12-14-23 @ 07:38) (62 - 89)  BP: 118/61 (12-14-23 @ 07:38) (118/61 - 165/70)  RR: 17 (12-14-23 @ 05:01) (16 - 18)  SpO2: 100% (12-14-23 @ 05:01) (96% - 100%)    12-13-23 @ 07:01  -  12-14-23 @ 07:00  --------------------------------------------------------  IN: 360 mL / OUT: 300 mL / NET: 60 mL        PHYSICAL EXAM  General:  HEENT:  Cardiovascular:  Pulmonary:  GI:  :  Neuro:  Psych:          IMAGING:     *******************************  Yulia Choi MD (PGY-1)  Internal Medicine  Contact via [employer] TEAMS  *******************************    INTERVAL HPI/OVERNIGHT EVENTS:  No acute overnight events. Patient notes her leg swelling has decreased and her breathing is improved. Denies chest pain, palpitations, fever/chills.    OBJECTIVE  T(C): 37 (12-14-23 @ 05:01), Max: 37 (12-13-23 @ 21:22)  HR: 89 (12-14-23 @ 07:38) (62 - 89)  BP: 118/61 (12-14-23 @ 07:38) (118/61 - 165/70)  RR: 17 (12-14-23 @ 05:01) (16 - 18)  SpO2: 100% (12-14-23 @ 05:01) (96% - 100%)    12-13-23 @ 07:01  -  12-14-23 @ 07:00  --------------------------------------------------------  IN: 360 mL / OUT: 300 mL / NET: 60 mL        PHYSICAL EXAM  GENERAL: NAD, sitting up in bed comfortably  EYES: EOMI, PERRLA, conjunctiva and sclera clear  NECK: Supple, trachea midline, no JVD  HEART: Regular rate and rhythm, no murmurs, rubs, or gallops  LUNGS: Unlabored respirations.  Clear to auscultation bilaterally, no crackles, wheezing, or rhonchi  ABDOMEN: Soft, nontender, nondistended, +BS  EXTREMITIES: 2+ peripheral pulses bilaterally. No clubbing, cyanosis, or edema  NERVOUS SYSTEM:  A&Ox3, moving all extremities, no focal deficits       IMAGING:    < from: TTE W or WO Ultrasound Enhancing Agent (12.13.23 @ 15:34) >      1. The left ventricular cavity is normal. Left ventricular wall thickness is normal. Left ventricular systolic function is mildly decreased with an ejection fraction visually estimated at 45 to 50%. There is global left ventricular hypokinesis. The left ventricular diastolic function is indeterminate. Analysis of left ventricular diastolic function and filling pressure is made challenging by the presence of prosthetic mitral valve.   2. Normalright ventricular cavity size and probably normal systolic function.   3. Bioprosthetic valve is present in the mitral position. The transmitral peak gradient is 18.4 mmHg and mean gradient is 6.13 mmHg. There is mild to moderate mitral regurgitation.   4. A bioprosthetic valve replacement present in the aortic position. The peak transaortic velocity is 3.07 m/s, peak transaortic gradient is 37.6 mmHg and mean transaortic gradient is 21.6 mmHg with an LVOT/aortic valve VTI ratio of 0.47. There ismild to moderate aortic regurgitation.   5. The left atrium is severely dilated with an indexed volume of 54 ml/m².   6. The right atrium is severely dilated in size with an indexed volume of 59.29 ml/m² and an indexed area of 15.82 cm²/m².   7. Estimated pulmonary artery systolic pressure is 57 mmHg, consistent with moderate pulmonary hypertension.   8. No prior echocardiogram is available for comparison.    < end of copied text >

## 2023-12-14 NOTE — DISCHARGE NOTE PROVIDER - CARE PROVIDERS DIRECT ADDRESSES
,[email or web address]

## 2023-12-14 NOTE — PROGRESS NOTE ADULT - ASSESSMENT
67-year-old female w/ hx of Afib (on eliquis), MVR/AVR in 2009, ?CAD, CVA (R cerebellar infarct 11 years ago), T2DM, HTN, and HLD presenting for progressive SOB and fatigue, found to have HFrEF with EF of 45%.     Recommendations:  #HFrEF  -Plan for LHC/RHC tomorrow. Please hold eliquis tonight.  -TTE 12/13 showed mildly decreased EF of 45%, global LV hypokinesis, valves unchanged from prior, severely dilated LA, RA  -Stop lisinopril today  -Start losartan 100 mg PO qd tomorrow 12/15, with goal to switch to Entresto after 36h washout period of ACE  -Start spironolactone 25 mg PO qd  -C/w IV lasix 40 mg BID, with diuresis goal net negative of 1.5-2.5L  -Monitor I&O, daily weights    #Afib  -Tele: 80s, atrial fibrillation  -Hold eliquis tonight for cath tomorrow    Case discussed with Dr. Morrison, please see attending attestation for final recommendations.  67-year-old female w/ hx of Afib (on eliquis), MVR/AVR in 2009, ?CAD, CVA (R cerebellar infarct 11 years ago), T2DM, HTN, and HLD presenting for progressive SOB and fatigue, found to have HFrEF with EF of 45%.     Recommendations:  #HFrEF  -Plan for LHC/RHC tomorrow. Please hold eliquis tonight.  -TTE 12/13 showed mildly decreased EF of 45%, global LV hypokinesis, valves unchanged from prior, severely dilated LA, RA  -Stop lisinopril today  -Start losartan 100 mg PO qd tomorrow 12/15, with goal to switch to Entresto after 36h washout period of ACE  -Start spironolactone 25 mg PO qd  -C/w IV furosemide today, can transition to PO lasix starting tomorrow 12/15.   -Monitor I&O, daily weights    #Afib  -Tele: 80s, atrial fibrillation  -Hold eliquis tonight for cath tomorrow    Case discussed with Dr. Morrison, please see attending attestation for final recommendations.

## 2023-12-14 NOTE — DISCHARGE NOTE PROVIDER - NSDCMRMEDTOKEN_GEN_ALL_CORE_FT
Eliquis 5 mg oral tablet: 1 tab(s) orally every 12 hours  hydroCHLOROthiazide 12.5 mg oral tablet: 1 tab(s) orally once a day  lisinopril 10 mg oral tablet: 1 tab(s) orally once a day  metFORMIN 500 mg oral tablet, extended release: 1 tab(s) orally once a day  simvastatin 10 mg oral tablet: 1 tab(s) orally once a day (at bedtime)  Vitamin D2 2000 intl units (50 mcg) oral capsule: 1  orally once a day   Eliquis 5 mg oral tablet: 1 tab(s) orally every 12 hours  furosemide 40 mg oral tablet: 1 tab(s) orally once a day  hydrALAZINE 25 mg oral tablet: 1 tab(s) orally 3 times a day  isosorbide dinitrate 10 mg oral tablet: 1 tab(s) orally 3 times a day  metFORMIN 500 mg oral tablet, extended release: 1 tab(s) orally once a day  sacubitril-valsartan 49 mg-51 mg oral tablet: 1 tab(s) orally 2 times a day  simvastatin 10 mg oral tablet: 1 tab(s) orally once a day (at bedtime)  spironolactone 25 mg oral tablet: 1 tab(s) orally once a day  Vitamin D2 2000 intl units (50 mcg) oral capsule: 1  orally once a day

## 2023-12-14 NOTE — PROGRESS NOTE ADULT - SUBJECTIVE AND OBJECTIVE BOX
Dr. Nasra Lyon  Pager [phone]    PROGRESS NOTE:     Patient is a 67y old  Female who presents with a chief complaint of dyspnea (14 Dec 2023 08:29)      SUBJECTIVE / OVERNIGHT EVENTS: pt reports breathing better with diuresis   ADDITIONAL REVIEW OF SYSTEMS: afebrile , no chest pain    MEDICATIONS  (STANDING):  apixaban 5 milliGRAM(s) Oral two times a day  dextrose 5%. 1000 milliLiter(s) (50 mL/Hr) IV Continuous <Continuous>  dextrose 5%. 1000 milliLiter(s) (100 mL/Hr) IV Continuous <Continuous>  dextrose 50% Injectable 12.5 Gram(s) IV Push once  dextrose 50% Injectable 25 Gram(s) IV Push once  dextrose 50% Injectable 25 Gram(s) IV Push once  furosemide   Injectable 40 milliGRAM(s) IV Push every 12 hours  glucagon  Injectable 1 milliGRAM(s) IntraMuscular once  hydrALAZINE 25 milliGRAM(s) Oral three times a day  hydrochlorothiazide 12.5 milliGRAM(s) Oral daily  insulin lispro (ADMELOG) corrective regimen sliding scale   SubCutaneous three times a day before meals  isosorbide   dinitrate Tablet (ISORDIL) 10 milliGRAM(s) Oral three times a day  simvastatin 10 milliGRAM(s) Oral at bedtime  spironolactone 25 milliGRAM(s) Oral daily    MEDICATIONS  (PRN):  acetaminophen     Tablet .. 650 milliGRAM(s) Oral every 6 hours PRN Mild Pain (1 - 3), Moderate Pain (4 - 6)  dextrose Oral Gel 15 Gram(s) Oral once PRN Blood Glucose LESS THAN 70 milliGRAM(s)/deciliter  melatonin 3 milliGRAM(s) Oral at bedtime PRN Insomnia      CAPILLARY BLOOD GLUCOSE      POCT Blood Glucose.: 169 mg/dL (14 Dec 2023 12:27)  POCT Blood Glucose.: 140 mg/dL (14 Dec 2023 08:23)  POCT Blood Glucose.: 127 mg/dL (13 Dec 2023 21:26)  POCT Blood Glucose.: 124 mg/dL (13 Dec 2023 16:59)    I&O's Summary    13 Dec 2023 07:01  -  14 Dec 2023 07:00  --------------------------------------------------------  IN: 360 mL / OUT: 300 mL / NET: 60 mL        PHYSICAL EXAM:  Vital Signs Last 24 Hrs  T(C): 37 (14 Dec 2023 05:01), Max: 37 (13 Dec 2023 21:22)  T(F): 98.6 (14 Dec 2023 05:01), Max: 98.6 (13 Dec 2023 21:22)  HR: 89 (14 Dec 2023 07:38) (62 - 89)  BP: 118/61 (14 Dec 2023 07:38) (118/61 - 165/70)  BP(mean): --  RR: 17 (14 Dec 2023 05:01) (16 - 18)  SpO2: 100% (14 Dec 2023 05:01) (96% - 100%)    Parameters below as of 14 Dec 2023 05:01  Patient On (Oxygen Delivery Method): room air      CONSTITUTIONAL: NAD, well-developed  RESPIRATORY: Normal respiratory effort; lungs are clear to auscultation bilaterally  CARDIOVASCULAR: Regular rate and rhythm, normal S1 and S2, no murmur/rub/gallop; 1+ pitting lower extremity edema; Peripheral pulses are 2+ bilaterally  ABDOMEN: minimal ruq ttp,  normoactive bowel sounds, no rebound/guarding; No hepatosplenomegaly  MUSCULOSKELETAL: no clubbing or cyanosis of digits; no joint swelling or tenderness to palpation  PSYCH: A+O to person, place, and time; affect appropriate      LABS:                        13.7   8.75  )-----------( 137      ( 14 Dec 2023 05:09 )             41.7     12-14    142  |  101  |  27<H>  ----------------------------<  98  3.7   |  27  |  1.01    Ca    9.7      14 Dec 2023 05:09  Phos  4.2     12-14  Mg     2.10     12-14    TPro  6.5  /  Alb  3.9  /  TBili  0.6  /  DBili  x   /  AST  81<H>  /  ALT  106<H>  /  AlkPhos  112  12-13    PT/INR - ( 13 Dec 2023 06:04 )   PT: 16.0 sec;   INR: 1.44 ratio         PTT - ( 13 Dec 2023 06:04 )  PTT:33.1 sec      Urinalysis Basic - ( 14 Dec 2023 05:09 )    Color: x / Appearance: x / SG: x / pH: x  Gluc: 98 mg/dL / Ketone: x  / Bili: x / Urobili: x   Blood: x / Protein: x / Nitrite: x   Leuk Esterase: x / RBC: x / WBC x   Sq Epi: x / Non Sq Epi: x / Bacteria: x        Culture - Urine (collected 12 Dec 2023 18:49)  Source: Clean Catch Clean Catch (Midstream)  Final Report (13 Dec 2023 23:22):    <10,000 CFU/mL Normal Urogenital Marielena        RADIOLOGY & ADDITIONAL TESTS:  Results Reviewed:   Imaging Personally Reviewed:  < from: TTE W or WO Ultrasound Enhancing Agent (12.13.23 @ 15:34) >   1. The left ventricular cavity is normal. Left ventricular wall thickness is normal. Left ventricular systolic function is mildly decreased with an ejection fraction visually estimated at 45 to 50%. There is global left ventricular hypokinesis. The left ventricular diastolic function is indeterminate. Analysis of left ventricular diastolic function and filling pressure is made challenging by the presence of prosthetic mitral valve.   2. Normalright ventricular cavity size and probably normal systolic function.   3. Bioprosthetic valve is present in the mitral position. The transmitral peak gradient is 18.4 mmHg and mean gradient is 6.13 mmHg. There is mild to moderate mitral regurgitation.   4. A bioprosthetic valve replacement present in the aortic position. The peak transaortic velocity is 3.07 m/s, peak transaortic gradient is 37.6 mmHg and mean transaortic gradient is 21.6 mmHg with an LVOT/aortic valve VTI ratio of 0.47. There ismild to moderate aortic regurgitation.   5. The left atrium is severely dilated with an indexed volume of 54 ml/m².   6. The right atrium is severely dilated in size with an indexed volume of 59.29 ml/m² and an indexed area of 15.82 cm²/m².   7. Estimated pulmonary artery systolic pressure is 57 mmHg, consistent with moderate pulmonary hypertension.   8. No prior echocardiogram is available for comparison.      Electrocardiogram Personally Reviewed:    COORDINATION OF CARE:  Care Discussed with Consultants/Other Providers [Y/N]:  Prior or Outpatient Records Reviewed [Y/N]:   Dr. Nasra Lyon  Pager [phone]    PROGRESS NOTE:     Patient is a 67y old  Female who presents with a chief complaint of dyspnea (14 Dec 2023 08:29)      SUBJECTIVE / OVERNIGHT EVENTS: pt reports breathing better with diuresis   ADDITIONAL REVIEW OF SYSTEMS: afebrile , no chest pain    MEDICATIONS  (STANDING):  apixaban 5 milliGRAM(s) Oral two times a day  dextrose 5%. 1000 milliLiter(s) (50 mL/Hr) IV Continuous <Continuous>  dextrose 5%. 1000 milliLiter(s) (100 mL/Hr) IV Continuous <Continuous>  dextrose 50% Injectable 12.5 Gram(s) IV Push once  dextrose 50% Injectable 25 Gram(s) IV Push once  dextrose 50% Injectable 25 Gram(s) IV Push once  furosemide   Injectable 40 milliGRAM(s) IV Push every 12 hours  glucagon  Injectable 1 milliGRAM(s) IntraMuscular once  hydrALAZINE 25 milliGRAM(s) Oral three times a day  hydrochlorothiazide 12.5 milliGRAM(s) Oral daily  insulin lispro (ADMELOG) corrective regimen sliding scale   SubCutaneous three times a day before meals  isosorbide   dinitrate Tablet (ISORDIL) 10 milliGRAM(s) Oral three times a day  simvastatin 10 milliGRAM(s) Oral at bedtime  spironolactone 25 milliGRAM(s) Oral daily    MEDICATIONS  (PRN):  acetaminophen     Tablet .. 650 milliGRAM(s) Oral every 6 hours PRN Mild Pain (1 - 3), Moderate Pain (4 - 6)  dextrose Oral Gel 15 Gram(s) Oral once PRN Blood Glucose LESS THAN 70 milliGRAM(s)/deciliter  melatonin 3 milliGRAM(s) Oral at bedtime PRN Insomnia      CAPILLARY BLOOD GLUCOSE      POCT Blood Glucose.: 169 mg/dL (14 Dec 2023 12:27)  POCT Blood Glucose.: 140 mg/dL (14 Dec 2023 08:23)  POCT Blood Glucose.: 127 mg/dL (13 Dec 2023 21:26)  POCT Blood Glucose.: 124 mg/dL (13 Dec 2023 16:59)    I&O's Summary    13 Dec 2023 07:01  -  14 Dec 2023 07:00  --------------------------------------------------------  IN: 360 mL / OUT: 300 mL / NET: 60 mL        PHYSICAL EXAM:  Vital Signs Last 24 Hrs  T(C): 37 (14 Dec 2023 05:01), Max: 37 (13 Dec 2023 21:22)  T(F): 98.6 (14 Dec 2023 05:01), Max: 98.6 (13 Dec 2023 21:22)  HR: 89 (14 Dec 2023 07:38) (62 - 89)  BP: 118/61 (14 Dec 2023 07:38) (118/61 - 165/70)  BP(mean): --  RR: 17 (14 Dec 2023 05:01) (16 - 18)  SpO2: 100% (14 Dec 2023 05:01) (96% - 100%)    Parameters below as of 14 Dec 2023 05:01  Patient On (Oxygen Delivery Method): room air      CONSTITUTIONAL: NAD, well-developed  RESPIRATORY: Normal respiratory effort; lungs are clear to auscultation bilaterally  CARDIOVASCULAR: Regular rate and rhythm, normal S1 and S2, no murmur/rub/gallop; 1+ pitting lower extremity edema; Peripheral pulses are 2+ bilaterally  ABDOMEN: minimal ruq ttp,  normoactive bowel sounds, no rebound/guarding; No hepatosplenomegaly  MUSCULOSKELETAL: no clubbing or cyanosis of digits; no joint swelling or tenderness to palpation  PSYCH: A+O to person, place, and time; affect appropriate      LABS:                        13.7   8.75  )-----------( 137      ( 14 Dec 2023 05:09 )             41.7     12-14    142  |  101  |  27<H>  ----------------------------<  98  3.7   |  27  |  1.01    Ca    9.7      14 Dec 2023 05:09  Phos  4.2     12-14  Mg     2.10     12-14    TPro  6.5  /  Alb  3.9  /  TBili  0.6  /  DBili  x   /  AST  81<H>  /  ALT  106<H>  /  AlkPhos  112  12-13    PT/INR - ( 13 Dec 2023 06:04 )   PT: 16.0 sec;   INR: 1.44 ratio         PTT - ( 13 Dec 2023 06:04 )  PTT:33.1 sec      Urinalysis Basic - ( 14 Dec 2023 05:09 )    Color: x / Appearance: x / SG: x / pH: x  Gluc: 98 mg/dL / Ketone: x  / Bili: x / Urobili: x   Blood: x / Protein: x / Nitrite: x   Leuk Esterase: x / RBC: x / WBC x   Sq Epi: x / Non Sq Epi: x / Bacteria: x        Culture - Urine (collected 12 Dec 2023 18:49)  Source: Clean Catch Clean Catch (Midstream)  Final Report (13 Dec 2023 23:22):    <10,000 CFU/mL Normal Urogenital Marielena        RADIOLOGY & ADDITIONAL TESTS:  Results Reviewed:   Imaging Personally Reviewed:  < from: TTE W or WO Ultrasound Enhancing Agent (12.13.23 @ 15:34) >   1. The left ventricular cavity is normal. Left ventricular wall thickness is normal. Left ventricular systolic function is mildly decreased with an ejection fraction visually estimated at 45 to 50%. There is global left ventricular hypokinesis. The left ventricular diastolic function is indeterminate. Analysis of left ventricular diastolic function and filling pressure is made challenging by the presence of prosthetic mitral valve.   2. Normalright ventricular cavity size and probably normal systolic function.   3. Bioprosthetic valve is present in the mitral position. The transmitral peak gradient is 18.4 mmHg and mean gradient is 6.13 mmHg. There is mild to moderate mitral regurgitation.   4. A bioprosthetic valve replacement present in the aortic position. The peak transaortic velocity is 3.07 m/s, peak transaortic gradient is 37.6 mmHg and mean transaortic gradient is 21.6 mmHg with an LVOT/aortic valve VTI ratio of 0.47. There ismild to moderate aortic regurgitation.   5. The left atrium is severely dilated with an indexed volume of 54 ml/m².   6. The right atrium is severely dilated in size with an indexed volume of 59.29 ml/m² and an indexed area of 15.82 cm²/m².   7. Estimated pulmonary artery systolic pressure is 57 mmHg, consistent with moderate pulmonary hypertension.   8. No prior echocardiogram is available for comparison.      Electrocardiogram Personally Reviewed:    COORDINATION OF CARE:  Care Discussed with Consultants/Other Providers [Y/N]:  Prior or Outpatient Records Reviewed [Y/N]:

## 2023-12-14 NOTE — DISCHARGE NOTE PROVIDER - NSDCCPCAREPLAN_GEN_ALL_CORE_FT
PRINCIPAL DISCHARGE DIAGNOSIS  Diagnosis: Shortness of breath  Assessment and Plan of Treatment: you had congestive heart failure, you had echocardiogram and heart cath done showed mild nonobstructive coronary artery disease, your medications were adjusted, stop taking hydrochlorthiazie and lisinopril and start taking lasix, spironolactone, entresto, hydralazine and isordil as directed.  low salt diet, f/u with your cardiologist Dr. Skip Roberto      SECONDARY DISCHARGE DIAGNOSES  Diagnosis: Palpitations  Assessment and Plan of Treatment:

## 2023-12-14 NOTE — PROGRESS NOTE ADULT - PROBLEM SELECTOR PLAN 6
Home meds: HCTZ 12.5 mg QD, lisinopril 20 mg QD    Plan:  - vitals q4h  - changed lisinopril to losartan 100 qd per Cards  - resume home HCTZ  - BP improved with diuresis and addition of hydralazine 25 mg tid and isordil 10 tid, titrate prn

## 2023-12-14 NOTE — PROGRESS NOTE ADULT - PROBLEM SELECTOR PLAN 5
- ?previous MI in [facility] in ~2008, unclear if any interventions  - pt with hx of tissue AVR/MVR    Plan:  - continue asa, statin  - f/u cardiology - ?previous MI in [facility] in ~2008, unclear if any interventions  - pt with hx of tissue AVR/MVR    Plan:  - continue asa, statin  - f/u cardiology

## 2023-12-14 NOTE — CHART NOTE - NSCHARTNOTEFT_GEN_A_CORE
Patient requires the use of a Rolling walker to complete MRADLs in the home.     Juanito Preston PA-C  Department of Medicine  Pager #[phone] Patient requires the use of a Rolling walker to complete MRADLs in the home.     Juanito Preston PA-C  Department of Medicine  Pager #[phone]

## 2023-12-15 LAB
ANION GAP SERPL CALC-SCNC: 12 MMOL/L — SIGNIFICANT CHANGE UP (ref 7–14)
ANION GAP SERPL CALC-SCNC: 12 MMOL/L — SIGNIFICANT CHANGE UP (ref 7–14)
BUN SERPL-MCNC: 29 MG/DL — HIGH (ref 7–23)
BUN SERPL-MCNC: 29 MG/DL — HIGH (ref 7–23)
CALCIUM SERPL-MCNC: 9.9 MG/DL — SIGNIFICANT CHANGE UP (ref 8.4–10.5)
CALCIUM SERPL-MCNC: 9.9 MG/DL — SIGNIFICANT CHANGE UP (ref 8.4–10.5)
CHLORIDE SERPL-SCNC: 100 MMOL/L — SIGNIFICANT CHANGE UP (ref 98–107)
CHLORIDE SERPL-SCNC: 100 MMOL/L — SIGNIFICANT CHANGE UP (ref 98–107)
CO2 SERPL-SCNC: 27 MMOL/L — SIGNIFICANT CHANGE UP (ref 22–31)
CO2 SERPL-SCNC: 27 MMOL/L — SIGNIFICANT CHANGE UP (ref 22–31)
CREAT SERPL-MCNC: 1.06 MG/DL — SIGNIFICANT CHANGE UP (ref 0.5–1.3)
CREAT SERPL-MCNC: 1.06 MG/DL — SIGNIFICANT CHANGE UP (ref 0.5–1.3)
EGFR: 57 ML/MIN/1.73M2 — LOW
EGFR: 57 ML/MIN/1.73M2 — LOW
GLUCOSE SERPL-MCNC: 132 MG/DL — HIGH (ref 70–99)
GLUCOSE SERPL-MCNC: 132 MG/DL — HIGH (ref 70–99)
HCT VFR BLD CALC: 45.2 % — HIGH (ref 34.5–45)
HCT VFR BLD CALC: 45.2 % — HIGH (ref 34.5–45)
HGB BLD-MCNC: 14.9 G/DL — SIGNIFICANT CHANGE UP (ref 11.5–15.5)
HGB BLD-MCNC: 14.9 G/DL — SIGNIFICANT CHANGE UP (ref 11.5–15.5)
MAGNESIUM SERPL-MCNC: 2.3 MG/DL — SIGNIFICANT CHANGE UP (ref 1.6–2.6)
MAGNESIUM SERPL-MCNC: 2.3 MG/DL — SIGNIFICANT CHANGE UP (ref 1.6–2.6)
MCHC RBC-ENTMCNC: 29.8 PG — SIGNIFICANT CHANGE UP (ref 27–34)
MCHC RBC-ENTMCNC: 29.8 PG — SIGNIFICANT CHANGE UP (ref 27–34)
MCHC RBC-ENTMCNC: 33 GM/DL — SIGNIFICANT CHANGE UP (ref 32–36)
MCHC RBC-ENTMCNC: 33 GM/DL — SIGNIFICANT CHANGE UP (ref 32–36)
MCV RBC AUTO: 90.4 FL — SIGNIFICANT CHANGE UP (ref 80–100)
MCV RBC AUTO: 90.4 FL — SIGNIFICANT CHANGE UP (ref 80–100)
NRBC # BLD: 0 /100 WBCS — SIGNIFICANT CHANGE UP (ref 0–0)
NRBC # BLD: 0 /100 WBCS — SIGNIFICANT CHANGE UP (ref 0–0)
NRBC # FLD: 0 K/UL — SIGNIFICANT CHANGE UP (ref 0–0)
NRBC # FLD: 0 K/UL — SIGNIFICANT CHANGE UP (ref 0–0)
PHOSPHATE SERPL-MCNC: 3.7 MG/DL — SIGNIFICANT CHANGE UP (ref 2.5–4.5)
PHOSPHATE SERPL-MCNC: 3.7 MG/DL — SIGNIFICANT CHANGE UP (ref 2.5–4.5)
PLATELET # BLD AUTO: 158 K/UL — SIGNIFICANT CHANGE UP (ref 150–400)
PLATELET # BLD AUTO: 158 K/UL — SIGNIFICANT CHANGE UP (ref 150–400)
POTASSIUM SERPL-MCNC: 4.1 MMOL/L — SIGNIFICANT CHANGE UP (ref 3.5–5.3)
POTASSIUM SERPL-MCNC: 4.1 MMOL/L — SIGNIFICANT CHANGE UP (ref 3.5–5.3)
POTASSIUM SERPL-SCNC: 4.1 MMOL/L — SIGNIFICANT CHANGE UP (ref 3.5–5.3)
POTASSIUM SERPL-SCNC: 4.1 MMOL/L — SIGNIFICANT CHANGE UP (ref 3.5–5.3)
RBC # BLD: 5 M/UL — SIGNIFICANT CHANGE UP (ref 3.8–5.2)
RBC # BLD: 5 M/UL — SIGNIFICANT CHANGE UP (ref 3.8–5.2)
RBC # FLD: 13 % — SIGNIFICANT CHANGE UP (ref 10.3–14.5)
RBC # FLD: 13 % — SIGNIFICANT CHANGE UP (ref 10.3–14.5)
SODIUM SERPL-SCNC: 139 MMOL/L — SIGNIFICANT CHANGE UP (ref 135–145)
SODIUM SERPL-SCNC: 139 MMOL/L — SIGNIFICANT CHANGE UP (ref 135–145)
WBC # BLD: 10.51 K/UL — HIGH (ref 3.8–10.5)
WBC # BLD: 10.51 K/UL — HIGH (ref 3.8–10.5)
WBC # FLD AUTO: 10.51 K/UL — HIGH (ref 3.8–10.5)
WBC # FLD AUTO: 10.51 K/UL — HIGH (ref 3.8–10.5)

## 2023-12-15 PROCEDURE — 93456 R HRT CORONARY ARTERY ANGIO: CPT | Mod: 26

## 2023-12-15 PROCEDURE — 99232 SBSQ HOSP IP/OBS MODERATE 35: CPT

## 2023-12-15 PROCEDURE — 99152 MOD SED SAME PHYS/QHP 5/>YRS: CPT

## 2023-12-15 RX ORDER — SACUBITRIL AND VALSARTAN 24; 26 MG/1; MG/1
1 TABLET, FILM COATED ORAL
Refills: 0 | Status: DISCONTINUED | OUTPATIENT
Start: 2023-12-16 | End: 2023-12-16

## 2023-12-15 RX ORDER — LOSARTAN POTASSIUM 100 MG/1
100 TABLET, FILM COATED ORAL DAILY
Refills: 0 | Status: DISCONTINUED | OUTPATIENT
Start: 2023-12-15 | End: 2023-12-15

## 2023-12-15 RX ORDER — SACUBITRIL AND VALSARTAN 24; 26 MG/1; MG/1
1 TABLET, FILM COATED ORAL
Refills: 0 | Status: DISCONTINUED | OUTPATIENT
Start: 2023-12-15 | End: 2023-12-15

## 2023-12-15 RX ORDER — APIXABAN 2.5 MG/1
5 TABLET, FILM COATED ORAL EVERY 12 HOURS
Refills: 0 | Status: DISCONTINUED | OUTPATIENT
Start: 2023-12-16 | End: 2023-12-16

## 2023-12-15 RX ORDER — FUROSEMIDE 40 MG
40 TABLET ORAL DAILY
Refills: 0 | Status: DISCONTINUED | OUTPATIENT
Start: 2023-12-15 | End: 2023-12-16

## 2023-12-15 RX ADMIN — LOSARTAN POTASSIUM 100 MILLIGRAM(S): 100 TABLET, FILM COATED ORAL at 10:53

## 2023-12-15 RX ADMIN — Medication 25 MILLIGRAM(S): at 22:35

## 2023-12-15 RX ADMIN — ISOSORBIDE DINITRATE 10 MILLIGRAM(S): 5 TABLET ORAL at 10:53

## 2023-12-15 RX ADMIN — Medication 40 MILLIGRAM(S): at 05:58

## 2023-12-15 RX ADMIN — SPIRONOLACTONE 25 MILLIGRAM(S): 25 TABLET, FILM COATED ORAL at 10:53

## 2023-12-15 RX ADMIN — ISOSORBIDE DINITRATE 10 MILLIGRAM(S): 5 TABLET ORAL at 19:10

## 2023-12-15 RX ADMIN — SIMVASTATIN 10 MILLIGRAM(S): 20 TABLET, FILM COATED ORAL at 22:35

## 2023-12-15 NOTE — CHART NOTE - NSCHARTNOTEFT_GEN_A_CORE
Cardiology f/up recs:    Pt s/p LHC today showed mild CAD and normal R sided PCWP w/ mean PA 30 suggesting mild pHTN.    Plan:  - start lasix 40mg po qd  - start entresto 49-51mg BID tomorrow morning  - continue spironolactone 25mg qd  - okay to resume home apixaban tomorrow morning  - routine post-cath f/up  - can be dc'ed tomorrow if no further issues overnight  - please arrange f/up w/ cardiology within 2w of DC

## 2023-12-15 NOTE — PROGRESS NOTE ADULT - ASSESSMENT
68 yo F with hx Afib on eliquis, MVR and AVR 2008, CAD with prior MI, R cerebellar CVA, p/w progressive dyspnea on exertion. BLE edema on exam, EKG with TWI in II, III, V5-6, trops negative. Pro-BNP 2134. , a/w acute on chronic systolic CHF exacerbation  66 yo F with hx Afib on eliquis, MVR and AVR 2008, CAD with prior MI, R cerebellar CVA, p/w progressive dyspnea on exertion. BLE edema on exam, EKG with TWI in II, III, V5-6, trops negative. Pro-BNP 2134. , a/w acute on chronic systolic CHF exacerbation

## 2023-12-15 NOTE — CHART NOTE - NSCHARTNOTEFT_GEN_A_CORE
ROXY MIRANDA 67y Female s/p LHC & RHC via radial site and femoral site check. Dressing is clear/dry/intact. Site is without hematoma or bleeding.   Patient denies pain, numbness, tingling, CP, SOB. VSS. Will continue to monitor.       Vital Signs Last 24 Hrs  T(C): 36.6 (15 Dec 2023 21:48), Max: 36.8 (15 Dec 2023 10:53)  T(F): 97.8 (15 Dec 2023 21:48), Max: 98.3 (15 Dec 2023 10:53)  HR: 87 (15 Dec 2023 21:48) (85 - 91)  BP: 120/56 (15 Dec 2023 21:48) (120/56 - 154/74)  RR: 18 (15 Dec 2023 21:48) (17 - 18)  SpO2: 100% (15 Dec 2023 21:48) (98% - 100%)    Parameters below as of 15 Dec 2023 21:48  Patient On (Oxygen Delivery Method): room air      Pulses palpable, cap refill <2 sec.     Lindy Stovall PA-C  Department of Medicine [phone] ROXY MIRANDA 67y Female s/p LHC & RHC via radial site and femoral site check. Dressing is clear/dry/intact. Site is without hematoma or bleeding.   Patient denies pain, numbness, tingling, CP, SOB. VSS. Will continue to monitor.       Vital Signs Last 24 Hrs  T(C): 36.6 (15 Dec 2023 21:48), Max: 36.8 (15 Dec 2023 10:53)  T(F): 97.8 (15 Dec 2023 21:48), Max: 98.3 (15 Dec 2023 10:53)  HR: 87 (15 Dec 2023 21:48) (85 - 91)  BP: 120/56 (15 Dec 2023 21:48) (120/56 - 154/74)  RR: 18 (15 Dec 2023 21:48) (17 - 18)  SpO2: 100% (15 Dec 2023 21:48) (98% - 100%)    Parameters below as of 15 Dec 2023 21:48  Patient On (Oxygen Delivery Method): room air      Pulses palpable, cap refill <2 sec.     Lindy Stovall PA-C  Department of Medicine [phone]

## 2023-12-15 NOTE — PROGRESS NOTE ADULT - ATTENDING COMMENTS
Pt with volume overload in the setting of a hx of increasing prosthetic valve gradients.    TTE with mildly reduced EF  approaching euvolemia  -cont IV diuresis  -optimize GDMT - favor changing lisinopril to losartan and eventually ARB/ARNi  -caution with bblocker given hx of bradycardia and 2:1 block  -ischemic evaluation planned for 12/15. will also check RHC to determine filling pressures
euvolemic on exam  check RHC/LHC

## 2023-12-15 NOTE — PROGRESS NOTE ADULT - SUBJECTIVE AND OBJECTIVE BOX
Cardiology Progress Note    12-12-23 (3d)    Patient is a 67y old  Female who presents with a chief complaint of dyspnea (14 Dec 2023 15:19)      Subjective / Overnight Events :  - No acute events overnight.  - Pt seen and examined at bedside.     Additional ROS (if any):    MEDICATIONS  (STANDING):  dextrose 5%. 1000 milliLiter(s) (100 mL/Hr) IV Continuous <Continuous>  dextrose 5%. 1000 milliLiter(s) (50 mL/Hr) IV Continuous <Continuous>  dextrose 50% Injectable 25 Gram(s) IV Push once  dextrose 50% Injectable 25 Gram(s) IV Push once  dextrose 50% Injectable 12.5 Gram(s) IV Push once  furosemide   Injectable 40 milliGRAM(s) IV Push every 12 hours  glucagon  Injectable 1 milliGRAM(s) IntraMuscular once  hydrALAZINE 25 milliGRAM(s) Oral three times a day  hydrochlorothiazide 12.5 milliGRAM(s) Oral daily  insulin lispro (ADMELOG) corrective regimen sliding scale   SubCutaneous three times a day before meals  isosorbide   dinitrate Tablet (ISORDIL) 10 milliGRAM(s) Oral three times a day  losartan 100 milliGRAM(s) Oral daily  simvastatin 10 milliGRAM(s) Oral at bedtime  spironolactone 25 milliGRAM(s) Oral daily    MEDICATIONS  (PRN):  acetaminophen     Tablet .. 650 milliGRAM(s) Oral every 6 hours PRN Mild Pain (1 - 3), Moderate Pain (4 - 6)  dextrose Oral Gel 15 Gram(s) Oral once PRN Blood Glucose LESS THAN 70 milliGRAM(s)/deciliter  melatonin 3 milliGRAM(s) Oral at bedtime PRN Insomnia          PHYSICAL EXAM:  Vital Signs Last 24 Hrs  T(C): 36.9 (15 Dec 2023 05:56), Max: 36.9 (15 Dec 2023 05:56)  T(F): 98.5 (15 Dec 2023 05:56), Max: 98.5 (15 Dec 2023 05:56)  HR: 84 (15 Dec 2023 05:56) (79 - 97)  BP: 124/61 (15 Dec 2023 05:56) (110/61 - 137/60)  BP(mean): --  RR: 17 (15 Dec 2023 05:56) (17 - 18)  SpO2: 100% (15 Dec 2023 05:56) (98% - 100%)    Parameters below as of 15 Dec 2023 05:56  Patient On (Oxygen Delivery Method): room air        I&O's Summary    14 Dec 2023 07:01  -  15 Dec 2023 07:00  --------------------------------------------------------  IN: 120 mL / OUT: 0 mL / NET: 120 mL        General: NAD, non-toxic appearing   HEENT: PERRLA, EOMi, no scleral icterus  CV: RRR, normal S1 and S2, no m/r/g  Lungs: normal respiratory effort. CTAB, no wheezes, rales, or rhonchi  Abd: soft, nontender, nondistended  Ext: no edema, 2+ peripheral pulses   Pysch: AAOx3, appropriate affect   Neuro: grossly non-focal, moving all extremities spontaneously   Skin: no rashes or lesions     LABS:  CAPILLARY BLOOD GLUCOSE      POCT Blood Glucose.: 203 mg/dL (14 Dec 2023 21:31)  POCT Blood Glucose.: 104 mg/dL (14 Dec 2023 17:30)  POCT Blood Glucose.: 169 mg/dL (14 Dec 2023 12:27)  POCT Blood Glucose.: 140 mg/dL (14 Dec 2023 08:23)                              14.9   10.51 )-----------( 158      ( 15 Dec 2023 04:50 )             45.2       WBC Trend: 10.51<--, 8.75<--, 7.35<--  Hb Trend: 14.9<--, 13.7<--, 12.7<--, 12.6<--    12-15    139  |  100  |  29<H>  ----------------------------<  132<H>  4.1   |  27  |  1.06    Ca    9.9      15 Dec 2023 04:50  Phos  3.7     12-15  Mg     2.30     12-15            Urinalysis Basic - ( 15 Dec 2023 04:50 )    Color: x / Appearance: x / SG: x / pH: x  Gluc: 132 mg/dL / Ketone: x  / Bili: x / Urobili: x   Blood: x / Protein: x / Nitrite: x   Leuk Esterase: x / RBC: x / WBC x   Sq Epi: x / Non Sq Epi: x / Bacteria: x        Culture - Urine (collected 12 Dec 2023 18:49)  Source: Clean Catch Clean Catch (Midstream)  Final Report (13 Dec 2023 23:22):    <10,000 CFU/mL Normal Urogenital Marielena          RADIOLOGY & ADDITIONAL TESTS: Reviewed Cardiology Progress Note    Subjective / Overnight Events :  No events overnight. Denies chest pain, palpitations. Telemetry review showing atrial fibrillation without sinus pauses or other abnormalities. Rate controlled, intermittent elevations to RVR rates, however, as per tele review unclear if patient moving at that time.     Additional ROS (if any):    MEDICATIONS  (STANDING):  dextrose 5%. 1000 milliLiter(s) (100 mL/Hr) IV Continuous <Continuous>  dextrose 5%. 1000 milliLiter(s) (50 mL/Hr) IV Continuous <Continuous>  dextrose 50% Injectable 25 Gram(s) IV Push once  dextrose 50% Injectable 25 Gram(s) IV Push once  dextrose 50% Injectable 12.5 Gram(s) IV Push once  furosemide   Injectable 40 milliGRAM(s) IV Push every 12 hours  glucagon  Injectable 1 milliGRAM(s) IntraMuscular once  hydrALAZINE 25 milliGRAM(s) Oral three times a day  hydrochlorothiazide 12.5 milliGRAM(s) Oral daily  insulin lispro (ADMELOG) corrective regimen sliding scale   SubCutaneous three times a day before meals  isosorbide   dinitrate Tablet (ISORDIL) 10 milliGRAM(s) Oral three times a day  losartan 100 milliGRAM(s) Oral daily  simvastatin 10 milliGRAM(s) Oral at bedtime  spironolactone 25 milliGRAM(s) Oral daily    MEDICATIONS  (PRN):  acetaminophen     Tablet .. 650 milliGRAM(s) Oral every 6 hours PRN Mild Pain (1 - 3), Moderate Pain (4 - 6)  dextrose Oral Gel 15 Gram(s) Oral once PRN Blood Glucose LESS THAN 70 milliGRAM(s)/deciliter  melatonin 3 milliGRAM(s) Oral at bedtime PRN Insomnia          PHYSICAL EXAM:  Vital Signs Last 24 Hrs  T(C): 36.9 (15 Dec 2023 05:56), Max: 36.9 (15 Dec 2023 05:56)  T(F): 98.5 (15 Dec 2023 05:56), Max: 98.5 (15 Dec 2023 05:56)  HR: 84 (15 Dec 2023 05:56) (79 - 97)  BP: 124/61 (15 Dec 2023 05:56) (110/61 - 137/60)  BP(mean): --  RR: 17 (15 Dec 2023 05:56) (17 - 18)  SpO2: 100% (15 Dec 2023 05:56) (98% - 100%)    Parameters below as of 15 Dec 2023 05:56  Patient On (Oxygen Delivery Method): room air        I&O's Summary    14 Dec 2023 07:01  -  15 Dec 2023 07:00  --------------------------------------------------------  IN: 120 mL / OUT: 0 mL / NET: 120 mL        General: comfortable   CV: irregular rhythm  Lungs: CLA bilaterally   Abd: soft, nontender, nondistended  Ext: no edema, 2+ peripheral pulses     LABS:  CAPILLARY BLOOD GLUCOSE      POCT Blood Glucose.: 203 mg/dL (14 Dec 2023 21:31)  POCT Blood Glucose.: 104 mg/dL (14 Dec 2023 17:30)  POCT Blood Glucose.: 169 mg/dL (14 Dec 2023 12:27)  POCT Blood Glucose.: 140 mg/dL (14 Dec 2023 08:23)                              14.9   10.51 )-----------( 158      ( 15 Dec 2023 04:50 )             45.2       WBC Trend: 10.51<--, 8.75<--, 7.35<--  Hb Trend: 14.9<--, 13.7<--, 12.7<--, 12.6<--    12-15    139  |  100  |  29<H>  ----------------------------<  132<H>  4.1   |  27  |  1.06    Ca    9.9      15 Dec 2023 04:50  Phos  3.7     12-15  Mg     2.30     12-15            Urinalysis Basic - ( 15 Dec 2023 04:50 )    Color: x / Appearance: x / SG: x / pH: x  Gluc: 132 mg/dL / Ketone: x  / Bili: x / Urobili: x   Blood: x / Protein: x / Nitrite: x   Leuk Esterase: x / RBC: x / WBC x   Sq Epi: x / Non Sq Epi: x / Bacteria: x        Culture - Urine (collected 12 Dec 2023 18:49)  Source: Clean Catch Clean Catch (Midstream)  Final Report (13 Dec 2023 23:22):    <10,000 CFU/mL Normal Urogenital Marielena          RADIOLOGY & ADDITIONAL TESTS: Reviewed

## 2023-12-15 NOTE — PROGRESS NOTE ADULT - PROBLEM SELECTOR PLAN 5
- ?previous MI in [facility] in ~2008, unclear if any interventions  - pt with hx of tissue AVR/MVR    Plan:  - continue asa, statin  - f/u cardiology - ?previous MI in [facility] in ~2008, unclear if any interventions  - pt with hx of tissue AVR/MVR    Plan:  - continue asa, statin  - f/u cardiology

## 2023-12-15 NOTE — PROGRESS NOTE ADULT - ASSESSMENT
67-year-old female w/ hx of Afib (on eliquis), MVR/AVR in 2009, ?CAD, CVA (R cerebellar infarct 11 years ago), T2DM, HTN, and HLD presenting for progressive SOB and fatigue, found to have HFrEF with EF of 45%.     Cardiac studies:  12/13/23: TTE - mildly decreased EF 45%, global LV hypokinesis, valves unchanged from prior, severely dilated LA, RA    #HFrEF  > GDMT (spironolactone 25 mg PO qd, Losartan 100mg)  - RHC/LHC today 12/15, off Eliquis  - will need 36hr washout period and can initiate Entresto prior to d/c   - avoid BB iso conduction disease, AV block and sinus pauses  - start PO lasix 40mg BID  - strict I/Os    #Afib  - long history   - off AC for procedure     Case discussed with Dr. Morrison, please see attending attestation for final recommendations.  67-year-old female w/ hx of Afib (on eliquis), MVR/AVR in 2009, ?CAD, CVA (R cerebellar infarct 11 years ago), T2DM, HTN, and HLD presenting for progressive SOB and fatigue, found to have HFrEF with EF of 45%.     Cardiac studies:  12/13/23: TTE - mildly decreased EF 45%, global LV hypokinesis, valves unchanged from prior, severely dilated LA, RA  7/28/20: TTE - Bioprosthetic mitral valve replacement, calcified (peak mitral valve gradient equals 23 mm Hg, mean transmitral valve gradient equals 8); Bioprosthetic aortic valve (peak transaortic valve gradient equals 21 mm Hg, mean transaortic valve gradient equals 16 mm Hg); normal LV systolic function, EF 61%. No segmental WMA; normal RV       #HFrEF, newly depressed compared to prior   #moderate pulmonary hypertension   > GDMT (spironolactone 25 mg PO qd, Losartan 100mg)  - RHC/LHC today 12/15, off Eliquis  - will need 36hr washout period and can initiate Entresto prior to d/c   - avoid BB iso conduction disease, AV block and sinus pauses and hx of syncope  - start PO lasix 40mg BID  - strict I/Os    #Afib  - long history   - off AC for procedure     Case discussed with Dr. Morrison, please see attending attestation for final recommendations.  67-year-old female w/ hx of Afib (on eliquis), MVR/AVR in 2009, ?CAD, CVA (R cerebellar infarct 11 years ago), T2DM, HTN, and HLD presenting for progressive SOB and fatigue, found to have HFrEF with EF of 45%.     Cardiac studies:  12/13/23: TTE - mildly decreased EF 45%, global LV hypokinesis, valves unchanged from prior, severely dilated LA, RA  7/28/20: TTE - Bioprosthetic mitral valve replacement, calcified (peak mitral valve gradient equals 23 mm Hg, mean transmitral valve gradient equals 8); Bioprosthetic aortic valve (peak transaortic valve gradient equals 21 mm Hg, mean transaortic valve gradient equals 16 mm Hg); normal LV systolic function, EF 61%. No segmental WMA; normal RV       #HFrEF, newly depressed compared to prior   #moderate pulmonary hypertension   > GDMT (spironolactone 25 mg PO qd, Losartan 100mg)  - RHC/LHC today 12/15, off Eliquis  - will need 36hr washout period and can initiate Entresto 12/16 AM  - avoid BB iso conduction disease, AV block and sinus pauses and hx of syncope  - start PO lasix 40mg BID  - strict I/Os    #Afib  - long history   - off AC for procedure     Case discussed with Dr. Morrison, please see attending attestation for final recommendations.  67-year-old female w/ hx of Afib (on eliquis), MVR/AVR in 2009, ?CAD, CVA (R cerebellar infarct 11 years ago), T2DM, HTN, and HLD presenting for progressive SOB and fatigue, found to have HFrEF with EF of 45%.     Cardiac studies:  12/13/23: TTE - mildly decreased EF 45%, global LV hypokinesis, valves unchanged from prior, severely dilated LA, RA  7/28/20: TTE - Bioprosthetic mitral valve replacement, calcified (peak mitral valve gradient equals 23 mm Hg, mean transmitral valve gradient equals 8); Bioprosthetic aortic valve (peak transaortic valve gradient equals 21 mm Hg, mean transaortic valve gradient equals 16 mm Hg); normal LV systolic function, EF 61%. No segmental WMA; normal RV       #HFrEF, newly depressed compared to prior   #moderate pulmonary hypertension   > GDMT (spironolactone 25 mg PO qd, Losartan 100mg)  - RHC/LHC today 12/15, off Eliquis  - will need 36hr washout period and can initiate Entresto 49/51 BID 12/16 AM  - avoid BB iso conduction disease, AV block and sinus pauses and hx of syncope  - will discuss diuretics following cath, likely 40mg PO daily   - strict I/Os    #Afib  - long history   - off AC for procedure     Case discussed with Dr. Morrison, please see attending attestation for final recommendations.

## 2023-12-15 NOTE — PROGRESS NOTE ADULT - SUBJECTIVE AND OBJECTIVE BOX
Dr. Nasra Lyon  Pager [phone]    PROGRESS NOTE:     Patient is a 67y old  Female who presents with a chief complaint of dyspnea (15 Dec 2023 07:08)      SUBJECTIVE / OVERNIGHT EVENTS: denies chest pain or sob   ADDITIONAL REVIEW OF SYSTEMS: afebrile , plan for heart cath today    MEDICATIONS  (STANDING):  dextrose 5%. 1000 milliLiter(s) (50 mL/Hr) IV Continuous <Continuous>  dextrose 5%. 1000 milliLiter(s) (100 mL/Hr) IV Continuous <Continuous>  dextrose 50% Injectable 25 Gram(s) IV Push once  dextrose 50% Injectable 25 Gram(s) IV Push once  dextrose 50% Injectable 12.5 Gram(s) IV Push once  furosemide   Injectable 40 milliGRAM(s) IV Push every 12 hours  glucagon  Injectable 1 milliGRAM(s) IntraMuscular once  hydrALAZINE 25 milliGRAM(s) Oral three times a day  hydrochlorothiazide 12.5 milliGRAM(s) Oral daily  insulin lispro (ADMELOG) corrective regimen sliding scale   SubCutaneous three times a day before meals  isosorbide   dinitrate Tablet (ISORDIL) 10 milliGRAM(s) Oral three times a day  losartan 100 milliGRAM(s) Oral daily  simvastatin 10 milliGRAM(s) Oral at bedtime  spironolactone 25 milliGRAM(s) Oral daily    MEDICATIONS  (PRN):  acetaminophen     Tablet .. 650 milliGRAM(s) Oral every 6 hours PRN Mild Pain (1 - 3), Moderate Pain (4 - 6)  dextrose Oral Gel 15 Gram(s) Oral once PRN Blood Glucose LESS THAN 70 milliGRAM(s)/deciliter  melatonin 3 milliGRAM(s) Oral at bedtime PRN Insomnia      CAPILLARY BLOOD GLUCOSE      POCT Blood Glucose.: 114 mg/dL (15 Dec 2023 08:29)  POCT Blood Glucose.: 203 mg/dL (14 Dec 2023 21:31)  POCT Blood Glucose.: 104 mg/dL (14 Dec 2023 17:30)    I&O's Summary    14 Dec 2023 07:01  -  15 Dec 2023 07:00  --------------------------------------------------------  IN: 120 mL / OUT: 0 mL / NET: 120 mL        PHYSICAL EXAM:  Vital Signs Last 24 Hrs  T(C): 36.8 (15 Dec 2023 10:53), Max: 36.9 (15 Dec 2023 05:56)  T(F): 98.3 (15 Dec 2023 10:53), Max: 98.5 (15 Dec 2023 05:56)  HR: 85 (15 Dec 2023 10:53) (79 - 97)  BP: 141/74 (15 Dec 2023 10:53) (110/61 - 141/74)  BP(mean): --  RR: 18 (15 Dec 2023 10:53) (17 - 18)  SpO2: 99% (15 Dec 2023 10:53) (98% - 100%)    Parameters below as of 15 Dec 2023 10:53  Patient On (Oxygen Delivery Method): room air      CONSTITUTIONAL: NAD, well-developed  RESPIRATORY: Normal respiratory effort; lungs are clear to auscultation bilaterally  CARDIOVASCULAR: Regular rate and rhythm, normal S1 and S2, no murmur/rub/gallop; trace-1+ pitting lower extremity edema; Peripheral pulses are 2+ bilaterally  ABDOMEN: minimal ruq ttp,  normoactive bowel sounds, no rebound/guarding; No hepatosplenomegaly  MUSCULOSKELETAL: no clubbing or cyanosis of digits; no joint swelling or tenderness to palpation  PSYCH: A+O to person, place, and time; affect appropriate      LABS:                        14.9   10.51 )-----------( 158      ( 15 Dec 2023 04:50 )             45.2     12-15    139  |  100  |  29<H>  ----------------------------<  132<H>  4.1   |  27  |  1.06    Ca    9.9      15 Dec 2023 04:50  Phos  3.7     12-15  Mg     2.30     12-15            Urinalysis Basic - ( 15 Dec 2023 04:50 )    Color: x / Appearance: x / SG: x / pH: x  Gluc: 132 mg/dL / Ketone: x  / Bili: x / Urobili: x   Blood: x / Protein: x / Nitrite: x   Leuk Esterase: x / RBC: x / WBC x   Sq Epi: x / Non Sq Epi: x / Bacteria: x        Culture - Urine (collected 12 Dec 2023 18:49)  Source: Clean Catch Clean Catch (Midstream)  Final Report (13 Dec 2023 23:22):    <10,000 CFU/mL Normal Urogenital Marielena        RADIOLOGY & ADDITIONAL TESTS:  Results Reviewed:   Imaging Personally Reviewed:  Electrocardiogram Personally Reviewed:    COORDINATION OF CARE:  Care Discussed with Consultants/Other Providers [Y/N]:  Prior or Outpatient Records Reviewed [Y/N]:   Dr. Nasra Lyon  Pager [phone]    PROGRESS NOTE:     Patient is a 67y old  Female who presents with a chief complaint of dyspnea (15 Dec 2023 07:08)      SUBJECTIVE / OVERNIGHT EVENTS: denies chest pain or sob   ADDITIONAL REVIEW OF SYSTEMS: afebrile , plan for heart cath today    MEDICATIONS  (STANDING):  dextrose 5%. 1000 milliLiter(s) (50 mL/Hr) IV Continuous <Continuous>  dextrose 5%. 1000 milliLiter(s) (100 mL/Hr) IV Continuous <Continuous>  dextrose 50% Injectable 25 Gram(s) IV Push once  dextrose 50% Injectable 25 Gram(s) IV Push once  dextrose 50% Injectable 12.5 Gram(s) IV Push once  furosemide   Injectable 40 milliGRAM(s) IV Push every 12 hours  glucagon  Injectable 1 milliGRAM(s) IntraMuscular once  hydrALAZINE 25 milliGRAM(s) Oral three times a day  hydrochlorothiazide 12.5 milliGRAM(s) Oral daily  insulin lispro (ADMELOG) corrective regimen sliding scale   SubCutaneous three times a day before meals  isosorbide   dinitrate Tablet (ISORDIL) 10 milliGRAM(s) Oral three times a day  losartan 100 milliGRAM(s) Oral daily  simvastatin 10 milliGRAM(s) Oral at bedtime  spironolactone 25 milliGRAM(s) Oral daily    MEDICATIONS  (PRN):  acetaminophen     Tablet .. 650 milliGRAM(s) Oral every 6 hours PRN Mild Pain (1 - 3), Moderate Pain (4 - 6)  dextrose Oral Gel 15 Gram(s) Oral once PRN Blood Glucose LESS THAN 70 milliGRAM(s)/deciliter  melatonin 3 milliGRAM(s) Oral at bedtime PRN Insomnia      CAPILLARY BLOOD GLUCOSE      POCT Blood Glucose.: 114 mg/dL (15 Dec 2023 08:29)  POCT Blood Glucose.: 203 mg/dL (14 Dec 2023 21:31)  POCT Blood Glucose.: 104 mg/dL (14 Dec 2023 17:30)    I&O's Summary    14 Dec 2023 07:01  -  15 Dec 2023 07:00  --------------------------------------------------------  IN: 120 mL / OUT: 0 mL / NET: 120 mL        PHYSICAL EXAM:  Vital Signs Last 24 Hrs  T(C): 36.8 (15 Dec 2023 10:53), Max: 36.9 (15 Dec 2023 05:56)  T(F): 98.3 (15 Dec 2023 10:53), Max: 98.5 (15 Dec 2023 05:56)  HR: 85 (15 Dec 2023 10:53) (79 - 97)  BP: 141/74 (15 Dec 2023 10:53) (110/61 - 141/74)  BP(mean): --  RR: 18 (15 Dec 2023 10:53) (17 - 18)  SpO2: 99% (15 Dec 2023 10:53) (98% - 100%)    Parameters below as of 15 Dec 2023 10:53  Patient On (Oxygen Delivery Method): room air      CONSTITUTIONAL: NAD, well-developed  RESPIRATORY: Normal respiratory effort; lungs are clear to auscultation bilaterally  CARDIOVASCULAR: Regular rate and rhythm, normal S1 and S2, no murmur/rub/gallop; trace-1+ pitting lower extremity edema; Peripheral pulses are 2+ bilaterally  ABDOMEN: minimal ruq ttp,  normoactive bowel sounds, no rebound/guarding; No hepatosplenomegaly  MUSCULOSKELETAL: no clubbing or cyanosis of digits; no joint swelling or tenderness to palpation  PSYCH: A+O to person, place, and time; affect appropriate      LABS:                        14.9   10.51 )-----------( 158      ( 15 Dec 2023 04:50 )             45.2     12-15    139  |  100  |  29<H>  ----------------------------<  132<H>  4.1   |  27  |  1.06    Ca    9.9      15 Dec 2023 04:50  Phos  3.7     12-15  Mg     2.30     12-15            Urinalysis Basic - ( 15 Dec 2023 04:50 )    Color: x / Appearance: x / SG: x / pH: x  Gluc: 132 mg/dL / Ketone: x  / Bili: x / Urobili: x   Blood: x / Protein: x / Nitrite: x   Leuk Esterase: x / RBC: x / WBC x   Sq Epi: x / Non Sq Epi: x / Bacteria: x        Culture - Urine (collected 12 Dec 2023 18:49)  Source: Clean Catch Clean Catch (Midstream)  Final Report (13 Dec 2023 23:22):    <10,000 CFU/mL Normal Urogenital Marielena        RADIOLOGY & ADDITIONAL TESTS:  Results Reviewed:   Imaging Personally Reviewed:  Electrocardiogram Personally Reviewed:    COORDINATION OF CARE:  Care Discussed with Consultants/Other Providers [Y/N]:  Prior or Outpatient Records Reviewed [Y/N]:

## 2023-12-16 ENCOUNTER — TRANSCRIPTION ENCOUNTER (OUTPATIENT)
Age: 68
End: 2023-12-16

## 2023-12-16 VITALS
HEART RATE: 77 BPM | SYSTOLIC BLOOD PRESSURE: 128 MMHG | OXYGEN SATURATION: 99 % | RESPIRATION RATE: 18 BRPM | DIASTOLIC BLOOD PRESSURE: 53 MMHG | TEMPERATURE: 98 F

## 2023-12-16 LAB
ANION GAP SERPL CALC-SCNC: 15 MMOL/L — HIGH (ref 7–14)
ANION GAP SERPL CALC-SCNC: 15 MMOL/L — HIGH (ref 7–14)
BUN SERPL-MCNC: 35 MG/DL — HIGH (ref 7–23)
BUN SERPL-MCNC: 35 MG/DL — HIGH (ref 7–23)
CALCIUM SERPL-MCNC: 10.4 MG/DL — SIGNIFICANT CHANGE UP (ref 8.4–10.5)
CALCIUM SERPL-MCNC: 10.4 MG/DL — SIGNIFICANT CHANGE UP (ref 8.4–10.5)
CHLORIDE SERPL-SCNC: 98 MMOL/L — SIGNIFICANT CHANGE UP (ref 98–107)
CHLORIDE SERPL-SCNC: 98 MMOL/L — SIGNIFICANT CHANGE UP (ref 98–107)
CO2 SERPL-SCNC: 25 MMOL/L — SIGNIFICANT CHANGE UP (ref 22–31)
CO2 SERPL-SCNC: 25 MMOL/L — SIGNIFICANT CHANGE UP (ref 22–31)
CREAT SERPL-MCNC: 1.29 MG/DL — SIGNIFICANT CHANGE UP (ref 0.5–1.3)
CREAT SERPL-MCNC: 1.29 MG/DL — SIGNIFICANT CHANGE UP (ref 0.5–1.3)
EGFR: 45 ML/MIN/1.73M2 — LOW
EGFR: 45 ML/MIN/1.73M2 — LOW
GLUCOSE SERPL-MCNC: 113 MG/DL — HIGH (ref 70–99)
GLUCOSE SERPL-MCNC: 113 MG/DL — HIGH (ref 70–99)
HCT VFR BLD CALC: 47.8 % — HIGH (ref 34.5–45)
HCT VFR BLD CALC: 47.8 % — HIGH (ref 34.5–45)
HGB BLD-MCNC: 15.6 G/DL — HIGH (ref 11.5–15.5)
HGB BLD-MCNC: 15.6 G/DL — HIGH (ref 11.5–15.5)
MAGNESIUM SERPL-MCNC: 2.4 MG/DL — SIGNIFICANT CHANGE UP (ref 1.6–2.6)
MAGNESIUM SERPL-MCNC: 2.4 MG/DL — SIGNIFICANT CHANGE UP (ref 1.6–2.6)
MCHC RBC-ENTMCNC: 29.8 PG — SIGNIFICANT CHANGE UP (ref 27–34)
MCHC RBC-ENTMCNC: 29.8 PG — SIGNIFICANT CHANGE UP (ref 27–34)
MCHC RBC-ENTMCNC: 32.6 GM/DL — SIGNIFICANT CHANGE UP (ref 32–36)
MCHC RBC-ENTMCNC: 32.6 GM/DL — SIGNIFICANT CHANGE UP (ref 32–36)
MCV RBC AUTO: 91.2 FL — SIGNIFICANT CHANGE UP (ref 80–100)
MCV RBC AUTO: 91.2 FL — SIGNIFICANT CHANGE UP (ref 80–100)
NRBC # BLD: 0 /100 WBCS — SIGNIFICANT CHANGE UP (ref 0–0)
NRBC # BLD: 0 /100 WBCS — SIGNIFICANT CHANGE UP (ref 0–0)
NRBC # FLD: 0 K/UL — SIGNIFICANT CHANGE UP (ref 0–0)
NRBC # FLD: 0 K/UL — SIGNIFICANT CHANGE UP (ref 0–0)
PHOSPHATE SERPL-MCNC: 4.1 MG/DL — SIGNIFICANT CHANGE UP (ref 2.5–4.5)
PHOSPHATE SERPL-MCNC: 4.1 MG/DL — SIGNIFICANT CHANGE UP (ref 2.5–4.5)
PLATELET # BLD AUTO: 168 K/UL — SIGNIFICANT CHANGE UP (ref 150–400)
PLATELET # BLD AUTO: 168 K/UL — SIGNIFICANT CHANGE UP (ref 150–400)
POTASSIUM SERPL-MCNC: 3.7 MMOL/L — SIGNIFICANT CHANGE UP (ref 3.5–5.3)
POTASSIUM SERPL-MCNC: 3.7 MMOL/L — SIGNIFICANT CHANGE UP (ref 3.5–5.3)
POTASSIUM SERPL-SCNC: 3.7 MMOL/L — SIGNIFICANT CHANGE UP (ref 3.5–5.3)
POTASSIUM SERPL-SCNC: 3.7 MMOL/L — SIGNIFICANT CHANGE UP (ref 3.5–5.3)
RBC # BLD: 5.24 M/UL — HIGH (ref 3.8–5.2)
RBC # BLD: 5.24 M/UL — HIGH (ref 3.8–5.2)
RBC # FLD: 13.3 % — SIGNIFICANT CHANGE UP (ref 10.3–14.5)
RBC # FLD: 13.3 % — SIGNIFICANT CHANGE UP (ref 10.3–14.5)
SODIUM SERPL-SCNC: 138 MMOL/L — SIGNIFICANT CHANGE UP (ref 135–145)
SODIUM SERPL-SCNC: 138 MMOL/L — SIGNIFICANT CHANGE UP (ref 135–145)
WBC # BLD: 11.06 K/UL — HIGH (ref 3.8–10.5)
WBC # BLD: 11.06 K/UL — HIGH (ref 3.8–10.5)
WBC # FLD AUTO: 11.06 K/UL — HIGH (ref 3.8–10.5)
WBC # FLD AUTO: 11.06 K/UL — HIGH (ref 3.8–10.5)

## 2023-12-16 PROCEDURE — 99239 HOSP IP/OBS DSCHRG MGMT >30: CPT

## 2023-12-16 RX ORDER — FUROSEMIDE 40 MG
1 TABLET ORAL
Qty: 30 | Refills: 0
Start: 2023-12-16 | End: 2024-01-14

## 2023-12-16 RX ORDER — SACUBITRIL AND VALSARTAN 24; 26 MG/1; MG/1
1 TABLET, FILM COATED ORAL
Qty: 60 | Refills: 2
Start: 2023-12-16 | End: 2024-03-14

## 2023-12-16 RX ORDER — SPIRONOLACTONE 25 MG/1
1 TABLET, FILM COATED ORAL
Qty: 30 | Refills: 0
Start: 2023-12-16 | End: 2024-01-14

## 2023-12-16 RX ORDER — ISOSORBIDE DINITRATE 5 MG/1
1 TABLET ORAL
Qty: 90 | Refills: 0
Start: 2023-12-16 | End: 2024-01-14

## 2023-12-16 RX ORDER — LISINOPRIL 2.5 MG/1
1 TABLET ORAL
Qty: 0 | Refills: 0 | DISCHARGE

## 2023-12-16 RX ORDER — HYDROCHLOROTHIAZIDE 25 MG
1 TABLET ORAL
Qty: 0 | Refills: 0 | DISCHARGE

## 2023-12-16 RX ORDER — HYDRALAZINE HCL 50 MG
1 TABLET ORAL
Qty: 90 | Refills: 0
Start: 2023-12-16 | End: 2024-01-14

## 2023-12-16 RX ORDER — HYDROCHLOROTHIAZIDE 25 MG
1 TABLET ORAL
Qty: 30 | Refills: 0
Start: 2023-12-16 | End: 2024-01-14

## 2023-12-16 RX ADMIN — SACUBITRIL AND VALSARTAN 1 TABLET(S): 24; 26 TABLET, FILM COATED ORAL at 06:15

## 2023-12-16 RX ADMIN — ISOSORBIDE DINITRATE 10 MILLIGRAM(S): 5 TABLET ORAL at 12:13

## 2023-12-16 RX ADMIN — SPIRONOLACTONE 25 MILLIGRAM(S): 25 TABLET, FILM COATED ORAL at 06:17

## 2023-12-16 RX ADMIN — APIXABAN 5 MILLIGRAM(S): 2.5 TABLET, FILM COATED ORAL at 06:15

## 2023-12-16 RX ADMIN — Medication 40 MILLIGRAM(S): at 06:18

## 2023-12-16 RX ADMIN — Medication 25 MILLIGRAM(S): at 14:34

## 2023-12-16 RX ADMIN — Medication 25 MILLIGRAM(S): at 06:16

## 2023-12-16 RX ADMIN — ISOSORBIDE DINITRATE 10 MILLIGRAM(S): 5 TABLET ORAL at 06:16

## 2023-12-16 RX ADMIN — Medication 2: at 09:22

## 2023-12-16 NOTE — DISCHARGE NOTE NURSING/CASE MANAGEMENT/SOCIAL WORK - NSDCDMENAME_GEN_ALL_CORE_FT
[facility]  Count includes the [facility]

## 2023-12-16 NOTE — PROGRESS NOTE ADULT - PROBLEM SELECTOR PLAN 9
DVT: eliquis 5 mg BID  Diet: DASH/TLC   Dispo: pending improvement, PT/OT
DVT: eliquis 5 mg BID  Diet: DASH/TLC
DVT: eliquis 5 mg BID  Diet: DASH/TLC   Dispo: pending improvement, PT recs no skilled needs
DVT: eliquis 5 mg BID  Diet: DASH/TLC   Dispo: pending improvement, PT recs no skilled needs

## 2023-12-16 NOTE — DISCHARGE NOTE NURSING/CASE MANAGEMENT/SOCIAL WORK - NSDCPEFALRISK_GEN_ALL_CORE
For information on Fall & Injury Prevention, visit: [email or web address] OR  [email or web address] OR  https://www.cdc.gov/steadi/patient.html For information on Fall & Injury Prevention, visit: [email or web address] OR  [email or web address] OR  https://www.cdc.gov/steadi/patient.html

## 2023-12-16 NOTE — PROGRESS NOTE ADULT - PROBLEM SELECTOR PLAN 4
Pt not endorsing abdominal pain, however TTP RUQ on abdominal exam  - mildly elevated AST/ALT 70/99  - Tbili, alk phos WNL  - may have some component of congestive hepatopathy ISO R heart failure    Plan:   - trend AST/ALT, alk phos, Tbili, PT/INR and PTT   - RUQ US unremarkable liver and biliary duct

## 2023-12-16 NOTE — DISCHARGE NOTE NURSING/CASE MANAGEMENT/SOCIAL WORK - PATIENT PORTAL LINK FT
You can access the FollowMyHealth Patient Portal offered by [facility] by registering at the following website: [email or web address]. By joining [employer]’s FollowMyHealth portal, you will also be able to view your health information using other applications (apps) compatible with our system. You can access the FollowMyHealth Patient Portal offered by [facility] by registering at the following website: [email or web address]. By joining [employer]’s FollowMyHealth portal, you will also be able to view your health information using other applications (apps) compatible with our system.

## 2023-12-16 NOTE — PROGRESS NOTE ADULT - PROBLEM SELECTOR PLAN 3
- In Afib this admission, rates <110  - not on any rate control agents, previously had slow vent response  - on eliquis 5 mg QD (BID per outpatient rx review however pt states she only takes QHS)     Plan:  - Hold eliquis tonight for heart cath tomorrow   - monitor on tele
- In Afib this admission, rates <110  - not on any rate control agents, previously had slow vent response  - on eliquis 5 mg QD (BID per outpatient rx review however pt states she only takes QHS)     Plan:  - Resumed on eliquis post cath  - monitor on tele
- In Afib this admission, rates <110  - not on any rate control agents, previously had slow vent response  - on eliquis 5 mg QD (BID per outpatient rx review however pt states she only takes QHS)     Plan:  - Resume Eliquis after heart cath if ok with Cards  - monitor on tele
- In Afib this admission, rates <110  - not on any rate control agents, previously had slow vent response  - on eliquis 5 mg QD (BID per outpatient rx review however pt states she only takes QHS)     Plan:  - eliquis 5 mg BID (patient takes 5 mg QHS at home but no indication for reduced dose)   - monitor on tele

## 2023-12-16 NOTE — PROGRESS NOTE ADULT - PROBLEM SELECTOR PLAN 6
Home meds: HCTZ 12.5 mg QD, lisinopril 20 mg QD    Plan:  -BP controlled  - changed lisinopril to Entresto per Cards  - stop Hctz on discharge  - started hydralazine 25 mg tid and isordil 10 tid, titrate prn

## 2023-12-16 NOTE — PROGRESS NOTE ADULT - PROBLEM SELECTOR PLAN 7
A1c 6.2  - on metformin 500 mg QD    Plan:  - Hold home metformin  - ISS
A1c 6.2  - on metformin 500 mg QD    Plan:  - resume home metformin on DC  - ISS
A1c 6.6 4/2023  - on metformin 500 mg QD    Plan:  - Hold home metformin  - ISS
A1c 6.6 4/2023  - on metformin 500 mg QD    Plan:  - Hold home metformin  - ISS

## 2023-12-16 NOTE — PROGRESS NOTE ADULT - PROBLEM SELECTOR PLAN 5
- ?previous MI in [facility] in ~2008, unclear if any interventions  - pt with hx of tissue AVR/MVR    Plan:  - continue asa, statin  - f/u cardiology - ?[facility], unclear if any interventions  - pt with hx of tissue AVR/MVR    Plan:  - continue asa, statin  - f/u cardiology

## 2023-12-16 NOTE — PROGRESS NOTE ADULT - SUBJECTIVE AND OBJECTIVE BOX
Dr. Nasra Lyon  Pager [phone]    PROGRESS NOTE:     Patient is a 67y old  Female who presents with a chief complaint of dyspnea (15 Dec 2023 12:38)      SUBJECTIVE / OVERNIGHT EVENTS: denies chest pain or sob, daughter at bedside  ADDITIONAL REVIEW OF SYSTEMS: afebrile     MEDICATIONS  (STANDING):  apixaban 5 milliGRAM(s) Oral every 12 hours  dextrose 5%. 1000 milliLiter(s) (100 mL/Hr) IV Continuous <Continuous>  dextrose 5%. 1000 milliLiter(s) (50 mL/Hr) IV Continuous <Continuous>  dextrose 50% Injectable 12.5 Gram(s) IV Push once  dextrose 50% Injectable 25 Gram(s) IV Push once  dextrose 50% Injectable 25 Gram(s) IV Push once  furosemide    Tablet 40 milliGRAM(s) Oral daily  glucagon  Injectable 1 milliGRAM(s) IntraMuscular once  hydrALAZINE 25 milliGRAM(s) Oral three times a day  hydrochlorothiazide 12.5 milliGRAM(s) Oral daily  insulin lispro (ADMELOG) corrective regimen sliding scale   SubCutaneous three times a day before meals  isosorbide   dinitrate Tablet (ISORDIL) 10 milliGRAM(s) Oral three times a day  sacubitril 49 mG/valsartan 51 mG 1 Tablet(s) Oral two times a day  simvastatin 10 milliGRAM(s) Oral at bedtime  spironolactone 25 milliGRAM(s) Oral daily    MEDICATIONS  (PRN):  acetaminophen     Tablet .. 650 milliGRAM(s) Oral every 6 hours PRN Mild Pain (1 - 3), Moderate Pain (4 - 6)  dextrose Oral Gel 15 Gram(s) Oral once PRN Blood Glucose LESS THAN 70 milliGRAM(s)/deciliter  melatonin 3 milliGRAM(s) Oral at bedtime PRN Insomnia      CAPILLARY BLOOD GLUCOSE      POCT Blood Glucose.: 127 mg/dL (16 Dec 2023 12:25)  POCT Blood Glucose.: 202 mg/dL (16 Dec 2023 09:15)  POCT Blood Glucose.: 118 mg/dL (15 Dec 2023 22:05)  POCT Blood Glucose.: 109 mg/dL (15 Dec 2023 17:54)    I&O's Summary    15 Dec 2023 07:01  -  16 Dec 2023 07:00  --------------------------------------------------------  IN: 150 mL / OUT: 0 mL / NET: 150 mL        PHYSICAL EXAM:  Vital Signs Last 24 Hrs  T(C): 36.6 (16 Dec 2023 14:01), Max: 36.6 (15 Dec 2023 19:03)  T(F): 97.9 (16 Dec 2023 14:01), Max: 97.9 (15 Dec 2023 19:03)  HR: 77 (16 Dec 2023 14:01) (77 - 92)  BP: 128/53 (16 Dec 2023 14:01) (120/56 - 154/74)  BP(mean): --  RR: 18 (16 Dec 2023 14:01) (17 - 19)  SpO2: 99% (16 Dec 2023 14:01) (96% - 100%)    Parameters below as of 16 Dec 2023 14:01  Patient On (Oxygen Delivery Method): room air      CONSTITUTIONAL: NAD, well-developed  RESPIRATORY: Normal respiratory effort; lungs are clear to auscultation bilaterally  CARDIOVASCULAR: Regular rate and rhythm, normal S1 and S2, no murmur/rub/gallop; trace lower extremity edema; Peripheral pulses are 2+ bilaterally  ABDOMEN: minimal ruq ttp,  normoactive bowel sounds, no rebound/guarding; No hepatosplenomegaly  MUSCULOSKELETAL: no clubbing or cyanosis of digits; no joint swelling or tenderness to palpation  PSYCH: A+O to person, place, and time; affect appropriate    LABS:                        15.6   11.06 )-----------( 168      ( 16 Dec 2023 05:14 )             47.8     12-16    138  |  98  |  35<H>  ----------------------------<  113<H>  3.7   |  25  |  1.29    Ca    10.4      16 Dec 2023 05:14  Phos  4.1     12-16  Mg     2.40     12-16            Urinalysis Basic - ( 16 Dec 2023 05:14 )    Color: x / Appearance: x / SG: x / pH: x  Gluc: 113 mg/dL / Ketone: x  / Bili: x / Urobili: x   Blood: x / Protein: x / Nitrite: x   Leuk Esterase: x / RBC: x / WBC x   Sq Epi: x / Non Sq Epi: x / Bacteria: x          RADIOLOGY & ADDITIONAL TESTS:  Results Reviewed:   Imaging Personally Reviewed:  < from: Cardiac Catheterization (12.15.23 @ 14:23) >  Right Heart Cath   Heart rate at time of right heart cath was 98 bpm. A 7 Fr. [place] Miri  catheter was advanced to the pulmonary artery wedge  position under fluoroscopy guidance. Measurements of pressures,  arterial and venous oxygen saturation and cardiac output by  assumed elton were obtained. The catheter was removed at once.      Diagnostic Findings:     Coronary Angiography   The coronary circulation is left dominant. Cardiac catheterization was  performed electively.    LM   Left main artery: Angiography shows no disease.      LAD   Mid left anterior descending: Angiography shows mild atherosclerosis.      CX   Circumflex: Angiography shows no disease.      RCA   Right coronary artery: Angiography shows minor irregularities.          Electrocardiogram Personally Reviewed:    COORDINATION OF CARE:  Care Discussed with Consultants/Other Providers [Y/N]:  Prior or Outpatient Records Reviewed [Y/N]:   Dr. Nasra Lyon  Pager [phone]    PROGRESS NOTE:     Patient is a 67y old  Female who presents with a chief complaint of dyspnea (15 Dec 2023 12:38)      SUBJECTIVE / OVERNIGHT EVENTS: denies chest pain or sob, daughter at bedside  ADDITIONAL REVIEW OF SYSTEMS: afebrile     MEDICATIONS  (STANDING):  apixaban 5 milliGRAM(s) Oral every 12 hours  dextrose 5%. 1000 milliLiter(s) (100 mL/Hr) IV Continuous <Continuous>  dextrose 5%. 1000 milliLiter(s) (50 mL/Hr) IV Continuous <Continuous>  dextrose 50% Injectable 12.5 Gram(s) IV Push once  dextrose 50% Injectable 25 Gram(s) IV Push once  dextrose 50% Injectable 25 Gram(s) IV Push once  furosemide    Tablet 40 milliGRAM(s) Oral daily  glucagon  Injectable 1 milliGRAM(s) IntraMuscular once  hydrALAZINE 25 milliGRAM(s) Oral three times a day  hydrochlorothiazide 12.5 milliGRAM(s) Oral daily  insulin lispro (ADMELOG) corrective regimen sliding scale   SubCutaneous three times a day before meals  isosorbide   dinitrate Tablet (ISORDIL) 10 milliGRAM(s) Oral three times a day  sacubitril 49 mG/valsartan 51 mG 1 Tablet(s) Oral two times a day  simvastatin 10 milliGRAM(s) Oral at bedtime  spironolactone 25 milliGRAM(s) Oral daily    MEDICATIONS  (PRN):  acetaminophen     Tablet .. 650 milliGRAM(s) Oral every 6 hours PRN Mild Pain (1 - 3), Moderate Pain (4 - 6)  dextrose Oral Gel 15 Gram(s) Oral once PRN Blood Glucose LESS THAN 70 milliGRAM(s)/deciliter  melatonin 3 milliGRAM(s) Oral at bedtime PRN Insomnia      CAPILLARY BLOOD GLUCOSE      POCT Blood Glucose.: 127 mg/dL (16 Dec 2023 12:25)  POCT Blood Glucose.: 202 mg/dL (16 Dec 2023 09:15)  POCT Blood Glucose.: 118 mg/dL (15 Dec 2023 22:05)  POCT Blood Glucose.: 109 mg/dL (15 Dec 2023 17:54)    I&O's Summary    15 Dec 2023 07:01  -  16 Dec 2023 07:00  --------------------------------------------------------  IN: 150 mL / OUT: 0 mL / NET: 150 mL        PHYSICAL EXAM:  Vital Signs Last 24 Hrs  T(C): 36.6 (16 Dec 2023 14:01), Max: 36.6 (15 Dec 2023 19:03)  T(F): 97.9 (16 Dec 2023 14:01), Max: 97.9 (15 Dec 2023 19:03)  HR: 77 (16 Dec 2023 14:01) (77 - 92)  BP: 128/53 (16 Dec 2023 14:01) (120/56 - 154/74)  BP(mean): --  RR: 18 (16 Dec 2023 14:01) (17 - 19)  SpO2: 99% (16 Dec 2023 14:01) (96% - 100%)    Parameters below as of 16 Dec 2023 14:01  Patient On (Oxygen Delivery Method): room air      CONSTITUTIONAL: NAD, well-developed  RESPIRATORY: Normal respiratory effort; lungs are clear to auscultation bilaterally  CARDIOVASCULAR: Regular rate and rhythm, normal S1 and S2, no murmur/rub/gallop; trace lower extremity edema; Peripheral pulses are 2+ bilaterally  ABDOMEN: minimal ruq ttp,  normoactive bowel sounds, no rebound/guarding; No hepatosplenomegaly  MUSCULOSKELETAL: no clubbing or cyanosis of digits; no joint swelling or tenderness to palpation  PSYCH: A+O to person, place, and time; affect appropriate    LABS:                        15.6   11.06 )-----------( 168      ( 16 Dec 2023 05:14 )             47.8     12-16    138  |  98  |  35<H>  ----------------------------<  113<H>  3.7   |  25  |  1.29    Ca    10.4      16 Dec 2023 05:14  Phos  4.1     12-16  Mg     2.40     12-16            Urinalysis Basic - ( 16 Dec 2023 05:14 )    Color: x / Appearance: x / SG: x / pH: x  Gluc: 113 mg/dL / Ketone: x  / Bili: x / Urobili: x   Blood: x / Protein: x / Nitrite: x   Leuk Esterase: x / RBC: x / WBC x   Sq Epi: x / Non Sq Epi: x / Bacteria: x          RADIOLOGY & ADDITIONAL TESTS:  Results Reviewed:   Imaging Personally Reviewed:  < from: Cardiac Catheterization (12.15.23 @ 14:23) >  Right Heart Cath   Heart rate at time of right heart cath was 98 bpm. A 7 Fr. [place] Miri  catheter was advanced to the pulmonary artery wedge  position under fluoroscopy guidance. Measurements of pressures,  arterial and venous oxygen saturation and cardiac output by  assumed elton were obtained. The catheter was removed at once.      Diagnostic Findings:     Coronary Angiography   The coronary circulation is left dominant. Cardiac catheterization was  performed electively.    LM   Left main artery: Angiography shows no disease.      LAD   Mid left anterior descending: Angiography shows mild atherosclerosis.      CX   Circumflex: Angiography shows no disease.      RCA   Right coronary artery: Angiography shows minor irregularities.          Electrocardiogram Personally Reviewed:    COORDINATION OF CARE:  Care Discussed with Consultants/Other Providers [Y/N]:  Prior or Outpatient Records Reviewed [Y/N]:

## 2023-12-16 NOTE — PROGRESS NOTE ADULT - PROBLEM SELECTOR PLAN 2
- s/p bioprosthetic MVR and AVR  - TTE 2021 and 2023, per outpatient cards note had worsening gradients across bioprosthetic valves from 2021->2023   -heart cath as above, mildly elevated wedge pressure and mild nonobstructive LAD disease, medical management
- s/p bioprosthetic MVR and AVR  - TTE 2021 and 2023, per outpatient cards note had worsening gradients across bioprosthetic valves from 2021->2023     -Plan as aobve, plan for heart cath tomorrow
- s/p bioprosthetic MVR and AVR  - TTE 2021 and 2023, per outpatient cards note had worsening gradients across bioprosthetic valves from 2021->2023 although full report not avaiable for review    Plan:   - TTE  - f/u cardiology consult
- s/p bioprosthetic MVR and AVR  - TTE 2021 and 2023, per outpatient cards note had worsening gradients across bioprosthetic valves from 2021->2023     -Plan as above, plan for RHC/LHC today

## 2023-12-16 NOTE — PROGRESS NOTE ADULT - PROBLEM SELECTOR PROBLEM 6
HPI:  This is a 68 y/o female, well known to Dr. Daley with PMH of non-hodkins lymphoma (xrt in 1981) and breast ca (2000's), HTN, HLD, osteoporosis.  In 10/18, patient was evaluated for upper extremity weakness and underwent a carpal tunnel release and ulnar transposition without relief of symptoms.   in 11/18, patient was admitted to [facility] for b/l UE weakness and difficulty walking.  Her work up revealed cervical stenosis with compression.  However, at the time, her bone scan also was significant for uptake in her right acetabulum and at the time and the evaluating spine surgeon requested this lesion be addressed prior to any cervical surgery.  A CT of the region was negative and the patient was sent home for follow up. on 12/4/18, patient was seen in the [facility] ER again, this time with worsening symptoms and she was emergently taken to the OR for a three level ACDF C4-C6.  She was discharged after the surgery with an uncomplicated post op course.     Since this surgery, patient has dramatic improvements in her walking, neuropathy and radicular pain.  Follow up imaging with X-ray lead to further evaluation with CT which revealed possible loosening of hardware.  Clinically, patient still has left UE weakness that she thinks may be from her history of broken clavicle. No bowel, bladder dysfunction     She is here for definitive surgical evaluation. (05 Apr 2019 06:51)    Hospital course:   POD#0: S/P revision of anterior cervical discectomy with fusion of C4-C6 with extension to C3, as well as C2-T2 posterior fusion with right iliac crest autograft.   POD#1: CESARIO overnight. Drains x3 remain in place, [place] J collar and prevena dressing in place. Esposito removed this am, f/u TOV. Reports some throat tightness with mucous, but denies pain, no issues talking or swallowing (tolerating liquid diet)  POD#2 Pt with secrections and difficulty swallowing. Continue decadron 4q6. Robitussin started.   4/8 Pt complaining muscle spasm and pain down BUE L>R  4/9: no acute events overnight, neuro stable. Transferred to CCU for rapid Afib, started on Amiodarone drip.  4/10: Transitioned to PO Amiodarone, off IV drip, on tele, pending transfer back to Neurosurgery. Posterior MASOOD and HMV remains.  4/11: Posterior drain removed. Pt on Vanco/zosyn for aspiration PNA  4/12 Repeat Fees. 2nd posterior drain removed. Sputum culture sent  4/13: patient requiring supplemental O2 while at rest and during physical therapy had one bout of nausea overnight.  Over all, appears comfortable.  4/14 calorie count. Pt pre-op for possible PEG on Monday. Hypokalemia repleted. Con't vanco/zosyn for aspiration pneumonia. Daily CXR.  4/16 PEG inserted  Able to take PO as tolerated  Continue IV antbix  4/17 uneventful night, abx changed to cipro x7 days for pneumonia, TFs started at 4pm via PEG, to be slowly increased per nutrition protocol   4/18: CT abd / pelvis performed yesterday demonstrates left upper lobe kidney mass (likely renal cell carcinoma), R iliac crest lytic lesions and persistent pneumonia in bilateral lung bases.  does not want patient or daughters to know about new mass, but does want oncology to see patient in-pt, Dr. Daley to discuss with patient today. DW also to clear for eliquis start date.         OVERNIGHT EVENTS: no major event overnight. Tolerates puree diet, and nutritional complement via PEG. Patient is getting Lasix for  bilateral pedal edema. Stable LUE weakness. 	  Vital Signs Last 24 Hrs  T(C): 37 (19 Apr 2019 08:07), Max: 37 (18 Apr 2019 16:35)  T(F): 98.6 (19 Apr 2019 08:07), Max: 98.6 (18 Apr 2019 16:35)  HR: 76 (19 Apr 2019 08:07) (75 - 85)  BP: 120/80 (19 Apr 2019 08:07) (105/58 - 146/70)  BP(mean): --  RR: 20 (19 Apr 2019 08:07) (17 - 20)  SpO2: 96% (19 Apr 2019 08:07) (92% - 98%)    I&O's Detail    18 Apr 2019 07:01  -  19 Apr 2019 07:00  --------------------------------------------------------  IN:    ns in tub fed  [id number]: 250 mL    Oral Fluid: 830 mL  Total IN: 1080 mL    OUT:    Voided: 1000 mL  Total OUT: 1000 mL    Total NET: 80 mL        I&O's Summary    18 Apr 2019 07:01  -  19 Apr 2019 07:00  --------------------------------------------------------  IN: 1080 mL / OUT: 1000 mL / NET: 80 mL        PHYSICAL EXAM:    Gen: NAD, AAOx3  HEENT: PERRL. EOMI.  Neuro: CNs II-XII intact. Left  4/5, proximal 3/5. RUE and LEs b/l 5/5. Sensation intact throughout. Following commands. Speech clear.   Neck: +[place] J collar. Left anterior incision C/D/I. Posterior cervical incision dry and clean, dressing removed.  Lungs: Clear b/l  Heart: S1, S2. NSR.  Abd: Soft, NT/ND. +BS, PEG site c/d/i, abdominal binder in place   Back: Right iliac crest graft site C/D/I.  Bilateral 2+ pedal edema      DIET: Puree diet with suplemental PEG feeds.  [] NPO  [] Mechanical  [] Tube feeds    LABS:                        9.6    12.13 )-----------( 343      ( 19 Apr 2019 07:00 )             30.4     04-19    138  |  99  |  8   ----------------------------<  137<H>  4.0   |  29  |  0.46<L>    Ca    9.1      19 Apr 2019 07:43  Phos  3.6     04-19  Mg     1.9     04-19              CAPILLARY BLOOD GLUCOSE          Drug Levels: [] N/A  Vancomycin Level, Trough: 17.2 ug/mL (04-14 @ 12:35)  Vancomycin Level, Trough: 26.3 ug/mL (04-13 @ 06:36)  Vancomycin Level, Trough: 10.7 ug/mL (04-12 @ 23:41)    CSF Analysis: [] N/A      Allergies    penicillin (Rash)    Intolerances      MEDICATIONS:  Antibiotics:  ciprofloxacin     Tablet 500 milliGRAM(s) Oral every 12 hours    Neuro:  acetaminophen   Tablet .. 650 milliGRAM(s) Oral every 6 hours PRN  gabapentin 600 milliGRAM(s) Oral at bedtime  melatonin 3 milliGRAM(s) Oral at bedtime PRN  ondansetron Injectable 4 milliGRAM(s) IV Push every 6 hours PRN  sertraline 50 milliGRAM(s) Oral daily  traMADol 25 milliGRAM(s) Oral every 4 hours PRN  traMADol 50 milliGRAM(s) Oral every 4 hours PRN    Anticoagulation:  enoxaparin Injectable 40 milliGRAM(s) SubCutaneous at bedtime    OTHER:  amiodarone    Tablet 200 milliGRAM(s) Oral daily  anastrozole 1 milliGRAM(s) Oral daily  atorvastatin 40 milliGRAM(s) Oral at bedtime  benzocaine 15 mG/menthol 3.6 mG Lozenge 1 Lozenge Oral every 1 hour PRN  famotidine    Tablet 20 milliGRAM(s) Oral daily  furosemide   Injectable 20 milliGRAM(s) IV Push every 12 hours  guaiFENesin    Syrup 100 milliGRAM(s) Oral every 6 hours PRN  magnesium hydroxide Suspension 30 milliLiter(s) Oral every 12 hours PRN  metoprolol succinate ER 25 milliGRAM(s) Oral daily  senna 2 Tablet(s) Oral at bedtime    IVF:  cholecalciferol 400 Unit(s) Oral daily  cyanocobalamin 1000 MICROGram(s) Oral daily  multivitamin  Chewable 1 Tablet(s) Oral daily  potassium chloride   Powder 40 milliEquivalent(s) Oral daily  thiamine 100 milliGRAM(s) Oral daily    CULTURES:  Culture Results:   Moderate Enterobacter cloacae complex  Accompanied by normal respiratory enedelia including Numerous Yeast (04-13 @ 07:57)    RADIOLOGY & ADDITIONAL TESTS:      ASSESSMENT:  67y Female w/ PMH of HTN, HLD, h/o Breast Ca, h/o Hodgkins Lymphoma, h/o ACDF C4-C6 now s/p ACDF C3-C4 extension to C6, PSF C2-T2, right iliac crest bone graft 4/5, now s/p PEG placement for swallowing difficulties on 4/16/19, ENT assessed Vocal cords, which are intact.     PLAN:  NEURO:  - Cont neuro checks   - Cont [place] J   - Cont PT/OT   - Dc home saturday with services   - Pain control: cont gabapentin, tramadol   - Cont. zoloft   - Cont home meds: anastrozole    CARDIOVASCULAR:  - BP goal: normotension   - Cont amiodarone - tapered to amiodarone 200 QD   - Cont atorvastatin, metoprolol  - Pt needs eliquis 5 bid, start date pending DW clearance     PULMONARY:  - IS   - Cont robitussin   - Trend CXR   - CT chest with persistent left lower lobe pneumonia and right lower lobe pneumonia     RENAL:  - I&Os   - Replete lytes prn   - CT abd with lytic lesion on R iliac crest and Left kidney upper lobe renal cell carcinoma - to be seen by oncologist inpatient vs outpatient, DW to discuss plan with patient and family today     GI:  - PEG feeds to be advanced slowly as per nutrition to avoid overfeeding syndrome   - Pt may also have pureed (moist is best) and thin liquids in addition to PEG feeds   - Bowel regimen   - cont famotidine   - Cont abd binder     HEME:  - Trend H/H     ID:  - Monitor for fevers   - Trend WBC  - Cont Cipro x7 day (4/17-4/23)     ENDO:  - ISS     DVT PROPHYLAXIS:  [x] Venodynes                                [x] Heparin/Lovenox    DISPOSITION:  - Tele status   - Full code   - Dispo: pending. Patient was acceprd to [facility]. will discuss with family.  - D/w Dr. Daley           [] Sepsis  [] hypovolemic shock,[] cardiogenic shock, [] hemorrhagic shock, [] neuogenic shock  [] Acute Respiratory Failure  []Cerebral edema, [] Brain compression/ herniation,   [] Functional quadriplegia  [] Acute blood loss anemia  t via PEG.
Hypertension

## 2023-12-16 NOTE — PROGRESS NOTE ADULT - PROBLEM SELECTOR PLAN 1
P/w progressive VIRAMONTES, lower extremity edema, clinical picture c/w acute on chronic systolic chf  - CXR with enlarged cardiac silhouette, clear lungs, troponins negative, probnp 2134  - s/p iv lasix diuresis 40 mg bid  -TTE (12/13) showed mildly decreased EF of 45-50%, global LV hypokinesis, valves unchanged from prior, severely dilated LA, RA; previous echo with worsening gradients across bioprosthetic valves, LVEF ~50% but with severe LAE, moderate TR, RVSP 45 2021 -> 59 2023.   - s/p RHC/LHC mild mLAD disease, RHC PCWP 15  - lasix changed to 40 mg po qd  - started on spironolactone 25 mg qd and Entresto 49/51 mg bid per Cards  - dispo: DC home today, updated pt and her daughter at bedside on1 2/16  , oupt f/u cardiology Dr. Skip Roberto.   Time spent on discharge 32 minutes coordinating discharge plan and discussing with patient and family.
P/w progressive VIRAMONTES, lower extremity edema, clinical picture c/w acute on chronic chf  - CXR with enlarged cardiac silhouette, clear lungs  - ddx includes ADHF, ACS, AR/MR,COPD (no known COPD hx, non-smoker), PE (less likely)   - Suspect most likely cardiac etiology, per outpatient records review previous TTEs with worsening gradients across bioprosthetic valves, LVEF ~50% but with severe LAE, moderate TR, RVSP 45 2021 -> 59 2023.   - EKG with TWI inferolateral leads, however troponins negative  - Pro-BNP 2134  - WBC WNL, no focal consolidations on CXR to suggest PNA  - VBG 7.34/54/29 (mild hypercapnia)    Plan:   - Start iv lasix 40 mg bid  - daily weight, strict I/O  - cardiology consult called, f/u recs   - TTE ordered   - monitor on tele  - K>4, Mg >2
P/w progressive VIRAMONTES, lower extremity edema, clinical picture c/w acute on chronic systolic chf  - CXR with enlarged cardiac silhouette, clear lungs, troponins negative, probnp 2134  - c/w iv lasix diuresis 40 mg bid  -TTE (12/13) showed mildly decreased EF of 45-50%, global LV hypokinesis, valves unchanged from prior, severely dilated LA, RA; previous echo with worsening gradients across bioprosthetic valves, LVEF ~50% but with severe LAE, moderate TR, RVSP 45 2021 -> 59 2023.   - cardiology consulted, plan for LHC/RHC today, Eliquis on hold  - started spironolactone 25 mg qd and losartan 100 mg qd per Cards  - dc losartan and start Entresto 49/51 mg bid tomorrow 12/16  - c/w iv lasix 40 bid, per Cards can transition to PO lasix QD after cath  - daily weight, strict I/O  -  monitor on tele  - K>4, Mg >2
P/w progressive VIRAMONTES, lower extremity edema, clinical picture c/w acute on chronic systolic chf  - CXR with enlarged cardiac silhouette, clear lungs, troponins negative, probnp 2134  - c/w iv lasix diuresis 40 mg bid  -TTE (12/13) showed mildly decreased EF of 45-50%, global LV hypokinesis, valves unchanged from prior, severely dilated LA, RA; previous echo with worsening gradients across bioprosthetic valves, LVEF ~50% but with severe LAE, moderate TR, RVSP 45 2021 -> 59 2023.   - cardiology consulted, plan for LHC/RHC tomorrow, hold eliquis tonight  - started spironolactone 25 mg qd and start losartan 100mg qd tomorrow per Cards  - c/w iv lasix 40 bid, per Cards can transition to PO lasix tomorrow  - daily weight, strict I/O  -  monitor on tele  - K>4, Mg >2

## 2023-12-16 NOTE — DISCHARGE NOTE NURSING/CASE MANAGEMENT/SOCIAL WORK - CAREGIVER ADDRESS
[address] [facility] [id number] [address] [facility] [place] [id number]

## 2023-12-17 ENCOUNTER — INPATIENT (INPATIENT)
Facility: HOSPITAL | Age: 68
LOS: 4 days | Discharge: HOME HEALTH | End: 2023-12-22
Attending: HOSPITALIST | Admitting: HOSPITALIST
Payer: MEDICARE

## 2023-12-17 VITALS
SYSTOLIC BLOOD PRESSURE: 105 MMHG | OXYGEN SATURATION: 98 % | DIASTOLIC BLOOD PRESSURE: 60 MMHG | RESPIRATION RATE: 18 BRPM | HEART RATE: 90 BPM | TEMPERATURE: 98 F

## 2023-12-17 DIAGNOSIS — I25.10 ATHEROSCLEROTIC HEART DISEASE OF NATIVE CORONARY ARTERY WITHOUT ANGINA PECTORIS: ICD-10-CM

## 2023-12-17 DIAGNOSIS — N39.0 URINARY TRACT INFECTION, SITE NOT SPECIFIED: ICD-10-CM

## 2023-12-17 DIAGNOSIS — N17.9 ACUTE KIDNEY FAILURE, UNSPECIFIED: ICD-10-CM

## 2023-12-17 DIAGNOSIS — I50.22 CHRONIC SYSTOLIC (CONGESTIVE) HEART FAILURE: ICD-10-CM

## 2023-12-17 DIAGNOSIS — E11.9 TYPE 2 DIABETES MELLITUS WITHOUT COMPLICATIONS: ICD-10-CM

## 2023-12-17 DIAGNOSIS — I10 ESSENTIAL (PRIMARY) HYPERTENSION: ICD-10-CM

## 2023-12-17 DIAGNOSIS — I48.20 CHRONIC ATRIAL FIBRILLATION, UNSPECIFIED: ICD-10-CM

## 2023-12-17 DIAGNOSIS — R82.71 BACTERIURIA: ICD-10-CM

## 2023-12-17 DIAGNOSIS — Z29.9 ENCOUNTER FOR PROPHYLACTIC MEASURES, UNSPECIFIED: ICD-10-CM

## 2023-12-17 DIAGNOSIS — R56.9 UNSPECIFIED CONVULSIONS: ICD-10-CM

## 2023-12-17 DIAGNOSIS — R53.1 WEAKNESS: ICD-10-CM

## 2023-12-17 DIAGNOSIS — H81.10 BENIGN PAROXYSMAL VERTIGO, UNSPECIFIED EAR: ICD-10-CM

## 2023-12-17 LAB
ALBUMIN SERPL ELPH-MCNC: 3.7 G/DL — SIGNIFICANT CHANGE UP (ref 3.3–5)
ALBUMIN SERPL ELPH-MCNC: 3.7 G/DL — SIGNIFICANT CHANGE UP (ref 3.3–5)
ALP SERPL-CCNC: 102 U/L — SIGNIFICANT CHANGE UP (ref 40–120)
ALP SERPL-CCNC: 102 U/L — SIGNIFICANT CHANGE UP (ref 40–120)
ALT FLD-CCNC: 28 U/L — SIGNIFICANT CHANGE UP (ref 4–33)
ALT FLD-CCNC: 28 U/L — SIGNIFICANT CHANGE UP (ref 4–33)
ANION GAP SERPL CALC-SCNC: 14 MMOL/L — SIGNIFICANT CHANGE UP (ref 7–14)
ANION GAP SERPL CALC-SCNC: 14 MMOL/L — SIGNIFICANT CHANGE UP (ref 7–14)
APPEARANCE UR: ABNORMAL
APPEARANCE UR: ABNORMAL
AST SERPL-CCNC: 20 U/L — SIGNIFICANT CHANGE UP (ref 4–32)
AST SERPL-CCNC: 20 U/L — SIGNIFICANT CHANGE UP (ref 4–32)
BACTERIA # UR AUTO: ABNORMAL /HPF
BACTERIA # UR AUTO: ABNORMAL /HPF
BASE EXCESS BLDV CALC-SCNC: 0.9 MMOL/L — SIGNIFICANT CHANGE UP (ref -2–3)
BASE EXCESS BLDV CALC-SCNC: 0.9 MMOL/L — SIGNIFICANT CHANGE UP (ref -2–3)
BASOPHILS # BLD AUTO: 0.09 K/UL — SIGNIFICANT CHANGE UP (ref 0–0.2)
BASOPHILS # BLD AUTO: 0.09 K/UL — SIGNIFICANT CHANGE UP (ref 0–0.2)
BASOPHILS NFR BLD AUTO: 0.9 % — SIGNIFICANT CHANGE UP (ref 0–2)
BASOPHILS NFR BLD AUTO: 0.9 % — SIGNIFICANT CHANGE UP (ref 0–2)
BILIRUB SERPL-MCNC: 0.5 MG/DL — SIGNIFICANT CHANGE UP (ref 0.2–1.2)
BILIRUB SERPL-MCNC: 0.5 MG/DL — SIGNIFICANT CHANGE UP (ref 0.2–1.2)
BILIRUB UR-MCNC: NEGATIVE — SIGNIFICANT CHANGE UP
BILIRUB UR-MCNC: NEGATIVE — SIGNIFICANT CHANGE UP
BLOOD GAS VENOUS COMPREHENSIVE RESULT: SIGNIFICANT CHANGE UP
BLOOD GAS VENOUS COMPREHENSIVE RESULT: SIGNIFICANT CHANGE UP
BUN SERPL-MCNC: 62 MG/DL — HIGH (ref 7–23)
BUN SERPL-MCNC: 62 MG/DL — HIGH (ref 7–23)
CALCIUM SERPL-MCNC: 9.4 MG/DL — SIGNIFICANT CHANGE UP (ref 8.4–10.5)
CALCIUM SERPL-MCNC: 9.4 MG/DL — SIGNIFICANT CHANGE UP (ref 8.4–10.5)
CAST: 10 /LPF — HIGH (ref 0–4)
CAST: 10 /LPF — HIGH (ref 0–4)
CHLORIDE BLDV-SCNC: 99 MMOL/L — SIGNIFICANT CHANGE UP (ref 96–108)
CHLORIDE BLDV-SCNC: 99 MMOL/L — SIGNIFICANT CHANGE UP (ref 96–108)
CHLORIDE SERPL-SCNC: 99 MMOL/L — SIGNIFICANT CHANGE UP (ref 98–107)
CHLORIDE SERPL-SCNC: 99 MMOL/L — SIGNIFICANT CHANGE UP (ref 98–107)
CO2 BLDV-SCNC: 28 MMOL/L — HIGH (ref 22–26)
CO2 BLDV-SCNC: 28 MMOL/L — HIGH (ref 22–26)
CO2 SERPL-SCNC: 24 MMOL/L — SIGNIFICANT CHANGE UP (ref 22–31)
CO2 SERPL-SCNC: 24 MMOL/L — SIGNIFICANT CHANGE UP (ref 22–31)
COLOR SPEC: YELLOW — SIGNIFICANT CHANGE UP
COLOR SPEC: YELLOW — SIGNIFICANT CHANGE UP
CREAT SERPL-MCNC: 1.86 MG/DL — HIGH (ref 0.5–1.3)
CREAT SERPL-MCNC: 1.86 MG/DL — HIGH (ref 0.5–1.3)
DIFF PNL FLD: NEGATIVE — SIGNIFICANT CHANGE UP
DIFF PNL FLD: NEGATIVE — SIGNIFICANT CHANGE UP
EGFR: 29 ML/MIN/1.73M2 — LOW
EGFR: 29 ML/MIN/1.73M2 — LOW
EOSINOPHIL # BLD AUTO: 0.1 K/UL — SIGNIFICANT CHANGE UP (ref 0–0.5)
EOSINOPHIL # BLD AUTO: 0.1 K/UL — SIGNIFICANT CHANGE UP (ref 0–0.5)
EOSINOPHIL NFR BLD AUTO: 1 % — SIGNIFICANT CHANGE UP (ref 0–6)
EOSINOPHIL NFR BLD AUTO: 1 % — SIGNIFICANT CHANGE UP (ref 0–6)
GAS PNL BLDV: 133 MMOL/L — LOW (ref 136–145)
GAS PNL BLDV: 133 MMOL/L — LOW (ref 136–145)
GAS PNL BLDV: SIGNIFICANT CHANGE UP
GAS PNL BLDV: SIGNIFICANT CHANGE UP
GLUCOSE BLDV-MCNC: 237 MG/DL — HIGH (ref 70–99)
GLUCOSE BLDV-MCNC: 237 MG/DL — HIGH (ref 70–99)
GLUCOSE SERPL-MCNC: 232 MG/DL — HIGH (ref 70–99)
GLUCOSE SERPL-MCNC: 232 MG/DL — HIGH (ref 70–99)
GLUCOSE UR QL: NEGATIVE MG/DL — SIGNIFICANT CHANGE UP
GLUCOSE UR QL: NEGATIVE MG/DL — SIGNIFICANT CHANGE UP
HCO3 BLDV-SCNC: 27 MMOL/L — SIGNIFICANT CHANGE UP (ref 22–29)
HCO3 BLDV-SCNC: 27 MMOL/L — SIGNIFICANT CHANGE UP (ref 22–29)
HCT VFR BLD CALC: 49.1 % — HIGH (ref 34.5–45)
HCT VFR BLD CALC: 49.1 % — HIGH (ref 34.5–45)
HCT VFR BLDA CALC: 48 % — HIGH (ref 34.5–46.5)
HCT VFR BLDA CALC: 48 % — HIGH (ref 34.5–46.5)
HGB BLD CALC-MCNC: 16.1 G/DL — SIGNIFICANT CHANGE UP (ref 11.7–16.1)
HGB BLD CALC-MCNC: 16.1 G/DL — SIGNIFICANT CHANGE UP (ref 11.7–16.1)
HGB BLD-MCNC: 15.8 G/DL — HIGH (ref 11.5–15.5)
HGB BLD-MCNC: 15.8 G/DL — HIGH (ref 11.5–15.5)
IANC: 7.55 K/UL — HIGH (ref 1.8–7.4)
IANC: 7.55 K/UL — HIGH (ref 1.8–7.4)
IMM GRANULOCYTES NFR BLD AUTO: 0.4 % — SIGNIFICANT CHANGE UP (ref 0–0.9)
IMM GRANULOCYTES NFR BLD AUTO: 0.4 % — SIGNIFICANT CHANGE UP (ref 0–0.9)
KETONES UR-MCNC: NEGATIVE MG/DL — SIGNIFICANT CHANGE UP
KETONES UR-MCNC: NEGATIVE MG/DL — SIGNIFICANT CHANGE UP
LACTATE BLDV-MCNC: 1.7 MMOL/L — SIGNIFICANT CHANGE UP (ref 0.5–2)
LACTATE BLDV-MCNC: 1.7 MMOL/L — SIGNIFICANT CHANGE UP (ref 0.5–2)
LEUKOCYTE ESTERASE UR-ACNC: ABNORMAL
LEUKOCYTE ESTERASE UR-ACNC: ABNORMAL
LYMPHOCYTES # BLD AUTO: 1.66 K/UL — SIGNIFICANT CHANGE UP (ref 1–3.3)
LYMPHOCYTES # BLD AUTO: 1.66 K/UL — SIGNIFICANT CHANGE UP (ref 1–3.3)
LYMPHOCYTES # BLD AUTO: 16.3 % — SIGNIFICANT CHANGE UP (ref 13–44)
LYMPHOCYTES # BLD AUTO: 16.3 % — SIGNIFICANT CHANGE UP (ref 13–44)
MAGNESIUM SERPL-MCNC: 2.3 MG/DL — SIGNIFICANT CHANGE UP (ref 1.6–2.6)
MAGNESIUM SERPL-MCNC: 2.3 MG/DL — SIGNIFICANT CHANGE UP (ref 1.6–2.6)
MCHC RBC-ENTMCNC: 29.9 PG — SIGNIFICANT CHANGE UP (ref 27–34)
MCHC RBC-ENTMCNC: 29.9 PG — SIGNIFICANT CHANGE UP (ref 27–34)
MCHC RBC-ENTMCNC: 32.2 GM/DL — SIGNIFICANT CHANGE UP (ref 32–36)
MCHC RBC-ENTMCNC: 32.2 GM/DL — SIGNIFICANT CHANGE UP (ref 32–36)
MCV RBC AUTO: 92.8 FL — SIGNIFICANT CHANGE UP (ref 80–100)
MCV RBC AUTO: 92.8 FL — SIGNIFICANT CHANGE UP (ref 80–100)
MONOCYTES # BLD AUTO: 0.74 K/UL — SIGNIFICANT CHANGE UP (ref 0–0.9)
MONOCYTES # BLD AUTO: 0.74 K/UL — SIGNIFICANT CHANGE UP (ref 0–0.9)
MONOCYTES NFR BLD AUTO: 7.3 % — SIGNIFICANT CHANGE UP (ref 2–14)
MONOCYTES NFR BLD AUTO: 7.3 % — SIGNIFICANT CHANGE UP (ref 2–14)
NEUTROPHILS # BLD AUTO: 7.55 K/UL — HIGH (ref 1.8–7.4)
NEUTROPHILS # BLD AUTO: 7.55 K/UL — HIGH (ref 1.8–7.4)
NEUTROPHILS NFR BLD AUTO: 74.1 % — SIGNIFICANT CHANGE UP (ref 43–77)
NEUTROPHILS NFR BLD AUTO: 74.1 % — SIGNIFICANT CHANGE UP (ref 43–77)
NITRITE UR-MCNC: NEGATIVE — SIGNIFICANT CHANGE UP
NITRITE UR-MCNC: NEGATIVE — SIGNIFICANT CHANGE UP
NRBC # BLD: 0 /100 WBCS — SIGNIFICANT CHANGE UP (ref 0–0)
NRBC # BLD: 0 /100 WBCS — SIGNIFICANT CHANGE UP (ref 0–0)
NRBC # FLD: 0 K/UL — SIGNIFICANT CHANGE UP (ref 0–0)
NRBC # FLD: 0 K/UL — SIGNIFICANT CHANGE UP (ref 0–0)
PCO2 BLDV: 45 MMHG — SIGNIFICANT CHANGE UP (ref 39–52)
PCO2 BLDV: 45 MMHG — SIGNIFICANT CHANGE UP (ref 39–52)
PH BLDV: 7.38 — SIGNIFICANT CHANGE UP (ref 7.32–7.43)
PH BLDV: 7.38 — SIGNIFICANT CHANGE UP (ref 7.32–7.43)
PH UR: 6 — SIGNIFICANT CHANGE UP (ref 5–8)
PH UR: 6 — SIGNIFICANT CHANGE UP (ref 5–8)
PHOSPHATE SERPL-MCNC: 3.8 MG/DL — SIGNIFICANT CHANGE UP (ref 2.5–4.5)
PHOSPHATE SERPL-MCNC: 3.8 MG/DL — SIGNIFICANT CHANGE UP (ref 2.5–4.5)
PLATELET # BLD AUTO: 184 K/UL — SIGNIFICANT CHANGE UP (ref 150–400)
PLATELET # BLD AUTO: 184 K/UL — SIGNIFICANT CHANGE UP (ref 150–400)
PO2 BLDV: 33 MMHG — SIGNIFICANT CHANGE UP (ref 25–45)
PO2 BLDV: 33 MMHG — SIGNIFICANT CHANGE UP (ref 25–45)
POTASSIUM BLDV-SCNC: 4.4 MMOL/L — SIGNIFICANT CHANGE UP (ref 3.5–5.1)
POTASSIUM BLDV-SCNC: 4.4 MMOL/L — SIGNIFICANT CHANGE UP (ref 3.5–5.1)
POTASSIUM SERPL-MCNC: 4 MMOL/L — SIGNIFICANT CHANGE UP (ref 3.5–5.3)
POTASSIUM SERPL-MCNC: 4 MMOL/L — SIGNIFICANT CHANGE UP (ref 3.5–5.3)
POTASSIUM SERPL-SCNC: 4 MMOL/L — SIGNIFICANT CHANGE UP (ref 3.5–5.3)
POTASSIUM SERPL-SCNC: 4 MMOL/L — SIGNIFICANT CHANGE UP (ref 3.5–5.3)
PROT SERPL-MCNC: 7.2 G/DL — SIGNIFICANT CHANGE UP (ref 6–8.3)
PROT SERPL-MCNC: 7.2 G/DL — SIGNIFICANT CHANGE UP (ref 6–8.3)
PROT UR-MCNC: SIGNIFICANT CHANGE UP MG/DL
PROT UR-MCNC: SIGNIFICANT CHANGE UP MG/DL
RBC # BLD: 5.29 M/UL — HIGH (ref 3.8–5.2)
RBC # BLD: 5.29 M/UL — HIGH (ref 3.8–5.2)
RBC # FLD: 13.2 % — SIGNIFICANT CHANGE UP (ref 10.3–14.5)
RBC # FLD: 13.2 % — SIGNIFICANT CHANGE UP (ref 10.3–14.5)
RBC CASTS # UR COMP ASSIST: 3 /HPF — SIGNIFICANT CHANGE UP (ref 0–4)
RBC CASTS # UR COMP ASSIST: 3 /HPF — SIGNIFICANT CHANGE UP (ref 0–4)
SAO2 % BLDV: 51.2 % — LOW (ref 67–88)
SAO2 % BLDV: 51.2 % — LOW (ref 67–88)
SODIUM SERPL-SCNC: 137 MMOL/L — SIGNIFICANT CHANGE UP (ref 135–145)
SODIUM SERPL-SCNC: 137 MMOL/L — SIGNIFICANT CHANGE UP (ref 135–145)
SP GR SPEC: 1.02 — SIGNIFICANT CHANGE UP (ref 1–1.03)
SP GR SPEC: 1.02 — SIGNIFICANT CHANGE UP (ref 1–1.03)
SQUAMOUS # UR AUTO: 9 /HPF — HIGH (ref 0–5)
SQUAMOUS # UR AUTO: 9 /HPF — HIGH (ref 0–5)
TRANS CELLS #/AREA URNS HPF: PRESENT
TRANS CELLS #/AREA URNS HPF: PRESENT
TROPONIN T, HIGH SENSITIVITY RESULT: 37 NG/L — SIGNIFICANT CHANGE UP
UROBILINOGEN FLD QL: 1 MG/DL — SIGNIFICANT CHANGE UP (ref 0.2–1)
UROBILINOGEN FLD QL: 1 MG/DL — SIGNIFICANT CHANGE UP (ref 0.2–1)
WBC # BLD: 10.18 K/UL — SIGNIFICANT CHANGE UP (ref 3.8–10.5)
WBC # BLD: 10.18 K/UL — SIGNIFICANT CHANGE UP (ref 3.8–10.5)
WBC # FLD AUTO: 10.18 K/UL — SIGNIFICANT CHANGE UP (ref 3.8–10.5)
WBC # FLD AUTO: 10.18 K/UL — SIGNIFICANT CHANGE UP (ref 3.8–10.5)
WBC UR QL: 24 /HPF — HIGH (ref 0–5)
WBC UR QL: 24 /HPF — HIGH (ref 0–5)

## 2023-12-17 PROCEDURE — 71045 X-RAY EXAM CHEST 1 VIEW: CPT | Mod: 26

## 2023-12-17 PROCEDURE — 99285 EMERGENCY DEPT VISIT HI MDM: CPT

## 2023-12-17 PROCEDURE — 70450 CT HEAD/BRAIN W/O DYE: CPT | Mod: 26,MA

## 2023-12-17 PROCEDURE — 99223 1ST HOSP IP/OBS HIGH 75: CPT | Mod: GC

## 2023-12-17 RX ORDER — GLUCAGON INJECTION, SOLUTION 0.5 MG/.1ML
1 INJECTION, SOLUTION SUBCUTANEOUS ONCE
Refills: 0 | Status: DISCONTINUED | OUTPATIENT
Start: 2023-12-17 | End: 2023-12-22

## 2023-12-17 RX ORDER — APIXABAN 2.5 MG/1
1 TABLET, FILM COATED ORAL
Qty: 0 | Refills: 0 | DISCHARGE

## 2023-12-17 RX ORDER — ERGOCALCIFEROL 1.25 MG/1
1 CAPSULE ORAL
Qty: 0 | Refills: 0 | DISCHARGE

## 2023-12-17 RX ORDER — CEFTRIAXONE 500 MG/1
1000 INJECTION, POWDER, FOR SOLUTION INTRAMUSCULAR; INTRAVENOUS EVERY 24 HOURS
Refills: 0 | Status: COMPLETED | OUTPATIENT
Start: 2023-12-18 | End: 2023-12-20

## 2023-12-17 RX ORDER — SODIUM CHLORIDE 9 MG/ML
1000 INJECTION, SOLUTION INTRAVENOUS
Refills: 0 | Status: DISCONTINUED | OUTPATIENT
Start: 2023-12-17 | End: 2023-12-22

## 2023-12-17 RX ORDER — FUROSEMIDE 40 MG
40 TABLET ORAL DAILY
Refills: 0 | Status: DISCONTINUED | OUTPATIENT
Start: 2023-12-17 | End: 2023-12-17

## 2023-12-17 RX ORDER — DEXTROSE 50 % IN WATER 50 %
12.5 SYRINGE (ML) INTRAVENOUS ONCE
Refills: 0 | Status: DISCONTINUED | OUTPATIENT
Start: 2023-12-17 | End: 2023-12-22

## 2023-12-17 RX ORDER — APIXABAN 2.5 MG/1
5 TABLET, FILM COATED ORAL
Refills: 0 | Status: DISCONTINUED | OUTPATIENT
Start: 2023-12-17 | End: 2023-12-22

## 2023-12-17 RX ORDER — INSULIN LISPRO 100/ML
VIAL (ML) SUBCUTANEOUS AT BEDTIME
Refills: 0 | Status: DISCONTINUED | OUTPATIENT
Start: 2023-12-17 | End: 2023-12-22

## 2023-12-17 RX ORDER — SACUBITRIL AND VALSARTAN 24; 26 MG/1; MG/1
1 TABLET, FILM COATED ORAL
Refills: 0 | Status: DISCONTINUED | OUTPATIENT
Start: 2023-12-17 | End: 2023-12-22

## 2023-12-17 RX ORDER — CEFTRIAXONE 500 MG/1
1000 INJECTION, POWDER, FOR SOLUTION INTRAMUSCULAR; INTRAVENOUS ONCE
Refills: 0 | Status: COMPLETED | OUTPATIENT
Start: 2023-12-17 | End: 2023-12-17

## 2023-12-17 RX ORDER — SIMVASTATIN 20 MG/1
10 TABLET, FILM COATED ORAL AT BEDTIME
Refills: 0 | Status: DISCONTINUED | OUTPATIENT
Start: 2023-12-17 | End: 2023-12-22

## 2023-12-17 RX ORDER — DEXTROSE 50 % IN WATER 50 %
25 SYRINGE (ML) INTRAVENOUS ONCE
Refills: 0 | Status: DISCONTINUED | OUTPATIENT
Start: 2023-12-17 | End: 2023-12-22

## 2023-12-17 RX ORDER — INSULIN LISPRO 100/ML
VIAL (ML) SUBCUTANEOUS
Refills: 0 | Status: DISCONTINUED | OUTPATIENT
Start: 2023-12-17 | End: 2023-12-22

## 2023-12-17 RX ORDER — ASPIRIN/CALCIUM CARB/MAGNESIUM 324 MG
81 TABLET ORAL DAILY
Refills: 0 | Status: DISCONTINUED | OUTPATIENT
Start: 2023-12-17 | End: 2023-12-22

## 2023-12-17 RX ORDER — SODIUM CHLORIDE 9 MG/ML
500 INJECTION, SOLUTION INTRAVENOUS ONCE
Refills: 0 | Status: COMPLETED | OUTPATIENT
Start: 2023-12-17 | End: 2023-12-17

## 2023-12-17 RX ORDER — SIMVASTATIN 20 MG/1
1 TABLET, FILM COATED ORAL
Qty: 0 | Refills: 0 | DISCHARGE

## 2023-12-17 RX ORDER — DEXTROSE 50 % IN WATER 50 %
15 SYRINGE (ML) INTRAVENOUS ONCE
Refills: 0 | Status: DISCONTINUED | OUTPATIENT
Start: 2023-12-17 | End: 2023-12-22

## 2023-12-17 RX ORDER — METFORMIN HYDROCHLORIDE 850 MG/1
1 TABLET ORAL
Qty: 0 | Refills: 0 | DISCHARGE

## 2023-12-17 RX ORDER — ACETAMINOPHEN 500 MG
650 TABLET ORAL EVERY 6 HOURS
Refills: 0 | Status: DISCONTINUED | OUTPATIENT
Start: 2023-12-17 | End: 2023-12-22

## 2023-12-17 RX ORDER — SPIRONOLACTONE 25 MG/1
25 TABLET, FILM COATED ORAL DAILY
Refills: 0 | Status: DISCONTINUED | OUTPATIENT
Start: 2023-12-17 | End: 2023-12-22

## 2023-12-17 RX ADMIN — CEFTRIAXONE 100 MILLIGRAM(S): 500 INJECTION, POWDER, FOR SOLUTION INTRAMUSCULAR; INTRAVENOUS at 19:08

## 2023-12-17 RX ADMIN — SIMVASTATIN 10 MILLIGRAM(S): 20 TABLET, FILM COATED ORAL at 22:35

## 2023-12-17 RX ADMIN — SODIUM CHLORIDE 500 MILLILITER(S): 9 INJECTION, SOLUTION INTRAVENOUS at 14:37

## 2023-12-17 RX ADMIN — Medication 81 MILLIGRAM(S): at 22:35

## 2023-12-17 NOTE — H&P ADULT - NSHPSOCIALHISTORY_GEN_ALL_CORE
Home: Domiciled   ADL: Full ADLs  Alcohol: Denies  Smoking: Denies   Drugs: Denies Home: Domiciled at home  ADL: Full ADLs, some IADLs  Alcohol: Denies  Smoking: Denies   Drugs: Denies

## 2023-12-17 NOTE — ED PROVIDER NOTE - NS ED ROS FT
Constitutional:  (-) fever, (-) chills, (-) lethargy  Eyes:  (-) eye pain (-) visual changes  ENMT: (-) nasal discharge, (-) sore throat. (-) neck pain or stiffness  Cardiac: (-) chest pain (-) palpitations  Respiratory:  (-) cough (-) respiratory distress.   GI:  (-) nausea (-) vomiting (-) diarrhea (-) abdominal pain.  :  (-) dysuria (-) frequency (-) burning.  MS:  (-) back pain (-) joint pain.  Neuro: (+) dizziness (+) seizure like activity (-) headache (-) numbness (-) tingling (-) focal weakness  Skin:  (-) rash  Except as documented in the HPI,  all other systems are negative

## 2023-12-17 NOTE — H&P ADULT - NSHPREVIEWOFSYSTEMS_GEN_ALL_CORE
Constitutional: denies weight loss, fatigue, fevers, chills  Skin: denies rashes or wounds  HEENT: no vision or hearing changes  CV: denies VIRAMONTES or GISELE  Resp: denies SOB or cough  GI: denies diarrhea, constipation, N/V, or changes to appetite  Urinary: denies urgency, dysuria  Neurologic: denies headache or pain  MSK: denies arthralgias or myalgias  Hematologic: denies bleeding or easy bruising  Lymphatics: denies LAD or recent infection

## 2023-12-17 NOTE — ED PROVIDER NOTE - NSICDXPASTMEDICALHX_GEN_ALL_CORE_FT
PAST MEDICAL HISTORY:  Abnormal uterine and vaginal bleeding     Atrial fibrillation on eliquis    Cerebrovascular accident (CVA)     Dyspnea on exertion     History of MI (myocardial infarction) 8-9 years ago in [facility]    History of stroke 2008 hospitalized in [facility]    History of TIA (transient ischemic attack) sept 2020    HLD (hyperlipidemia)     HTN (hypertension)     Long term current use of anticoagulant     Memory loss     S/P aortic valve replacement with bioprosthetic valve 2009    S/P mitral valve replacement with bioprosthetic valve 2009 "tissue valve per daughter"    Type 2 diabetes mellitus      PAST MEDICAL HISTORY:  Abnormal uterine and vaginal bleeding     Atrial fibrillation on eliquis    Cerebrovascular accident (CVA)     Dyspnea on exertion     History of MI (myocardial infarction) 8-9 years ago in [facility]    History of stroke 2008 hospitalized in [facility]    History of TIA (transient ischemic attack) sept 2020    HLD (hyperlipidemia)     HTN (hypertension)     Long term current use of anticoagulant     Memory loss     S/P aortic valve replacement with bioprosthetic valve 2009    S/P mitral valve replacement with bioprosthetic valve 2009 "tissue valve per daughter"    Type 2 diabetes mellitus

## 2023-12-17 NOTE — H&P ADULT - PROBLEM SELECTOR PLAN 1
Vertigo vs orthostatic hypotension since cardiac cath 12/13/23.  Post-ictal lethargy, no prodromal symptoms. No prior seizure hx.  Seizure vs TIA vs syncope vs vagal vs encephalopathy.  - recent TTE without acute changes from prior; no endocarditis/LV thrombus  - CTH with no acute changes, chronic cerebellar volume loss i/s/o prior R cerebellar CVA  > neurology consulted  > f/u metabolic w/u  > dysphagia screen?  > UTI antibiotics as below Vertigo vs orthostatic hypotension since cardiac cath 12/13/23.  Post-ictal lethargy, no prodromal symptoms. No prior seizure hx.  Seizure vs TIA vs syncope vs vagal vs encephalopathy.  - recent TTE without acute changes from prior; no endocarditis/LV thrombus  - CTH with no acute changes, chronic cerebellar volume loss i/s/o prior R cerebellar CVA  > f/u metabolic w/u  > UTI antibiotics as below  > vEEG and MRI brain ordered  > f/u Neurology in AM Vertigo vs orthostatic changes since cardiac cath 12/15/23. Recent medication changes of CHF meds at recent discharge.  Post-ictal lethargy, no prodromal symptoms. No prior seizure hx.  Seizure vs TIA vs syncope vs vagal vs encephalopathy.  - recent TTE without acute changes from prior; no endocarditis/LV thrombus  - CTH with no acute changes, chronic cerebellar volume loss i/s/o prior R cerebellar CVA  > CTM lytes  > UTI antibiotics as below  > vEEG and MRI brain ordered  > f/u Neurology in AM Vertigo vs orthostatic changes since cardiac cath 12/15/23. Recent medication changes of CHF meds at recent discharge.  Post-ictal lethargy, no prodromal symptoms. No prior seizure hx.  Seizure vs TIA vs syncope vs vagal vs encephalopathy.  - recent TTE without acute changes from prior; no endocarditis/LV thrombus  - CTH with no acute changes, chronic cerebellar volume loss i/s/o prior R cerebellar CVA  > CTM lytes  > UTI antibiotics as below  > Neurology consult in AM  > vEEG and MRI brain ordered Vertigo vs orthostatic changes since cardiac cath 12/15/23. Recent medication changes of CHF meds at recent discharge.  Post-ictal lethargy, no prodromal symptoms. No prior seizure hx.  Seizure vs TIA vs syncope vs vagal vs encephalopathy.  - recent TTE without acute changes from prior; no endocarditis/LV thrombus  - CTH with no acute changes, chronic cerebellar volume loss i/s/o prior R cerebellar CVA  > CTM lytes  > UTI antibiotics as below  > vEEG and MRI brain ordered  [  ] Formal Neuro consult in AM Post-ictal lethargy, no prodromal symptoms. No prior seizure hx.  Seizure vs syncope vs vagal  recent TTE without acute changes from prior; no endocarditis/LV thrombus  -CT Head with no acute changes, chronic cerebellar volume loss i/s/o prior R cerebellar CVA    > CTM lytes  > UTI antibiotics as below  > vEEG and MRI brain ordered  > Formal Neuro consult in AM

## 2023-12-17 NOTE — ED PROVIDER NOTE - OBJECTIVE STATEMENT
67 y F, hx of afib on Eliquis, MVR, AVR, CAD, CVA, DM, recently discharged yesterday after hospitalization for acute on chronic CHF exacerbation, now p/w 1-day onset of seizure like activity. No prior hx of seizure. Daughter at bedside. Reports that patient was drinking coffee at the breakfast table and then she started making unusual noise, head laying against the pantry door and she was shaking all four extremities. No urinary incontinence or tongue biting. Episode lasted for approximately 15 seconds. Resolved spontaneously. Patient fell asleep on the table afterwards. Per triage note, patient was also found to be hypotensive in the field 70/40, received 600 mL of IV fluid. Per daughter, patient is now back to baseline. Patient is complaining of dizziness (spinning sensation) that she has been having since her cardiac cath procedure. Reports standing and sitting up triggered her symptoms. Denies fever, chest pain, shortness of breath, nausea, vomiting, diarrhea, urinary symptoms.

## 2023-12-17 NOTE — ED ADULT TRIAGE NOTE - PATIENT'S PREFERRED PRONOUN
Gen: No fever, no weight loss, no fatigue  CV: No chest pain, no palpitations  HEENT: No sore throat, no hoarseness  Skin: No rash, no color changes  Resp: No SOB, no cough  Endo: No sensitivity to heat or cold, no appetite changes  GI: No constipation, no diarrhea, no nausea, no vomiting  Msk: No back pain, no LE swelling, no extremity pain  : No dysuria, no increased frequency  Neuro: No LOC, no weakness, no numbness
Her/She

## 2023-12-17 NOTE — H&P ADULT - PROBLEM SELECTOR PLAN 6
- recent previous home meds HCTZ and Lisinopril  > cont new GDMT as above (Michael, Entresto)  > cont new Hydralazine 25mg TID and Isordil 10mg TID, additional tiration as necessary w/o RVR. CHADSVASC 8  > cont home Eliquis (BID outpatient documented but patient/daughter reporting takes qHS)  > not on rate/rhythm control (previously had slow vent response), CTM HRs, on Tele Previous MI in [facility] 2008? Unclear interventions at the time.  > not on home ASA, start ASA 81  > cont home simvastatin 10mg  > no need for Cardiology consult at this time Previous MI in [facility] 2008? Unclear interventions at the time.  > not on home ASA, start ASA 81  > cont home simvastatin 10mg  > no need for Cardiology consult at this time Low concern for active exacerbation. Euvolemic, CXR without congestion.  - TTE 2021 and 2023, per outpatient cards note had worsening gradients across bioprosthetic valves from 2021->2023   - s/p LHC/RHC with mild mLAD dz, mildly elevated PCWP -> medical mgmt w/ outpatient cards  > Lasix changed to 40mg PO qd on last discharge, cont 40mg PO qD  > cont GDMT per Cardiology with newly started on last admission Spironolactone 25mg qD and Entresto 49-51mg BID  > hold isosorbide and hydral given infection  > oupt f/u cardiology Dr. Skip Roberto

## 2023-12-17 NOTE — H&P ADULT - NSHPPHYSICALEXAM_GEN_ALL_CORE
T(C): 36.3 (12-17-23 @ 18:14), Max: 36.7 (12-17-23 @ 14:16)  HR: 85 (12-17-23 @ 18:14) (85 - 99)  BP: 101/51 (12-17-23 @ 18:14) (101/51 - 119/87)  RR: 17 (12-17-23 @ 18:14) (16 - 18)  SpO2: 99% (12-17-23 @ 18:14) (98% - 99%)    GENERAL: well-appearing, NAD, appears comfortable  HEENT: NC/AT, EOMI, no conjunctival icterus, moist mucus membranes  NECK: supple, non-tender, no JVD, no LAD  LUNGS: non-labored breathing, CTAB, no wheezing/rales/rhonchi  CV: RRR, no m/r/g, 2+ palpable peripheral pulses bi/l, cap refill <2 secs  ABD: non-distended, soft, non-tender, no rebound tenderness or guarding  : no CVA tenderness  MSK: full ROM, strength 5/5 bi/l  EXT: warm and well-perfused, no peripheral edema  SKIN: no rashes or lesions  NEURO: AAOx3, no focal deficits T(C): 36.3 (12-17-23 @ 18:14), Max: 36.7 (12-17-23 @ 14:16)  HR: 85 (12-17-23 @ 18:14) (85 - 99)  BP: 101/51 (12-17-23 @ 18:14) (101/51 - 119/87)  RR: 17 (12-17-23 @ 18:14) (16 - 18)  SpO2: 99% (12-17-23 @ 18:14) (98% - 99%)    GENERAL: well-appearing, NAD, appears comfortable  HEENT: NC/AT, EOMI, no conjunctival icterus, moist mucus membranes  NECK: supple, non-tender, no JVD, no LAD  LUNGS: non-labored breathing, slight coarseness bilaterally, occasional wheeze  CV: irregularly irregular, systolic murmur, 2+ palpable peripheral pulses bi/l, cap refill <2 secs  ABD: non-distended, soft, non-tender, no rebound tenderness or guarding  : no CVA tenderness  MSK: full ROM, strength 5/5 bi/l  EXT: warm and well-perfused, trace peripheral edema  SKIN: no rashes or lesions  NEURO: AAOx3, no focal deficits, CNII-XII grossly intact

## 2023-12-17 NOTE — ED ADULT NURSE NOTE - NSFALLHARMRISKINTERV_ED_ALL_ED
Assistance OOB with selected safe patient handling equipment if applicable/Assistance with ambulation/Communicate risk of Fall with Harm to all staff, patient, and family/Provide visual cue: red socks, yellow wristband, yellow gown, etc/Reinforce activity limits and safety measures with patient and family/Bed in lowest position, wheels locked, appropriate side rails in place/Call bell, personal items and telephone in reach/Instruct patient to call for assistance before getting out of bed/chair/stretcher/Non-slip footwear applied when patient is off stretcher/[place] to call system/Physically safe environment - no spills, clutter or unnecessary equipment/Purposeful Proactive Rounding/Room/bathroom lighting operational, light cord in reach Assistance OOB with selected safe patient handling equipment if applicable/Assistance with ambulation/Communicate risk of Fall with Harm to all staff, patient, and family/Provide visual cue: red socks, yellow wristband, yellow gown, etc/Reinforce activity limits and safety measures with patient and family/Bed in lowest position, wheels locked, appropriate side rails in place/Call bell, personal items and telephone in reach/Instruct patient to call for assistance before getting out of bed/chair/stretcher/Non-slip footwear applied when patient is off stretcher/[place] to call system/Physically safe environment - no spills, clutter or unnecessary equipment/Purposeful Proactive Rounding/Room/bathroom lighting operational, light cord in reach

## 2023-12-17 NOTE — H&P ADULT - PROBLEM SELECTOR PROBLEM 5
Chronic atrial fibrillation CAD (coronary artery disease) Heart failure with mildly reduced ejection fraction (HFmrEF) Vertigo, benign positional

## 2023-12-17 NOTE — ED PROVIDER NOTE - ATTENDING CONTRIBUTION TO CARE
67F h/o AFib on Eliquis, MVR, AVR, CAD, CVA, DM discharged from [facility] yesterday after hospitalization for acute on chronic CHF exacerbation, now p/w 1 episode of seizure-like activity. Daughter assisting with history and translation. States patient awoke in usual state of health, but while at table for breakfast pt was seen shaking all four extremities for ~15 seconds and unresponsive during this time. Denies any urinary incontinence or tongue biting. Pt then rested head on table and fell asleep. Upon EMS arrival, pt found hypotensive to 70s/40s and received 600cc IVF en route. Per daughter, patient is now back to baseline mental status but has been very weak since her discharge. Pt also reports room spinning sensation when sitting up and standing, though improves with lying flat. Denies fever, chest pain, shortness of breath, nausea, vomiting, diarrhea, urinary symptoms.  Gen: 67F h/o AFib on Eliquis, MVR, AVR, CAD, CVA, DM discharged from [facility] yesterday after hospitalization for acute on chronic CHF exacerbation, now p/w 1 episode of seizure-like activity. Daughter assisting with history and translation. States patient awoke in usual state of health, but while at table for breakfast pt was seen shaking all four extremities for ~15 seconds and unresponsive during this time. Denies any urinary incontinence or tongue biting. Pt then rested head on table and fell asleep. Upon EMS arrival, pt found hypotensive to 70s/40s and received 600cc IVF en route. Per daughter, patient is now back to baseline mental status but has been very weak since her discharge. Pt also reports room spinning sensation when sitting up and standing, though improves with lying flat. Denies fever, chest pain, shortness of breath, nausea, vomiting, diarrhea, urinary symptoms.  Gen: 67F h/o AFib on Eliquis, MVR, AVR, CAD, CVA, DM discharged from [facility] yesterday after hospitalization for acute on chronic CHF exacerbation, now p/w 1 episode of seizure-like activity. Daughter assisting with history and translation. States patient awoke in usual state of health, but while at table for breakfast pt was seen shaking all four extremities for ~15 seconds and unresponsive during this time. Denies any urinary incontinence or tongue biting. Pt then rested head on table and fell asleep. Upon EMS arrival, pt found hypotensive to 70s/40s and received 600cc IVF en route. Per daughter, patient is now back to baseline mental status but has been very weak since her discharge. Pt also reports room spinning sensation when sitting up and standing, though improves with lying flat. Denies fever, chest pain, shortness of breath, nausea, vomiting, diarrhea, urinary symptoms.  Gen: lethargic-appearing but answering questions appropriately via translation, weak appearing , AAOx3, able to follow commands   CV: irregular but rate controlled, no appreciable murmur  Pulm: cta b/l   Abd: soft, NTND  Neuro: No focal sensory or motor deficits, normal CN exam  Ext: no edema/swelling  Skin: Warm, well perfused, no rash, no leg swelling  MDM: 67F h/o AFib on Eliquis, MVR, AVR, CAD, CVA, DM discharged from [facility] yesterday after hospitalization for acute on chronic CHF exacerbation, now p/w 1 episode of seizure-like activity. DDx includes but not limited to dehydration 2/2 overdiuresis (H/H 12.6/40.8) on admission 12/12, up to 15.8/47.6 on discharge yesterday) vs new onset seizure vs less likely metabolic encephalopathy from infection as pt not reporting any infectious symptoms and remains afebrile. Unlikely 2/2 heart failure as pt otherwise warm, good cap refill. Will check cbc, cmp, VBG with lactate, trop. EKG performed, unchanged from prior. Obtain CTH given possible seizure. Will have neuro assess. Check basic infectious w/u with UA and CXR. Dispo pending w/u 67F h/o AFib on Eliquis, MVR, AVR, CAD, CVA, DM discharged from [facility] yesterday after hospitalization for acute on chronic CHF exacerbation, now p/w 1 episode of seizure-like activity. Daughter assisting with history and translation. States patient awoke in usual state of health, but while at table for breakfast pt was seen shaking all four extremities for ~15 seconds and unresponsive during this time. Denies any urinary incontinence or tongue biting. Pt then rested head on table and fell asleep. Upon EMS arrival, pt found hypotensive to 70s/40s and received 600cc IVF en route. Per daughter, patient is now back to baseline mental status but has been very weak since her discharge. Pt also reports room spinning sensation when sitting up and standing, though improves with lying flat. Denies fever, chest pain, shortness of breath, nausea, vomiting, diarrhea, urinary symptoms.  Gen: lethargic-appearing but answering questions appropriately via translation, weak appearing , AAOx3, able to follow commands   CV: irregular but rate controlled, no appreciable murmur  Pulm: cta b/l   Abd: soft, NTND  Neuro: No focal sensory or motor deficits, normal CN exam  Ext: no edema/swelling  Skin: Warm, well perfused, no rash, no leg swelling  MDM: 67F h/o AFib on Eliquis, MVR, AVR, CAD, CVA, DM discharged from [facility] yesterday after hospitalization for acute on chronic CHF exacerbation, now p/w 1 episode of seizure-like activity. DDx includes but not limited to dehydration 2/2 overdiuresis (H/H 12.6/40.8) on admission 12/12, up to 15.8/47.6 on discharge yesterday) vs new onset seizure vs less likely metabolic encephalopathy from infection as pt not reporting any infectious symptoms and remains afebrile. Unlikely 2/2 heart failure as pt otherwise warm, good cap refill. Will check cbc, cmp, VBG with lactate, trop. EKG performed, unchanged from prior. Obtain CTH given possible seizure. Will have neuro assess. Check basic infectious w/u with UA and CXR. Dispo pending w/u

## 2023-12-17 NOTE — H&P ADULT - ASSESSMENT
67F hx DM2, Afib (on Eliquis), CHF (TTE 12/13 EF 45-50%; bioproesthetic MVR/AVR 2008), CAD (prior MI, RHC/HC 12/15 mild mLAD dz), CVA (R cerebellar), recent hospitalization 12/12-16 for acute on chronic CHF exacerbation presented for seizure-like activity. At current baseline, neurology consulted. Possibly 2/2 ?UTI vs metabolic abnormality vs acute on chronic CVA. Infectious and metabolic w/u pending. 66yo Female hx DM Type 2, Afib (on Eliquis), CHF (TTE 12/13 EF 45-50%; bioprosthetic MVR/AVR 2008), CAD (prior MI, RHC/LHC 12/15 mild mLAD dz), CVA (R cerebellar), recent hospitalization 12/12-16 for acute on chronic CHF exacerbation. Currently a/w seizure-like activity, UTI, GABRIELLE; Found to be in Afib with RVR in 120s;  68yo Female hx DM Type 2, Afib (on Eliquis), CHF (TTE 12/13 EF 45-50%; bioprosthetic MVR/AVR 2008), CAD (prior MI, RHC/LHC 12/15 mild mLAD dz), CVA (R cerebellar), recent hospitalization 12/12-16 for acute on chronic CHF exacerbation. Currently a/w seizure-like activity, UTI, GABRIELLE; Found to be in Afib with RVR in 120s;

## 2023-12-17 NOTE — ED ADULT NURSE REASSESSMENT NOTE - NS ED NURSE REASSESS COMMENT FT1
Report received from MARVIN Braswell. Pt A&Ox3 and primarily Japanese speaking, son at bedside for translation. Pt breathing even and unlabored on room air. Pt remains on continuous cardiac monitor, Afib noted. VS as noted in flow sheet. Medicated per EMAR. Seizure precautions maintained at this time, side rails up. No acute distress noted. Pt denies any complaints at this time. Plan of care ongoing, comfort measures provided and safety measures maintained. Awaiting bed assignment. Report received from MARVIN Braswell. Pt A&Ox3 and primarily Ivorian speaking, son at bedside for translation. Pt breathing even and unlabored on room air. Pt remains on continuous cardiac monitor, Afib noted. VS as noted in flow sheet. Medicated per EMAR. Seizure precautions maintained at this time, side rails up. No acute distress noted. Pt denies any complaints at this time. Plan of care ongoing, comfort measures provided and safety measures maintained. Awaiting bed assignment.

## 2023-12-17 NOTE — H&P ADULT - MUSCULOSKELETAL
no joint swelling/no joint erythema/no calf tenderness/calf tenderness/strength 5/5 bilateral upper extremities/strength 5/5 bilateral lower extremities

## 2023-12-17 NOTE — ED ADULT NURSE NOTE - NSICDXPASTMEDICALHX_GEN_ALL_CORE_FT
PAST MEDICAL HISTORY:  Abnormal uterine and vaginal bleeding     Atrial fibrillation on eliquis    Cerebrovascular accident (CVA)     Dyspnea on exertion     History of MI (myocardial infarction) 8-9 years ago in [facility]    History of stroke 2008 hospitalized in [facility]    History of TIA (transient ischemic attack) sept 2020    HLD (hyperlipidemia)     HTN (hypertension)     Long term current use of anticoagulant     Memory loss     S/P aortic valve replacement with bioprosthetic valve 2009    S/P mitral valve replacement with bioprosthetic valve 2009 "tissue valve per daughter"    Type 2 diabetes mellitus      PAST MEDICAL HISTORY:  Abnormal uterine and vaginal bleeding     Atrial fibrillation on eliquis    Cerebrovascular accident (CVA)     Dyspnea on exertion     History of MI (myocardial infarction) 8-9 years ago in [facility]    History of stroke 2008 hospitalized in [facility]    History of TIA (transient ischemic attack) sept 2020    HLD (hyperlipidemia)     HTN (hypertension)     Long term current use of anticoagulant     Memory loss     S/P aortic valve replacement with bioprosthetic valve 2009    S/P mitral valve replacement with bioprosthetic valve 2009 "tissue valve per daughter"    Type 2 diabetes mellitus

## 2023-12-17 NOTE — ED ADULT TRIAGE NOTE - CHIEF COMPLAINT QUOTE
As per daughter, pt. might have had a seizure around 9:30am lasting approx. 15 seconds. Pt. was not responding well for 5 minutes afterwards. Currently back to baseline. Pt. had a cardiac cath 2 days ago. c/o dizziness and nausea. Pt. was hypotensive at home (70/40s). Given 600ml NS via #20g LAC. Phx: MI, bypass, CVA, DM

## 2023-12-17 NOTE — H&P ADULT - PROBLEM SELECTOR PROBLEM 6
HTN (hypertension) Chronic atrial fibrillation CAD (coronary artery disease) Heart failure with mildly reduced ejection fraction (HFmrEF)

## 2023-12-17 NOTE — H&P ADULT - PROBLEM SELECTOR PROBLEM 7
DM2 (diabetes mellitus, type 2) HTN (hypertension) Chronic atrial fibrillation CAD (coronary artery disease)

## 2023-12-17 NOTE — H&P ADULT - NSICDXPASTMEDICALHX_GEN_ALL_CORE_FT
PAST MEDICAL HISTORY:  Abnormal uterine and vaginal bleeding     Atrial fibrillation on eliquis    Cerebrovascular accident (CVA)     Dyspnea on exertion     History of MI (myocardial infarction) 8-9 years ago in [facility]    History of stroke 2008 hospitalized in [facility]    History of TIA (transient ischemic attack) sept 2020    HLD (hyperlipidemia)     HTN (hypertension)     Long term current use of anticoagulant     Memory loss     S/P aortic valve replacement with bioprosthetic valve 2009    S/P mitral valve replacement with bioprosthetic valve 2009 "tissue valve per daughter"    Type 2 diabetes mellitus      PAST MEDICAL HISTORY:  Abnormal uterine and vaginal bleeding     Atrial fibrillation on eliquis    Cerebrovascular accident (CVA)     Dyspnea on exertion     History of MI (myocardial infarction) 8-9 years ago in [facility]    History of stroke 2008 hospitalized in [facility]    History of TIA (transient ischemic attack) sept 2020    HLD (hyperlipidemia)     HTN (hypertension)     Long term current use of anticoagulant     Memory loss     S/P aortic valve replacement with bioprosthetic valve 2009    S/P mitral valve replacement with bioprosthetic valve 2009 "tissue valve per daughter"    Type 2 diabetes mellitus

## 2023-12-17 NOTE — H&P ADULT - PROBLEM SELECTOR PLAN 8
DVT PPx: Eliquis 5mg   Diet: DASH/CC  Code:   Dispo: PT/OT Pending Hospital Course  Communication: daughter    Discharge information:  Pharmacy -   PCP - - A1c 6.2% 12/23/23  > hold home Metformin 500mg qD  > low ISS; FSG TIDACHS - recent previous home meds HCTZ and Lisinopril  > cont new GDMT as above (Michael, Entresto)  > hold new Hydralazine 25mg TID and Isordil 10mg TID i/s/o infection

## 2023-12-17 NOTE — H&P ADULT - HISTORY OF PRESENT ILLNESS
67F hx DM2, Afib (on Eliquis), CHF (TTE 12/13 EF 45-50%; bioproesthetic MVR/AVR 2008), CAD (prior MI, RHC/HC 12/15 mild mLAD dz), CVA (R cerebellar), recent hospitalization 12/12-16 for acute on chronic CHF exacerbation presented to [facility] ED with seizure-like activity. No prior hx of seizures. Daughter reports patient eating breakfast and starting making atypical noises with head laying against pantry door, shaking all extremities. No urinary incontinence or tongue biting, LOC or head injury. Episode lasted ~15 seconds with spontaenous resolution. No prodromal symptoms but fell aleep on the table after. Reportedly also hypotensive to 70/40 in the field and received 600cc IVF. Patient now at approximate baseline per daughter. Acute complaint of dizziness (spinning sensation) since her C/RHC 12/13. Symptoms of dizziness posturally triggered by standing/sitting. Denies fever/chills, CP/SOB, abd pain, N/V, urinary incontinence, pain, or frequency.    In the ED, patient afebrile and hemodynamically stable (hypotensive in field), saturating well on room air. Labs significant for BUN/SCR 62/1.86, trops wnl, UA+ LE and bacteria with 9 epithelial cells. Imaging with CXR clear lungs and w/o congestion, CTH with no acute changes, chronic cerebellar volume loss (2/2 chronic AEDs?). Neurology consulted. Received 500cc LR bolus (and 600cc in field), and started on CTX 1g. 67F hx DM2, Afib (on Eliquis), CHF (TTE 12/13 EF 45-50%; bioproesthetic MVR/AVR 2008), CAD (prior MI, RHC/HC 12/15 mild mLAD dz), CVA (R cerebellar), recent hospitalization 12/12-16 for acute on chronic CHF exacerbation presented to [facility] ED with seizure-like activity. No prior hx of seizures. Daughter reports patient eating breakfast and starting making atypical noises with head laying against pantry door, shaking all extremities. No urinary incontinence or tongue biting, LOC or head injury. Episode lasted ~15 seconds with spontaenous resolution. No prodromal symptoms but fell aleep on the table after. Reportedly also hypotensive to 70/40 in the field and received 600cc IVF. Patient now at approximate baseline per daughter. Acute complaint of dizziness (spinning sensation) since her C/RHC 12/13. Symptoms of dizziness posturally triggered by standing/sitting. Denies fever/chills, CP/SOB, abd pain, N/V, urinary incontinence, pain, or frequency.    In the ED, patient afebrile and hemodynamically stable (hypotensive in field), saturating well on room air. Labs significant for BUN/SCR 62/1.86, trops wnl, UA+ LE and bacteria with 9 epithelial cells. Imaging with CXR clear lungs and w/o congestion, CTH with no acute changes, chronic cerebellar volume loss (2/2 chronic AEDs?). Neurology consulted. Received 500cc LR bolus (and 600cc in field), and started on CTX 1g. 67F hx DM2, Afib (on Eliquis), CHF (TTE 12/13 EF 45-50%; bioproesthetic MVR/AVR 2008), CAD (prior MI, RHC/LHC 12/15 mild mLAD dz), CVA (R cerebellar), recent hospitalization 12/12-16 for acute on chronic CHF exacerbation presented to [facility] ED with seizure-like activity. No prior hx of seizures. Per chart check daughter reported patient eating breakfast and starting making atypical noises with head laying against pantry door, shaking all extremities. No urinary incontinence or tongue biting, LOC or head injury. Episode lasted ~15 seconds with spontaenous resolution. No prodromal symptoms but fell aleep on the table after. Patient reports that she believes she never lost consciousness and that these events did not happen. She endorses weakness for 1+ weeks a/w recent hospitalization, requiring help to amublate to the bathroom, but that her weakness has significantly imprved at this point. She also reports dizziness (spinning sensation) since her LHC/RHC 12/13. Symptoms of dizziness posturally triggered by standing/sitting, though patient denies these symptoms with repositioning in the ED currently. No hearing changes or pain. Reportedly also hypotensive to 70/40 in the field and received 600cc IVF. Patient now at approximate baseline per daughter. Denies fever/chills, CP/SOB, abd pain, N/V, urinary incontinence, pain, or frequency.    In the ED, patient afebrile and hemodynamically stable (hypotensive in field), saturating well on room air. Labs significant for BUN/SCR 62/1.86, trops wnl, UA+ LE and bacteria with 9 epithelial cells. Imaging with CXR clear lungs and w/o congestion, CTH with no acute changes, chronic cerebellar volume loss (2/2 chronic AEDs?). Neurology consulted. Received 500cc LR bolus (and 600cc in field), and started on CTX 1g. 67F hx DM2, Afib (on Eliquis), CHF (TTE 12/13 EF 45-50%; bioproesthetic MVR/AVR 2008), CAD (prior MI, RHC/LHC 12/15 mild mLAD dz), CVA (R cerebellar), recent hospitalization 12/12-16 for acute on chronic CHF exacerbation presented to [facility] ED with seizure-like activity. No prior hx of seizures. Per chart check daughter reported patient eating breakfast and starting making atypical noises with head laying against pantry door, shaking all extremities. No urinary incontinence or tongue biting, LOC or head injury. Episode lasted ~15 seconds with spontaenous resolution. No prodromal symptoms but fell aleep on the table after. Patient reports that she believes she never lost consciousness and that these events did not happen. She endorses weakness for 1+ weeks a/w recent hospitalization, requiring help to amublate to the bathroom, but that her weakness has significantly imprved at this point. She also reports dizziness (spinning sensation) since her LHC/RHC 12/13. Symptoms of dizziness posturally triggered by standing/sitting, though patient denies these symptoms with repositioning in the ED currently. No hearing changes or pain. Reportedly also hypotensive to 70/40 in the field and received 600cc IVF. Patient now at approximate baseline per daughter. Denies fever/chills, CP/SOB, abd pain, N/V, urinary incontinence, pain, or frequency.    In the ED, patient afebrile and hemodynamically stable (hypotensive in field), saturating well on room air. Labs significant for BUN/SCR 62/1.86, trops wnl, UA+ LE and bacteria with 9 epithelial cells. Imaging with CXR clear lungs and w/o congestion, CTH with no acute changes, chronic cerebellar volume loss (2/2 chronic AEDs?). Neurology consulted. Received 500cc LR bolus (and 600cc in field), and started on CTX 1g. 67F hx DM2, Afib (on Eliquis), CHF (TTE 12/13 EF 45-50%; bioprosthetic MVR/AVR 2008), CAD (prior MI, RHC/LHC 12/15 mild mLAD dz), CVA (R cerebellar), recent hospitalization 12/12-16 for acute on chronic CHF exacerbation presented to [facility] ED with seizure-like activity. No prior hx of seizures. Per chart check daughter reported patient eating breakfast and starting making atypical noises with head laying against pantry door, shaking all extremities. No urinary incontinence or tongue biting, LOC or head injury. Episode lasted ~15 seconds with spontaneous resolution. No prodromal symptoms but fell aleep on the table after. Patient reports that she believes she never lost consciousness and that these events did not happen. She endorses weakness for 1+ weeks a/w recent hospitalization, requiring help to ambulate to the bathroom, but that her weakness has significantly improved at this point. She also reports dizziness (spinning sensation) since her LHC/RHC 12/13. Symptoms of dizziness posturally triggered by standing/sitting, though patient denies these symptoms with repositioning in the ED currently. No hearing changes or pain. Reportedly also hypotensive to 70/40 in the field and received 600cc IVF. Patient now at approximate baseline per daughter. Denies fever/chills, CP/SOB, abd pain, N/V, urinary incontinence, pain, or frequency.    In the ED, patient afebrile and hemodynamically stable (hypotensive in field), saturating well on room air. Labs significant for BUN/SCR 62/1.86, trops wnl, UA+ LE and bacteria with 9 epithelial cells. Imaging with CXR clear lungs and w/o congestion, CTH with no acute changes, chronic cerebellar volume loss. Received 500cc LR bolus (and 600cc in field), and started on CTX 1g. 67F hx DM2, Afib (on Eliquis), CHF (TTE 12/13 EF 45-50%; bioprosthetic MVR/AVR 2008), CAD (prior MI, RHC/LHC 12/15 mild mLAD dz), CVA (R cerebellar), recent hospitalization 12/12-16 for acute on chronic CHF exacerbation presented to [facility] ED with seizure-like activity. No prior hx of seizures. Per chart check daughter reported patient eating breakfast and starting making atypical noises with head laying against pantry door, shaking all extremities. No urinary incontinence or tongue biting, LOC or head injury. Episode lasted ~15 seconds with spontaneous resolution. No prodromal symptoms but fell aleep on the table after. Patient reports that she believes she never lost consciousness and that these events did not happen. She endorses weakness for 1+ weeks a/w recent hospitalization, requiring help to ambulate to the bathroom, but that her weakness has significantly improved at this point. She also reports dizziness (spinning sensation) since her LHC/RHC 12/13. Symptoms of dizziness posturally triggered by standing/sitting, though patient denies these symptoms with repositioning in the ED currently. No hearing changes or pain. Reportedly also hypotensive to 70/40 in the field and received 600cc IVF. Patient now at approximate baseline per daughter. Denies fever/chills, CP/SOB, abd pain, N/V, urinary incontinence, pain, or frequency.    In the ED, patient afebrile and hemodynamically stable (hypotensive in field), saturating well on room air. Labs significant for BUN/SCR 62/1.86, trops wnl, UA+ LE and bacteria with 9 epithelial cells. Imaging with CXR clear lungs and w/o congestion, CTH with no acute changes, chronic cerebellar volume loss. Received 500cc LR bolus (and 600cc in field), and started on CTX 1g. 68yo Female hx DM Type 2, Afib (on Eliquis), CHF (TTE 12/13 EF 45-50%; bioprosthetic MVR/AVR 2008), CAD (prior MI, RHC/LHC 12/15 mild mLAD dz), CVA (R cerebellar), recent hospitalization 12/12-16 for acute on chronic CHF exacerbation presented to [facility] ED with seizure-like activity. No prior hx of seizures. Per chart check daughter reported patient eating breakfast and starting making atypical noises with head laying against pantry door, shaking all extremities. No urinary incontinence or tongue biting, LOC or head injury. Episode lasted ~15 seconds with spontaneous resolution. No prodromal symptoms but fell aleep on the table after. Patient reports that she believes she never lost consciousness and that these events did not happen. She endorses weakness for 1+ weeks a/w recent hospitalization, requiring help to ambulate to the bathroom, but that her weakness has significantly improved at this point. She also reports dizziness (spinning sensation) since her LHC/RHC 12/13. Symptoms of dizziness posturally triggered by standing/sitting, though patient denies these symptoms with repositioning in the ED currently. No hearing changes or pain. Reportedly also hypotensive to 70/40 in the field and received 600cc IVF. Patient now at approximate baseline per daughter. Denies fever/chills, CP/SOB, abd pain, N/V, urinary incontinence, pain, or frequency.    Of note, per cardiology progress note dated 12/15/2023: avoid BB iso conduction disease, AV block and sinus pauses and hx of syncope;     ED course: patient afebrile and hemodynamically stable (hypotensive in field), saturating well on room air. Received 500cc LR bolus (and 600cc in field), and started on CTX 1g. 66yo Female hx DM Type 2, Afib (on Eliquis), CHF (TTE 12/13 EF 45-50%; bioprosthetic MVR/AVR 2008), CAD (prior MI, RHC/LHC 12/15 mild mLAD dz), CVA (R cerebellar), recent hospitalization 12/12-16 for acute on chronic CHF exacerbation presented to [facility] ED with seizure-like activity. No prior hx of seizures. Per chart check daughter reported patient eating breakfast and starting making atypical noises with head laying against pantry door, shaking all extremities. No urinary incontinence or tongue biting, LOC or head injury. Episode lasted ~15 seconds with spontaneous resolution. No prodromal symptoms but fell aleep on the table after. Patient reports that she believes she never lost consciousness and that these events did not happen. She endorses weakness for 1+ weeks a/w recent hospitalization, requiring help to ambulate to the bathroom, but that her weakness has significantly improved at this point. She also reports dizziness (spinning sensation) since her LHC/RHC 12/13. Symptoms of dizziness posturally triggered by standing/sitting, though patient denies these symptoms with repositioning in the ED currently. No hearing changes or pain. Reportedly also hypotensive to 70/40 in the field and received 600cc IVF. Patient now at approximate baseline per daughter. Denies fever/chills, CP/SOB, abd pain, N/V, urinary incontinence, pain, or frequency.    Of note, per cardiology progress note dated 12/15/2023: avoid BB iso conduction disease, AV block and sinus pauses and hx of syncope;     ED course: patient afebrile and hemodynamically stable (hypotensive in field), saturating well on room air. Received 500cc LR bolus (and 600cc in field), and started on CTX 1g.

## 2023-12-17 NOTE — ED ADULT NURSE NOTE - OBJECTIVE STATEMENT
Pt received in no acute distress, A&Ox3, with weakness, placed on continuous cardiac monitor, and pulse ox. Daughter at bedside and report pt was sitting having coffee today and leaned over to the pantry and started having seizure like activity with arm and leg shaking, and LOC. Denies incontinence or tongue biting. Reports episodes lasted approximate 15 sec and took pt approximately 5-10 minutes to come back to talking and then went to sleep. Pt noted with weakness now able to follow commands and move all extremities.

## 2023-12-17 NOTE — H&P ADULT - PROBLEM SELECTOR PROBLEM 3
Heart failure with mildly reduced ejection fraction (HFmrEF) GABRIELLE (acute kidney injury) Vertigo, benign positional Acute UTI

## 2023-12-17 NOTE — H&P ADULT - PROBLEM SELECTOR PLAN 2
No dysuria, frequency, urgency. UA+ LE and bacteria but dirty with 9 epithelial cells.  Possible seizure trigger? No prior culture data.  - s/p CTX in ED  > cont CTX empiric coverage  > f/u Ucx EKG: Afib with RVR, 104bpm  Reviewed Telemetry: VR 120s intermittently   [id number] risk stratification score =8  Per cardiology progress note dated 12/15/2023: avoid BB iso conduction disease, AV block and sinus pauses and hx of syncope;     > cont  Eliquis (BID outpatient documented but patient/daughter reporting takes qHS)  > not on rate/rhythm control (previously had slow vent response)  >TSH  >Telemetry   >Cardiology c/s requested given RVR and conduction disease EKG: Afib with RVR, 104bpm  Reviewed Telemetry: VR 120s intermittently   [id number] risk stratification score =8  Per cardiology progress note dated 12/15/2023: avoid BB iso conduction disease, AV block and sinus pauses and hx of syncope;     > cont  Eliquis (BID outpatient documented but patient/daughter reporting takes qHS)  > not on rate/rhythm control (previously had slow vent response)  >TSH  >Telemetry   >Cardiology c/s requested given RVR and conduction disease EKG: Afib with RVR, 104bpm  Reviewed Telemetry: VR 120s intermittently   [id number] risk stratification score =8  Per cardiology progress note dated 12/15/2023: avoid BB iso conduction disease, AV block and sinus pauses and hx of syncope;     > cont  Eliquis (BID outpatient documented but patient/daughter reporting takes qHS)  > not on rate/rhythm control (previously had slow vent response)  >TSH  >Telemetry   >Holding AVN blockers at this time   >Cardiology c/s requested given RVR and conduction disease EKG: Afib with RVR, 104bpm  Reviewed Telemetry: VR 120s intermittently   [id number] risk stratification score =8  Per cardiology progress note dated 12/15/2023: avoid BB iso conduction disease, AV block and sinus pauses and hx of syncope;     > cont  Eliquis (BID outpatient documented but patient/daughter reporting takes qHS)  > not on rate/rhythm control (previously had slow vent response)  >TSH  >Telemetry   >Holding AVN blockers at this time   >Cardiology c/s requested given RVR and conduction disease

## 2023-12-17 NOTE — H&P ADULT - PROBLEM SELECTOR PLAN 4
Previous MI in [facility] 2008? Unclear interventions at the time.  > cont home ASA, statin  > no need for Cardiology consult at this time Previous MI in [facility] 2008? Unclear interventions at the time.  > cont home ASA, statin  > no need for Cardiology consult at this time Low concern for active exacerbation. Euvolemic, CXR without congestion.  - TTE 2021 and 2023, per outpatient cards note had worsening gradients across bioprosthetic valves from 2021->2023   - s/p LHC/RHC with mild mLAD dz, mildly elevated PCWP -> medical mgmt w/ outpatient cards  > Lasix changed to 40mg PO qd on last discharge, cont 40mg PO qD  > cont GDMT per Cardiology with newly started on last admission Spironolactone 25mg qD and Entresto 49-51mg BID  > hold isosorbide and hydral given infection  > oupt f/u cardiology Dr. Skip Roberto Recently increased Lasix dose, likely overdiuresis, pre-renal i/s/o infection, less likely cardio-renal given unlikely CHF exacerbation.  > hold Lasix, reassess volume status and SCr in AM  > antibiotics mgmt as above  > bladder scan, urine lytes  > CTM with SCr Recently increased Lasix dose.  Possible overdiuresis, pre-renal i/s/o infection, less likely cardio-renal given unlikely CHF exacerbation.    > hold Lasix, reassess volume status and SCr in AM  > antibiotics mgmt as above  > bladder scan, urine lytes  > CTM with SCr

## 2023-12-17 NOTE — ED PROVIDER NOTE - PHYSICAL EXAMINATION
Gen: Patient is lethargic-appearing, NAD, AAOx3, able to follow commands   HEENT: NCAT, EOMI, PERRLA, normal conjunctiva, tongue midline, oral mucosa moist  Lung: CTAB, no respiratory distress, no wheezes/rhonchi/rales B/L  CV: irregular rhythm, no murmurs, rubs or gallops  Abd: soft, NT, ND, no guarding, no rigidity, no rebound tenderness, no CVA tenderness   MSK: no visible deformities, ROM normal in UE/LE  Neuro: No focal sensory or motor deficits, normal CN exam  Skin: Warm, well perfused, no rash, no leg swelling  Psych: normal affect, calm

## 2023-12-17 NOTE — H&P ADULT - PROBLEM SELECTOR PLAN 7
- A1c 6.2% 12/23/23  > hold home Metformin 500mg qD  > low ISS; FSG TIDACHS - recent previous home meds HCTZ and Lisinopril  > cont new GDMT as above (Michael, Entresto)  > hold new Hydralazine 25mg TID and Isordil 10mg TID i/s/o infection w/o RVR. CHADSVASC 8  > cont home Eliquis (BID outpatient documented but patient/daughter reporting takes qHS)  > not on rate/rhythm control (previously had slow vent response), CTM HRs, on Tele Previous MI in [facility] 2008? Unclear interventions at the time.  Cardiac cath 12/15/2023: Diagnostic Conclusions - Mild nonobstructive CAD     > not on home ASA, start ASA 81  > cont home simvastatin 10mg Previous MI in [facility] 2008? Unclear interventions at the time.  Cardiac cath 12/15/2023: Diagnostic Conclusions - Mild nonobstructive CAD     > not on home ASA, start ASA 81  > cont home simvastatin 10mg

## 2023-12-17 NOTE — H&P ADULT - PROBLEM SELECTOR PLAN 9
DVT PPx: Eliquis 5mg   Diet: DASH/CC  Code:   Dispo: PT/OT Pending Hospital Course  Communication: daughter    Discharge information:  Pharmacy -   PCP - - A1c 6.2% 12/23/23  > hold home Metformin 500mg qD  > low ISS; FSG TIDACHS

## 2023-12-17 NOTE — H&P ADULT - PROBLEM SELECTOR PLAN 3
Low concern for active exacerbation. Euvolemic, CXR without congestion.  - TTE 2021 and 2023, per outpatient cards note had worsening gradients across bioprosthetic valves from 2021->2023   - s/p LHC/RHC with mild mLAD dz, mildly elevated PCWP -> medical mgmt w/ outpatient cards  > Lasix changed to 40mg PO qd on last discharge, cont 20mg IV qD  > cont GDMT per Cardiology with newly started on last admission Spironolactone 25mg qD and Entresto 49-51mg BID  > oupt f/u cardiology Dr. Skip Roberto. Unclear etiology unless poor PO intake +/- recently increased Lasix dose/pre-renal vs infection, less likely cardio-renal given unlikely CHF exacerbation.  > antibiotics mgmt as above  > bladder scan, urine lytes  > CTM with SCr Room spinning and dizziness, positionally triggers. Symptoms began after cardiac cath 12/15.  Transient, not a/w hearing changes.  Likely BPPV vs less likely vertebrobasilar insufficiency/new CVA vs complication of old cerebellar CVA vs vestibular neuritis vs epileptic vertigo.  - CTH w/o contrast does not show acute changes, chronic cerebellar volume loss likely 2/2 historical CVA  > consider symptomatic treatment depending on symptom severity; antihistamines, BZDs, antiemetics  > may benefit from vestibular rehab if symptoms continue given hx of cerebellar CVA  > attempt Epley if symptoms continue No dysuria, frequency, urgency. UA+ LE and bacteria but dirty with 9 epithelial cells.  Possible seizure trigger? No prior culture data.  - s/p CTX in ED  > cont CTX empiric coverage  > f/u Ucx No dysuria, frequency, urgency. UA+ LE and bacteria but dirty with 9 epithelial cells.  Possible seizure trigger. No prior culture data.  Will treat empirically    > empiric coverage with Ceftriaxone IV due to comorbidities and seizure  > f/u Ucx

## 2023-12-17 NOTE — H&P ADULT - PROBLEM SELECTOR PLAN 5
w/o RVR  > cont home Eliquis (BID outpatient but patient reporting takes qHS)  > not on rate/rhythm control (previously had slow vent response), CTM HRs, on Tele Previous MI in [facility] 2008? Unclear interventions at the time.  > not on home ASA, start ASA 81  > cont home simvastatin 10mg  > no need for Cardiology consult at this time Previous MI in [facility] 2008? Unclear interventions at the time.  > not on home ASA, start ASA 81  > cont home simvastatin 10mg  > no need for Cardiology consult at this time Low concern for active exacerbation. Euvolemic, CXR without congestion.  - TTE 2021 and 2023, per outpatient cards note had worsening gradients across bioprosthetic valves from 2021->2023   - s/p LHC/RHC with mild mLAD dz, mildly elevated PCWP -> medical mgmt w/ outpatient cards  > Lasix changed to 40mg PO qd on last discharge, cont 40mg PO qD  > cont GDMT per Cardiology with newly started on last admission Spironolactone 25mg qD and Entresto 49-51mg BID  > hold isosorbide and hydral given infection  > oupt f/u cardiology Dr. Skip Roberto Room spinning and dizziness, positionally triggers. Symptoms began after cardiac cath 12/15.  Transient, not a/w hearing changes.  Likely BPPV vs less likely vertebrobasilar insufficiency/new CVA vs complication of old cerebellar CVA vs vestibular neuritis vs epileptic vertigo.  - CTH w/o contrast does not show acute changes, chronic cerebellar volume loss likely 2/2 historical CVA  > consider symptomatic treatment depending on symptom severity; antihistamines, BZDs, antiemetics  > may benefit from vestibular rehab if symptoms continue given hx of cerebellar CVA  > attempt Epley if symptoms continue

## 2023-12-17 NOTE — ED ADULT NURSE NOTE - COVID-19  TEST TYPE
MOLECULAR PCR Calcipotriene Counseling: Cantharidin Counseling:  I discussed with the patient the risks of Cantharidin including but not limited to pain, redness, burning, itching, and blistering.

## 2023-12-17 NOTE — H&P ADULT - PROBLEM SELECTOR PLAN 10
DVT PPx: Eliquis 5mg   Diet: DASH/CC  Code: will clarify  Dispo: PT/OT Pending Hospital Course  Communication: daughter    Discharge information:  Pharmacy -   PCP -

## 2023-12-17 NOTE — H&P ADULT - PROBLEM SELECTOR PROBLEM 4
CAD (coronary artery disease) Heart failure with mildly reduced ejection fraction (HFmrEF) GABRIELLE (acute kidney injury)

## 2023-12-17 NOTE — H&P ADULT - ATTENDING COMMENTS
66yo Female hx DM Type 2, Afib (on Eliquis), CHF (TTE 12/13 EF 45-50%; bioprosthetic MVR/AVR 2008), CAD (prior MI, RHC/LHC 12/15 mild mLAD dz), CVA (R cerebellar), recent hospitalization 12/12-16 for acute on chronic CHF exacerbation. Currently a/w seizure-like activity, UTI, GABRIELLE; Found to be in Afib with RVR in 120s;     Assessment and plan modified and supplemented where indicated; 68yo Female hx DM Type 2, Afib (on Eliquis), CHF (TTE 12/13 EF 45-50%; bioprosthetic MVR/AVR 2008), CAD (prior MI, RHC/LHC 12/15 mild mLAD dz), CVA (R cerebellar), recent hospitalization 12/12-16 for acute on chronic CHF exacerbation. Currently a/w seizure-like activity, UTI, GABRIELLE; Found to be in Afib with RVR in 120s;     Assessment and plan modified and supplemented where indicated;

## 2023-12-17 NOTE — H&P ADULT - NSVTERISKREFERASSESS_GEN_ALL_CORE
· Venous duplex- no DVT   · Secondary to postop edema  · Supportive care   · Elevate extremity; sling  · Swelling is much improved  · PT/OT Refer to the Assessment tab to view/cancel completed assessment.

## 2023-12-18 ENCOUNTER — NON-APPOINTMENT (OUTPATIENT)
Age: 68
End: 2023-12-18

## 2023-12-18 DIAGNOSIS — I48.91 UNSPECIFIED ATRIAL FIBRILLATION: ICD-10-CM

## 2023-12-18 LAB
BASOPHILS # BLD AUTO: 0.1 K/UL — SIGNIFICANT CHANGE UP (ref 0–0.2)
BASOPHILS # BLD AUTO: 0.1 K/UL — SIGNIFICANT CHANGE UP (ref 0–0.2)
BASOPHILS NFR BLD AUTO: 1.2 % — SIGNIFICANT CHANGE UP (ref 0–2)
BASOPHILS NFR BLD AUTO: 1.2 % — SIGNIFICANT CHANGE UP (ref 0–2)
CREAT ?TM UR-MCNC: 124 MG/DL — SIGNIFICANT CHANGE UP
CREAT ?TM UR-MCNC: 124 MG/DL — SIGNIFICANT CHANGE UP
EOSINOPHIL # BLD AUTO: 0.26 K/UL — SIGNIFICANT CHANGE UP (ref 0–0.5)
EOSINOPHIL # BLD AUTO: 0.26 K/UL — SIGNIFICANT CHANGE UP (ref 0–0.5)
EOSINOPHIL NFR BLD AUTO: 3 % — SIGNIFICANT CHANGE UP (ref 0–6)
EOSINOPHIL NFR BLD AUTO: 3 % — SIGNIFICANT CHANGE UP (ref 0–6)
HCT VFR BLD CALC: 45.6 % — HIGH (ref 34.5–45)
HCT VFR BLD CALC: 45.6 % — HIGH (ref 34.5–45)
HGB BLD-MCNC: 14.9 G/DL — SIGNIFICANT CHANGE UP (ref 11.5–15.5)
HGB BLD-MCNC: 14.9 G/DL — SIGNIFICANT CHANGE UP (ref 11.5–15.5)
IANC: 4.76 K/UL — SIGNIFICANT CHANGE UP (ref 1.8–7.4)
IANC: 4.76 K/UL — SIGNIFICANT CHANGE UP (ref 1.8–7.4)
IMM GRANULOCYTES NFR BLD AUTO: 0.2 % — SIGNIFICANT CHANGE UP (ref 0–0.9)
IMM GRANULOCYTES NFR BLD AUTO: 0.2 % — SIGNIFICANT CHANGE UP (ref 0–0.9)
LYMPHOCYTES # BLD AUTO: 2.62 K/UL — SIGNIFICANT CHANGE UP (ref 1–3.3)
LYMPHOCYTES # BLD AUTO: 2.62 K/UL — SIGNIFICANT CHANGE UP (ref 1–3.3)
LYMPHOCYTES # BLD AUTO: 30.3 % — SIGNIFICANT CHANGE UP (ref 13–44)
LYMPHOCYTES # BLD AUTO: 30.3 % — SIGNIFICANT CHANGE UP (ref 13–44)
MCHC RBC-ENTMCNC: 30.5 PG — SIGNIFICANT CHANGE UP (ref 27–34)
MCHC RBC-ENTMCNC: 30.5 PG — SIGNIFICANT CHANGE UP (ref 27–34)
MCHC RBC-ENTMCNC: 32.7 GM/DL — SIGNIFICANT CHANGE UP (ref 32–36)
MCHC RBC-ENTMCNC: 32.7 GM/DL — SIGNIFICANT CHANGE UP (ref 32–36)
MCV RBC AUTO: 93.4 FL — SIGNIFICANT CHANGE UP (ref 80–100)
MCV RBC AUTO: 93.4 FL — SIGNIFICANT CHANGE UP (ref 80–100)
MONOCYTES # BLD AUTO: 0.89 K/UL — SIGNIFICANT CHANGE UP (ref 0–0.9)
MONOCYTES # BLD AUTO: 0.89 K/UL — SIGNIFICANT CHANGE UP (ref 0–0.9)
MONOCYTES NFR BLD AUTO: 10.3 % — SIGNIFICANT CHANGE UP (ref 2–14)
MONOCYTES NFR BLD AUTO: 10.3 % — SIGNIFICANT CHANGE UP (ref 2–14)
NEUTROPHILS # BLD AUTO: 4.76 K/UL — SIGNIFICANT CHANGE UP (ref 1.8–7.4)
NEUTROPHILS # BLD AUTO: 4.76 K/UL — SIGNIFICANT CHANGE UP (ref 1.8–7.4)
NEUTROPHILS NFR BLD AUTO: 55 % — SIGNIFICANT CHANGE UP (ref 43–77)
NEUTROPHILS NFR BLD AUTO: 55 % — SIGNIFICANT CHANGE UP (ref 43–77)
NRBC # BLD: 0 /100 WBCS — SIGNIFICANT CHANGE UP (ref 0–0)
NRBC # BLD: 0 /100 WBCS — SIGNIFICANT CHANGE UP (ref 0–0)
NRBC # FLD: 0 K/UL — SIGNIFICANT CHANGE UP (ref 0–0)
NRBC # FLD: 0 K/UL — SIGNIFICANT CHANGE UP (ref 0–0)
OSMOLALITY UR: 471 MOSM/KG — SIGNIFICANT CHANGE UP (ref 50–1200)
OSMOLALITY UR: 471 MOSM/KG — SIGNIFICANT CHANGE UP (ref 50–1200)
PLATELET # BLD AUTO: 158 K/UL — SIGNIFICANT CHANGE UP (ref 150–400)
PLATELET # BLD AUTO: 158 K/UL — SIGNIFICANT CHANGE UP (ref 150–400)
POTASSIUM UR-SCNC: 39.9 MMOL/L — SIGNIFICANT CHANGE UP
POTASSIUM UR-SCNC: 39.9 MMOL/L — SIGNIFICANT CHANGE UP
PROT ?TM UR-MCNC: 16 MG/DL — SIGNIFICANT CHANGE UP
PROT ?TM UR-MCNC: 16 MG/DL — SIGNIFICANT CHANGE UP
PROT/CREAT UR-RTO: 0.1 RATIO — SIGNIFICANT CHANGE UP (ref 0–0.2)
PROT/CREAT UR-RTO: 0.1 RATIO — SIGNIFICANT CHANGE UP (ref 0–0.2)
RBC # BLD: 4.88 M/UL — SIGNIFICANT CHANGE UP (ref 3.8–5.2)
RBC # BLD: 4.88 M/UL — SIGNIFICANT CHANGE UP (ref 3.8–5.2)
RBC # FLD: 13.1 % — SIGNIFICANT CHANGE UP (ref 10.3–14.5)
RBC # FLD: 13.1 % — SIGNIFICANT CHANGE UP (ref 10.3–14.5)
SODIUM UR-SCNC: 52 MMOL/L — SIGNIFICANT CHANGE UP
SODIUM UR-SCNC: 52 MMOL/L — SIGNIFICANT CHANGE UP
TSH SERPL-MCNC: 2.08 UIU/ML — SIGNIFICANT CHANGE UP (ref 0.27–4.2)
TSH SERPL-MCNC: 2.08 UIU/ML — SIGNIFICANT CHANGE UP (ref 0.27–4.2)
UUN UR-MCNC: 779.3 MG/DL — SIGNIFICANT CHANGE UP
UUN UR-MCNC: 779.3 MG/DL — SIGNIFICANT CHANGE UP
WBC # BLD: 8.65 K/UL — SIGNIFICANT CHANGE UP (ref 3.8–10.5)
WBC # BLD: 8.65 K/UL — SIGNIFICANT CHANGE UP (ref 3.8–10.5)
WBC # FLD AUTO: 8.65 K/UL — SIGNIFICANT CHANGE UP (ref 3.8–10.5)
WBC # FLD AUTO: 8.65 K/UL — SIGNIFICANT CHANGE UP (ref 3.8–10.5)

## 2023-12-18 PROCEDURE — 99233 SBSQ HOSP IP/OBS HIGH 50: CPT

## 2023-12-18 PROCEDURE — 99223 1ST HOSP IP/OBS HIGH 75: CPT

## 2023-12-18 RX ORDER — POTASSIUM CHLORIDE 20 MEQ
40 PACKET (EA) ORAL ONCE
Refills: 0 | Status: COMPLETED | OUTPATIENT
Start: 2023-12-18 | End: 2023-12-18

## 2023-12-18 RX ADMIN — SPIRONOLACTONE 25 MILLIGRAM(S): 25 TABLET, FILM COATED ORAL at 06:44

## 2023-12-18 RX ADMIN — CEFTRIAXONE 100 MILLIGRAM(S): 500 INJECTION, POWDER, FOR SOLUTION INTRAMUSCULAR; INTRAVENOUS at 18:41

## 2023-12-18 RX ADMIN — APIXABAN 5 MILLIGRAM(S): 2.5 TABLET, FILM COATED ORAL at 06:44

## 2023-12-18 RX ADMIN — APIXABAN 5 MILLIGRAM(S): 2.5 TABLET, FILM COATED ORAL at 18:41

## 2023-12-18 RX ADMIN — Medication 40 MILLIEQUIVALENT(S): at 11:15

## 2023-12-18 RX ADMIN — SACUBITRIL AND VALSARTAN 1 TABLET(S): 24; 26 TABLET, FILM COATED ORAL at 18:42

## 2023-12-18 RX ADMIN — Medication 81 MILLIGRAM(S): at 11:16

## 2023-12-18 RX ADMIN — SACUBITRIL AND VALSARTAN 1 TABLET(S): 24; 26 TABLET, FILM COATED ORAL at 07:30

## 2023-12-18 RX ADMIN — SIMVASTATIN 10 MILLIGRAM(S): 20 TABLET, FILM COATED ORAL at 21:59

## 2023-12-18 NOTE — ED ADULT NURSE REASSESSMENT NOTE - NS ED NURSE REASSESS COMMENT FT1
Upon reassessment pt remains on continuous cardiac monitor, A fib on the monitor, rate 124. MD Mikey Shearer at bedside assessing pt. MD aware. As per MD "do not give any rate control meds due the conduction disease the pt has". Breathing even and unlabored on room air. VS as noted in flow sheet. Pt denies any head ache, vision changes, dizziness, chest pain or SOB. No acute distress noted. Plan of care ongoing, comfort measures provided and safety measures maintained. Seizure precautions remain in place at this time.

## 2023-12-18 NOTE — PROGRESS NOTE ADULT - PROBLEM SELECTOR PLAN 8
- recent previous home meds HCTZ and Lisinopril  > cont new GDMT as above (Michael, Entresto)  > hold new Hydralazine 25mg TID and Isordil 10mg TID i/s/o infection

## 2023-12-18 NOTE — CONSULT NOTE ADULT - ASSESSMENT
67y (1955) Creole speaking woman with a PMHx significant for DM2, Afib (on Eliquis), CHF, bioprosthetic MVR/AVR, MI, Stroke (R cerebellar) admitted for Afib with RVR, UTI. Neurology consulted for seizure-like activity. Spoke with daughter Kaleb who reports after patient was eating breakfast she was sitting down and leaning her head against the pantry, she was groaning, eyes open and had shaking of all her extremities. Denies tongue biting or incontinence. Episode lasted 15-20 seconds and she was sleepy afterwards. Denies previous similar episodes or history of seizures. Patient also reports feeling like her head is spinning since Sunday after her hospitalized. She has similar episodes of dizziness for years, however recently worsened to where she is unable to walk. Romero tinnitus ear fullness, weakness, numbness, changes to speech, changes to vision. At baseline, ambulates with walker. Exam mild dysmetria to right side. CTH no acute findings.     Impression: Episode of full body shaking eyes open possibly convulsive syncope vs seizure vs non epileptic event     Recommendation:  [] vEEG  [] Telemetry monitoring; Neurochecks/VS per unit protocol  [] Seizure, fall and aspiration precautions  [] Given concern for seizure, advise patient to not drive, operate heavy machinery, avoid heights, pools, bathtubs, locked doors until cleared by further follow up outpatient by neurology.     To be seen by attending.  67y (1955) Creole speaking woman with a PMHx significant for DM2, Afib (on Eliquis), CHF, bioprosthetic MVR/AVR, MI, Stroke (R cerebellar) admitted for Afib with RVR, UTI. Neurology consulted for seizure-like activity. Spoke with daughter Kaleb who reports after patient was eating breakfast she was sitting down and leaning her head against the pantry, she was groaning, eyes open and had shaking of all her extremities. Denies tongue biting or incontinence. Episode lasted 15-20 seconds and she was sleepy afterwards. Denies previous similar episodes or history of seizures. Patient also reports feeling like her head is spinning since Sunday after her hospitalized. She has similar episodes of dizziness for years, however recently worsened to where she is unable to walk. Romero tinnitus ear fullness, weakness, numbness, changes to speech, changes to vision. At baseline, ambulates with walker. Exam mild dysmetria to right side. CTH no acute findings.     Impression: Episode of full body shaking eyes open possibly convulsive syncope vs seizure vs non epileptic event     Recommendation:  [] vEEG-24 hours  [] Telemetry monitoring; Neurochecks/VS per unit protocol  [] Seizure, fall and aspiration precautions  [] Given concern for seizure, advise patient to not drive, operate heavy machinery, avoid heights, pools, bathtubs, locked doors until cleared by further follow up outpatient by neurology.     To be seen by attending.

## 2023-12-18 NOTE — PROGRESS NOTE ADULT - ASSESSMENT
67F with T2DM, afib (on Eliquis), HFrEF (TTE 12/13 EF 45-50%; bioprosthetic MVR/AVR 2008), CAD (prior MI, RHC/LHC 12/15 mild mLAD dz), CVA (R cerebellar), recent hospitalization 12/12-16 for acute on chronic CHF exacerbation. Currently a/w seizure-like activity, UTI, GABRIELLE; Found to be in Afib with RVR in 120s.

## 2023-12-18 NOTE — PROGRESS NOTE ADULT - PROBLEM SELECTOR PLAN 3
No dysuria, frequency, urgency. UA+ LE and bacteria but dirty with 9 epithelial cells.  Possible seizure trigger. No prior culture data.  Will treat empirically    > empiric coverage with Ceftriaxone IV due to comorbidities and seizure  > f/u Ucx

## 2023-12-18 NOTE — PROGRESS NOTE ADULT - PROBLEM SELECTOR PLAN 7
Previous MI in [facility] 2008? Unclear interventions at the time.  Cardiac cath 12/15/2023: Diagnostic Conclusions - Mild nonobstructive CAD     > not on home ASA, start ASA 81  > cont home simvastatin 10mg Previous MI in [facility] 2008? Unclear interventions at the time.  Cardiac cath 12/15/2023: Diagnostic Conclusions - Mild nonobstructive CAD     > not on home ASA, start ASA 81  > cont home simvastatin 10mg

## 2023-12-18 NOTE — CONSULT NOTE ADULT - SUBJECTIVE AND OBJECTIVE BOX
HPI: 66yo Female hx DM Type 2, Afib (on Eliquis), CHF (TTE  EF 45-50%; bioprosthetic MVR/AVR ), CAD (prior MI, RHC/LHC 12/15 mild mLAD dz), CVA (R cerebellar), recent hospitalization - for acute on chronic CHF exacerbation presented to [facility] ED with seizure-like activity. Cardiology consulted for AF w RVR.     Pt is poor historian/ likely confused from post ictal state. Per chart review, pt was brought in by daughter for a witnessed seizure like activity for around 15 secs and resolved spontaneously. No prior simlar symptoms. On tele pt has been in AF w rate in 80s and no symptoms and vitals stable.     Pt denies any cp, palpitation, sob, orthopnea, PND, dizziness, lightheadedness or syncope. Denies any fever, chills, nausea, vomiting. Denies any sick contacts, recent travel.      ROS as above    T(C): 36.8 (23 @ 01:17), Max: 36.8 (23 @ 01:17)  HR: 75 (23 @ 01:17) (75 - 99)  BP: 116/67 (23 @ 01:17) (101/51 - 119/87)  RR: 16 (23 @ 01:17) (16 - 22)  SpO2: 98% (23 @ 01:17) (98% - 100%)    MEDICATIONS  (STANDING):  apixaban 5 milliGRAM(s) Oral two times a day  aspirin enteric coated 81 milliGRAM(s) Oral daily  cefTRIAXone   IVPB 1000 milliGRAM(s) IV Intermittent every 24 hours  dextrose 5%. 1000 milliLiter(s) (50 mL/Hr) IV Continuous <Continuous>  dextrose 5%. 1000 milliLiter(s) (100 mL/Hr) IV Continuous <Continuous>  dextrose 50% Injectable 25 Gram(s) IV Push once  dextrose 50% Injectable 25 Gram(s) IV Push once  dextrose 50% Injectable 12.5 Gram(s) IV Push once  glucagon  Injectable 1 milliGRAM(s) IntraMuscular once  insulin lispro (ADMELOG) corrective regimen sliding scale   SubCutaneous at bedtime  insulin lispro (ADMELOG) corrective regimen sliding scale   SubCutaneous three times a day before meals  sacubitril 49 mG/valsartan 51 mG 1 Tablet(s) Oral two times a day  simvastatin 10 milliGRAM(s) Oral at bedtime  spironolactone 25 milliGRAM(s) Oral daily    MEDICATIONS  (PRN):  acetaminophen     Tablet .. 650 milliGRAM(s) Oral every 6 hours PRN Temp greater or equal to 38C (100.4F), Mild Pain (1 - 3)  dextrose Oral Gel 15 Gram(s) Oral once PRN Blood Glucose LESS THAN 70 milliGRAM(s)/deciliter      I&O's Summary                        15.8                 137  | 24   | 62           10.18 >-----------< 184     ------------------------< 232                   49.1                 4.0  | 99   | 1.86                                         Ca 9.4   Mg 2.30  Ph 3.8    Urinalysis Basic - ( 17 Dec 2023 16:25 )    Color: Yellow / Appearance: Cloudy / S.016 / pH: x  Gluc: x / Ketone: Negative mg/dL  / Bili: Negative / Urobili: 1.0 mg/dL   Blood: x / Protein: Trace mg/dL / Nitrite: Negative   Leuk Esterase: Moderate / RBC: 3 /HPF / WBC 24 /HPF   Sq Epi: x / Non Sq Epi: 9 /HPF / Bacteria: Occasional /HPF            < from: TTE W or WO Ultrasound Enhancing Agent (23 @ 15:34) >  TRANSTHORACIC ECHOCARDIOGRAM REPORT  ________________________________________________________________________________                                      _______       Pt. Name:       ROXY MIRANDA Study Date:    2023  MRN:            [id number]          YOB: 1955  Accession #:    [id number]          Age:           67 years  Account#:       [id number]           Gender:        F  Heart Rate:                        Height:        62.99 in (160.00 cm)  Rhythm:             Weight:        170.00 lb (77.11 kg)  Blood Pressure: 147/67 mmHg        BSA/BMI:       1.80 m² / 30.12 kg/m²  ________________________________________________________________________________________  Referring Physician:    [id number] Cezar Muhammad  Interpreting Physician: Ayo Giordano M.D.  Primary Sonographer:    Lizzette Khan RDCS    CPT:               ECHO TTE WO CON COMP W DOPP - 47474.m  Indication(s):     Other nonrheumatic mitral valve disorders - I34.89  Procedure:         Transthoracic echocardiogram with 2-D, M-mode and complete                     spectral and color flow Doppler.  Ordering Location: [facility] [place]  Study Information: Image quality for this study is good.    _______________________________________________________________________________________     CONCLUSIONS:      1. The left ventricular cavity is normal. Left ventricular wall thickness is normal. Left ventricular systolic function is mildly decreased with an ejection fraction visually estimated at 45 to 50%. There is global left ventricular hypokinesis. The left ventricular diastolic function is indeterminate. Analysis of left ventricular diastolic function and filling pressure is made challenging by the presence of prosthetic mitral valve.   2. Normalright ventricular cavity size and probably normal systolic function.   3. Bioprosthetic valve is present in the mitral position. The transmitral peak gradient is 18.4 mmHg and mean gradient is 6.13 mmHg. There is mild to moderate mitral regurgitation.   4. A bioprosthetic valve replacement present in the aortic position. The peak transaortic velocity is 3.07 m/s, peak transaortic gradient is 37.6 mmHg and mean transaortic gradient is 21.6 mmHg with an LVOT/aortic valve VTI ratio of 0.47. There ismild to moderate aortic regurgitation.   5. The left atrium is severely dilated with an indexed volume of 54 ml/m².   6. The right atrium is severely dilated in size with an indexed volume of 59.29 ml/m² and an indexed area of 15.82 cm²/m².   7. Estimated pulmonary artery systolic pressure is 57 mmHg, consistent with moderate pulmonary hypertension.   8. No prior echocardiogram is available for comparison.    __________________________________________________________________    < end of copied text >  < from: Cardiac Catheterization (12.15.23 @ 14:23) >    Cath Lab Report    Diagnostic Cardiologist:       Kalyani Loza MD   Fellow:                        David Philip, Jayesh   Referring Physician:           Michael Morrison MD     Procedures Performed   Procedures:               1.    Ultrasound Guided Access     2.    Arterial Access - Right Radial   3.    Venous Access - Right Femoral   4.    Diagnostic Coronary Angiography   5.    RHC     Indications:               Congestive heart failure with  cardiomyopathy    Diagnostic Conclusions:     Mild nonobstructive CAD and RHC with mildly elevated filling pressure,  mild pHTN mPAP 30 mmHg, PCWP 15 and PA sat  71%.    Recommendations:     Optimal medical management for HFrEF.      < end of copied text >   HPI: 66yo Female hx DM Type 2, Afib (on Eliquis), CHF (TTE  EF 45-50%; bioprosthetic MVR/AVR ), CAD (prior MI, RHC/LHC 12/15 mild mLAD dz), CVA (R cerebellar), recent hospitalization - for acute on chronic CHF exacerbation presented to [facility] ED with seizure-like activity. Cardiology consulted for AF w RVR.     Pt is poor historian/ likely confused from post ictal state. Per chart review, pt was brought in by daughter for a witnessed seizure like activity for around 15 secs and resolved spontaneously. No prior simlar symptoms. On tele pt has been in AF w rate in 80s and no symptoms and vitals stable.     Pt denies any cp, palpitation, sob, orthopnea, PND, dizziness, lightheadedness or syncope. Denies any fever, chills, nausea, vomiting. Denies any sick contacts, recent travel.      ROS as above    T(C): 36.8 (23 @ 01:17), Max: 36.8 (23 @ 01:17)  HR: 75 (23 @ 01:17) (75 - 99)  BP: 116/67 (23 @ 01:17) (101/51 - 119/87)  RR: 16 (23 @ 01:17) (16 - 22)  SpO2: 98% (23 @ 01:17) (98% - 100%)    MEDICATIONS  (STANDING):  apixaban 5 milliGRAM(s) Oral two times a day  aspirin enteric coated 81 milliGRAM(s) Oral daily  cefTRIAXone   IVPB 1000 milliGRAM(s) IV Intermittent every 24 hours  dextrose 5%. 1000 milliLiter(s) (50 mL/Hr) IV Continuous <Continuous>  dextrose 5%. 1000 milliLiter(s) (100 mL/Hr) IV Continuous <Continuous>  dextrose 50% Injectable 25 Gram(s) IV Push once  dextrose 50% Injectable 25 Gram(s) IV Push once  dextrose 50% Injectable 12.5 Gram(s) IV Push once  glucagon  Injectable 1 milliGRAM(s) IntraMuscular once  insulin lispro (ADMELOG) corrective regimen sliding scale   SubCutaneous at bedtime  insulin lispro (ADMELOG) corrective regimen sliding scale   SubCutaneous three times a day before meals  sacubitril 49 mG/valsartan 51 mG 1 Tablet(s) Oral two times a day  simvastatin 10 milliGRAM(s) Oral at bedtime  spironolactone 25 milliGRAM(s) Oral daily    MEDICATIONS  (PRN):  acetaminophen     Tablet .. 650 milliGRAM(s) Oral every 6 hours PRN Temp greater or equal to 38C (100.4F), Mild Pain (1 - 3)  dextrose Oral Gel 15 Gram(s) Oral once PRN Blood Glucose LESS THAN 70 milliGRAM(s)/deciliter      I&O's Summary                        15.8                 137  | 24   | 62           10.18 >-----------< 184     ------------------------< 232                   49.1                 4.0  | 99   | 1.86                                         Ca 9.4   Mg 2.30  Ph 3.8    Urinalysis Basic - ( 17 Dec 2023 16:25 )    Color: Yellow / Appearance: Cloudy / S.016 / pH: x  Gluc: x / Ketone: Negative mg/dL  / Bili: Negative / Urobili: 1.0 mg/dL   Blood: x / Protein: Trace mg/dL / Nitrite: Negative   Leuk Esterase: Moderate / RBC: 3 /HPF / WBC 24 /HPF   Sq Epi: x / Non Sq Epi: 9 /HPF / Bacteria: Occasional /HPF            < from: TTE W or WO Ultrasound Enhancing Agent (23 @ 15:34) >  TRANSTHORACIC ECHOCARDIOGRAM REPORT  ________________________________________________________________________________                                      _______       Pt. Name:       ROXY MIRANDA Study Date:    2023  MRN:            [id number]          YOB: 1955  Accession #:    [id number]          Age:           67 years  Account#:       [id number]           Gender:        F  Heart Rate:                        Height:        62.99 in (160.00 cm)  Rhythm:             Weight:        170.00 lb (77.11 kg)  Blood Pressure: 147/67 mmHg        BSA/BMI:       1.80 m² / 30.12 kg/m²  ________________________________________________________________________________________  Referring Physician:    [id number] Cezar Muhammad  Interpreting Physician: yAo Giordano M.D.  Primary Sonographer:    Lizzette Khan RDCS    CPT:               ECHO TTE WO CON COMP W DOPP - 75490.m  Indication(s):     Other nonrheumatic mitral valve disorders - I34.89  Procedure:         Transthoracic echocardiogram with 2-D, M-mode and complete                     spectral and color flow Doppler.  [place] [facility] [place] Information: Image quality for this study is good.    _______________________________________________________________________________________     CONCLUSIONS:      1. The left ventricular cavity is normal. Left ventricular wall thickness is normal. Left ventricular systolic function is mildly decreased with an ejection fraction visually estimated at 45 to 50%. There is global left ventricular hypokinesis. The left ventricular diastolic function is indeterminate. Analysis of left ventricular diastolic function and filling pressure is made challenging by the presence of prosthetic mitral valve.   2. Normalright ventricular cavity size and probably normal systolic function.   3. Bioprosthetic valve is present in the mitral position. The transmitral peak gradient is 18.4 mmHg and mean gradient is 6.13 mmHg. There is mild to moderate mitral regurgitation.   4. A bioprosthetic valve replacement present in the aortic position. The peak transaortic velocity is 3.07 m/s, peak transaortic gradient is 37.6 mmHg and mean transaortic gradient is 21.6 mmHg with an LVOT/aortic valve VTI ratio of 0.47. There ismild to moderate aortic regurgitation.   5. The left atrium is severely dilated with an indexed volume of 54 ml/m².   6. The right atrium is severely dilated in size with an indexed volume of 59.29 ml/m² and an indexed area of 15.82 cm²/m².   7. Estimated pulmonary artery systolic pressure is 57 mmHg, consistent with moderate pulmonary hypertension.   8. No prior echocardiogram is available for comparison.    __________________________________________________________________    < end of copied text >  < from: Cardiac Catheterization (12.15.23 @ 14:23) >    Cath Lab Report    Diagnostic Cardiologist:       Kalyani Loza MD   Fellow:                        David Philip, Jayesh   Referring Physician:           Michael Morrison MD     Procedures Performed   Procedures:               1.    Ultrasound Guided Access     2.    Arterial Access - Right Radial   3.    Venous Access - Right Femoral   4.    Diagnostic Coronary Angiography   5.    RHC     Indications:               Congestive heart failure with  cardiomyopathy    Diagnostic Conclusions:     Mild nonobstructive CAD and RHC with mildly elevated filling pressure,  mild pHTN mPAP 30 mmHg, PCWP 15 and PA sat  71%.    Recommendations:     Optimal medical management for HFrEF.      < end of copied text >

## 2023-12-18 NOTE — PROGRESS NOTE ADULT - PROBLEM SELECTOR PLAN 2
EKG: Afib with RVR, 104bpm  Reviewed Telemetry: VR 120s intermittently   [id number] risk stratification score =8  Per cardiology progress note dated 12/15/2023: avoid BB iso conduction disease, AV block and sinus pauses and hx of syncope;     > cont  Eliquis (BID outpatient documented but patient/daughter reporting takes qHS)  > not on rate/rhythm control (previously had slow vent response)  - TSH 2.08  - Monitor on Telemetry   - avoid AVNB  - Cardiology recs appreciated EKG: Afib with RVR, 104bpm  Reviewed Telemetry: VR 120s intermittently   [id number] risk stratification score =8  Per cardiology progress note dated 12/15/2023: avoid BB iso conduction disease, AV block and sinus pauses and hx of syncope;     > cont  Eliquis (BID outpatient documented but patient/daughter reporting takes qHS)  > not on rate/rhythm control (previously had slow vent response)  - TSH 2.08  - Monitor on Telemetry   - avoid AVNB  - Cardiology recs appreciated

## 2023-12-18 NOTE — PROGRESS NOTE ADULT - NSPROGADDITIONALINFOA_GEN_ALL_CORE
The necessity of the time spent during the encounter on this date of service was due to:   - Ordering, reviewing, and interpreting labs, testing, and imaging  - Independently obtaining a review of systems and performing a physical exam  - Reviewing prior hospitalization and where necessary, outpatient records  - Reviewing consultant recommendations/communicating with consultants  - Counselling and educating patient and family regarding interpretation of aforementioned items and plan of care    Time-based billing (NON-critical care). Total minutes spent: 51

## 2023-12-18 NOTE — CONSULT NOTE ADULT - SUBJECTIVE AND OBJECTIVE BOX
Neurology - Consult Note    -  Spectra: [phone] ([facility]), [phone] ([facility])  -    HPI: Patient ROXY MIRANDA is a 67y (1955) woman with a PMHx significant for DM2, Afib (on Eliquis), CHF, bioprosthetic MVR/AVR, MI, Stroke (R cerebellar) admitted for Afib with RVR, UTI. Neurology consulted for seizure-like activity.    Patient also reports feeling like her head is spinning since Sunday after her hospitalized. She has similar episodes of dizziness for years, however recently worsened to where she is unable to walk. Romero tinnitis, ear fullness, weakness, numbness, changes to speech, changes to vision. At baseline, ambulates with walker.       Review of Systems:    All other review of systems is negative unless indicated above.    Allergies:  No Known Allergies      PMHx/PSHx/Family Hx: As above, otherwise see below   HTN (hypertension)    HLD (hyperlipidemia)    Atrial fibrillation    History of MI (myocardial infarction)    History of stroke    Cerebrovascular accident (CVA)    History of TIA (transient ischemic attack)    Long term current use of anticoagulant    Type 2 diabetes mellitus    Dyspnea on exertion    Memory loss    Abnormal uterine and vaginal bleeding    S/P aortic valve replacement with bioprosthetic valve    S/P mitral valve replacement with bioprosthetic valve        Social Hx:  No current use of tobacco, alcohol, or illicit drugs      Medications:  MEDICATIONS  (STANDING):  apixaban 5 milliGRAM(s) Oral two times a day  aspirin enteric coated 81 milliGRAM(s) Oral daily  cefTRIAXone   IVPB 1000 milliGRAM(s) IV Intermittent every 24 hours  dextrose 5%. 1000 milliLiter(s) (50 mL/Hr) IV Continuous <Continuous>  dextrose 5%. 1000 milliLiter(s) (100 mL/Hr) IV Continuous <Continuous>  dextrose 50% Injectable 25 Gram(s) IV Push once  dextrose 50% Injectable 25 Gram(s) IV Push once  dextrose 50% Injectable 12.5 Gram(s) IV Push once  glucagon  Injectable 1 milliGRAM(s) IntraMuscular once  insulin lispro (ADMELOG) corrective regimen sliding scale   SubCutaneous three times a day before meals  insulin lispro (ADMELOG) corrective regimen sliding scale   SubCutaneous at bedtime  sacubitril 49 mG/valsartan 51 mG 1 Tablet(s) Oral two times a day  simvastatin 10 milliGRAM(s) Oral at bedtime  spironolactone 25 milliGRAM(s) Oral daily    MEDICATIONS  (PRN):  acetaminophen     Tablet .. 650 milliGRAM(s) Oral every 6 hours PRN Temp greater or equal to 38C (100.4F), Mild Pain (1 - 3)  dextrose Oral Gel 15 Gram(s) Oral once PRN Blood Glucose LESS THAN 70 milliGRAM(s)/deciliter      Vitals:  T(C): 36.6 (12-18-23 @ 17:29), Max: 37 (12-18-23 @ 06:30)  HR: 71 (12-18-23 @ 17:29) (66 - 124)  BP: 102/71 (12-18-23 @ 17:29) (101/57 - 122/70)  RR: 18 (12-18-23 @ 17:29) (16 - 22)  SpO2: 98% (12-18-23 @ 17:29) (98% - 100%)    Physical Examination:   General - NAD      Neurologic Exam:  Mental status - Awake, Alert, Oriented to person, place, and time. Speech fluent, repetition and naming intact. Follows simple and complex commands    Cranial nerves - PERRL, VFF, EOMI, face sensation (V1-V3) intact b/l, facial strength intact without asymmetry b/l, hearing intact b/l, palate with symmetric elevation, trapezius  5/5 strength b/l, tongue midline on protrusion with full lateral movement    Motor - Normal bulk and tone throughout. No pronator drift.  Strength testing            Deltoid      Biceps      Triceps     Wrist Extension    Wrist Flexion       R            5                 5               5                     5                              5                        5                   L             5                 5               5                     5                              5                        5                               Hip Flexion    Hip Extension    Knee Flexion    Knee Extension    Dorsiflexion    Plantar Flexion  R              4+                           5                       5                           5                            5                          5  L              4+                         5                        5                           5                            5                          5    Sensation - Light touch intact throughout    Coordination - Mild dysmetria right hand, heel to shin intact. No tremors appreciated    Gait and station - Deffered due to fall safety risk     Labs:                        14.9   8.65  )-----------( 158      ( 18 Dec 2023 06:59 )             45.6     12-18    140  |  105  |  39<H>  ----------------------------<  120<H>  3.7   |  21<L>  |  1.07    Ca    8.8      18 Dec 2023 06:47  Phos  2.7     12-18  Mg     2.10     12-18    TPro  7.2  /  Alb  3.7  /  TBili  0.5  /  DBili  x   /  AST  20  /  ALT  28  /  AlkPhos  102  12-17    CAPILLARY BLOOD GLUCOSE      POCT Blood Glucose.: 93 mg/dL (18 Dec 2023 17:37)    LIVER FUNCTIONS - ( 17 Dec 2023 13:45 )  Alb: 3.7 g/dL / Pro: 7.2 g/dL / ALK PHOS: 102 U/L / ALT: 28 U/L / AST: 20 U/L / GGT: x                 Radiology:  CT Head No Cont:  (17 Dec 2023 17:01)  < from: CT Head No Cont (12.17.23 @ 17:01) >    IMPRESSION:    No acute intracranial bleeding.  Cerebellar volume loss may be due to chronic antiepileptic medication   use. Correlate with patient's history.       Neurology - Consult Note    -  Spectra: [phone] ([facility]), [phone] ([facility])  -    HPI: Patient ROXY MIRANDA is a 67y (1955) woman with a PMHx significant for DM2, Afib (on Eliquis), CHF, bioprosthetic MVR/AVR, MI, Stroke (R cerebellar) admitted for Afib with RVR, UTI. Neurology consulted for seizure-like activity.    Patient also reports feeling like her head is spinning since Sunday after her hospitalized. She has similar episodes of dizziness for years, however recently worsened to where she is unable to walk. Romero tinnitis, ear fullness, weakness, numbness, changes to speech, changes to vision. At baseline, ambulates with walker.       Review of Systems:    All other review of systems is negative unless indicated above.    Allergies:  No Known Allergies      PMHx/PSHx/Family Hx: As above, otherwise see below   HTN (hypertension)    HLD (hyperlipidemia)    Atrial fibrillation    History of MI (myocardial infarction)    History of stroke    Cerebrovascular accident (CVA)    History of TIA (transient ischemic attack)    Long term current use of anticoagulant    Type 2 diabetes mellitus    Dyspnea on exertion    Memory loss    Abnormal uterine and vaginal bleeding    S/P aortic valve replacement with bioprosthetic valve    S/P mitral valve replacement with bioprosthetic valve        Social Hx:  No current use of tobacco, alcohol, or illicit drugs      Medications:  MEDICATIONS  (STANDING):  apixaban 5 milliGRAM(s) Oral two times a day  aspirin enteric coated 81 milliGRAM(s) Oral daily  cefTRIAXone   IVPB 1000 milliGRAM(s) IV Intermittent every 24 hours  dextrose 5%. 1000 milliLiter(s) (50 mL/Hr) IV Continuous <Continuous>  dextrose 5%. 1000 milliLiter(s) (100 mL/Hr) IV Continuous <Continuous>  dextrose 50% Injectable 25 Gram(s) IV Push once  dextrose 50% Injectable 25 Gram(s) IV Push once  dextrose 50% Injectable 12.5 Gram(s) IV Push once  glucagon  Injectable 1 milliGRAM(s) IntraMuscular once  insulin lispro (ADMELOG) corrective regimen sliding scale   SubCutaneous three times a day before meals  insulin lispro (ADMELOG) corrective regimen sliding scale   SubCutaneous at bedtime  sacubitril 49 mG/valsartan 51 mG 1 Tablet(s) Oral two times a day  simvastatin 10 milliGRAM(s) Oral at bedtime  spironolactone 25 milliGRAM(s) Oral daily    MEDICATIONS  (PRN):  acetaminophen     Tablet .. 650 milliGRAM(s) Oral every 6 hours PRN Temp greater or equal to 38C (100.4F), Mild Pain (1 - 3)  dextrose Oral Gel 15 Gram(s) Oral once PRN Blood Glucose LESS THAN 70 milliGRAM(s)/deciliter      Vitals:  T(C): 36.6 (12-18-23 @ 17:29), Max: 37 (12-18-23 @ 06:30)  HR: 71 (12-18-23 @ 17:29) (66 - 124)  BP: 102/71 (12-18-23 @ 17:29) (101/57 - 122/70)  RR: 18 (12-18-23 @ 17:29) (16 - 22)  SpO2: 98% (12-18-23 @ 17:29) (98% - 100%)    Physical Examination:   General - NAD      Neurologic Exam:  Mental status - Awake, Alert, Oriented to person, place, and time. Speech fluent, repetition and naming intact. Follows simple and complex commands    Cranial nerves - PERRL, VFF, EOMI, face sensation (V1-V3) intact b/l, facial strength intact without asymmetry b/l, hearing intact b/l, palate with symmetric elevation, trapezius  5/5 strength b/l, tongue midline on protrusion with full lateral movement    Motor - Normal bulk and tone throughout. No pronator drift.  Strength testing            Deltoid      Biceps      Triceps     Wrist Extension    Wrist Flexion       R            5                 5               5                     5                              5                        5                   L             5                 5               5                     5                              5                        5                               Hip Flexion    Hip Extension    Knee Flexion    Knee Extension    Dorsiflexion    Plantar Flexion  R              4+                           5                       5                           5                            5                          5  L              4+                         5                        5                           5                            5                          5    Sensation - Light touch intact throughout    Coordination - Mild dysmetria right hand, heel to shin intact. No tremors appreciated    Gait and station - Deffered due to fall safety risk     Labs:                        14.9   8.65  )-----------( 158      ( 18 Dec 2023 06:59 )             45.6     12-18    140  |  105  |  39<H>  ----------------------------<  120<H>  3.7   |  21<L>  |  1.07    Ca    8.8      18 Dec 2023 06:47  Phos  2.7     12-18  Mg     2.10     12-18    TPro  7.2  /  Alb  3.7  /  TBili  0.5  /  DBili  x   /  AST  20  /  ALT  28  /  AlkPhos  102  12-17    CAPILLARY BLOOD GLUCOSE      POCT Blood Glucose.: 93 mg/dL (18 Dec 2023 17:37)    LIVER FUNCTIONS - ( 17 Dec 2023 13:45 )  Alb: 3.7 g/dL / Pro: 7.2 g/dL / ALK PHOS: 102 U/L / ALT: 28 U/L / AST: 20 U/L / GGT: x                 Radiology:  CT Head No Cont:  (17 Dec 2023 17:01)  < from: CT Head No Cont (12.17.23 @ 17:01) >    IMPRESSION:    No acute intracranial bleeding.  Cerebellar volume loss may be due to chronic antiepileptic medication   use. Correlate with patient's history.       Neurology - Consult Note    -  Spectra: [phone] ([facility]), [phone] ([facility])  -    HPI: Patient ROXY MIRANDA is a 67y (1955) Creole speaking woman with a PMHx significant for DM2, Afib (on Eliquis), CHF, bioprosthetic MVR/AVR, MI, Stroke (R cerebellar) admitted for Afib with RVR, UTI. Neurology consulted for seizure-like activity. Spoke with daughter Kaleb who reports after patient was eating breakfast she was sitting down and leaning her head against the pantry, she was groaning, eyes open and had shaking of all her extremities. Denies tongue biting or incontinence. Episode lasted 15-20 seconds and she was sleepy afterwards. Denies previous similar episodes or history of seizures. Patient also reports feeling like her head is spinning since Sunday after her hospitalized. Provoked with head movements and standing. Also associated with N/V. She has similar episodes of dizziness for years, however recently worsened to where she is unable to walk. Romero tinnitus ear fullness, weakness, numbness, changes to speech, changes to vision. At baseline, ambulates with walker.       Review of Systems:    All other review of systems is negative unless indicated above.    Allergies:  No Known Allergies      PMHx/PSHx/Family Hx: As above, otherwise see below   HTN (hypertension)    HLD (hyperlipidemia)    Atrial fibrillation    History of MI (myocardial infarction)    History of stroke    Cerebrovascular accident (CVA)    History of TIA (transient ischemic attack)    Long term current use of anticoagulant    Type 2 diabetes mellitus    Dyspnea on exertion    Memory loss    Abnormal uterine and vaginal bleeding    S/P aortic valve replacement with bioprosthetic valve    S/P mitral valve replacement with bioprosthetic valve        Social Hx:  No current use of tobacco, alcohol, or illicit drugs      Medications:  MEDICATIONS  (STANDING):  apixaban 5 milliGRAM(s) Oral two times a day  aspirin enteric coated 81 milliGRAM(s) Oral daily  cefTRIAXone   IVPB 1000 milliGRAM(s) IV Intermittent every 24 hours  dextrose 5%. 1000 milliLiter(s) (50 mL/Hr) IV Continuous <Continuous>  dextrose 5%. 1000 milliLiter(s) (100 mL/Hr) IV Continuous <Continuous>  dextrose 50% Injectable 25 Gram(s) IV Push once  dextrose 50% Injectable 25 Gram(s) IV Push once  dextrose 50% Injectable 12.5 Gram(s) IV Push once  glucagon  Injectable 1 milliGRAM(s) IntraMuscular once  insulin lispro (ADMELOG) corrective regimen sliding scale   SubCutaneous three times a day before meals  insulin lispro (ADMELOG) corrective regimen sliding scale   SubCutaneous at bedtime  sacubitril 49 mG/valsartan 51 mG 1 Tablet(s) Oral two times a day  simvastatin 10 milliGRAM(s) Oral at bedtime  spironolactone 25 milliGRAM(s) Oral daily    MEDICATIONS  (PRN):  acetaminophen     Tablet .. 650 milliGRAM(s) Oral every 6 hours PRN Temp greater or equal to 38C (100.4F), Mild Pain (1 - 3)  dextrose Oral Gel 15 Gram(s) Oral once PRN Blood Glucose LESS THAN 70 milliGRAM(s)/deciliter      Vitals:  T(C): 36.6 (12-18-23 @ 17:29), Max: 37 (12-18-23 @ 06:30)  HR: 71 (12-18-23 @ 17:29) (66 - 124)  BP: 102/71 (12-18-23 @ 17:29) (101/57 - 122/70)  RR: 18 (12-18-23 @ 17:29) (16 - 22)  SpO2: 98% (12-18-23 @ 17:29) (98% - 100%)    Physical Examination:   General - NAD      Neurologic Exam:  Mental status - Awake, Alert, Oriented to person, place, and time. Speech fluent, repetition and naming intact. Follows simple and complex commands    Cranial nerves - PERRL, VFF, EOMI, face sensation (V1-V3) intact b/l, facial strength intact without asymmetry b/l, hearing intact b/l, palate with symmetric elevation, trapezius  5/5 strength b/l, tongue midline on protrusion with full lateral movement    Motor - Normal bulk and tone throughout. No pronator drift.  Strength testing            Deltoid      Biceps      Triceps     Wrist Extension    Wrist Flexion       R            5                 5               5                     5                              5                        5                   L             5                 5               5                     5                              5                        5                               Hip Flexion    Hip Extension    Knee Flexion    Knee Extension    Dorsiflexion    Plantar Flexion  R              4+                           5                       5                           5                            5                          5  L              4+                         5                        5                           5                            5                          5    Sensation - Light touch intact throughout    Coordination - Mild dysmetria right hand, heel to shin intact. No tremors appreciated    Gait and station - Deffered due to fall safety risk     Labs:                        14.9   8.65  )-----------( 158      ( 18 Dec 2023 06:59 )             45.6     12-18    140  |  105  |  39<H>  ----------------------------<  120<H>  3.7   |  21<L>  |  1.07    Ca    8.8      18 Dec 2023 06:47  Phos  2.7     12-18  Mg     2.10     12-18    TPro  7.2  /  Alb  3.7  /  TBili  0.5  /  DBili  x   /  AST  20  /  ALT  28  /  AlkPhos  102  12-17    CAPILLARY BLOOD GLUCOSE      POCT Blood Glucose.: 93 mg/dL (18 Dec 2023 17:37)    LIVER FUNCTIONS - ( 17 Dec 2023 13:45 )  Alb: 3.7 g/dL / Pro: 7.2 g/dL / ALK PHOS: 102 U/L / ALT: 28 U/L / AST: 20 U/L / GGT: x                 Radiology:  CT Head No Cont:  (17 Dec 2023 17:01)  < from: CT Head No Cont (12.17.23 @ 17:01) >    IMPRESSION:    No acute intracranial bleeding.  Cerebellar volume loss may be due to chronic antiepileptic medication   use. Correlate with patient's history.       Neurology - Consult Note    -  Spectra: [phone] ([facility]), [phone] ([facility])  -    HPI: Patient ROXY MIRANDA is a 67y (1955) Creole speaking woman with a PMHx significant for DM2, Afib (on Eliquis), CHF, bioprosthetic MVR/AVR, MI, Stroke (R cerebellar) admitted for Afib with RVR, UTI. Neurology consulted for seizure-like activity. Spoke with daughter Kaleb who reports after patient was eating breakfast she was sitting down and leaning her head against the pantry, she was groaning, eyes open and had shaking of all her extremities. Denies tongue biting or incontinence. Episode lasted 15-20 seconds and she was sleepy afterwards. Denies previous similar episodes or history of seizures. Patient also reports feeling like her head is spinning since Sunday after her hospitalized. Provoked with head movements and standing. Also associated with N/V. She has similar episodes of dizziness for years, however recently worsened to where she is unable to walk. Romero tinnitus ear fullness, weakness, numbness, changes to speech, changes to vision. At baseline, ambulates with walker.       Review of Systems:    All other review of systems is negative unless indicated above.    Allergies:  No Known Allergies      PMHx/PSHx/Family Hx: As above, otherwise see below   HTN (hypertension)    HLD (hyperlipidemia)    Atrial fibrillation    History of MI (myocardial infarction)    History of stroke    Cerebrovascular accident (CVA)    History of TIA (transient ischemic attack)    Long term current use of anticoagulant    Type 2 diabetes mellitus    Dyspnea on exertion    Memory loss    Abnormal uterine and vaginal bleeding    S/P aortic valve replacement with bioprosthetic valve    S/P mitral valve replacement with bioprosthetic valve        Social Hx:  No current use of tobacco, alcohol, or illicit drugs      Medications:  MEDICATIONS  (STANDING):  apixaban 5 milliGRAM(s) Oral two times a day  aspirin enteric coated 81 milliGRAM(s) Oral daily  cefTRIAXone   IVPB 1000 milliGRAM(s) IV Intermittent every 24 hours  dextrose 5%. 1000 milliLiter(s) (50 mL/Hr) IV Continuous <Continuous>  dextrose 5%. 1000 milliLiter(s) (100 mL/Hr) IV Continuous <Continuous>  dextrose 50% Injectable 25 Gram(s) IV Push once  dextrose 50% Injectable 25 Gram(s) IV Push once  dextrose 50% Injectable 12.5 Gram(s) IV Push once  glucagon  Injectable 1 milliGRAM(s) IntraMuscular once  insulin lispro (ADMELOG) corrective regimen sliding scale   SubCutaneous three times a day before meals  insulin lispro (ADMELOG) corrective regimen sliding scale   SubCutaneous at bedtime  sacubitril 49 mG/valsartan 51 mG 1 Tablet(s) Oral two times a day  simvastatin 10 milliGRAM(s) Oral at bedtime  spironolactone 25 milliGRAM(s) Oral daily    MEDICATIONS  (PRN):  acetaminophen     Tablet .. 650 milliGRAM(s) Oral every 6 hours PRN Temp greater or equal to 38C (100.4F), Mild Pain (1 - 3)  dextrose Oral Gel 15 Gram(s) Oral once PRN Blood Glucose LESS THAN 70 milliGRAM(s)/deciliter      Vitals:  T(C): 36.6 (12-18-23 @ 17:29), Max: 37 (12-18-23 @ 06:30)  HR: 71 (12-18-23 @ 17:29) (66 - 124)  BP: 102/71 (12-18-23 @ 17:29) (101/57 - 122/70)  RR: 18 (12-18-23 @ 17:29) (16 - 22)  SpO2: 98% (12-18-23 @ 17:29) (98% - 100%)    Physical Examination:   General - NAD      Neurologic Exam:  Mental status - Awake, Alert, Oriented to person, place, and time. Speech fluent, repetition and naming intact. Follows simple and complex commands    Cranial nerves - PERRL, VFF, EOMI, face sensation (V1-V3) intact b/l, facial strength intact without asymmetry b/l, hearing intact b/l, palate with symmetric elevation, trapezius  5/5 strength b/l, tongue midline on protrusion with full lateral movement    Motor - Normal bulk and tone throughout. No pronator drift.  Strength testing            Deltoid      Biceps      Triceps     Wrist Extension    Wrist Flexion       R            5                 5               5                     5                              5                        5                   L             5                 5               5                     5                              5                        5                               Hip Flexion    Hip Extension    Knee Flexion    Knee Extension    Dorsiflexion    Plantar Flexion  R              4+                           5                       5                           5                            5                          5  L              4+                         5                        5                           5                            5                          5    Sensation - Light touch intact throughout    Coordination - Mild dysmetria right hand, heel to shin intact. No tremors appreciated    Gait and station - Deffered due to fall safety risk     Labs:                        14.9   8.65  )-----------( 158      ( 18 Dec 2023 06:59 )             45.6     12-18    140  |  105  |  39<H>  ----------------------------<  120<H>  3.7   |  21<L>  |  1.07    Ca    8.8      18 Dec 2023 06:47  Phos  2.7     12-18  Mg     2.10     12-18    TPro  7.2  /  Alb  3.7  /  TBili  0.5  /  DBili  x   /  AST  20  /  ALT  28  /  AlkPhos  102  12-17    CAPILLARY BLOOD GLUCOSE      POCT Blood Glucose.: 93 mg/dL (18 Dec 2023 17:37)    LIVER FUNCTIONS - ( 17 Dec 2023 13:45 )  Alb: 3.7 g/dL / Pro: 7.2 g/dL / ALK PHOS: 102 U/L / ALT: 28 U/L / AST: 20 U/L / GGT: x                 Radiology:  CT Head No Cont:  (17 Dec 2023 17:01)  < from: CT Head No Cont (12.17.23 @ 17:01) >    IMPRESSION:    No acute intracranial bleeding.  Cerebellar volume loss may be due to chronic antiepileptic medication   use. Correlate with patient's history.

## 2023-12-18 NOTE — ED ADULT NURSE REASSESSMENT NOTE - NS ED NURSE REASSESS COMMENT FT1
BREAK RN: Report received from MARVIN Sanders. Well appearing sitting up in stretcher HOB elevated. Afib on bedside cardiac monitor. Respirations even and unlabored. HOB elevated. No acute distress noted. Airway patent. Safety maintained. Awaiting bed assignment as per admission orders.

## 2023-12-18 NOTE — PATIENT PROFILE ADULT - BILL PAYMENT
Prediabetes: Care Instructions  Your Care Instructions    Prediabetes is a warning sign that you are at risk for getting type 2 diabetes. It means that your blood sugar is higher than it should be. The food you eat turns into sugar, which your body uses for energy. Normally, an organ called the pancreas makes insulin, which allows the sugar in your blood to get into your body's cells. But when your body can't use insulin the right way, the sugar doesn't move into cells. It stays in your blood instead. This is called insulin resistance. The buildup of sugar in the blood causes prediabetes. The good news is that lifestyle changes may help you get your blood sugar back to normal and help you avoid or delay diabetes. Follow-up care is a key part of your treatment and safety. Be sure to make and go to all appointments, and call your doctor if you are having problems. It's also a good idea to know your test results and keep a list of the medicines you take. How can you care for yourself at home? · Watch your weight. A healthy weight helps your body use insulin properly. · Limit the amount of calories, sweets, and unhealthy fat you eat. Ask your doctor if you should see a dietitian. A registered dietitian can help you create meal plans that fit your lifestyle. · Get at least 30 minutes of exercise on most days of the week. Exercise helps control your blood sugar. It also helps you maintain a healthy weight. Walking is a good choice. You also may want to do other activities, such as running, swimming, cycling, or playing tennis or team sports. · Do not smoke. Smoking can make prediabetes worse. If you need help quitting, talk to your doctor about stop-smoking programs and medicines. These can increase your chances of quitting for good. · If your doctor prescribed medicines, take them exactly as prescribed. Call your doctor if you think you are having a problem with your medicine.  You will get more details on the specific medicines your doctor prescribes. When should you call for help? Watch closely for changes in your health, and be sure to contact your doctor if:  ? · You have any symptoms of diabetes. These may include:  ¨ Being thirsty more often. ¨ Urinating more. ¨ Being hungrier. ¨ Losing weight. ¨ Being very tired. ¨ Having blurry vision. ? · You have a wound that will not heal.   ? · You have an infection that will not go away. ? · You have problems with your blood pressure. ? · You want more information about diabetes and how you can keep from getting it. Where can you learn more? Go to http://[place]-rylan.info/. Enter I222 in the search box to learn more about \"Prediabetes: Care Instructions. \"  Current as of: March 13, 2017  Content Version: 11.4  © 3340-2953 [employer]. Care instructions adapted under license by [employer] (which disclaims liability or warranty for this information). If you have questions about a medical condition or this instruction, always ask your healthcare professional. [address] any warranty or liability for your use of this information. no

## 2023-12-18 NOTE — PROGRESS NOTE ADULT - SUBJECTIVE AND OBJECTIVE BOX
[facility] Division of Hospital Medicine  Wilman Ceballos MD  Available via MS Teams    SUBJECTIVE / OVERNIGHT EVENTS: No acute events overnight. Pt seen and examined at bedside. Creole phone : Pt denies any chest pain or sob. Denies any lightheadedness or dizziness. Feels thirsty.     MEDICATIONS  (STANDING):  apixaban 5 milliGRAM(s) Oral two times a day  aspirin enteric coated 81 milliGRAM(s) Oral daily  cefTRIAXone   IVPB 1000 milliGRAM(s) IV Intermittent every 24 hours  dextrose 5%. 1000 milliLiter(s) (50 mL/Hr) IV Continuous <Continuous>  dextrose 5%. 1000 milliLiter(s) (100 mL/Hr) IV Continuous <Continuous>  dextrose 50% Injectable 25 Gram(s) IV Push once  dextrose 50% Injectable 25 Gram(s) IV Push once  dextrose 50% Injectable 12.5 Gram(s) IV Push once  glucagon  Injectable 1 milliGRAM(s) IntraMuscular once  insulin lispro (ADMELOG) corrective regimen sliding scale   SubCutaneous three times a day before meals  insulin lispro (ADMELOG) corrective regimen sliding scale   SubCutaneous at bedtime  sacubitril 49 mG/valsartan 51 mG 1 Tablet(s) Oral two times a day  simvastatin 10 milliGRAM(s) Oral at bedtime  spironolactone 25 milliGRAM(s) Oral daily    MEDICATIONS  (PRN):  acetaminophen     Tablet .. 650 milliGRAM(s) Oral every 6 hours PRN Temp greater or equal to 38C (100.4F), Mild Pain (1 - 3)  dextrose Oral Gel 15 Gram(s) Oral once PRN Blood Glucose LESS THAN 70 milliGRAM(s)/deciliter      I&O's Summary    PHYSICAL EXAM:  Vital Signs Last 24 Hrs  T(C): 36.6 (18 Dec 2023 10:12), Max: 37 (18 Dec 2023 06:30)  T(F): 97.9 (18 Dec 2023 10:12), Max: 98.6 (18 Dec 2023 06:30)  HR: 70 (18 Dec 2023 10:12) (66 - 124)  BP: 113/72 (18 Dec 2023 10:12) (101/51 - 122/70)  RR: 18 (18 Dec 2023 10:12) (16 - 22)  SpO2: 99% (18 Dec 2023 10:12) (98% - 100%)    Parameters below as of 18 Dec 2023 10:12  Patient On (Oxygen Delivery Method): room air    CONSTITUTIONAL: NAD, well appearing, watching TV  HEENT: dry MM  RESPIRATORY: Normal respiratory effort; lungs are clear to auscultation bilaterally  CARDIOVASCULAR: irregularly irregular, s1, s  ABDOMEN: soft, nt, nd  MUSCULOSKELETAL: no clubbing or cyanosis of digits; no joint swelling or tenderness to palpation  PSYCH: A+O to person, place, and time; affect appropriate  NEUROLOGY: CN 2-12 are intact and symmetric; no gross sensory deficits   SKIN: No rashes; no palpable lesions    LABS:                        14.9   8.65  )-----------( 158      ( 18 Dec 2023 06:59 )             45.6     12-18    140  |  105  |  39<H>  ----------------------------<  120<H>  3.7   |  21<L>  |  1.07    Ca    8.8      18 Dec 2023 06:47  Phos  2.7     12-18  Mg     2.10     12-18    TPro  7.2  /  Alb  3.7  /  TBili  0.5  /  DBili  x   /  AST  20  /  ALT  28  /  AlkPhos  102  12-17    Urinalysis Basic - ( 18 Dec 2023 06:47 )    Color: x / Appearance: x / SG: x / pH: x  Gluc: 120 mg/dL / Ketone: x  / Bili: x / Urobili: x   Blood: x / Protein: x / Nitrite: x   Leuk Esterase: x / RBC: x / WBC x   Sq Epi: x / Non Sq Epi: x / Bacteria: x    Venous: 12-17-23 @ 13:45 FiO2: -- Oxygen Sat% 51.2      RADIOLOGY & ADDITIONAL TESTS:  New Results Reviewed Today:   New Imaging Personally Reviewed Today:  New Electrocardiogram Personally Reviewed Today:  Prior or Outpatient Records Reviewed Today:    COMMUNICATION:  Care Discussed with Consultants/Other Providers and Details of Discussion: Discussed with ACP  Discussions with Patient/Family:  PCP Communication: [facility] Division of Hospital Medicine  Wilman Ceballos MD  Available via MS Teams    SUBJECTIVE / OVERNIGHT EVENTS: No acute events overnight. Pt seen and examined at bedside. Creole phone : Pt denies any chest pain or sob. Denies any lightheadedness or dizziness. Feels thirsty.     MEDICATIONS  (STANDING):  apixaban 5 milliGRAM(s) Oral two times a day  aspirin enteric coated 81 milliGRAM(s) Oral daily  cefTRIAXone   IVPB 1000 milliGRAM(s) IV Intermittent every 24 hours  dextrose 5%. 1000 milliLiter(s) (50 mL/Hr) IV Continuous <Continuous>  dextrose 5%. 1000 milliLiter(s) (100 mL/Hr) IV Continuous <Continuous>  dextrose 50% Injectable 25 Gram(s) IV Push once  dextrose 50% Injectable 25 Gram(s) IV Push once  dextrose 50% Injectable 12.5 Gram(s) IV Push once  glucagon  Injectable 1 milliGRAM(s) IntraMuscular once  insulin lispro (ADMELOG) corrective regimen sliding scale   SubCutaneous three times a day before meals  insulin lispro (ADMELOG) corrective regimen sliding scale   SubCutaneous at bedtime  sacubitril 49 mG/valsartan 51 mG 1 Tablet(s) Oral two times a day  simvastatin 10 milliGRAM(s) Oral at bedtime  spironolactone 25 milliGRAM(s) Oral daily    MEDICATIONS  (PRN):  acetaminophen     Tablet .. 650 milliGRAM(s) Oral every 6 hours PRN Temp greater or equal to 38C (100.4F), Mild Pain (1 - 3)  dextrose Oral Gel 15 Gram(s) Oral once PRN Blood Glucose LESS THAN 70 milliGRAM(s)/deciliter      I&O's Summary    PHYSICAL EXAM:  Vital Signs Last 24 Hrs  T(C): 36.6 (18 Dec 2023 10:12), Max: 37 (18 Dec 2023 06:30)  T(F): 97.9 (18 Dec 2023 10:12), Max: 98.6 (18 Dec 2023 06:30)  HR: 70 (18 Dec 2023 10:12) (66 - 124)  BP: 113/72 (18 Dec 2023 10:12) (101/51 - 122/70)  RR: 18 (18 Dec 2023 10:12) (16 - 22)  SpO2: 99% (18 Dec 2023 10:12) (98% - 100%)    Parameters below as of 18 Dec 2023 10:12  Patient On (Oxygen Delivery Method): room air    CONSTITUTIONAL: NAD, well appearing, watching TV  HEENT: dry MM  RESPIRATORY: Normal respiratory effort; lungs are clear to auscultation bilaterally  CARDIOVASCULAR: irregularly irregular, s1, s  ABDOMEN: soft, nt, nd  MUSCULOSKELETAL: no clubbing or cyanosis of digits; no joint swelling or tenderness to palpation  PSYCH: A+O to person, place, and time; affect appropriate  NEUROLOGY: CN 2-12 are intact and symmetric; no gross sensory deficits   SKIN: No rashes; no palpable lesions    LABS:                        14.9   8.65  )-----------( 158      ( 18 Dec 2023 06:59 )             45.6     12-18    140  |  105  |  39<H>  ----------------------------<  120<H>  3.7   |  21<L>  |  1.07    Ca    8.8      18 Dec 2023 06:47  Phos  2.7     12-18  Mg     2.10     12-18    TPro  7.2  /  Alb  3.7  /  TBili  0.5  /  DBili  x   /  AST  20  /  ALT  28  /  AlkPhos  102  12-17    Urinalysis Basic - ( 18 Dec 2023 06:47 )    Color: x / Appearance: x / SG: x / pH: x  Gluc: 120 mg/dL / Ketone: x  / Bili: x / Urobili: x   Blood: x / Protein: x / Nitrite: x   Leuk Esterase: x / RBC: x / WBC x   Sq Epi: x / Non Sq Epi: x / Bacteria: x    Venous: 12-17-23 @ 13:45 FiO2: -- Oxygen Sat% 51.2      RADIOLOGY & ADDITIONAL TESTS:  New Results Reviewed Today:   New Imaging Personally Reviewed Today:  New Electrocardiogram Personally Reviewed Today:  Prior or Outpatient Records Reviewed Today:    COMMUNICATION:  Care Discussed with Consultants/Other Providers and Details of Discussion: Discussed with ACP  Discussions with Patient/Family:  PCP Communication:

## 2023-12-18 NOTE — CONSULT NOTE ADULT - ATTENDING COMMENTS
Thai Creole  used and also family used.     A/P  Ms. Cano is a 66 yo woman with episode of LOC.  The differential diagnosis includes: convulsive syncope, seizure.   Continuous EEG.  D/W patient and family.  Thank you Cuban Creole  used and also family used.     A/P  Ms. Cano is a 68 yo woman with episode of LOC.  The differential diagnosis includes: convulsive syncope, seizure.   Continuous EEG.  D/W patient and family.  Thank you

## 2023-12-18 NOTE — PATIENT PROFILE ADULT - FALL HARM RISK - UNIVERSAL INTERVENTIONS
Bed in lowest position, wheels locked, appropriate side rails in place/Call bell, personal items and telephone in reach/Instruct patient to call for assistance before getting out of bed or chair/Non-slip footwear when patient is out of bed/[place] to call system/Physically safe environment - no spills, clutter or unnecessary equipment/Purposeful Proactive Rounding/Room/bathroom lighting operational, light cord in reach Bed in lowest position, wheels locked, appropriate side rails in place/Call bell, personal items and telephone in reach/Instruct patient to call for assistance before getting out of bed or chair/Non-slip footwear when patient is out of bed/[place] to call system/Physically safe environment - no spills, clutter or unnecessary equipment/Purposeful Proactive Rounding/Room/bathroom lighting operational, light cord in reach

## 2023-12-18 NOTE — CONSULT NOTE ADULT - CARDIOVASCULAR
normal/regular rate and rhythm/S1 S2 present/no gallops/no rub/no murmur/no JVD/no pedal edema/Irregularly irregular rhythm

## 2023-12-18 NOTE — ED ADULT NURSE REASSESSMENT NOTE - NS ED NURSE REASSESS COMMENT FT1
Pt resting comfortably in stretcher at this time. Breathing even and unlabored on room air. Pt remains on continuous cardiac monitor, Afib noted. VS as noted in flow sheet. FS performed at this time, 115. Repeat labs drawn and sent. Medicated per EMAR. No acute distress noted. Denies any complaints at this time. Report given to CDU nurse.

## 2023-12-18 NOTE — CONSULT NOTE ADULT - NS ATTEND AMEND GEN_ALL_CORE FT
personally saw and examined patient   labs and vitals reviewed  agree with above assessment and plan  67F with DM2, Afib on Eliquis, CAD HFrEF EF 45-50% bioprosthetic MVR/AVR 2008), hx of CVA (R cerebellar), recent hospitalization 12/12-16 for acute on chronic CHF exacerbation presented to [facility] ED with seizure-like activity. Cardiology consulted for AF w RVR.   pt awake and alert now, encounter with  phone  pt without complaints  recent cath reviewed  pt grossly euvolemic  unclear etiology of syncope/seizure at home, pt without recollection  consider neuro eval  pt without cp sob   cont tele to eval for arrythmia  will follow with you personally saw and examined patient   labs and vitals reviewed  agree with above assessment and plan  67F with DM2, Afib on Eliquis, CAD HFrEF EF 45-50% bioprosthetic MVR/AVR 2008), hx of CVA (R cerebellar), recent hospitalization 12/12-16 for acute on chronic CHF exacerbation presented to [facility] ED with seizure-like activity. Cardiology consulted for AF w RVR.   pt awake and alert now, encounter with  phone  pt without complaints  recent cath reviewed  pt grossly euvolemic  unclear etiology of syncope/seizure at home, pt without recollection  consider neuro eval  pt without cp sob   cont tele to eval for arrythmia  will follow with you

## 2023-12-18 NOTE — PROGRESS NOTE ADULT - PROBLEM SELECTOR PLAN 1
Post-ictal lethargy, no prodromal symptoms. No prior seizure hx.  Seizure vs syncope vs vagal  recent TTE without acute changes from prior; no endocarditis/LV thrombus  -CT Head with no acute changes, chronic cerebellar volume loss i/s/o prior R cerebellar CVA    > CTM lytes  > UTI antibiotics as below  > vEEG and MRI brain ordered  > Neuro consult in AM

## 2023-12-18 NOTE — CONSULT NOTE ADULT - ASSESSMENT
HPI: 66yo Female hx DM Type 2, Afib (on Eliquis), CHF (TTE 12/13 EF 45-50%; bioprosthetic MVR/AVR 2008), CAD (prior MI, RHC/LHC 12/15 mild mLAD dz), CVA (R cerebellar), recent hospitalization 12/12-16 for acute on chronic CHF exacerbation presented to [facility] ED with seizure-like activity. Cardiology consulted for AF w RVR.     Pt is poor historian/ likely confused from post ictal state. Per chart review, pt was brought in by daughter for a witnessed seizure like activity for around 15 secs and resolved spontaneously. No prior simlar symptoms. On tele pt has been in AF w rate in 80s and no symptoms and vitals stable.     Assessment and Plan:  # Chronic AF   Pt currently asymptomatic. vitals stable. Tele review showing AF rate controlled in 80s. EKG showing AF w rate in 100s, T wave inversions in inferior and lateral leads, No recent EKG to compare.   Pt had a recent admission for HFrEF w EF of 45% and underwent a LHC/RHC on 12/15/23 showing minimal CAD and mild pulmonary hypertension. Pt was also started on lasix and entresto, aldactone.   Pt appears euvolemic on exam. No JVD, rales, pedal edema, no high grade cardiac murmur.  Of note, Per chart review 12/15/23 pt has not tolerated BB in past due to underlying conduction disease and developing pauses and syncope.   Currently no evidence suggestive of ACS. Pt being investigated for seizure like activity. Neuro to see pt in AM  - check orthostatic vitals as pt maybe volume depleted from recent initiation of GDMT and diuretics.  - would hold off on BB/CCB for now in setting of prior report of developing syncope and pauses   - continue tele monitoring  - would not treat for AF w RVR if pt remains asymptomatic, vitals stable and HR<120s  - hold off on diuretics for now  - check TSH  - Replete lytes to keep K>4, Mg>2  - resume home dose eliquis if no contraindications for thromboembolic prevention.    ** note not finalized  ** pending further recs from cards fellow  cardiology will continue to follow HPI: 66yo Female hx DM Type 2, Afib (on Eliquis), CHF (TTE 12/13 EF 45-50%; bioprosthetic MVR/AVR 2008), CAD (prior MI, RHC/LHC 12/15 mild mLAD dz), CVA (R cerebellar), recent hospitalization 12/12-16 for acute on chronic CHF exacerbation presented to [facility] ED with seizure-like activity. Cardiology consulted for AF w RVR.     Pt is poor historian/ likely confused from post ictal state. Per chart review, pt was brought in by daughter for a witnessed seizure like activity for around 15 secs and resolved spontaneously. No prior simlar symptoms. On tele pt has been in AF w rate in 80s and no symptoms and vitals stable.     Assessment and Plan:  # Chronic AF   Pt currently asymptomatic. vitals stable. Tele review showing AF rate controlled in 80s. EKG showing AF w rate in 100s, T wave inversions in inferior and lateral leads, No recent EKG to compare.   Pt had a recent admission for HFrEF w EF of 45% and underwent a LHC/RHC on 12/15/23 showing minimal CAD and mild pulmonary hypertension. Pt was also started on lasix and entresto, aldactone.   Pt appears euvolemic on exam. No JVD, rales, pedal edema, no high grade cardiac murmur.  Of note, Per chart review 12/15/23 pt has not tolerated BB in past due to underlying conduction disease and developing pauses and syncope.   Currently no evidence suggestive of ACS. Pt being investigated for seizure like activity. Neuro to see pt in AM  - check orthostatic vitals as pt maybe volume depleted from recent initiation of GDMT and diuretics.  - would hold off on BB/CCB for now in setting of prior report of developing syncope and pauses   - continue tele monitoring  - would not treat for AF w RVR if pt remains asymptomatic, vitals stable and HR<120s  - hold off on diuretics for now  - check TSH  - Replete lytes to keep K>4, Mg>2  - resume home dose eliquis if no contraindications for thromboembolic prevention.    ** note not finalized  ** pending further recs from cards fellow  cardiology will continue to follow HPI: 68yo Female hx DM Type 2, Afib (on Eliquis), CHF (TTE 12/13 EF 45-50%; bioprosthetic MVR/AVR 2008), CAD (prior MI, RHC/LHC 12/15 mild mLAD dz), CVA (R cerebellar), recent hospitalization 12/12-16 for acute on chronic CHF exacerbation presented to [facility] ED with seizure-like activity. Cardiology consulted for AF w RVR.     Pt is poor historian/ likely confused from post ictal state. Per chart review, pt was brought in by daughter for a witnessed seizure like activity for around 15 secs and resolved spontaneously. No prior simlar symptoms. On tele pt has been in AF w rate in 80s and no symptoms and vitals stable.     Assessment and Plan:  # Chronic AF   Pt currently asymptomatic. vitals stable. Tele review showing AF rate controlled in 80s. EKG showing AF w rate in 100s, T wave inversions in inferior and lateral leads, No recent EKG to compare.   Pt had a recent admission for HFrEF w EF of 45% and underwent a LHC/RHC on 12/15/23 showing minimal CAD and mild pulmonary hypertension. Pt was also started on lasix and entresto, aldactone.   Pt appears euvolemic on exam. No JVD, rales, pedal edema, no high grade cardiac murmur.  Of note, Per chart review 12/15/23 pt has not tolerated BB in past due to underlying conduction disease and developing pauses and syncope.   Currently no evidence suggestive of ACS. Pt being investigated for seizure like activity. Neuro to see pt in AM  - check orthostatic vitals as pt maybe volume depleted from recent initiation of GDMT and diuretics.  - would hold off on BB/CCB for now in setting of prior report of developing syncope and pauses   - continue tele monitoring  - would not treat for AF w RVR if pt remains asymptomatic, vitals stable and HR<120s  - hold off on diuretics for now  - check TSH  - Replete lytes to keep K>4, Mg>2  - resume home dose eliquis if no contraindications for thromboembolic prevention.    will Consider EP consult in AM  Case discussed w fellow Dr Gonzalez  cardiology will continue to follow HPI: 68yo Female hx DM Type 2, Afib (on Eliquis), CHF (TTE 12/13 EF 45-50%; bioprosthetic MVR/AVR 2008), CAD (prior MI, RHC/LHC 12/15 mild mLAD dz), CVA (R cerebellar), recent hospitalization 12/12-16 for acute on chronic CHF exacerbation presented to [facility] ED with seizure-like activity. Cardiology consulted for AF w RVR.     Pt is poor historian/ likely confused from post ictal state. Per chart review, pt was brought in by daughter for a witnessed seizure like activity for around 15 secs and resolved spontaneously. No prior simlar symptoms. On tele pt has been in AF w rate in 80s and no symptoms and vitals stable.     Assessment and Plan:  # Chronic AF   Pt currently asymptomatic. vitals stable. Tele review showing AF rate controlled in 80s. EKG showing AF w rate in 100s, T wave inversions in inferior and lateral leads, No recent EKG to compare.   Pt had a recent admission for HFrEF w EF of 45% and underwent a LHC/RHC on 12/15/23 showing minimal CAD and mild pulmonary hypertension. Pt was also started on lasix and entresto, aldactone.   Pt appears euvolemic on exam. No JVD, rales, pedal edema, no high grade cardiac murmur.  Of note, Per chart review 12/15/23 pt has not tolerated BB in past due to underlying conduction disease and developing pauses and syncope.   Currently no evidence suggestive of ACS. Pt being investigated for seizure like activity. Neuro to see pt in AM  - check orthostatic vitals as pt maybe volume depleted from recent initiation of GDMT and diuretics.  - would hold off on BB/CCB for now in setting of prior report of developing syncope and pauses   - continue tele monitoring  - would not treat for AF w RVR if pt remains asymptomatic, vitals stable and HR<120s  - hold off on diuretics for now  - check TSH  - Replete lytes to keep K>4, Mg>2  - resume home dose eliquis if no contraindications for thromboembolic prevention.    will Consider EP consult in AM  Case discussed w fellow Dr Gonzalez  cardiology will continue to follow

## 2023-12-18 NOTE — PROGRESS NOTE ADULT - PROBLEM SELECTOR PLAN 4
Recently increased Lasix dose.  Possible overdiuresis, pre-renal i/s/o infection, less likely cardio-renal given unlikely CHF exacerbation.    - hold home lasix 40 mg daily  > antibiotics mgmt as above  > bladder scan, urine lytes  > CTM with SCr

## 2023-12-19 LAB
ANION GAP SERPL CALC-SCNC: 7 MMOL/L — SIGNIFICANT CHANGE UP (ref 7–14)
ANION GAP SERPL CALC-SCNC: 7 MMOL/L — SIGNIFICANT CHANGE UP (ref 7–14)
BUN SERPL-MCNC: 28 MG/DL — HIGH (ref 7–23)
BUN SERPL-MCNC: 28 MG/DL — HIGH (ref 7–23)
CALCIUM SERPL-MCNC: 9.5 MG/DL — SIGNIFICANT CHANGE UP (ref 8.4–10.5)
CALCIUM SERPL-MCNC: 9.5 MG/DL — SIGNIFICANT CHANGE UP (ref 8.4–10.5)
CHLORIDE SERPL-SCNC: 106 MMOL/L — SIGNIFICANT CHANGE UP (ref 98–107)
CHLORIDE SERPL-SCNC: 106 MMOL/L — SIGNIFICANT CHANGE UP (ref 98–107)
CO2 SERPL-SCNC: 24 MMOL/L — SIGNIFICANT CHANGE UP (ref 22–31)
CO2 SERPL-SCNC: 24 MMOL/L — SIGNIFICANT CHANGE UP (ref 22–31)
CREAT SERPL-MCNC: 1.05 MG/DL — SIGNIFICANT CHANGE UP (ref 0.5–1.3)
CREAT SERPL-MCNC: 1.05 MG/DL — SIGNIFICANT CHANGE UP (ref 0.5–1.3)
CULTURE RESULTS: ABNORMAL
CULTURE RESULTS: ABNORMAL
EGFR: 58 ML/MIN/1.73M2 — LOW
EGFR: 58 ML/MIN/1.73M2 — LOW
GLUCOSE SERPL-MCNC: 125 MG/DL — HIGH (ref 70–99)
GLUCOSE SERPL-MCNC: 125 MG/DL — HIGH (ref 70–99)
HCT VFR BLD CALC: 47.7 % — HIGH (ref 34.5–45)
HCT VFR BLD CALC: 47.7 % — HIGH (ref 34.5–45)
HGB BLD-MCNC: 15.1 G/DL — SIGNIFICANT CHANGE UP (ref 11.5–15.5)
HGB BLD-MCNC: 15.1 G/DL — SIGNIFICANT CHANGE UP (ref 11.5–15.5)
MAGNESIUM SERPL-MCNC: 2 MG/DL — SIGNIFICANT CHANGE UP (ref 1.6–2.6)
MAGNESIUM SERPL-MCNC: 2 MG/DL — SIGNIFICANT CHANGE UP (ref 1.6–2.6)
MCHC RBC-ENTMCNC: 29.7 PG — SIGNIFICANT CHANGE UP (ref 27–34)
MCHC RBC-ENTMCNC: 29.7 PG — SIGNIFICANT CHANGE UP (ref 27–34)
MCHC RBC-ENTMCNC: 31.7 GM/DL — LOW (ref 32–36)
MCHC RBC-ENTMCNC: 31.7 GM/DL — LOW (ref 32–36)
MCV RBC AUTO: 93.7 FL — SIGNIFICANT CHANGE UP (ref 80–100)
MCV RBC AUTO: 93.7 FL — SIGNIFICANT CHANGE UP (ref 80–100)
NRBC # BLD: 0 /100 WBCS — SIGNIFICANT CHANGE UP (ref 0–0)
NRBC # BLD: 0 /100 WBCS — SIGNIFICANT CHANGE UP (ref 0–0)
NRBC # FLD: 0 K/UL — SIGNIFICANT CHANGE UP (ref 0–0)
NRBC # FLD: 0 K/UL — SIGNIFICANT CHANGE UP (ref 0–0)
PHOSPHATE SERPL-MCNC: 3 MG/DL — SIGNIFICANT CHANGE UP (ref 2.5–4.5)
PHOSPHATE SERPL-MCNC: 3 MG/DL — SIGNIFICANT CHANGE UP (ref 2.5–4.5)
PLATELET # BLD AUTO: 163 K/UL — SIGNIFICANT CHANGE UP (ref 150–400)
PLATELET # BLD AUTO: 163 K/UL — SIGNIFICANT CHANGE UP (ref 150–400)
POTASSIUM SERPL-MCNC: 4.9 MMOL/L — SIGNIFICANT CHANGE UP (ref 3.5–5.3)
POTASSIUM SERPL-MCNC: 4.9 MMOL/L — SIGNIFICANT CHANGE UP (ref 3.5–5.3)
POTASSIUM SERPL-SCNC: 4.9 MMOL/L — SIGNIFICANT CHANGE UP (ref 3.5–5.3)
POTASSIUM SERPL-SCNC: 4.9 MMOL/L — SIGNIFICANT CHANGE UP (ref 3.5–5.3)
RBC # BLD: 5.09 M/UL — SIGNIFICANT CHANGE UP (ref 3.8–5.2)
RBC # BLD: 5.09 M/UL — SIGNIFICANT CHANGE UP (ref 3.8–5.2)
RBC # FLD: 13.2 % — SIGNIFICANT CHANGE UP (ref 10.3–14.5)
RBC # FLD: 13.2 % — SIGNIFICANT CHANGE UP (ref 10.3–14.5)
SODIUM SERPL-SCNC: 137 MMOL/L — SIGNIFICANT CHANGE UP (ref 135–145)
SODIUM SERPL-SCNC: 137 MMOL/L — SIGNIFICANT CHANGE UP (ref 135–145)
SPECIMEN SOURCE: SIGNIFICANT CHANGE UP
SPECIMEN SOURCE: SIGNIFICANT CHANGE UP
WBC # BLD: 7.67 K/UL — SIGNIFICANT CHANGE UP (ref 3.8–10.5)
WBC # BLD: 7.67 K/UL — SIGNIFICANT CHANGE UP (ref 3.8–10.5)
WBC # FLD AUTO: 7.67 K/UL — SIGNIFICANT CHANGE UP (ref 3.8–10.5)
WBC # FLD AUTO: 7.67 K/UL — SIGNIFICANT CHANGE UP (ref 3.8–10.5)

## 2023-12-19 PROCEDURE — 99222 1ST HOSP IP/OBS MODERATE 55: CPT

## 2023-12-19 PROCEDURE — 95720 EEG PHY/QHP EA INCR W/VEEG: CPT

## 2023-12-19 PROCEDURE — 99233 SBSQ HOSP IP/OBS HIGH 50: CPT

## 2023-12-19 RX ADMIN — APIXABAN 5 MILLIGRAM(S): 2.5 TABLET, FILM COATED ORAL at 05:29

## 2023-12-19 RX ADMIN — SACUBITRIL AND VALSARTAN 1 TABLET(S): 24; 26 TABLET, FILM COATED ORAL at 05:29

## 2023-12-19 RX ADMIN — CEFTRIAXONE 100 MILLIGRAM(S): 500 INJECTION, POWDER, FOR SOLUTION INTRAMUSCULAR; INTRAVENOUS at 18:47

## 2023-12-19 RX ADMIN — SACUBITRIL AND VALSARTAN 1 TABLET(S): 24; 26 TABLET, FILM COATED ORAL at 18:54

## 2023-12-19 RX ADMIN — SIMVASTATIN 10 MILLIGRAM(S): 20 TABLET, FILM COATED ORAL at 21:35

## 2023-12-19 RX ADMIN — Medication 81 MILLIGRAM(S): at 11:46

## 2023-12-19 RX ADMIN — Medication 1: at 12:22

## 2023-12-19 RX ADMIN — APIXABAN 5 MILLIGRAM(S): 2.5 TABLET, FILM COATED ORAL at 18:47

## 2023-12-19 RX ADMIN — SPIRONOLACTONE 25 MILLIGRAM(S): 25 TABLET, FILM COATED ORAL at 05:29

## 2023-12-19 NOTE — CHART NOTE - NSCHARTNOTEFT_GEN_A_CORE
EEG preliminary read (not final) on the initial recording hour(s) = x 2    No seizures recorded.    Final report to follow tomorrow morning after completion of study.    [facility] EEG Reading Room Ph#: [phone]  Epilepsy Answering Service after 5PM and before 8:30AM: Ph#: [phone] EEG preliminary read (not final) on the initial recording hour(s) = x 2    No seizures recorded.    Final report to follow tomorrow morning after completion of study.    [facility] EEG Reading Room Ph#: [phone]  Epilepsy Answering Service after 5PM and before 8:30AM: Ph#: [phone]

## 2023-12-19 NOTE — PROGRESS NOTE ADULT - ASSESSMENT
67F with DM2, Afib on Eliquis, CAD HFrEF EF 45-50% bioprosthetic MVR/AVR 2008), hx of CVA (R cerebellar), recent hospitalization 12/12-16 for acute on chronic CHF exacerbation presented to [facility] ED with seizure-like activity. Cardiology consulted for AF w RVR.   pt awake and alert now, encounter with   pt without complaints  recent cath reviewed  pt grossly euvolemic  unclear etiology of syncope/seizure at home, pt without recollection  neuro eval in progress  pt without cp sob   cont tele to eval for arrythmia     67F with DM2, Afib on Eliquis, CAD HFrEF EF 45-50% bioprosthetic MVR/AVR 2008), hx of CVA (R cerebellar), recent hospitalization 12/12-16 for acute on chronic CHF exacerbation presented to [facility] ED with seizure-like activity. Cardiology consulted for AF w RVR.   pt awake and alert now, encounter with   pt without complaints  recent cath reviewed  pt grossly euvolemic  unclear etiology of syncope/seizure at home, pt without recollection  neuro eval in progress  pt without cp sob   cont tele to eval for arrythmia

## 2023-12-19 NOTE — PROGRESS NOTE ADULT - SUBJECTIVE AND OBJECTIVE BOX
24H hour events:   pt resting  no complaints   no acute events overnight  MEDICATIONS:  apixaban 5 milliGRAM(s) Oral two times a day  aspirin enteric coated 81 milliGRAM(s) Oral daily  sacubitril 49 mG/valsartan 51 mG 1 Tablet(s) Oral two times a day  spironolactone 25 milliGRAM(s) Oral daily    cefTRIAXone   IVPB 1000 milliGRAM(s) IV Intermittent every 24 hours      acetaminophen     Tablet .. 650 milliGRAM(s) Oral every 6 hours PRN      dextrose 50% Injectable 25 Gram(s) IV Push once  dextrose 50% Injectable 25 Gram(s) IV Push once  dextrose 50% Injectable 12.5 Gram(s) IV Push once  dextrose Oral Gel 15 Gram(s) Oral once PRN  glucagon  Injectable 1 milliGRAM(s) IntraMuscular once  insulin lispro (ADMELOG) corrective regimen sliding scale   SubCutaneous at bedtime  insulin lispro (ADMELOG) corrective regimen sliding scale   SubCutaneous three times a day before meals  simvastatin 10 milliGRAM(s) Oral at bedtime    dextrose 5%. 1000 milliLiter(s) IV Continuous <Continuous>  dextrose 5%. 1000 milliLiter(s) IV Continuous <Continuous>          PHYSICAL EXAM:  T(C): 36.8 (12-19-23 @ 06:00), Max: 36.8 (12-19-23 @ 06:00)  HR: 89 (12-19-23 @ 06:00) (71 - 89)  BP: 108/65 (12-19-23 @ 06:00) (102/71 - 118/59)  RR: 18 (12-19-23 @ 06:00) (18 - 18)  SpO2: 98% (12-19-23 @ 06:00) (98% - 100%)  Wt(kg): --  I&O's Summary      Appearance: Normal	  HEENT:   Normal oral mucosa, PERRL, EOMI	  Lymphatic: No lymphadenopathy  Cardiovascular: Normal S1 S2, No JVD, No murmurs, No edema  Respiratory: Lungs clear to auscultation	  Psychiatry: A & O x 3, Mood & affect appropriate  Gastrointestinal:  Soft, Non-tender, + BS	  Skin: No rashes, No ecchymoses, No cyanosis	  Neurologic: Non-focal  Extremities: Normal range of motion, No clubbing, cyanosis      LABS:	 	    CBC Full  -  ( 19 Dec 2023 07:16 )  WBC Count : 7.67 K/uL  Hemoglobin : 15.1 g/dL  Hematocrit : 47.7 %  Platelet Count - Automated : 163 K/uL  Mean Cell Volume : 93.7 fL  Mean Cell Hemoglobin : 29.7 pg  Mean Cell Hemoglobin Concentration : 31.7 gm/dL  Auto Neutrophil # : x  Auto Lymphocyte # : x  Auto Monocyte # : x  Auto Eosinophil # : x  Auto Basophil # : x  Auto Neutrophil % : x  Auto Lymphocyte % : x  Auto Monocyte % : x  Auto Eosinophil % : x  Auto Basophil % : x    12-19    137  |  106  |  28<H>  ----------------------------<  125<H>  4.9   |  24  |  1.05  12-18    140  |  105  |  39<H>  ----------------------------<  120<H>  3.7   |  21<L>  |  1.07    Ca    9.5      19 Dec 2023 07:16  Ca    8.8      18 Dec 2023 06:47  Phos  3.0     12-19  Phos  2.7     12-18  Mg     2.00     12-19  Mg     2.10     12-18    TPro  7.2  /  Alb  3.7  /  TBili  0.5  /  DBili  x   /  AST  20  /  ALT  28  /  AlkPhos  102  12-17      proBNP:   Lipid Profile:   HgA1c:   TSH:       CARDIAC MARKERS:            TELEMETRY: 	    ECG:  	  RADIOLOGY:  OTHER: 	    PREVIOUS DIAGNOSTIC TESTING:    [ ] Echocardiogram:  [ ]  Catheterization:  [ ] Stress Test:  	  	  ASSESSMENT/PLAN:

## 2023-12-20 LAB
ANION GAP SERPL CALC-SCNC: 9 MMOL/L — SIGNIFICANT CHANGE UP (ref 7–14)
ANION GAP SERPL CALC-SCNC: 9 MMOL/L — SIGNIFICANT CHANGE UP (ref 7–14)
BUN SERPL-MCNC: 28 MG/DL — HIGH (ref 7–23)
BUN SERPL-MCNC: 28 MG/DL — HIGH (ref 7–23)
CALCIUM SERPL-MCNC: 9.7 MG/DL — SIGNIFICANT CHANGE UP (ref 8.4–10.5)
CALCIUM SERPL-MCNC: 9.7 MG/DL — SIGNIFICANT CHANGE UP (ref 8.4–10.5)
CHLORIDE SERPL-SCNC: 105 MMOL/L — SIGNIFICANT CHANGE UP (ref 98–107)
CHLORIDE SERPL-SCNC: 105 MMOL/L — SIGNIFICANT CHANGE UP (ref 98–107)
CO2 SERPL-SCNC: 24 MMOL/L — SIGNIFICANT CHANGE UP (ref 22–31)
CO2 SERPL-SCNC: 24 MMOL/L — SIGNIFICANT CHANGE UP (ref 22–31)
CREAT SERPL-MCNC: 1.02 MG/DL — SIGNIFICANT CHANGE UP (ref 0.5–1.3)
CREAT SERPL-MCNC: 1.02 MG/DL — SIGNIFICANT CHANGE UP (ref 0.5–1.3)
EGFR: 60 ML/MIN/1.73M2 — SIGNIFICANT CHANGE UP
EGFR: 60 ML/MIN/1.73M2 — SIGNIFICANT CHANGE UP
GLUCOSE SERPL-MCNC: 124 MG/DL — HIGH (ref 70–99)
GLUCOSE SERPL-MCNC: 124 MG/DL — HIGH (ref 70–99)
HCT VFR BLD CALC: 49 % — HIGH (ref 34.5–45)
HCT VFR BLD CALC: 49 % — HIGH (ref 34.5–45)
HGB BLD-MCNC: 15.5 G/DL — SIGNIFICANT CHANGE UP (ref 11.5–15.5)
HGB BLD-MCNC: 15.5 G/DL — SIGNIFICANT CHANGE UP (ref 11.5–15.5)
MAGNESIUM SERPL-MCNC: 1.9 MG/DL — SIGNIFICANT CHANGE UP (ref 1.6–2.6)
MAGNESIUM SERPL-MCNC: 1.9 MG/DL — SIGNIFICANT CHANGE UP (ref 1.6–2.6)
MCHC RBC-ENTMCNC: 30.2 PG — SIGNIFICANT CHANGE UP (ref 27–34)
MCHC RBC-ENTMCNC: 30.2 PG — SIGNIFICANT CHANGE UP (ref 27–34)
MCHC RBC-ENTMCNC: 31.6 GM/DL — LOW (ref 32–36)
MCHC RBC-ENTMCNC: 31.6 GM/DL — LOW (ref 32–36)
MCV RBC AUTO: 95.5 FL — SIGNIFICANT CHANGE UP (ref 80–100)
MCV RBC AUTO: 95.5 FL — SIGNIFICANT CHANGE UP (ref 80–100)
NRBC # BLD: 0 /100 WBCS — SIGNIFICANT CHANGE UP (ref 0–0)
NRBC # BLD: 0 /100 WBCS — SIGNIFICANT CHANGE UP (ref 0–0)
NRBC # FLD: 0 K/UL — SIGNIFICANT CHANGE UP (ref 0–0)
NRBC # FLD: 0 K/UL — SIGNIFICANT CHANGE UP (ref 0–0)
PHOSPHATE SERPL-MCNC: 3.2 MG/DL — SIGNIFICANT CHANGE UP (ref 2.5–4.5)
PHOSPHATE SERPL-MCNC: 3.2 MG/DL — SIGNIFICANT CHANGE UP (ref 2.5–4.5)
PLATELET # BLD AUTO: 167 K/UL — SIGNIFICANT CHANGE UP (ref 150–400)
PLATELET # BLD AUTO: 167 K/UL — SIGNIFICANT CHANGE UP (ref 150–400)
POTASSIUM SERPL-MCNC: 5.2 MMOL/L — SIGNIFICANT CHANGE UP (ref 3.5–5.3)
POTASSIUM SERPL-MCNC: 5.2 MMOL/L — SIGNIFICANT CHANGE UP (ref 3.5–5.3)
POTASSIUM SERPL-SCNC: 5.2 MMOL/L — SIGNIFICANT CHANGE UP (ref 3.5–5.3)
POTASSIUM SERPL-SCNC: 5.2 MMOL/L — SIGNIFICANT CHANGE UP (ref 3.5–5.3)
RBC # BLD: 5.13 M/UL — SIGNIFICANT CHANGE UP (ref 3.8–5.2)
RBC # BLD: 5.13 M/UL — SIGNIFICANT CHANGE UP (ref 3.8–5.2)
RBC # FLD: 13 % — SIGNIFICANT CHANGE UP (ref 10.3–14.5)
RBC # FLD: 13 % — SIGNIFICANT CHANGE UP (ref 10.3–14.5)
SODIUM SERPL-SCNC: 138 MMOL/L — SIGNIFICANT CHANGE UP (ref 135–145)
SODIUM SERPL-SCNC: 138 MMOL/L — SIGNIFICANT CHANGE UP (ref 135–145)
WBC # BLD: 7.86 K/UL — SIGNIFICANT CHANGE UP (ref 3.8–10.5)
WBC # BLD: 7.86 K/UL — SIGNIFICANT CHANGE UP (ref 3.8–10.5)
WBC # FLD AUTO: 7.86 K/UL — SIGNIFICANT CHANGE UP (ref 3.8–10.5)
WBC # FLD AUTO: 7.86 K/UL — SIGNIFICANT CHANGE UP (ref 3.8–10.5)

## 2023-12-20 PROCEDURE — 99497 ADVNCD CARE PLAN 30 MIN: CPT

## 2023-12-20 PROCEDURE — 99233 SBSQ HOSP IP/OBS HIGH 50: CPT

## 2023-12-20 RX ADMIN — SACUBITRIL AND VALSARTAN 1 TABLET(S): 24; 26 TABLET, FILM COATED ORAL at 06:17

## 2023-12-20 RX ADMIN — Medication 81 MILLIGRAM(S): at 11:13

## 2023-12-20 RX ADMIN — APIXABAN 5 MILLIGRAM(S): 2.5 TABLET, FILM COATED ORAL at 06:16

## 2023-12-20 RX ADMIN — APIXABAN 5 MILLIGRAM(S): 2.5 TABLET, FILM COATED ORAL at 17:21

## 2023-12-20 RX ADMIN — Medication 1: at 17:19

## 2023-12-20 RX ADMIN — SPIRONOLACTONE 25 MILLIGRAM(S): 25 TABLET, FILM COATED ORAL at 06:17

## 2023-12-20 RX ADMIN — CEFTRIAXONE 100 MILLIGRAM(S): 500 INJECTION, POWDER, FOR SOLUTION INTRAMUSCULAR; INTRAVENOUS at 17:21

## 2023-12-20 RX ADMIN — SACUBITRIL AND VALSARTAN 1 TABLET(S): 24; 26 TABLET, FILM COATED ORAL at 17:21

## 2023-12-20 RX ADMIN — SIMVASTATIN 10 MILLIGRAM(S): 20 TABLET, FILM COATED ORAL at 21:34

## 2023-12-20 NOTE — PROGRESS NOTE ADULT - PROBLEM SELECTOR PLAN 4
Chronic stable  TTE 2021 and 2023, per outpatient cards note had worsening gradients across bioprosthetic valves from 2021->2023   Continue Entresto 49-51mg BID, spironolactone 25mg daily, lasix 40mg daily  Holding Hydralazine 25mg TID and Isordil 10mg TID- restart as appropriate   Monitor

## 2023-12-20 NOTE — PROGRESS NOTE ADULT - PROBLEM SELECTOR PLAN 2
New  presented with afib with RVR  [id number]: 8, TSH 2.08  Per cardiology progress note dated 12/15/2023: avoid BB iso conduction disease, AV block and sinus pauses and hx of syncope;   Continue Eliquis BID (educate on BID dosing prior to dc as was taking daily)  Monitor New  presented with afib with RVR  [id number]: 8, TSH 2.08  Per cardiology progress note dated 12/15/2023: avoid BB iso conduction disease, AV block and sinus pauses and hx of syncope;   Continue Eliquis BID (educate on BID dosing prior to dc as was taking daily)  Monitor

## 2023-12-20 NOTE — PROGRESS NOTE ADULT - PROBLEM SELECTOR PLAN 3
New  UA: LE: Moderate, WBC 24, Bacteria occasional, epithelial cells: 9  Ucx: likely containment   s/p rocephin treatment

## 2023-12-20 NOTE — PROGRESS NOTE ADULT - ASSESSMENT
67yr old female with a pmh of DM Type 2, permanent Afib (on Eliquis), CHF (TTE 12/13 EF 45-50%; bioprosthetic MVR/AVR 2008), CAD (prior MI, RHC/LHC 12/15 mild mLAD dz), CVA (R cerebellar), recent hospitalization 12/12-16 for acute decompensated HF, who presented to [facility] ED with seizure-like activity and found to be in Afib with RVR and have an acute UTI and GABRIELLE. 67yr old female with a pmh of DM Type 2, permanent Afib (on Eliquis), CHF (TTE 12/13 EF 45-50%; bioprosthetic MVR/AVR 2008), CAD (prior MI, RHC/LHC 12/15 mild mLAD dz), CVA (R cerebellar), recent hospitalization 12/12-16 for acute decompensated HF, who presented to [facility] ED with seizure-like activity and found to be in Afib with RVR and have an acute UTI and GABRIELLE.

## 2023-12-20 NOTE — PROGRESS NOTE ADULT - SUBJECTIVE AND OBJECTIVE BOX
: [id number] [facility]  67yr old female with a pmh of DM Type 2, permanent Afib (on Eliquis), CHF (TTE 12/13 EF 45-50%; bioprosthetic MVR/AVR 2008), CAD (prior MI, RHC/LHC 12/15 mild mLAD dz), CVA (R cerebellar), recent hospitalization 12/12-16 for acute decompensated HF, who presented to [facility] ED with seizure-like activity and found to be in Afib with RVR and have an acute UTI and GABRIELLE.  She feels well today with no complaints.   Denies  headache, dizziness, chest pain, palpitations, SOB, abdominal pain, joint pain, diarrhea/constipation, urinary symptoms.   Vitals: T 98.2, HR 65, /60, RR 18 satting 98% RA    REVIEW OF SYSTEMS:    CONSTITUTIONAL: No weakness, fevers or chills  EYES/ENT: No visual changes;  No dysphagia; No sore throat; No rhinorrhea; No sinus pain/pressure  NECK: No pain or stiffness  RESPIRATORY: No cough, wheezing, hemoptysis; No shortness of breath  CARDIOVASCULAR: No chest pain or palpitations; No lower extremity edema  GASTROINTESTINAL: No abdominal or epigastric pain. No nausea, vomiting, or hematemesis; No diarrhea or constipation. No melena or hematochezia.  GENITOURINARY: No dysuria, frequency or hematuria  NEUROLOGICAL: No numbness or weakness  MSK: no complaints of msk pain at this time   SKIN: No itching, burning, rashes, or lesions   All other review of systems is negative unless indicated above.    PHYSICAL EXAM:  GENERAL: NAD, well-developed, well-nourished  HEAD:  Atraumatic, Normocephalic  EYES: EOMI, PERRL, conjunctiva and sclera clear  NECK: Supple, No JVD  CHEST/LUNG: Clear to auscultation bilaterally; No wheezes, rales or rhonchi  HEART: irregularly irregular; No murmurs, rubs, or gallops, (+)S1, S2  ABDOMEN: Soft, Nontender, Nondistended; Normal Bowel sounds   EXTREMITIES:  2+ Peripheral Pulses, No clubbing, cyanosis, or edema  PSYCH: normal mood and affect  NEUROLOGY: AAOx3, moving all extremities spontaneously   SKIN: No rashes or lesions    LABS:                        15.5   7.86  )-----------( 167      ( 20 Dec 2023 06:00 )             49.0     12-20    138  |  105  |  28<H>  ----------------------------<  124<H>  5.2   |  24  |  1.02    Ca    9.7      20 Dec 2023 06:00  Phos  3.2     12-20  Mg     1.90     12-20        Urinalysis Basic - ( 20 Dec 2023 06:00 )    Color: x / Appearance: x / SG: x / pH: x  Gluc: 124 mg/dL / Ketone: x  / Bili: x / Urobili: x   Blood: x / Protein: x / Nitrite: x   Leuk Esterase: x / RBC: x / WBC x   Sq Epi: x / Non Sq Epi: x / Bacteria: x      CAPILLARY BLOOD GLUCOSE      POCT Blood Glucose.: 207 mg/dL (20 Dec 2023 22:05)  POCT Blood Glucose.: 191 mg/dL (20 Dec 2023 17:16)  POCT Blood Glucose.: 114 mg/dL (20 Dec 2023 12:45)  POCT Blood Glucose.: 119 mg/dL (20 Dec 2023 08:49)    RADIOLOGY & ADDITIONAL TESTS:    Imaging results Personally Reviewed:  [x ] YES  [ ] NO    Consultant(s) Notes Reviewed:  [x ] YES  [ ] NO   : [id number] [facility]  67yr old female with a pmh of DM Type 2, permanent Afib (on Eliquis), CHF (TTE 12/13 EF 45-50%; bioprosthetic MVR/AVR 2008), CAD (prior MI, RHC/LHC 12/15 mild mLAD dz), CVA (R cerebellar), recent hospitalization 12/12-16 for acute decompensated HF, who presented to [facility] ED with seizure-like activity and found to be in Afib with RVR and have an acute UTI and GABRIELLE.  She feels well today with no complaints.   Denies  headache, dizziness, chest pain, palpitations, SOB, abdominal pain, joint pain, diarrhea/constipation, urinary symptoms.   Vitals: T 98.2, HR 65, /60, RR 18 satting 98% RA    REVIEW OF SYSTEMS:    CONSTITUTIONAL: No weakness, fevers or chills  EYES/ENT: No visual changes;  No dysphagia; No sore throat; No rhinorrhea; No sinus pain/pressure  NECK: No pain or stiffness  RESPIRATORY: No cough, wheezing, hemoptysis; No shortness of breath  CARDIOVASCULAR: No chest pain or palpitations; No lower extremity edema  GASTROINTESTINAL: No abdominal or epigastric pain. No nausea, vomiting, or hematemesis; No diarrhea or constipation. No melena or hematochezia.  GENITOURINARY: No dysuria, frequency or hematuria  NEUROLOGICAL: No numbness or weakness  MSK: no complaints of msk pain at this time   SKIN: No itching, burning, rashes, or lesions   All other review of systems is negative unless indicated above.    PHYSICAL EXAM:  GENERAL: NAD, well-developed, well-nourished  HEAD:  Atraumatic, Normocephalic  EYES: EOMI, PERRL, conjunctiva and sclera clear  NECK: Supple, No JVD  CHEST/LUNG: Clear to auscultation bilaterally; No wheezes, rales or rhonchi  HEART: irregularly irregular; No murmurs, rubs, or gallops, (+)S1, S2  ABDOMEN: Soft, Nontender, Nondistended; Normal Bowel sounds   EXTREMITIES:  2+ Peripheral Pulses, No clubbing, cyanosis, or edema  PSYCH: normal mood and affect  NEUROLOGY: AAOx3, moving all extremities spontaneously   SKIN: No rashes or lesions    LABS:                        15.5   7.86  )-----------( 167      ( 20 Dec 2023 06:00 )             49.0     12-20    138  |  105  |  28<H>  ----------------------------<  124<H>  5.2   |  24  |  1.02    Ca    9.7      20 Dec 2023 06:00  Phos  3.2     12-20  Mg     1.90     12-20        Urinalysis Basic - ( 20 Dec 2023 06:00 )    Color: x / Appearance: x / SG: x / pH: x  Gluc: 124 mg/dL / Ketone: x  / Bili: x / Urobili: x   Blood: x / Protein: x / Nitrite: x   Leuk Esterase: x / RBC: x / WBC x   Sq Epi: x / Non Sq Epi: x / Bacteria: x      CAPILLARY BLOOD GLUCOSE      POCT Blood Glucose.: 207 mg/dL (20 Dec 2023 22:05)  POCT Blood Glucose.: 191 mg/dL (20 Dec 2023 17:16)  POCT Blood Glucose.: 114 mg/dL (20 Dec 2023 12:45)  POCT Blood Glucose.: 119 mg/dL (20 Dec 2023 08:49)    RADIOLOGY & ADDITIONAL TESTS:    Imaging results Personally Reviewed:  [x ] YES  [ ] NO    Consultant(s) Notes Reviewed:  [x ] YES  [ ] NO

## 2023-12-20 NOTE — PROGRESS NOTE ADULT - TIME BILLING
I have spent a total of 52minutes to prepare to see the patient, obtaining and reviewing history, physical examination, explaining the diagnosis, prognosis and treatment plan with the patient/family/caregiver. I also have spent the time ordering studies and testing, interpreting results, medicine reconciliation, subspecialty consultation and documentation as above.

## 2023-12-20 NOTE — EEG REPORT - PATIENT STATUS
----- Message from Ziara Lee sent at 10/21/2022  7:03 AM EDT -----  Regarding: Pilocarpine  So, finally I got down to negative Leukocytes, now I am back to 4+  Could it be do to the Pilocarpine I have been taking for Sjogrens ? I can’t catch a break and soon I am leaving for my trip to [facility] ; I really want to be comfortable      Thank you
I do not see the recent results of urine testing in media or lab section of epic that the patient is alluding to in her message 
Please let her know that I think the pilocarpine has any effects on the leukocytes in her urine 
Sent a my chart message to patient regarding below 
Sorry-I do not think it has any effect on leukocytes 
Awake/Drowsy/Asleep

## 2023-12-20 NOTE — EEG REPORT - NS EEG TEXT BOX
ROXY MIRANDA [id number]     Study Date: 		12- 2173 - 9465 12-20-23  Duration x Hours: 20 hrs 19 min  --------------------------------------------------------------------------------------------------  History:  CC/ HPI Patient is a 67y old  Female who presents with a chief complaint of Seizure-like activity (19 Dec 2023 11:22)    MEDICATIONS  (STANDING):  apixaban 5 milliGRAM(s) Oral two times a day  aspirin enteric coated 81 milliGRAM(s) Oral daily  cefTRIAXone   IVPB 1000 milliGRAM(s) IV Intermittent every 24 hours  dextrose 5%. 1000 milliLiter(s) (50 mL/Hr) IV Continuous <Continuous>  dextrose 5%. 1000 milliLiter(s) (100 mL/Hr) IV Continuous <Continuous>  dextrose 50% Injectable 25 Gram(s) IV Push once  dextrose 50% Injectable 25 Gram(s) IV Push once  dextrose 50% Injectable 12.5 Gram(s) IV Push once  glucagon  Injectable 1 milliGRAM(s) IntraMuscular once  insulin lispro (ADMELOG) corrective regimen sliding scale   SubCutaneous at bedtime  insulin lispro (ADMELOG) corrective regimen sliding scale   SubCutaneous three times a day before meals  sacubitril 49 mG/valsartan 51 mG 1 Tablet(s) Oral two times a day  simvastatin 10 milliGRAM(s) Oral at bedtime  spironolactone 25 milliGRAM(s) Oral daily    --------------------------------------------------------------------------------------------------  Study Interpretation:    [[[Abbreviation Key:  PDR=alpha rhythm/posterior dominant rhythm. A-P=anterior posterior.  Amplitude: ‘very low’:<20; ‘low’:20-49; ‘medium’:; ‘high’:>150uV.  Persistence for periodic/rhythmic patterns (% of epoch) ‘rare’:<1%; ‘occasional’:1-10%; ‘frequent’:10-50%; ‘abundant’:50-90%; ‘continuous’:>90%.  Persistence for sporadic discharges: ‘rare’:<1/hr; ‘occasional’:1/min-1/hr; ‘frequent’:>1/min; ‘abundant’:>1/10 sec.  RPP=rhythmic and periodic patterns; GRDA=generalized rhythmic delta activity; FIRDA=frontal intermittent GRDA; LRDA=lateralized rhythmic delta activity; TIRDA=temporal intermittent rhythmic delta activity;  LPD=PLED=lateralized periodic discharges; GPD=generalized periodic discharges; BIPDs =bilateral independent periodic discharges; Mf=multifocal; SIRPDs=stimulus induced rhythmic, periodic, or ictal appearing discharges; BIRDs=brief potentially ictal rhythmic discharges >4 Hz, lasting .5-10s; PFA (paroxysmal bursts >13 Hz or =8 Hz <10s).  Modifiers: +F=with fast component; +S=with spike component; +R=with rhythmic component.  S-B=burst suppression pattern.  Max=maximal. N1-drowsy; N2-stage II sleep; N3-slow wave sleep. SSS/BETS=small sharp spikes/benign epileptiform transients of sleep. HV=hyperventilation; PS=photic stimulation]]]    Daily EEG Visual Analysis    FINDINGS:      Background:  Continuity: continuous  Symmetry: symmetric  PDR: 9-10 Hz activity, with amplitude to 40 uV, that attenuated to eye opening.  Low amplitude frontal beta noted in wakefulness.  Reactivity: present  Voltage: normal, mostly 20-150uV  Anterior Posterior Gradient: present  Other background findings: none  Breach: absent    Background Slowing:  Generalized slowing: none was present.  Focal slowing: none was present.    State Changes:   -Drowsiness noted with increased slowing, attenuation of fast activity, vertex transients.  -Present with N2 sleep transients with symmetric spindles and K-complexes.    Sporadic Epileptiform Discharges:    None    Rhythmic and Periodic Patterns (RPPs):  None     Electrographic and Electroclinical seizures:  None    Other Clinical Events:  None    Activation Procedures:   -Hyperventilation was not performed.    -Photic stimulation was not performed.    Artifacts:  Intermittent myogenic and movement artifacts were noted.    ECG:  The heart rate on single channel ECG was predominantly between 60-70 BPM.    EEG Classification / Summary:  Normal  EEG in the awake / drowsy / asleep state(s).    Clinical Impression:  There were no epileptiform abnormalities recorded.    **In absence of additional clinical concerns, recommend consideration for discontinuation of current EEG study with reconnection in future if warranted.  This is a preliminary report pending attending review and attestation.    Manpreet Jenkins MD  Fellow, [facility]            -------------------------------------------------------------------------------------------------------  [facility] EEG Reading Room Ph#: [phone]  Epilepsy Answering Service after 5PM and before 8:30AM: Ph#: [phone]   ROXY MIRANDA [id number]     Study Date: 		12- 4382 - 7920 12-20-23  Duration x Hours: 20 hrs 19 min  --------------------------------------------------------------------------------------------------  History:  CC/ HPI Patient is a 67y old  Female who presents with a chief complaint of Seizure-like activity (19 Dec 2023 11:22)    MEDICATIONS  (STANDING):  apixaban 5 milliGRAM(s) Oral two times a day  aspirin enteric coated 81 milliGRAM(s) Oral daily  cefTRIAXone   IVPB 1000 milliGRAM(s) IV Intermittent every 24 hours  dextrose 5%. 1000 milliLiter(s) (50 mL/Hr) IV Continuous <Continuous>  dextrose 5%. 1000 milliLiter(s) (100 mL/Hr) IV Continuous <Continuous>  dextrose 50% Injectable 25 Gram(s) IV Push once  dextrose 50% Injectable 25 Gram(s) IV Push once  dextrose 50% Injectable 12.5 Gram(s) IV Push once  glucagon  Injectable 1 milliGRAM(s) IntraMuscular once  insulin lispro (ADMELOG) corrective regimen sliding scale   SubCutaneous at bedtime  insulin lispro (ADMELOG) corrective regimen sliding scale   SubCutaneous three times a day before meals  sacubitril 49 mG/valsartan 51 mG 1 Tablet(s) Oral two times a day  simvastatin 10 milliGRAM(s) Oral at bedtime  spironolactone 25 milliGRAM(s) Oral daily    --------------------------------------------------------------------------------------------------  Study Interpretation:    [[[Abbreviation Key:  PDR=alpha rhythm/posterior dominant rhythm. A-P=anterior posterior.  Amplitude: ‘very low’:<20; ‘low’:20-49; ‘medium’:; ‘high’:>150uV.  Persistence for periodic/rhythmic patterns (% of epoch) ‘rare’:<1%; ‘occasional’:1-10%; ‘frequent’:10-50%; ‘abundant’:50-90%; ‘continuous’:>90%.  Persistence for sporadic discharges: ‘rare’:<1/hr; ‘occasional’:1/min-1/hr; ‘frequent’:>1/min; ‘abundant’:>1/10 sec.  RPP=rhythmic and periodic patterns; GRDA=generalized rhythmic delta activity; FIRDA=frontal intermittent GRDA; LRDA=lateralized rhythmic delta activity; TIRDA=temporal intermittent rhythmic delta activity;  LPD=PLED=lateralized periodic discharges; GPD=generalized periodic discharges; BIPDs =bilateral independent periodic discharges; Mf=multifocal; SIRPDs=stimulus induced rhythmic, periodic, or ictal appearing discharges; BIRDs=brief potentially ictal rhythmic discharges >4 Hz, lasting .5-10s; PFA (paroxysmal bursts >13 Hz or =8 Hz <10s).  Modifiers: +F=with fast component; +S=with spike component; +R=with rhythmic component.  S-B=burst suppression pattern.  Max=maximal. N1-drowsy; N2-stage II sleep; N3-slow wave sleep. SSS/BETS=small sharp spikes/benign epileptiform transients of sleep. HV=hyperventilation; PS=photic stimulation]]]    Daily EEG Visual Analysis    FINDINGS:      Background:  Continuity: continuous  Symmetry: symmetric  PDR: 9-10 Hz activity, with amplitude to 40 uV, that attenuated to eye opening.  Low amplitude frontal beta noted in wakefulness.  Reactivity: present  Voltage: normal, mostly 20-150uV  Anterior Posterior Gradient: present  Other background findings: none  Breach: absent    Background Slowing:  Generalized slowing: none was present.  Focal slowing: none was present.    State Changes:   -Drowsiness noted with increased slowing, attenuation of fast activity, vertex transients.  -Present with N2 sleep transients with symmetric spindles and K-complexes.    Sporadic Epileptiform Discharges:    None    Rhythmic and Periodic Patterns (RPPs):  None     Electrographic and Electroclinical seizures:  None    Other Clinical Events:  None    Activation Procedures:   -Hyperventilation was not performed.    -Photic stimulation was not performed.    Artifacts:  Intermittent myogenic and movement artifacts were noted.    ECG:  The heart rate on single channel ECG was predominantly between 60-70 BPM.    EEG Classification / Summary:  Normal  EEG in the awake / drowsy / asleep state(s).    Clinical Impression:  There were no epileptiform abnormalities recorded.    **In absence of additional clinical concerns, recommend consideration for discontinuation of current EEG study with reconnection in future if warranted.  This is a preliminary report pending attending review and attestation.    Manpreet Jenkins MD  Fellow, [facility]            -------------------------------------------------------------------------------------------------------  [facility] EEG Reading Room Ph#: [phone]  Epilepsy Answering Service after 5PM and before 8:30AM: Ph#: [phone]   ROXY MIRANDA [id number]     Study Date: 		12- 4945 - 5785 12-20-23  Duration x Hours: 20 hrs 19 min  --------------------------------------------------------------------------------------------------  History:  CC/ HPI Patient is a 67y old  Female who presents with a chief complaint of Seizure-like activity (19 Dec 2023 11:22)    MEDICATIONS  (STANDING):  apixaban 5 milliGRAM(s) Oral two times a day  aspirin enteric coated 81 milliGRAM(s) Oral daily  cefTRIAXone   IVPB 1000 milliGRAM(s) IV Intermittent every 24 hours  dextrose 5%. 1000 milliLiter(s) (50 mL/Hr) IV Continuous <Continuous>  dextrose 5%. 1000 milliLiter(s) (100 mL/Hr) IV Continuous <Continuous>  dextrose 50% Injectable 25 Gram(s) IV Push once  dextrose 50% Injectable 25 Gram(s) IV Push once  dextrose 50% Injectable 12.5 Gram(s) IV Push once  glucagon  Injectable 1 milliGRAM(s) IntraMuscular once  insulin lispro (ADMELOG) corrective regimen sliding scale   SubCutaneous at bedtime  insulin lispro (ADMELOG) corrective regimen sliding scale   SubCutaneous three times a day before meals  sacubitril 49 mG/valsartan 51 mG 1 Tablet(s) Oral two times a day  simvastatin 10 milliGRAM(s) Oral at bedtime  spironolactone 25 milliGRAM(s) Oral daily    --------------------------------------------------------------------------------------------------  Study Interpretation:    [[[Abbreviation Key:  PDR=alpha rhythm/posterior dominant rhythm. A-P=anterior posterior.  Amplitude: ‘very low’:<20; ‘low’:20-49; ‘medium’:; ‘high’:>150uV.  Persistence for periodic/rhythmic patterns (% of epoch) ‘rare’:<1%; ‘occasional’:1-10%; ‘frequent’:10-50%; ‘abundant’:50-90%; ‘continuous’:>90%.  Persistence for sporadic discharges: ‘rare’:<1/hr; ‘occasional’:1/min-1/hr; ‘frequent’:>1/min; ‘abundant’:>1/10 sec.  RPP=rhythmic and periodic patterns; GRDA=generalized rhythmic delta activity; FIRDA=frontal intermittent GRDA; LRDA=lateralized rhythmic delta activity; TIRDA=temporal intermittent rhythmic delta activity;  LPD=PLED=lateralized periodic discharges; GPD=generalized periodic discharges; BIPDs =bilateral independent periodic discharges; Mf=multifocal; SIRPDs=stimulus induced rhythmic, periodic, or ictal appearing discharges; BIRDs=brief potentially ictal rhythmic discharges >4 Hz, lasting .5-10s; PFA (paroxysmal bursts >13 Hz or =8 Hz <10s).  Modifiers: +F=with fast component; +S=with spike component; +R=with rhythmic component.  S-B=burst suppression pattern.  Max=maximal. N1-drowsy; N2-stage II sleep; N3-slow wave sleep. SSS/BETS=small sharp spikes/benign epileptiform transients of sleep. HV=hyperventilation; PS=photic stimulation]]]    Daily EEG Visual Analysis    FINDINGS:      Background:  Continuity: continuous  Symmetry: symmetric  PDR: 9-10 Hz activity, with amplitude to 40 uV, that attenuated to eye opening.  Low amplitude frontal beta noted in wakefulness.  Reactivity: present  Voltage: normal, mostly 20-150uV  Anterior Posterior Gradient: present  Other background findings: none  Breach: absent    Background Slowing:  Generalized slowing: none was present.  Focal slowing: none was present.    State Changes:   -Drowsiness noted with increased slowing, attenuation of fast activity, vertex transients.  -Present with N2 sleep transients with symmetric spindles and K-complexes.    Sporadic Epileptiform Discharges:    None    Rhythmic and Periodic Patterns (RPPs):  None     Electrographic and Electroclinical seizures:  None    Other Clinical Events:  None    Activation Procedures:   -Hyperventilation was not performed.    -Photic stimulation was not performed.    Artifacts:  Intermittent myogenic and movement artifacts were noted.    ECG:  The heart rate on single channel ECG was predominantly between 60-70 BPM.    EEG Classification / Summary:  Normal  EEG in the awake / drowsy / asleep state(s).    Clinical Impression:  There were no epileptiform abnormalities recorded.    **In absence of additional clinical concerns, recommend consideration for discontinuation of current EEG study with reconnection in future if warranted.    Manpreet Jenkins MD  Fellow, [facility]    Mark Barbosa MD  EEG/Epilepsy Attending       -------------------------------------------------------------------------------------------------------  [facility] EEG Reading Room Ph#: [phone]  Epilepsy Answering Service after 5PM and before 8:30AM: Ph#: [phone]   ROXY MIRANDA [id number]     Study Date: 		12- 6208 - 8949 12-20-23  Duration x Hours: 20 hrs 19 min  --------------------------------------------------------------------------------------------------  History:  CC/ HPI Patient is a 67y old  Female who presents with a chief complaint of Seizure-like activity (19 Dec 2023 11:22)    MEDICATIONS  (STANDING):  apixaban 5 milliGRAM(s) Oral two times a day  aspirin enteric coated 81 milliGRAM(s) Oral daily  cefTRIAXone   IVPB 1000 milliGRAM(s) IV Intermittent every 24 hours  dextrose 5%. 1000 milliLiter(s) (50 mL/Hr) IV Continuous <Continuous>  dextrose 5%. 1000 milliLiter(s) (100 mL/Hr) IV Continuous <Continuous>  dextrose 50% Injectable 25 Gram(s) IV Push once  dextrose 50% Injectable 25 Gram(s) IV Push once  dextrose 50% Injectable 12.5 Gram(s) IV Push once  glucagon  Injectable 1 milliGRAM(s) IntraMuscular once  insulin lispro (ADMELOG) corrective regimen sliding scale   SubCutaneous at bedtime  insulin lispro (ADMELOG) corrective regimen sliding scale   SubCutaneous three times a day before meals  sacubitril 49 mG/valsartan 51 mG 1 Tablet(s) Oral two times a day  simvastatin 10 milliGRAM(s) Oral at bedtime  spironolactone 25 milliGRAM(s) Oral daily    --------------------------------------------------------------------------------------------------  Study Interpretation:    [[[Abbreviation Key:  PDR=alpha rhythm/posterior dominant rhythm. A-P=anterior posterior.  Amplitude: ‘very low’:<20; ‘low’:20-49; ‘medium’:; ‘high’:>150uV.  Persistence for periodic/rhythmic patterns (% of epoch) ‘rare’:<1%; ‘occasional’:1-10%; ‘frequent’:10-50%; ‘abundant’:50-90%; ‘continuous’:>90%.  Persistence for sporadic discharges: ‘rare’:<1/hr; ‘occasional’:1/min-1/hr; ‘frequent’:>1/min; ‘abundant’:>1/10 sec.  RPP=rhythmic and periodic patterns; GRDA=generalized rhythmic delta activity; FIRDA=frontal intermittent GRDA; LRDA=lateralized rhythmic delta activity; TIRDA=temporal intermittent rhythmic delta activity;  LPD=PLED=lateralized periodic discharges; GPD=generalized periodic discharges; BIPDs =bilateral independent periodic discharges; Mf=multifocal; SIRPDs=stimulus induced rhythmic, periodic, or ictal appearing discharges; BIRDs=brief potentially ictal rhythmic discharges >4 Hz, lasting .5-10s; PFA (paroxysmal bursts >13 Hz or =8 Hz <10s).  Modifiers: +F=with fast component; +S=with spike component; +R=with rhythmic component.  S-B=burst suppression pattern.  Max=maximal. N1-drowsy; N2-stage II sleep; N3-slow wave sleep. SSS/BETS=small sharp spikes/benign epileptiform transients of sleep. HV=hyperventilation; PS=photic stimulation]]]    Daily EEG Visual Analysis    FINDINGS:      Background:  Continuity: continuous  Symmetry: symmetric  PDR: 9-10 Hz activity, with amplitude to 40 uV, that attenuated to eye opening.  Low amplitude frontal beta noted in wakefulness.  Reactivity: present  Voltage: normal, mostly 20-150uV  Anterior Posterior Gradient: present  Other background findings: none  Breach: absent    Background Slowing:  Generalized slowing: none was present.  Focal slowing: none was present.    State Changes:   -Drowsiness noted with increased slowing, attenuation of fast activity, vertex transients.  -Present with N2 sleep transients with symmetric spindles and K-complexes.    Sporadic Epileptiform Discharges:    None    Rhythmic and Periodic Patterns (RPPs):  None     Electrographic and Electroclinical seizures:  None    Other Clinical Events:  None    Activation Procedures:   -Hyperventilation was not performed.    -Photic stimulation was not performed.    Artifacts:  Intermittent myogenic and movement artifacts were noted.    ECG:  The heart rate on single channel ECG was predominantly between 60-70 BPM.    EEG Classification / Summary:  Normal  EEG in the awake / drowsy / asleep state(s).    Clinical Impression:  There were no epileptiform abnormalities recorded.    **In absence of additional clinical concerns, recommend consideration for discontinuation of current EEG study with reconnection in future if warranted.    Manpreet Jenkins MD  Fellow, [facility]    Mark Barbosa MD  EEG/Epilepsy Attending       -------------------------------------------------------------------------------------------------------  [facility] EEG Reading Room Ph#: [phone]  Epilepsy Answering Service after 5PM and before 8:30AM: Ph#: [phone]   ROXY MIRANDA [id number]     Study Date: 		12- 1132 - 1000 12-20-23  Duration x Hours: 22 hrs 19 min  --------------------------------------------------------------------------------------------------  History:  CC/ HPI Patient is a 67y old  Female who presents with a chief complaint of Seizure-like activity (19 Dec 2023 11:22)    MEDICATIONS  (STANDING):  apixaban 5 milliGRAM(s) Oral two times a day  aspirin enteric coated 81 milliGRAM(s) Oral daily  cefTRIAXone   IVPB 1000 milliGRAM(s) IV Intermittent every 24 hours  dextrose 5%. 1000 milliLiter(s) (50 mL/Hr) IV Continuous <Continuous>  dextrose 5%. 1000 milliLiter(s) (100 mL/Hr) IV Continuous <Continuous>  dextrose 50% Injectable 25 Gram(s) IV Push once  dextrose 50% Injectable 25 Gram(s) IV Push once  dextrose 50% Injectable 12.5 Gram(s) IV Push once  glucagon  Injectable 1 milliGRAM(s) IntraMuscular once  insulin lispro (ADMELOG) corrective regimen sliding scale   SubCutaneous at bedtime  insulin lispro (ADMELOG) corrective regimen sliding scale   SubCutaneous three times a day before meals  sacubitril 49 mG/valsartan 51 mG 1 Tablet(s) Oral two times a day  simvastatin 10 milliGRAM(s) Oral at bedtime  spironolactone 25 milliGRAM(s) Oral daily    --------------------------------------------------------------------------------------------------  Study Interpretation:    [[[Abbreviation Key:  PDR=alpha rhythm/posterior dominant rhythm. A-P=anterior posterior.  Amplitude: ‘very low’:<20; ‘low’:20-49; ‘medium’:; ‘high’:>150uV.  Persistence for periodic/rhythmic patterns (% of epoch) ‘rare’:<1%; ‘occasional’:1-10%; ‘frequent’:10-50%; ‘abundant’:50-90%; ‘continuous’:>90%.  Persistence for sporadic discharges: ‘rare’:<1/hr; ‘occasional’:1/min-1/hr; ‘frequent’:>1/min; ‘abundant’:>1/10 sec.  RPP=rhythmic and periodic patterns; GRDA=generalized rhythmic delta activity; FIRDA=frontal intermittent GRDA; LRDA=lateralized rhythmic delta activity; TIRDA=temporal intermittent rhythmic delta activity;  LPD=PLED=lateralized periodic discharges; GPD=generalized periodic discharges; BIPDs =bilateral independent periodic discharges; Mf=multifocal; SIRPDs=stimulus induced rhythmic, periodic, or ictal appearing discharges; BIRDs=brief potentially ictal rhythmic discharges >4 Hz, lasting .5-10s; PFA (paroxysmal bursts >13 Hz or =8 Hz <10s).  Modifiers: +F=with fast component; +S=with spike component; +R=with rhythmic component.  S-B=burst suppression pattern.  Max=maximal. N1-drowsy; N2-stage II sleep; N3-slow wave sleep. SSS/BETS=small sharp spikes/benign epileptiform transients of sleep. HV=hyperventilation; PS=photic stimulation]]]    Daily EEG Visual Analysis    FINDINGS:      Background:  Continuity: continuous  Symmetry: symmetric  PDR: 9-10 Hz activity, with amplitude to 40 uV, that attenuated to eye opening.  Low amplitude frontal beta noted in wakefulness.  Reactivity: present  Voltage: normal, mostly 20-150uV  Anterior Posterior Gradient: present  Other background findings: none  Breach: absent    Background Slowing:  Generalized slowing: none was present.  Focal slowing: none was present.    State Changes:   -Drowsiness noted with increased slowing, attenuation of fast activity, vertex transients.  -Present with N2 sleep transients with symmetric spindles and K-complexes.    Sporadic Epileptiform Discharges:    None    Rhythmic and Periodic Patterns (RPPs):  None     Electrographic and Electroclinical seizures:  None    Other Clinical Events:  None    Activation Procedures:   -Hyperventilation was not performed.    -Photic stimulation was not performed.    Artifacts:  Intermittent myogenic and movement artifacts were noted.    ECG:  The heart rate on single channel ECG was predominantly between 60-70 BPM.    EEG Classification / Summary:  Normal  EEG in the awake / drowsy / asleep state(s).    Clinical Impression:  There were no epileptiform abnormalities recorded.    **In absence of additional clinical concerns, recommend consideration for discontinuation of current EEG study with reconnection in future if warranted.    Manpreet Jenkins MD  Fellow, [facility]    Mark Barbosa MD  EEG/Epilepsy Attending       -------------------------------------------------------------------------------------------------------  [facility] EEG Reading Room Ph#: [phone]  Epilepsy Answering Service after 5PM and before 8:30AM: Ph#: [phone]   ROXY MIRANDA [id number]     Study Date: 		12- 1132 - 1000 12-20-23  Duration x Hours: 22 hrs 19 min  --------------------------------------------------------------------------------------------------  History:  CC/ HPI Patient is a 67y old  Female who presents with a chief complaint of Seizure-like activity (19 Dec 2023 11:22)    MEDICATIONS  (STANDING):  apixaban 5 milliGRAM(s) Oral two times a day  aspirin enteric coated 81 milliGRAM(s) Oral daily  cefTRIAXone   IVPB 1000 milliGRAM(s) IV Intermittent every 24 hours  dextrose 5%. 1000 milliLiter(s) (50 mL/Hr) IV Continuous <Continuous>  dextrose 5%. 1000 milliLiter(s) (100 mL/Hr) IV Continuous <Continuous>  dextrose 50% Injectable 25 Gram(s) IV Push once  dextrose 50% Injectable 25 Gram(s) IV Push once  dextrose 50% Injectable 12.5 Gram(s) IV Push once  glucagon  Injectable 1 milliGRAM(s) IntraMuscular once  insulin lispro (ADMELOG) corrective regimen sliding scale   SubCutaneous at bedtime  insulin lispro (ADMELOG) corrective regimen sliding scale   SubCutaneous three times a day before meals  sacubitril 49 mG/valsartan 51 mG 1 Tablet(s) Oral two times a day  simvastatin 10 milliGRAM(s) Oral at bedtime  spironolactone 25 milliGRAM(s) Oral daily    --------------------------------------------------------------------------------------------------  Study Interpretation:    [[[Abbreviation Key:  PDR=alpha rhythm/posterior dominant rhythm. A-P=anterior posterior.  Amplitude: ‘very low’:<20; ‘low’:20-49; ‘medium’:; ‘high’:>150uV.  Persistence for periodic/rhythmic patterns (% of epoch) ‘rare’:<1%; ‘occasional’:1-10%; ‘frequent’:10-50%; ‘abundant’:50-90%; ‘continuous’:>90%.  Persistence for sporadic discharges: ‘rare’:<1/hr; ‘occasional’:1/min-1/hr; ‘frequent’:>1/min; ‘abundant’:>1/10 sec.  RPP=rhythmic and periodic patterns; GRDA=generalized rhythmic delta activity; FIRDA=frontal intermittent GRDA; LRDA=lateralized rhythmic delta activity; TIRDA=temporal intermittent rhythmic delta activity;  LPD=PLED=lateralized periodic discharges; GPD=generalized periodic discharges; BIPDs =bilateral independent periodic discharges; Mf=multifocal; SIRPDs=stimulus induced rhythmic, periodic, or ictal appearing discharges; BIRDs=brief potentially ictal rhythmic discharges >4 Hz, lasting .5-10s; PFA (paroxysmal bursts >13 Hz or =8 Hz <10s).  Modifiers: +F=with fast component; +S=with spike component; +R=with rhythmic component.  S-B=burst suppression pattern.  Max=maximal. N1-drowsy; N2-stage II sleep; N3-slow wave sleep. SSS/BETS=small sharp spikes/benign epileptiform transients of sleep. HV=hyperventilation; PS=photic stimulation]]]    Daily EEG Visual Analysis    FINDINGS:      Background:  Continuity: continuous  Symmetry: symmetric  PDR: 9-10 Hz activity, with amplitude to 40 uV, that attenuated to eye opening.  Low amplitude frontal beta noted in wakefulness.  Reactivity: present  Voltage: normal, mostly 20-150uV  Anterior Posterior Gradient: present  Other background findings: none  Breach: absent    Background Slowing:  Generalized slowing: none was present.  Focal slowing: none was present.    State Changes:   -Drowsiness noted with increased slowing, attenuation of fast activity, vertex transients.  -Present with N2 sleep transients with symmetric spindles and K-complexes.    Sporadic Epileptiform Discharges:    None    Rhythmic and Periodic Patterns (RPPs):  None     Electrographic and Electroclinical seizures:  None    Other Clinical Events:  None    Activation Procedures:   -Hyperventilation was not performed.    -Photic stimulation was not performed.    Artifacts:  Intermittent myogenic and movement artifacts were noted.    ECG:  The heart rate on single channel ECG was predominantly between 60-70 BPM.    EEG Classification / Summary:  Normal  EEG in the awake / drowsy / asleep state(s).    Clinical Impression:  There were no epileptiform abnormalities recorded.    **In absence of additional clinical concerns, recommend consideration for discontinuation of current EEG study with reconnection in future if warranted.    Manpreet Jenkins MD  Fellow, [facility]    Mark Barbosa MD  EEG/Epilepsy Attending       -------------------------------------------------------------------------------------------------------  [facility] EEG Reading Room Ph#: [phone]  Epilepsy Answering Service after 5PM and before 8:30AM: Ph#: [phone]

## 2023-12-20 NOTE — PROGRESS NOTE ADULT - PROBLEM SELECTOR PLAN 7
Chronic stable   Previous MI in [facility] 2008? Unclear interventions at the time.  Cardiac cath 12/15/2023: Diagnostic Conclusions - Mild nonobstructive CAD   Continue aspirin 81mg daily and simvastatin 10mg daily. Chronic stable   Previous MI in [facility] 2008? Unclear interventions at the time.  Cardiac cath 12/15/2023: Diagnostic Conclusions - Mild nonobstructive CAD   Continue aspirin 81mg daily and simvastatin 10mg daily.

## 2023-12-20 NOTE — CHART NOTE - NSCHARTNOTEFT_GEN_A_CORE
67y (1955) Creole speaking woman with a PMHx significant for DM2, Afib (on Eliquis), CHF, bioprosthetic MVR/AVR, MI, Stroke (R cerebellar) admitted for Afib with RVR, UTI. Neurology consulted for seizure-like activity. Spoke with daughter Kaleb who reports after patient was eating breakfast she was sitting down and leaning her head against the pantry, she was groaning, eyes open and had shaking of all her extremities. Denies tongue biting or incontinence. Episode lasted 15-20 seconds and she was sleepy afterwards. Denies previous similar episodes or history of seizures. Patient also reports feeling like her head is spinning since Sunday after her hospitalized. She has similar episodes of dizziness for years, however recently worsened to where she is unable to walk. Romero tinnitus ear fullness, weakness, numbness, changes to speech, changes to vision. At baseline, ambulates with walker. Exam mild dysmetria to right side. CTH no acute findings. Video EEG with no seizures or epileptiform activity     Impression:   Episode of full body shaking with eyes open likely 2/2 convulsive syncope or non-epileptic event, less likely seizure    Recommendation:  [x] vEEG reviewed, normal with no epileptiform activity   [x] No need for inpatient imaging at this time  [x] Telemetry monitoring; Neurochecks/VS per unit protocol  [x] Seizure, fall and aspiration precautions  [x] Given concern for seizure, advise patient to not drive, operate heavy machinery, avoid heights, pools, bathtubs, locked doors until cleared by further follow up outpatient by neurology.   [ ] Neurology to sign off at this time, please page if there are additional questions  [ ] Follow outpatient with Neurology for continued management. If patient does not have a Neurologist, can follow at [address] [place] [address]. [address] [address].  Patient can call [phone] to schedule this appointment.  If pt is without insurance, patient can follow up with [facility], located in [address] [place] [address] at [address] [place] [address]. Patient/family can call [phone] to schedule this appointment.      Case discussed with Neurology attending Dr. Woodruff 67y (1955) Creole speaking woman with a PMHx significant for DM2, Afib (on Eliquis), CHF, bioprosthetic MVR/AVR, MI, Stroke (R cerebellar) admitted for Afib with RVR, UTI. Neurology consulted for seizure-like activity. Spoke with daughter Kaleb who reports after patient was eating breakfast she was sitting down and leaning her head against the pantry, she was groaning, eyes open and had shaking of all her extremities. Denies tongue biting or incontinence. Episode lasted 15-20 seconds and she was sleepy afterwards. Denies previous similar episodes or history of seizures. Patient also reports feeling like her head is spinning since Sunday after her hospitalized. She has similar episodes of dizziness for years, however recently worsened to where she is unable to walk. Romero tinnitus ear fullness, weakness, numbness, changes to speech, changes to vision. At baseline, ambulates with walker. Exam mild dysmetria to right side. CTH no acute findings. Video EEG with no seizures or epileptiform activity     Impression:   Episode of full body shaking with eyes open likely 2/2 convulsive syncope or non-epileptic event, less likely seizure    Recommendation:  [x] vEEG reviewed, normal with no epileptiform activity   [x] No need for inpatient imaging at this time  [x] Telemetry monitoring; Neurochecks/VS per unit protocol  [x] Seizure, fall and aspiration precautions  [x] Given concern for seizure, advise patient to not drive, operate heavy machinery, avoid heights, pools, bathtubs, locked doors until cleared by further follow up outpatient by neurology.   [ ] Neurology to sign off at this time, please page if there are additional questions  [ ] Follow outpatient with Neurology for continued management. If patient does not have a Neurologist, can follow at [address] [place] [address]. [address] [address].  Patient can call [phone] to schedule this appointment.  If pt is without insurance, patient can follow up with [facility], located in [address] [place] [address] at [address] [place] [address]. Patient/family can call [phone] to schedule this appointment.      Case discussed with Neurology attending Dr. Woodruff 67y (1955) Creole speaking woman with a PMHx significant for DM2, Afib (on Eliquis), CHF, bioprosthetic MVR/AVR, MI, Stroke (R cerebellar) admitted for Afib with RVR, UTI. Neurology consulted for seizure-like activity. Spoke with daughter Kaleb who reports after patient was eating breakfast she was sitting down and leaning her head against the pantry, she was groaning, eyes open and had shaking of all her extremities. Denies tongue biting or incontinence. Episode lasted 15-20 seconds and she was sleepy afterwards. Denies previous similar episodes or history of seizures. Patient also reports feeling like her head is spinning since Sunday after her hospitalized. She has similar episodes of dizziness for years, however recently worsened to where she is unable to walk. Romero tinnitus ear fullness, weakness, numbness, changes to speech, changes to vision. At baseline, ambulates with walker. Exam mild dysmetria to right side. CTH no acute findings. Video EEG with no seizures or epileptiform activity     Study Date: 		12- 1132 - 0800 12-20-23  Duration x Hours: 20 hrs 19 min    EEG Classification / Summary:  Normal  EEG in the awake / drowsy / asleep state(s).    Clinical Impression:  There were no epileptiform abnormalities recorded.    **In absence of additional clinical concerns, recommend consideration for discontinuation of current EEG study with reconnection in future if warranted.    Impression:   Episode of full body shaking with eyes open likely 2/2 convulsive syncope, less likely seizure    Recommendation:  [x] vEEG reviewed, normal with no epileptiform activity   [x] No need for inpatient imaging at this time  [x] Telemetry monitoring; Neurochecks/VS per unit protocol  [x] Seizure, fall and aspiration precautions  [x] Given concern for seizure, advise patient to not drive, operate heavy machinery, avoid heights, pools, bathtubs, locked doors until cleared by further follow up outpatient by neurology.   [ ] Neurology to sign off at this time, please page if there are additional questions  [ ] Follow outpatient with Neurology for continued management. If patient does not have a Neurologist, can follow at [address] [place] [address]. [address] [address].  Patient can call [phone] to schedule this appointment.  If pt is without insurance, patient can follow up with [facility], located in [address] [place] [address] at [address] [place] [address]. Patient/family can call [phone] to schedule this appointment.    Case discussed with Neurology attending Dr. Woodruff  Thank you 67y (1955) Creole speaking woman with a PMHx significant for DM2, Afib (on Eliquis), CHF, bioprosthetic MVR/AVR, MI, Stroke (R cerebellar) admitted for Afib with RVR, UTI. Neurology consulted for seizure-like activity. Spoke with daughter Kaleb who reports after patient was eating breakfast she was sitting down and leaning her head against the pantry, she was groaning, eyes open and had shaking of all her extremities. Denies tongue biting or incontinence. Episode lasted 15-20 seconds and she was sleepy afterwards. Denies previous similar episodes or history of seizures. Patient also reports feeling like her head is spinning since Sunday after her hospitalized. She has similar episodes of dizziness for years, however recently worsened to where she is unable to walk. Romero tinnitus ear fullness, weakness, numbness, changes to speech, changes to vision. At baseline, ambulates with walker. Exam mild dysmetria to right side. CTH no acute findings. Video EEG with no seizures or epileptiform activity     Study Date: 		12- 1132 - 0800 12-20-23  Duration x Hours: 20 hrs 19 min    EEG Classification / Summary:  Normal  EEG in the awake / drowsy / asleep state(s).    Clinical Impression:  There were no epileptiform abnormalities recorded.    **In absence of additional clinical concerns, recommend consideration for discontinuation of current EEG study with reconnection in future if warranted.    Impression:   Episode of full body shaking with eyes open likely 2/2 convulsive syncope, less likely seizure    Recommendation:  [x] vEEG reviewed, normal with no epileptiform activity   [x] No need for inpatient imaging at this time  [x] Telemetry monitoring; Neurochecks/VS per unit protocol  [x] Seizure, fall and aspiration precautions  [x] Given concern for seizure, advise patient to not drive, operate heavy machinery, avoid heights, pools, bathtubs, locked doors until cleared by further follow up outpatient by neurology.   [ ] Neurology to sign off at this time, please page if there are additional questions  [ ] Follow outpatient with Neurology for continued management. If patient does not have a Neurologist, can follow at [address] [place] [address]. [address]. [place] [address].  Patient can call [phone] to schedule this appointment.  If pt is without insurance, patient can follow up with [facility], located in [address] [place] [address] at [address] [place] [address]. Patient/family can call [phone] to schedule this appointment.    Case discussed with Neurology attending Dr. Woodruff  Thank you 67y (1955) Creole speaking woman with a PMHx significant for DM2, Afib (on Eliquis), CHF, bioprosthetic MVR/AVR, MI, Stroke (R cerebellar) admitted for Afib with RVR, UTI. Neurology consulted for seizure-like activity. Spoke with daughter Kaleb who reports after patient was eating breakfast she was sitting down and leaning her head against the pantry, she was groaning, eyes open and had shaking of all her extremities. Denies tongue biting or incontinence. Episode lasted 15-20 seconds and she was sleepy afterwards. Denies previous similar episodes or history of seizures. Patient also reports feeling like her head is spinning since Sunday after her hospitalized. She has similar episodes of dizziness for years, however recently worsened to where she is unable to walk. Romero tinnitus ear fullness, weakness, numbness, changes to speech, changes to vision. At baseline, ambulates with walker. Exam mild dysmetria to right side. CTH no acute findings. Video EEG with no seizures or epileptiform activity     Study Date: 		12- 1132 - 0800 12-20-23  Duration x Hours: 20 hrs 19 min    EEG Classification / Summary:  Normal  EEG in the awake / drowsy / asleep state(s).    Clinical Impression:  There were no epileptiform abnormalities recorded.    **In absence of additional clinical concerns, recommend consideration for discontinuation of current EEG study with reconnection in future if warranted.    Impression:   Episode of full body shaking with eyes open likely 2/2 convulsive syncope, less likely seizure    Recommendation:  [x] vEEG reviewed, normal with no epileptiform activity   [x] No need for inpatient imaging at this time - can do MRI brain as outpatient after f/u with neurology as outpatient.   [x] Telemetry monitoring; Neurochecks/VS per unit protocol  [x] Seizure, fall and aspiration precautions  [x] Given concern for seizure, advise patient to not drive, operate heavy machinery, avoid heights, pools, bathtubs, locked doors until cleared by further follow up outpatient by neurology.   [ ] Neurology to sign off at this time, please page if there are additional questions  [ ] Follow outpatient with Neurology for continued management. If patient does not have a Neurologist, can follow at [address] [facility]. [address] [place] [address].  Patient can call [phone] to schedule this appointment.  If pt is without insurance, patient can follow up with [facility], located in [address] [place] [address]. Patient/family can call [phone] to schedule this appointment.    Case discussed with Neurology attending Dr. Woodruff  Thank you 67y (1955) Creole speaking woman with a PMHx significant for DM2, Afib (on Eliquis), CHF, bioprosthetic MVR/AVR, MI, Stroke (R cerebellar) admitted for Afib with RVR, UTI. Neurology consulted for seizure-like activity. Spoke with daughter Kaleb who reports after patient was eating breakfast she was sitting down and leaning her head against the pantry, she was groaning, eyes open and had shaking of all her extremities. Denies tongue biting or incontinence. Episode lasted 15-20 seconds and she was sleepy afterwards. Denies previous similar episodes or history of seizures. Patient also reports feeling like her head is spinning since Sunday after her hospitalized. She has similar episodes of dizziness for years, however recently worsened to where she is unable to walk. Romero tinnitus ear fullness, weakness, numbness, changes to speech, changes to vision. At baseline, ambulates with walker. Exam mild dysmetria to right side. CTH no acute findings. Video EEG with no seizures or epileptiform activity     Study Date: 		12- 1132 - 0800 12-20-23  Duration x Hours: 20 hrs 19 min    EEG Classification / Summary:  Normal  EEG in the awake / drowsy / asleep state(s).    Clinical Impression:  There were no epileptiform abnormalities recorded.    **In absence of additional clinical concerns, recommend consideration for discontinuation of current EEG study with reconnection in future if warranted.    Impression:   Episode of full body shaking with eyes open likely 2/2 convulsive syncope, less likely seizure    Recommendation:  [x] vEEG reviewed, normal with no epileptiform activity   [x] No need for inpatient imaging at this time - can do MRI brain as outpatient after f/u with neurology as outpatient.   [x] Telemetry monitoring; Neurochecks/VS per unit protocol  [x] Seizure, fall and aspiration precautions  [x] Given concern for seizure, advise patient to not drive, operate heavy machinery, avoid heights, pools, bathtubs, locked doors until cleared by further follow up outpatient by neurology.   [ ] Neurology to sign off at this time, please page if there are additional questions  [ ] Follow outpatient with Neurology for continued management. If patient does not have a Neurologist, can follow at [address] [facility]. [address] [place] [address].  Patient can call [phone] to schedule this appointment.  If pt is without insurance, patient can follow up with [facility], located in [address] [place] [address]. Patient/family can call [phone] to schedule this appointment.    Case discussed with Neurology attending Dr. Woodruff  Thank you

## 2023-12-20 NOTE — PROGRESS NOTE ADULT - PROBLEM SELECTOR PLAN 1
New   seizure vs syncope  post-ictal lethargy on presentation with no prodromal symptoms. Pt does not recall events   CT Head with no acute changes, chronic cerebellar volume loss i/s/o prior R cerebellar CVA  EEG: Normal  EEG in the awake / drowsy / asleep state(s).  Neurology consulted: No need for inpatient imaging at this time - can do MRI brain as outpatient after f/u with neurology as outpatient  [ ] Follow outpatient with Neurology for continued management. If patient does not have a Neurologist, can follow at [address] [facility]. [address] [address].  Patient can call [phone] to schedule this appointment.  If pt is without insurance, patient can follow up with [facility], located in [address] [place] [address] at [address] [place] [address]. Patient/family can call [phone] to schedule this appointment.    Pt can likely be discharged in the AM New   seizure vs syncope  post-ictal lethargy on presentation with no prodromal symptoms. Pt does not recall events   CT Head with no acute changes, chronic cerebellar volume loss i/s/o prior R cerebellar CVA  EEG: Normal  EEG in the awake / drowsy / asleep state(s).  Neurology consulted: No need for inpatient imaging at this time - can do MRI brain as outpatient after f/u with neurology as outpatient  [ ] Follow outpatient with Neurology for continued management. If patient does not have a Neurologist, can follow at [address] [facility]. [address] [place] [address].  Patient can call [phone] to schedule this appointment.  If pt is without insurance, patient can follow up with [facility], located in [address] [place] [address] at [address] [place] [address]. Patient/family can call [phone] to schedule this appointment.    Pt can likely be discharged in the AM

## 2023-12-21 DIAGNOSIS — Z29.9 ENCOUNTER FOR PROPHYLACTIC MEASURES, UNSPECIFIED: ICD-10-CM

## 2023-12-21 LAB
ANION GAP SERPL CALC-SCNC: 12 MMOL/L — SIGNIFICANT CHANGE UP (ref 7–14)
ANION GAP SERPL CALC-SCNC: 12 MMOL/L — SIGNIFICANT CHANGE UP (ref 7–14)
ANION GAP SERPL CALC-SCNC: 7 MMOL/L — SIGNIFICANT CHANGE UP (ref 7–14)
ANION GAP SERPL CALC-SCNC: 7 MMOL/L — SIGNIFICANT CHANGE UP (ref 7–14)
ANION GAP SERPL CALC-SCNC: 9 MMOL/L — SIGNIFICANT CHANGE UP (ref 7–14)
ANION GAP SERPL CALC-SCNC: 9 MMOL/L — SIGNIFICANT CHANGE UP (ref 7–14)
BUN SERPL-MCNC: 22 MG/DL — SIGNIFICANT CHANGE UP (ref 7–23)
BUN SERPL-MCNC: 22 MG/DL — SIGNIFICANT CHANGE UP (ref 7–23)
BUN SERPL-MCNC: 24 MG/DL — HIGH (ref 7–23)
CALCIUM SERPL-MCNC: 10.4 MG/DL — SIGNIFICANT CHANGE UP (ref 8.4–10.5)
CALCIUM SERPL-MCNC: 10.4 MG/DL — SIGNIFICANT CHANGE UP (ref 8.4–10.5)
CALCIUM SERPL-MCNC: 9.4 MG/DL — SIGNIFICANT CHANGE UP (ref 8.4–10.5)
CALCIUM SERPL-MCNC: 9.4 MG/DL — SIGNIFICANT CHANGE UP (ref 8.4–10.5)
CALCIUM SERPL-MCNC: 9.9 MG/DL — SIGNIFICANT CHANGE UP (ref 8.4–10.5)
CALCIUM SERPL-MCNC: 9.9 MG/DL — SIGNIFICANT CHANGE UP (ref 8.4–10.5)
CHLORIDE SERPL-SCNC: 101 MMOL/L — SIGNIFICANT CHANGE UP (ref 98–107)
CHLORIDE SERPL-SCNC: 101 MMOL/L — SIGNIFICANT CHANGE UP (ref 98–107)
CHLORIDE SERPL-SCNC: 103 MMOL/L — SIGNIFICANT CHANGE UP (ref 98–107)
CHLORIDE SERPL-SCNC: 103 MMOL/L — SIGNIFICANT CHANGE UP (ref 98–107)
CHLORIDE SERPL-SCNC: 105 MMOL/L — SIGNIFICANT CHANGE UP (ref 98–107)
CHLORIDE SERPL-SCNC: 105 MMOL/L — SIGNIFICANT CHANGE UP (ref 98–107)
CO2 SERPL-SCNC: 21 MMOL/L — LOW (ref 22–31)
CO2 SERPL-SCNC: 21 MMOL/L — LOW (ref 22–31)
CO2 SERPL-SCNC: 22 MMOL/L — SIGNIFICANT CHANGE UP (ref 22–31)
CO2 SERPL-SCNC: 22 MMOL/L — SIGNIFICANT CHANGE UP (ref 22–31)
CO2 SERPL-SCNC: 28 MMOL/L — SIGNIFICANT CHANGE UP (ref 22–31)
CO2 SERPL-SCNC: 28 MMOL/L — SIGNIFICANT CHANGE UP (ref 22–31)
CREAT SERPL-MCNC: 0.94 MG/DL — SIGNIFICANT CHANGE UP (ref 0.5–1.3)
CREAT SERPL-MCNC: 0.94 MG/DL — SIGNIFICANT CHANGE UP (ref 0.5–1.3)
CREAT SERPL-MCNC: 1 MG/DL — SIGNIFICANT CHANGE UP (ref 0.5–1.3)
CREAT SERPL-MCNC: 1 MG/DL — SIGNIFICANT CHANGE UP (ref 0.5–1.3)
CREAT SERPL-MCNC: 1.03 MG/DL — SIGNIFICANT CHANGE UP (ref 0.5–1.3)
CREAT SERPL-MCNC: 1.03 MG/DL — SIGNIFICANT CHANGE UP (ref 0.5–1.3)
EGFR: 59 ML/MIN/1.73M2 — LOW
EGFR: 59 ML/MIN/1.73M2 — LOW
EGFR: 61 ML/MIN/1.73M2 — SIGNIFICANT CHANGE UP
EGFR: 61 ML/MIN/1.73M2 — SIGNIFICANT CHANGE UP
EGFR: 66 ML/MIN/1.73M2 — SIGNIFICANT CHANGE UP
EGFR: 66 ML/MIN/1.73M2 — SIGNIFICANT CHANGE UP
GLUCOSE BLDC GLUCOMTR-MCNC: 115 MG/DL — HIGH (ref 70–99)
GLUCOSE BLDC GLUCOMTR-MCNC: 115 MG/DL — HIGH (ref 70–99)
GLUCOSE BLDC GLUCOMTR-MCNC: 119 MG/DL — HIGH (ref 70–99)
GLUCOSE BLDC GLUCOMTR-MCNC: 119 MG/DL — HIGH (ref 70–99)
GLUCOSE BLDC GLUCOMTR-MCNC: 91 MG/DL — SIGNIFICANT CHANGE UP (ref 70–99)
GLUCOSE BLDC GLUCOMTR-MCNC: 91 MG/DL — SIGNIFICANT CHANGE UP (ref 70–99)
GLUCOSE SERPL-MCNC: 127 MG/DL — HIGH (ref 70–99)
GLUCOSE SERPL-MCNC: 127 MG/DL — HIGH (ref 70–99)
GLUCOSE SERPL-MCNC: 154 MG/DL — HIGH (ref 70–99)
GLUCOSE SERPL-MCNC: 154 MG/DL — HIGH (ref 70–99)
GLUCOSE SERPL-MCNC: 180 MG/DL — HIGH (ref 70–99)
GLUCOSE SERPL-MCNC: 180 MG/DL — HIGH (ref 70–99)
HCT VFR BLD CALC: 49.3 % — HIGH (ref 34.5–45)
HCT VFR BLD CALC: 49.3 % — HIGH (ref 34.5–45)
HGB BLD-MCNC: 15.5 G/DL — SIGNIFICANT CHANGE UP (ref 11.5–15.5)
HGB BLD-MCNC: 15.5 G/DL — SIGNIFICANT CHANGE UP (ref 11.5–15.5)
MAGNESIUM SERPL-MCNC: 2 MG/DL — SIGNIFICANT CHANGE UP (ref 1.6–2.6)
MAGNESIUM SERPL-MCNC: 2 MG/DL — SIGNIFICANT CHANGE UP (ref 1.6–2.6)
MCHC RBC-ENTMCNC: 29.7 PG — SIGNIFICANT CHANGE UP (ref 27–34)
MCHC RBC-ENTMCNC: 29.7 PG — SIGNIFICANT CHANGE UP (ref 27–34)
MCHC RBC-ENTMCNC: 31.4 GM/DL — LOW (ref 32–36)
MCHC RBC-ENTMCNC: 31.4 GM/DL — LOW (ref 32–36)
MCV RBC AUTO: 94.4 FL — SIGNIFICANT CHANGE UP (ref 80–100)
MCV RBC AUTO: 94.4 FL — SIGNIFICANT CHANGE UP (ref 80–100)
NRBC # BLD: 0 /100 WBCS — SIGNIFICANT CHANGE UP (ref 0–0)
NRBC # BLD: 0 /100 WBCS — SIGNIFICANT CHANGE UP (ref 0–0)
NRBC # FLD: 0 K/UL — SIGNIFICANT CHANGE UP (ref 0–0)
NRBC # FLD: 0 K/UL — SIGNIFICANT CHANGE UP (ref 0–0)
PHOSPHATE SERPL-MCNC: 3.3 MG/DL — SIGNIFICANT CHANGE UP (ref 2.5–4.5)
PHOSPHATE SERPL-MCNC: 3.3 MG/DL — SIGNIFICANT CHANGE UP (ref 2.5–4.5)
PLATELET # BLD AUTO: 158 K/UL — SIGNIFICANT CHANGE UP (ref 150–400)
PLATELET # BLD AUTO: 158 K/UL — SIGNIFICANT CHANGE UP (ref 150–400)
POTASSIUM SERPL-MCNC: 5.4 MMOL/L — HIGH (ref 3.5–5.3)
POTASSIUM SERPL-MCNC: 5.4 MMOL/L — HIGH (ref 3.5–5.3)
POTASSIUM SERPL-MCNC: 6 MMOL/L — HIGH (ref 3.5–5.3)
POTASSIUM SERPL-MCNC: 6 MMOL/L — HIGH (ref 3.5–5.3)
POTASSIUM SERPL-MCNC: 6.3 MMOL/L — CRITICAL HIGH (ref 3.5–5.3)
POTASSIUM SERPL-MCNC: 6.3 MMOL/L — CRITICAL HIGH (ref 3.5–5.3)
POTASSIUM SERPL-SCNC: 5.4 MMOL/L — HIGH (ref 3.5–5.3)
POTASSIUM SERPL-SCNC: 5.4 MMOL/L — HIGH (ref 3.5–5.3)
POTASSIUM SERPL-SCNC: 6 MMOL/L — HIGH (ref 3.5–5.3)
POTASSIUM SERPL-SCNC: 6 MMOL/L — HIGH (ref 3.5–5.3)
POTASSIUM SERPL-SCNC: 6.3 MMOL/L — CRITICAL HIGH (ref 3.5–5.3)
POTASSIUM SERPL-SCNC: 6.3 MMOL/L — CRITICAL HIGH (ref 3.5–5.3)
RBC # BLD: 5.22 M/UL — HIGH (ref 3.8–5.2)
RBC # BLD: 5.22 M/UL — HIGH (ref 3.8–5.2)
RBC # FLD: 13 % — SIGNIFICANT CHANGE UP (ref 10.3–14.5)
RBC # FLD: 13 % — SIGNIFICANT CHANGE UP (ref 10.3–14.5)
SODIUM SERPL-SCNC: 135 MMOL/L — SIGNIFICANT CHANGE UP (ref 135–145)
SODIUM SERPL-SCNC: 138 MMOL/L — SIGNIFICANT CHANGE UP (ref 135–145)
SODIUM SERPL-SCNC: 138 MMOL/L — SIGNIFICANT CHANGE UP (ref 135–145)
WBC # BLD: 8.83 K/UL — SIGNIFICANT CHANGE UP (ref 3.8–10.5)
WBC # BLD: 8.83 K/UL — SIGNIFICANT CHANGE UP (ref 3.8–10.5)
WBC # FLD AUTO: 8.83 K/UL — SIGNIFICANT CHANGE UP (ref 3.8–10.5)
WBC # FLD AUTO: 8.83 K/UL — SIGNIFICANT CHANGE UP (ref 3.8–10.5)

## 2023-12-21 PROCEDURE — 99232 SBSQ HOSP IP/OBS MODERATE 35: CPT

## 2023-12-21 RX ADMIN — APIXABAN 5 MILLIGRAM(S): 2.5 TABLET, FILM COATED ORAL at 05:55

## 2023-12-21 RX ADMIN — SACUBITRIL AND VALSARTAN 1 TABLET(S): 24; 26 TABLET, FILM COATED ORAL at 05:54

## 2023-12-21 RX ADMIN — Medication 81 MILLIGRAM(S): at 11:30

## 2023-12-21 RX ADMIN — SIMVASTATIN 10 MILLIGRAM(S): 20 TABLET, FILM COATED ORAL at 21:07

## 2023-12-21 RX ADMIN — APIXABAN 5 MILLIGRAM(S): 2.5 TABLET, FILM COATED ORAL at 17:56

## 2023-12-21 RX ADMIN — SPIRONOLACTONE 25 MILLIGRAM(S): 25 TABLET, FILM COATED ORAL at 05:54

## 2023-12-21 RX ADMIN — SACUBITRIL AND VALSARTAN 1 TABLET(S): 24; 26 TABLET, FILM COATED ORAL at 17:56

## 2023-12-21 NOTE — PROVIDER CONTACT NOTE (CRITICAL VALUE NOTIFICATION) - ASSESSMENT
Potassium 6.3 hemolyzed. Patient denies any signs and symptoms of chest pain. Tele monitoring continued; pt NSR

## 2023-12-21 NOTE — PROGRESS NOTE ADULT - SUBJECTIVE AND OBJECTIVE BOX
Patient is a 67y old  Female who presents with a chief complaint of Seizure-like activity (20 Dec 2023 22:52)      SUBJECTIVE / OVERNIGHT EVENTS:    MEDICATIONS  (STANDING):  apixaban 5 milliGRAM(s) Oral two times a day  aspirin enteric coated 81 milliGRAM(s) Oral daily  dextrose 5%. 1000 milliLiter(s) (50 mL/Hr) IV Continuous <Continuous>  dextrose 5%. 1000 milliLiter(s) (100 mL/Hr) IV Continuous <Continuous>  dextrose 50% Injectable 25 Gram(s) IV Push once  dextrose 50% Injectable 25 Gram(s) IV Push once  dextrose 50% Injectable 12.5 Gram(s) IV Push once  glucagon  Injectable 1 milliGRAM(s) IntraMuscular once  insulin lispro (ADMELOG) corrective regimen sliding scale   SubCutaneous three times a day before meals  insulin lispro (ADMELOG) corrective regimen sliding scale   SubCutaneous at bedtime  sacubitril 49 mG/valsartan 51 mG 1 Tablet(s) Oral two times a day  simvastatin 10 milliGRAM(s) Oral at bedtime  spironolactone 25 milliGRAM(s) Oral daily    MEDICATIONS  (PRN):  acetaminophen     Tablet .. 650 milliGRAM(s) Oral every 6 hours PRN Temp greater or equal to 38C (100.4F), Mild Pain (1 - 3)  dextrose Oral Gel 15 Gram(s) Oral once PRN Blood Glucose LESS THAN 70 milliGRAM(s)/deciliter      Vital Signs Last 24 Hrs  T(C): 37 (21 Dec 2023 06:04), Max: 37 (21 Dec 2023 06:04)  T(F): 98.6 (21 Dec 2023 06:04), Max: 98.6 (21 Dec 2023 06:04)  HR: 89 (21 Dec 2023 06:04) (65 - 90)  BP: 127/68 (21 Dec 2023 06:04) (114/61 - 146/60)  BP(mean): --  RR: 16 (21 Dec 2023 06:04) (16 - 18)  SpO2: 100% (21 Dec 2023 06:04) (98% - 100%)    Parameters below as of 21 Dec 2023 06:04  Patient On (Oxygen Delivery Method): room air      CAPILLARY BLOOD GLUCOSE      POCT Blood Glucose.: 105 mg/dL (21 Dec 2023 08:39)  POCT Blood Glucose.: 207 mg/dL (20 Dec 2023 22:05)  POCT Blood Glucose.: 191 mg/dL (20 Dec 2023 17:16)  POCT Blood Glucose.: 114 mg/dL (20 Dec 2023 12:45)    I&O's Summary      PHYSICAL EXAM:  GENERAL: NAD, well-developed  HEAD:  Atraumatic, Normocephalic  EYES: EOMI, PERRLA, conjunctiva and sclera clear  NECK: Supple, No JVD  CHEST/LUNG: Clear to auscultation bilaterally; No wheeze  HEART: Regular rate and rhythm; No murmurs, rubs, or gallops  ABDOMEN: Soft, Nontender, Nondistended; Bowel sounds present  EXTREMITIES:  2+ Peripheral Pulses, No clubbing, cyanosis, or edema  PSYCH: AAOx3  NEUROLOGY: non-focal  SKIN: No rashes or lesions    LABS:                        15.5   8.83  )-----------( 158      ( 21 Dec 2023 07:40 )             49.3     12-21    135  |  105  |  24<H>  ----------------------------<  127<H>  6.3<HH>   |  21<L>  |  0.94    Ca    9.4      21 Dec 2023 07:40  Phos  3.3     12-21  Mg     2.00     12-21            Urinalysis Basic - ( 21 Dec 2023 07:40 )    Color: x / Appearance: x / SG: x / pH: x  Gluc: 127 mg/dL / Ketone: x  / Bili: x / Urobili: x   Blood: x / Protein: x / Nitrite: x   Leuk Esterase: x / RBC: x / WBC x   Sq Epi: x / Non Sq Epi: x / Bacteria: x        RADIOLOGY & ADDITIONAL TESTS:    Imaging Personally Reviewed:    Consultant(s) Notes Reviewed:      Care Discussed with Consultants/Other Providers:   Patient is a 67y old  Female who presents with a chief complaint of Seizure-like activity (20 Dec 2023 22:52)      SUBJECTIVE / OVERNIGHT EVENTS: patient seen and examined by bedside, pt alert , awake, denies headache, dizziness, SOB, CP, Palpitations , N/V/D, abdominal pain  Tele : Afib @90s     MEDICATIONS  (STANDING):  apixaban 5 milliGRAM(s) Oral two times a day  aspirin enteric coated 81 milliGRAM(s) Oral daily  dextrose 5%. 1000 milliLiter(s) (50 mL/Hr) IV Continuous <Continuous>  dextrose 5%. 1000 milliLiter(s) (100 mL/Hr) IV Continuous <Continuous>  dextrose 50% Injectable 25 Gram(s) IV Push once  dextrose 50% Injectable 25 Gram(s) IV Push once  dextrose 50% Injectable 12.5 Gram(s) IV Push once  glucagon  Injectable 1 milliGRAM(s) IntraMuscular once  insulin lispro (ADMELOG) corrective regimen sliding scale   SubCutaneous three times a day before meals  insulin lispro (ADMELOG) corrective regimen sliding scale   SubCutaneous at bedtime  sacubitril 49 mG/valsartan 51 mG 1 Tablet(s) Oral two times a day  simvastatin 10 milliGRAM(s) Oral at bedtime  spironolactone 25 milliGRAM(s) Oral daily    MEDICATIONS  (PRN):  acetaminophen     Tablet .. 650 milliGRAM(s) Oral every 6 hours PRN Temp greater or equal to 38C (100.4F), Mild Pain (1 - 3)  dextrose Oral Gel 15 Gram(s) Oral once PRN Blood Glucose LESS THAN 70 milliGRAM(s)/deciliter      Vital Signs Last 24 Hrs  T(C): 37 (21 Dec 2023 06:04), Max: 37 (21 Dec 2023 06:04)  T(F): 98.6 (21 Dec 2023 06:04), Max: 98.6 (21 Dec 2023 06:04)  HR: 89 (21 Dec 2023 06:04) (65 - 90)  BP: 127/68 (21 Dec 2023 06:04) (114/61 - 146/60)  BP(mean): --  RR: 16 (21 Dec 2023 06:04) (16 - 18)  SpO2: 100% (21 Dec 2023 06:04) (98% - 100%)    Parameters below as of 21 Dec 2023 06:04  Patient On (Oxygen Delivery Method): room air      CAPILLARY BLOOD GLUCOSE      POCT Blood Glucose.: 105 mg/dL (21 Dec 2023 08:39)  POCT Blood Glucose.: 207 mg/dL (20 Dec 2023 22:05)  POCT Blood Glucose.: 191 mg/dL (20 Dec 2023 17:16)  POCT Blood Glucose.: 114 mg/dL (20 Dec 2023 12:45)        PHYSICAL EXAM:  GENERAL: NAD, well-developed, well-nourished  HEAD:  Atraumatic, Normocephalic  EYES: EOMI, PERRL, conjunctiva and sclera clear  NECK: Supple, No JVD  CHEST/LUNG: Clear to auscultation bilaterally; No wheezes, rales or rhonchi  HEART: irregularly irregular; No murmurs, rubs, or gallops, (+)S1, S2  ABDOMEN: Soft, Nontender, Nondistended; Normal Bowel sounds   EXTREMITIES:  2+ Peripheral Pulses, No clubbing, cyanosis, or edema  PSYCH: normal mood and affect  NEUROLOGY: AAOx3, moving all extremities spontaneously   SKIN: No rashes or lesions        LABS:                        15.5   8.83  )-----------( 158      ( 21 Dec 2023 07:40 )             49.3     12-21    135  |  105  |  24<H>  ----------------------------<  127<H>  6.3<HH>   |  21<L>  |  0.94    Ca    9.4      21 Dec 2023 07:40  Phos  3.3     12-21  Mg     2.00     12-21        12-21    138  |  103  |  22  ----------------------------<  154<H>  5.4<H>   |  28  |  1.03    Ca    9.9      21 Dec 2023 14:28  Phos  3.3     12-21  Mg     2.00     12-21        Urinalysis Basic - ( 21 Dec 2023 07:40 )    Color: x / Appearance: x / SG: x / pH: x  Gluc: 127 mg/dL / Ketone: x  / Bili: x / Urobili: x   Blood: x / Protein: x / Nitrite: x   Leuk Esterase: x / RBC: x / WBC x   Sq Epi: x / Non Sq Epi: x / Bacteria: x        RADIOLOGY & ADDITIONAL TESTS:    Imaging Personally Reviewed:    Consultant(s) Notes Reviewed:      Care Discussed with Consultants/Other Providers:

## 2023-12-21 NOTE — PROGRESS NOTE ADULT - ASSESSMENT
67yr old female with a pmh of DM Type 2, permanent Afib (on Eliquis), CHF (TTE 12/13 EF 45-50%; bioprosthetic MVR/AVR 2008), CAD (prior MI, RHC/LHC 12/15 mild mLAD dz), CVA (R cerebellar), recent hospitalization 12/12-16 for acute decompensated HF, who presented to [facility] ED with seizure-like activity and found to be in Afib with RVR and have an acute UTI and GABRIELLE. 67yr old female with a pmh of DM Type 2, permanent Afib (on Eliquis), CHF (TTE 12/13 EF 45-50%; bioprosthetic MVR/AVR 2008), CAD (prior MI, RHC/LHC 12/15 mild mLAD dz), CVA (R cerebellar), recent hospitalization 12/12-16 for acute decompensated HF, who presented to [facility] ED with seizure-like activity and found to be in Afib with RVR and have an acute UTI and GABRIELLE.

## 2023-12-21 NOTE — PROGRESS NOTE ADULT - PROBLEM SELECTOR PLAN 3
New  UA: LE: Moderate, WBC 24, Bacteria occasional, epithelial cells: 9  Ucx: likely containment   s/p rocephin treatment UA: LE: Moderate, WBC 24, Bacteria occasional, epithelial cells: 9  Ucx: likely containment   s/p rocephin treatment

## 2023-12-21 NOTE — PROGRESS NOTE ADULT - PROBLEM SELECTOR PLAN 7
Chronic stable   Previous MI in [facility] 2008? Unclear interventions at the time.  Cardiac cath 12/15/2023: Diagnostic Conclusions - Mild nonobstructive CAD   Continue aspirin 81mg daily and simvastatin 10mg daily. Chronic stable   Previous MI in [facility] 2008? Unclear interventions at the time.  Cardiac cath 12/15/2023: Diagnostic Conclusions - Mild nonobstructive CAD   Continue aspirin 81mg daily and simvastatin 10mg daily.

## 2023-12-21 NOTE — PROGRESS NOTE ADULT - PROBLEM SELECTOR PLAN 8
DVT PPx on Eliquis   Diet  carb cons, DASH/TLC diet   hyperkalemia : specimen hemolyzed, will repeat  PT eval for DC planning

## 2023-12-21 NOTE — PROGRESS NOTE ADULT - PROBLEM SELECTOR PLAN 2
New  presented with afib with RVR  [id number]: 8, TSH 2.08  Per cardiology progress note dated 12/15/2023: avoid BB iso conduction disease, AV block and sinus pauses and hx of syncope;   Continue Eliquis BID (educate on BID dosing prior to dc as was taking daily)  Monitor New  presented with afib with RVR  [id number]: 8, TSH 2.08  Per cardiology progress note dated 12/15/2023: avoid BB iso conduction disease, AV block and sinus pauses and hx of syncope;   Continue Eliquis BID (educate on BID dosing prior to dc as was taking daily)  Monitor presented with afib with RVR  [id number]: 8, TSH 2.08  Per cardiology progress note dated 12/15/2023: avoid BB iso conduction disease, AV block and sinus pauses and hx of syncope;   Continue Eliquis BID (educate on BID dosing prior to dc as was taking daily)  Monitor presented with afib with RVR  [id number]: 8, TSH 2.08  Per cardiology progress note dated 12/15/2023: avoid BB iso conduction disease, AV block and sinus pauses and hx of syncope;   Continue Eliquis BID (educate on BID dosing prior to dc as was taking daily)  Monitor

## 2023-12-21 NOTE — PROGRESS NOTE ADULT - PROBLEM SELECTOR PLAN 1
New   seizure vs syncope  post-ictal lethargy on presentation with no prodromal symptoms. Pt does not recall events   CT Head with no acute changes, chronic cerebellar volume loss i/s/o prior R cerebellar CVA  EEG: Normal  EEG in the awake / drowsy / asleep state(s).  Neurology consulted: No need for inpatient imaging at this time - can do MRI brain as outpatient after f/u with neurology as outpatient  [ ] Follow outpatient with Neurology for continued management. If patient does not have a Neurologist, can follow at [address] [facility] [address].  Patient can call [phone] to schedule this appointment.  If pt is without insurance, patient can follow up with [facility], located in [address] [place] [address] at [address] [place] [id number]. Patient/family can call [phone] to schedule this appointment.    Pt can likely be discharged in the AM New   seizure vs syncope  post-ictal lethargy on presentation with no prodromal symptoms. Pt does not recall events   CT Head with no acute changes, chronic cerebellar volume loss i/s/o prior R cerebellar CVA  EEG: Normal  EEG in the awake / drowsy / asleep state(s).  Neurology consulted: No need for inpatient imaging at this time - can do MRI brain as outpatient after f/u with neurology as outpatient  [ ] Follow outpatient with Neurology for continued management. If patient does not have a Neurologist, can follow at [address] [facility] [place] [address].  Patient can call [phone] to schedule this appointment.  If pt is without insurance, patient can follow up with [facility], located in [address] [place] [address] at [address] [place] [id number]. Patient/family can call [phone] to schedule this appointment.    Pt can likely be discharged in the AM seizure vs syncope  post-ictal lethargy on presentation with no prodromal symptoms. Pt does not recall events   CT Head with no acute changes, chronic cerebellar volume loss i/s/o prior R cerebellar CVA  EEG: Normal  EEG in the awake / drowsy / asleep state(s).  Neurology consulted: No need for inpatient imaging at this time - can do MRI brain as outpatient after f/u with neurology as outpatient  [ ] Follow outpatient with Neurology for continued management. If patient does not have a Neurologist, can follow at [address] [facility] [place] [address].  Patient can call [phone] to schedule this appointment.  If pt is without insurance, patient can follow up with [facility], located in [address] [place] [address] at [address] [place] [address]. Patient/family can call [phone] to schedule this appointment.    Pt can likely be discharged in the AM seizure vs syncope  post-ictal lethargy on presentation with no prodromal symptoms. Pt does not recall events   CT Head with no acute changes, chronic cerebellar volume loss i/s/o prior R cerebellar CVA  EEG: Normal  EEG in the awake / drowsy / asleep state(s).  Neurology consulted: No need for inpatient imaging at this time - can do MRI brain as outpatient after f/u with neurology as outpatient  [ ] Follow outpatient with Neurology for continued management. If patient does not have a Neurologist, can follow at [address] [facility] [place] [address].  Patient can call [phone] to schedule this appointment.  If pt is without insurance, patient can follow up with [facility], located in [address] [place] [address] at [address] [place] [address]. Patient/family can call [phone] to schedule this appointment.    Pt can likely be discharged in the AM

## 2023-12-22 ENCOUNTER — TRANSCRIPTION ENCOUNTER (OUTPATIENT)
Age: 68
End: 2023-12-22

## 2023-12-22 VITALS
RESPIRATION RATE: 18 BRPM | DIASTOLIC BLOOD PRESSURE: 76 MMHG | TEMPERATURE: 98 F | OXYGEN SATURATION: 98 % | SYSTOLIC BLOOD PRESSURE: 151 MMHG | HEART RATE: 105 BPM

## 2023-12-22 LAB
ANION GAP SERPL CALC-SCNC: 8 MMOL/L — SIGNIFICANT CHANGE UP (ref 7–14)
ANION GAP SERPL CALC-SCNC: 8 MMOL/L — SIGNIFICANT CHANGE UP (ref 7–14)
BUN SERPL-MCNC: 21 MG/DL — SIGNIFICANT CHANGE UP (ref 7–23)
BUN SERPL-MCNC: 21 MG/DL — SIGNIFICANT CHANGE UP (ref 7–23)
CALCIUM SERPL-MCNC: 9.4 MG/DL — SIGNIFICANT CHANGE UP (ref 8.4–10.5)
CALCIUM SERPL-MCNC: 9.4 MG/DL — SIGNIFICANT CHANGE UP (ref 8.4–10.5)
CHLORIDE SERPL-SCNC: 106 MMOL/L — SIGNIFICANT CHANGE UP (ref 98–107)
CHLORIDE SERPL-SCNC: 106 MMOL/L — SIGNIFICANT CHANGE UP (ref 98–107)
CO2 SERPL-SCNC: 22 MMOL/L — SIGNIFICANT CHANGE UP (ref 22–31)
CO2 SERPL-SCNC: 22 MMOL/L — SIGNIFICANT CHANGE UP (ref 22–31)
CREAT SERPL-MCNC: 0.91 MG/DL — SIGNIFICANT CHANGE UP (ref 0.5–1.3)
CREAT SERPL-MCNC: 0.91 MG/DL — SIGNIFICANT CHANGE UP (ref 0.5–1.3)
EGFR: 68 ML/MIN/1.73M2 — SIGNIFICANT CHANGE UP
EGFR: 68 ML/MIN/1.73M2 — SIGNIFICANT CHANGE UP
GLUCOSE BLDC GLUCOMTR-MCNC: 111 MG/DL — HIGH (ref 70–99)
GLUCOSE BLDC GLUCOMTR-MCNC: 111 MG/DL — HIGH (ref 70–99)
GLUCOSE BLDC GLUCOMTR-MCNC: 89 MG/DL — SIGNIFICANT CHANGE UP (ref 70–99)
GLUCOSE BLDC GLUCOMTR-MCNC: 89 MG/DL — SIGNIFICANT CHANGE UP (ref 70–99)
GLUCOSE BLDC GLUCOMTR-MCNC: 97 MG/DL — SIGNIFICANT CHANGE UP (ref 70–99)
GLUCOSE BLDC GLUCOMTR-MCNC: 97 MG/DL — SIGNIFICANT CHANGE UP (ref 70–99)
GLUCOSE SERPL-MCNC: 121 MG/DL — HIGH (ref 70–99)
GLUCOSE SERPL-MCNC: 121 MG/DL — HIGH (ref 70–99)
MAGNESIUM SERPL-MCNC: 1.9 MG/DL — SIGNIFICANT CHANGE UP (ref 1.6–2.6)
MAGNESIUM SERPL-MCNC: 1.9 MG/DL — SIGNIFICANT CHANGE UP (ref 1.6–2.6)
PHOSPHATE SERPL-MCNC: 3.1 MG/DL — SIGNIFICANT CHANGE UP (ref 2.5–4.5)
PHOSPHATE SERPL-MCNC: 3.1 MG/DL — SIGNIFICANT CHANGE UP (ref 2.5–4.5)
POTASSIUM SERPL-MCNC: 4.9 MMOL/L — SIGNIFICANT CHANGE UP (ref 3.5–5.3)
POTASSIUM SERPL-MCNC: 4.9 MMOL/L — SIGNIFICANT CHANGE UP (ref 3.5–5.3)
POTASSIUM SERPL-SCNC: 4.9 MMOL/L — SIGNIFICANT CHANGE UP (ref 3.5–5.3)
POTASSIUM SERPL-SCNC: 4.9 MMOL/L — SIGNIFICANT CHANGE UP (ref 3.5–5.3)
SODIUM SERPL-SCNC: 136 MMOL/L — SIGNIFICANT CHANGE UP (ref 135–145)
SODIUM SERPL-SCNC: 136 MMOL/L — SIGNIFICANT CHANGE UP (ref 135–145)

## 2023-12-22 PROCEDURE — 70553 MRI BRAIN STEM W/O & W/DYE: CPT | Mod: 26

## 2023-12-22 PROCEDURE — 99239 HOSP IP/OBS DSCHRG MGMT >30: CPT

## 2023-12-22 RX ORDER — ASPIRIN/CALCIUM CARB/MAGNESIUM 324 MG
1 TABLET ORAL
Qty: 0 | Refills: 0 | DISCHARGE
Start: 2023-12-22

## 2023-12-22 RX ORDER — ACETAMINOPHEN 500 MG
2 TABLET ORAL
Qty: 0 | Refills: 0 | DISCHARGE
Start: 2023-12-22

## 2023-12-22 RX ADMIN — SPIRONOLACTONE 25 MILLIGRAM(S): 25 TABLET, FILM COATED ORAL at 05:27

## 2023-12-22 RX ADMIN — Medication 81 MILLIGRAM(S): at 11:53

## 2023-12-22 RX ADMIN — SACUBITRIL AND VALSARTAN 1 TABLET(S): 24; 26 TABLET, FILM COATED ORAL at 05:26

## 2023-12-22 RX ADMIN — APIXABAN 5 MILLIGRAM(S): 2.5 TABLET, FILM COATED ORAL at 05:27

## 2023-12-22 NOTE — PROGRESS NOTE ADULT - SUBJECTIVE AND OBJECTIVE BOX
Patient is a 67y old  Female who presents with a chief complaint of Seizure-like activity (21 Dec 2023 10:33)      SUBJECTIVE / OVERNIGHT EVENTS:    MEDICATIONS  (STANDING):  apixaban 5 milliGRAM(s) Oral two times a day  aspirin enteric coated 81 milliGRAM(s) Oral daily  dextrose 5%. 1000 milliLiter(s) (50 mL/Hr) IV Continuous <Continuous>  dextrose 5%. 1000 milliLiter(s) (100 mL/Hr) IV Continuous <Continuous>  dextrose 50% Injectable 12.5 Gram(s) IV Push once  dextrose 50% Injectable 25 Gram(s) IV Push once  dextrose 50% Injectable 25 Gram(s) IV Push once  glucagon  Injectable 1 milliGRAM(s) IntraMuscular once  insulin lispro (ADMELOG) corrective regimen sliding scale   SubCutaneous at bedtime  insulin lispro (ADMELOG) corrective regimen sliding scale   SubCutaneous three times a day before meals  sacubitril 49 mG/valsartan 51 mG 1 Tablet(s) Oral two times a day  simvastatin 10 milliGRAM(s) Oral at bedtime  spironolactone 25 milliGRAM(s) Oral daily    MEDICATIONS  (PRN):  acetaminophen     Tablet .. 650 milliGRAM(s) Oral every 6 hours PRN Temp greater or equal to 38C (100.4F), Mild Pain (1 - 3)  dextrose Oral Gel 15 Gram(s) Oral once PRN Blood Glucose LESS THAN 70 milliGRAM(s)/deciliter      Vital Signs Last 24 Hrs  T(C): 36.7 (22 Dec 2023 05:31), Max: 36.7 (22 Dec 2023 05:31)  T(F): 98 (22 Dec 2023 05:31), Max: 98 (22 Dec 2023 05:31)  HR: 76 (22 Dec 2023 05:31) (75 - 84)  BP: 132/98 (22 Dec 2023 05:31) (132/98 - 144/63)  BP(mean): --  RR: 18 (22 Dec 2023 05:31) (17 - 18)  SpO2: 100% (22 Dec 2023 05:31) (100% - 100%)    Parameters below as of 22 Dec 2023 05:31  Patient On (Oxygen Delivery Method): room air      CAPILLARY BLOOD GLUCOSE      POCT Blood Glucose.: 111 mg/dL (22 Dec 2023 08:21)  POCT Blood Glucose.: 115 mg/dL (21 Dec 2023 22:32)  POCT Blood Glucose.: 91 mg/dL (21 Dec 2023 17:43)  POCT Blood Glucose.: 119 mg/dL (21 Dec 2023 12:18)    I&O's Summary      PHYSICAL EXAM:  GENERAL: NAD, well-developed  HEAD:  Atraumatic, Normocephalic  EYES: EOMI, PERRLA, conjunctiva and sclera clear  NECK: Supple, No JVD  CHEST/LUNG: Clear to auscultation bilaterally; No wheeze  HEART: Regular rate and rhythm; No murmurs, rubs, or gallops  ABDOMEN: Soft, Nontender, Nondistended; Bowel sounds present  EXTREMITIES:  2+ Peripheral Pulses, No clubbing, cyanosis, or edema  PSYCH: AAOx3  NEUROLOGY: non-focal  SKIN: No rashes or lesions    LABS:                        15.5   8.83  )-----------( 158      ( 21 Dec 2023 07:40 )             49.3     12-22    136  |  106  |  21  ----------------------------<  121<H>  4.9   |  22  |  0.91    Ca    9.4      22 Dec 2023 06:54  Phos  3.1     12-22  Mg     1.90     12-22            Urinalysis Basic - ( 22 Dec 2023 06:54 )    Color: x / Appearance: x / SG: x / pH: x  Gluc: 121 mg/dL / Ketone: x  / Bili: x / Urobili: x   Blood: x / Protein: x / Nitrite: x   Leuk Esterase: x / RBC: x / WBC x   Sq Epi: x / Non Sq Epi: x / Bacteria: x        RADIOLOGY & ADDITIONAL TESTS:  < from: MR Head w/wo IV Cont (12.22.23 @ 03:48) >  FINDINGS: A small chronic transcortical infarct is seen within the right   lateral frontal lobe.    A small chronic area of encephalomalacia and gliosis seen within the   right superior cerebellar hemisphere with surrounding hemosiderin   staining. Disproportionate volume loss of the right cerebellar hemisphere   is again seen which appears unchanged.    Disproportionate left-sided hippocampal volume loss is seen along with   hyperintense signal. There is no disproportionate volume loss of the left   fornix.    Multiple somewhat patchy confluent nonspecific T2/FLAIR hyperintense   signal changes are noted throughout the bihemispheric white matter   without associated mass effect or restricted diffusion.    A few scattered foci of susceptibility artifact are seen within the left   cerebral hemisphere which may represent areas of chronic microhemorrhage.    There is no abnormal brain parenchymalor leptomeningeal enhancement.    Ventricular size and configuration is unremarkable. No abnormal   extra-axial fluid collections are seen Flow-voids are noted throughout   the major intracranial vessels, on the T2 weighted images, consistent   with their patency. A partially empty sella is seen which appears   unchanged.    A polyp versus retention cyst is seen within the right maxillary sinus.   Additional scattered mucosal thickening is seen throughout the remainder   of the paranasal sinuses.The tympanic mastoid cavities are clear.   Calvarial signal appears unremarkable. The orbits appear within normal   limits.    IMPRESSION: Left-sided mesial temporal sclerosis.    No acute intracranial hemorrhage or evidence of acute ischemia.    Chronic infarcts within the right lateral posterior frontal lobe and   right cerebellar hemisphere.    Unchanged disproportionate volume loss involving the right cerebellar   hemisphere.    < end of copied text >    Imaging Personally Reviewed:    Consultant(s) Notes Reviewed:      Care Discussed with Consultants/Other Providers:   Patient is a 67y old  Female who presents with a chief complaint of Seizure-like activity (21 Dec 2023 10:33)      SUBJECTIVE / OVERNIGHT EVENTS: patient seen and examined by bedside, pt alert and awake, no acute distress noted  no acute events over night  Tele: Afib @80-90    MEDICATIONS  (STANDING):  apixaban 5 milliGRAM(s) Oral two times a day  aspirin enteric coated 81 milliGRAM(s) Oral daily  dextrose 5%. 1000 milliLiter(s) (50 mL/Hr) IV Continuous <Continuous>  dextrose 5%. 1000 milliLiter(s) (100 mL/Hr) IV Continuous <Continuous>  dextrose 50% Injectable 12.5 Gram(s) IV Push once  dextrose 50% Injectable 25 Gram(s) IV Push once  dextrose 50% Injectable 25 Gram(s) IV Push once  glucagon  Injectable 1 milliGRAM(s) IntraMuscular once  insulin lispro (ADMELOG) corrective regimen sliding scale   SubCutaneous at bedtime  insulin lispro (ADMELOG) corrective regimen sliding scale   SubCutaneous three times a day before meals  sacubitril 49 mG/valsartan 51 mG 1 Tablet(s) Oral two times a day  simvastatin 10 milliGRAM(s) Oral at bedtime  spironolactone 25 milliGRAM(s) Oral daily    MEDICATIONS  (PRN):  acetaminophen     Tablet .. 650 milliGRAM(s) Oral every 6 hours PRN Temp greater or equal to 38C (100.4F), Mild Pain (1 - 3)  dextrose Oral Gel 15 Gram(s) Oral once PRN Blood Glucose LESS THAN 70 milliGRAM(s)/deciliter      Vital Signs Last 24 Hrs  T(C): 36.7 (22 Dec 2023 05:31), Max: 36.7 (22 Dec 2023 05:31)  T(F): 98 (22 Dec 2023 05:31), Max: 98 (22 Dec 2023 05:31)  HR: 76 (22 Dec 2023 05:31) (75 - 84)  BP: 132/98 (22 Dec 2023 05:31) (132/98 - 144/63)  BP(mean): --  RR: 18 (22 Dec 2023 05:31) (17 - 18)  SpO2: 100% (22 Dec 2023 05:31) (100% - 100%)    Parameters below as of 22 Dec 2023 05:31  Patient On (Oxygen Delivery Method): room air      CAPILLARY BLOOD GLUCOSE      POCT Blood Glucose.: 111 mg/dL (22 Dec 2023 08:21)  POCT Blood Glucose.: 115 mg/dL (21 Dec 2023 22:32)  POCT Blood Glucose.: 91 mg/dL (21 Dec 2023 17:43)  POCT Blood Glucose.: 119 mg/dL (21 Dec 2023 12:18)    I&O's Summary      PHYSICAL EXAM:  GENERAL: NAD,   HEAD:  Atraumatic, Normocephalic  EYES: EOMI, PERRL, conjunctiva and sclera clear  NECK: Supple, No JVD  CHEST/LUNG: Clear to auscultation bilaterally; No wheezes, rales or rhonchi  HEART: irregularly irregular; No murmurs, rubs, or gallops, (+)S1, S2  ABDOMEN: Soft, Nontender, Nondistended; Normal Bowel sounds   EXTREMITIES:  2+ Peripheral Pulses, No clubbing, cyanosis, or edema  PSYCH: normal mood and affect  NEUROLOGY: AAOx3, moving all extremities spontaneously   SKIN: No rashes or lesions        LABS:                        15.5   8.83  )-----------( 158      ( 21 Dec 2023 07:40 )             49.3     12-22    136  |  106  |  21  ----------------------------<  121<H>  4.9   |  22  |  0.91    Ca    9.4      22 Dec 2023 06:54  Phos  3.1     12-22  Mg     1.90     12-22            Urinalysis Basic - ( 22 Dec 2023 06:54 )    Color: x / Appearance: x / SG: x / pH: x  Gluc: 121 mg/dL / Ketone: x  / Bili: x / Urobili: x   Blood: x / Protein: x / Nitrite: x   Leuk Esterase: x / RBC: x / WBC x   Sq Epi: x / Non Sq Epi: x / Bacteria: x        RADIOLOGY & ADDITIONAL TESTS:  < from: MR Head w/wo IV Cont (12.22.23 @ 03:48) >  FINDINGS: A small chronic transcortical infarct is seen within the right   lateral frontal lobe.    A small chronic area of encephalomalacia and gliosis seen within the   right superior cerebellar hemisphere with surrounding hemosiderin   staining. Disproportionate volume loss of the right cerebellar hemisphere   is again seen which appears unchanged.    Disproportionate left-sided hippocampal volume loss is seen along with   hyperintense signal. There is no disproportionate volume loss of the left   fornix.    Multiple somewhat patchy confluent nonspecific T2/FLAIR hyperintense   signal changes are noted throughout the bihemispheric white matter   without associated mass effect or restricted diffusion.    A few scattered foci of susceptibility artifact are seen within the left   cerebral hemisphere which may represent areas of chronic microhemorrhage.    There is no abnormal brain parenchymalor leptomeningeal enhancement.    Ventricular size and configuration is unremarkable. No abnormal   extra-axial fluid collections are seen Flow-voids are noted throughout   the major intracranial vessels, on the T2 weighted images, consistent   with their patency. A partially empty sella is seen which appears   unchanged.    A polyp versus retention cyst is seen within the right maxillary sinus.   Additional scattered mucosal thickening is seen throughout the remainder   of the paranasal sinuses.The tympanic mastoid cavities are clear.   Calvarial signal appears unremarkable. The orbits appear within normal   limits.    IMPRESSION: Left-sided mesial temporal sclerosis.    No acute intracranial hemorrhage or evidence of acute ischemia.    Chronic infarcts within the right lateral posterior frontal lobe and   right cerebellar hemisphere.    Unchanged disproportionate volume loss involving the right cerebellar   hemisphere.    < end of copied text >    Imaging Personally Reviewed:    Consultant(s) Notes Reviewed:  Neurology     Care Discussed with Consultants/Other Providers: Neurology

## 2023-12-22 NOTE — DISCHARGE NOTE PROVIDER - PROVIDER TOKENS
PROVIDER:[TOKEN:[[id number]],FOLLOWUP:[1 week]] PROVIDER:[TOKEN:[[id number]],FOLLOWUP:[1 week]]

## 2023-12-22 NOTE — PHYSICAL THERAPY INITIAL EVALUATION ADULT - PERTINENT HX OF CURRENT PROBLEM, REHAB EVAL
Patient presented with seizure like activity as well as hypotension and found to have atrial fibrillation

## 2023-12-22 NOTE — PROGRESS NOTE ADULT - ASSESSMENT
67yr old female with a pmh of DM Type 2, permanent Afib (on Eliquis), CHF (TTE 12/13 EF 45-50%; bioprosthetic MVR/AVR 2008), CAD (prior MI, RHC/LHC 12/15 mild mLAD dz), CVA (R cerebellar), recent hospitalization 12/12-16 for acute decompensated HF, who presented to [facility] ED with seizure-like activity and found to be in Afib with RVR and have an acute UTI and GABRIELLE. 67yr old female with a pmh of DM Type 2, permanent Afib (on Eliquis), CHF (TTE 12/13 EF 45-50%; bioprosthetic MVR/AVR 2008), CAD (prior MI, RHC/LHC 12/15 mild mLAD dz), CVA (R cerebellar), recent hospitalization 12/12-16 for acute decompensated HF, who presented to [facility] ED with seizure-like activity and found to be in Afib with RVR and have an acute UTI and GABRIELLE.

## 2023-12-22 NOTE — PROGRESS NOTE ADULT - PROBLEM SELECTOR PLAN 4
Chronic stable  TTE 2021 and 2023, per outpatient cards note had worsening gradients across bioprosthetic valves from 2021->2023   Continue Entresto 49-51mg BID, spironolactone 25mg daily, lasix 40mg daily  Holding Hydralazine 25mg TID and Isordil 10mg TID- restart as appropriate   Monitor Chronic stable  TTE 2021 and 2023, per outpatient cards note had worsening gradients across bioprosthetic valves from 2021->2023   Continue Entresto 49-51mg BID, spironolactone 25mg daily, lasix 40mg daily  Holding Hydralazine 25mg TID and Isordil 10mg TID- restart as appropriate   Monitor  pt and family advised to f/u with cardiology as outpt

## 2023-12-22 NOTE — DISCHARGE NOTE NURSING/CASE MANAGEMENT/SOCIAL WORK - PATIENT PORTAL LINK FT
You can access the FollowMyHealth Patient Portal offered by [facility] by registering at the following website: [email or web address]. By joining [employer]’s FollowMyHealth portal, you will also be able to view your health information using other applications (apps) compatible with our system. You can access the FollowMyHealth Patient Portal offered by [facility] by registering at the following website: [email or web address]. By joining [employer]’s FollowMyHealth portal, you will also be able to view your health information using other applications (apps) compatible with our system.

## 2023-12-22 NOTE — PROGRESS NOTE ADULT - PROBLEM SELECTOR PROBLEM 4
GABRIELLE (acute kidney injury)
Heart failure with mildly reduced ejection fraction (HFmrEF)

## 2023-12-22 NOTE — CHART NOTE - NSCHARTNOTEFT_GEN_A_CORE
MR Head w/wo IV Cont (12.22.23 @ 03:48)     IMPRESSION: Left-sided mesial temporal sclerosis.  No acute intracranial hemorrhage or evidence of acute ischemia.  Chronic infarcts within the right lateral posterior frontal lobe and   right cerebellar hemisphere.  Unchanged disproportionate volume loss involving the right cerebellar   hemisphere.    EEG Classification / Summary:  Study Date: 12- 3912 - 3592 12-20-23  Duration x Hours: 20 hrs 19 min    Normal  EEG in the awake / drowsy / asleep state(s).    Clinical Impression:  There were no epileptiform abnormalities recorded.    **In absence of additional clinical concerns, recommend consideration for discontinuation of current EEG study with reconnection in future if warranted.  __________________________________________________________________________________    Impression:     Ms. Cano is a 66 y/o Creole speaking woman with a PMHx significant for DM2, Afib (on Eliquis), CHF, bioprosthetic MVR/AVR, MI, Stroke (R cerebellar) admitted for Afib with RVR, UTI. Neurology consulted for seizure-like activity. Daughter states patient was eating breakfast, then noted to leaning her head against the pantry, she was groaning, eyes open and had shaking of all her extremities. Denies tongue biting or incontinence. Episode lasted 15-20 seconds and she was sleepy afterwards. On exam mild dysmetria to right side. CTH no acute findings. Video EEG with no seizures or epileptiform activity. MRI showing L mesial temporal sclerosis. This episode may have been 2/2 convulsive syncope or perhaps first lifetime seizure triggered by infection. Will not start anti-seizure medications at this time. Patient can follow outpatient for further evaluation     Recommendation:  [x] vEEG reviewed, normal with no epileptiform activity   [x] MRI brain w/ and w/o reviewed  [ ] Will monitor off anti-seizure medications at this time  [x] Seizure, fall and aspiration precautions  [x] Given concern for seizure, advise patient to not drive, operate heavy machinery, avoid heights, pools, bathtubs, locked doors until cleared by further follow up outpatient by neurology.    [ ] Please advise patient/family to seek medical attention if there is a second event as she may benefit from anti-seizure medications and evaluation for Epilepsy  [ ] Neurology to sign off at this time, please page if there are additional questions  [ ] Follow outpatient with Neurology (Epilepsy) for continued management. If patient does not have a Neurologist, can follow at [address] [facility]. [address] [place] [address].  Patient can call [phone] to schedule this appointment.  If pt is without insurance, patient can follow up with [facility], located in [address] [place] [address] at [address] [place] [address]. Patient/family can call [phone] to schedule this appointment.    Case discussed with Neurology attending Dr. Woodruff MR Head w/wo IV Cont (12.22.23 @ 03:48)     IMPRESSION: Left-sided mesial temporal sclerosis.  No acute intracranial hemorrhage or evidence of acute ischemia.  Chronic infarcts within the right lateral posterior frontal lobe and   right cerebellar hemisphere.  Unchanged disproportionate volume loss involving the right cerebellar   hemisphere.    EEG Classification / Summary:  Study Date: 12- 2128 - 7564 12-20-23  Duration x Hours: 20 hrs 19 min    Normal  EEG in the awake / drowsy / asleep state(s).    Clinical Impression:  There were no epileptiform abnormalities recorded.    **In absence of additional clinical concerns, recommend consideration for discontinuation of current EEG study with reconnection in future if warranted.  __________________________________________________________________________________    Impression:     Ms. Cano is a 68 y/o Creole speaking woman with a PMHx significant for DM2, Afib (on Eliquis), CHF, bioprosthetic MVR/AVR, MI, Stroke (R cerebellar) admitted for Afib with RVR, UTI. Neurology consulted for seizure-like activity. Daughter states patient was eating breakfast, then noted to leaning her head against the pantry, she was groaning, eyes open and had shaking of all her extremities. Denies tongue biting or incontinence. Episode lasted 15-20 seconds and she was sleepy afterwards. On exam mild dysmetria to right side. CTH no acute findings. Video EEG with no seizures or epileptiform activity. MRI showing L mesial temporal sclerosis. This episode may have been 2/2 convulsive syncope or perhaps first lifetime seizure triggered by infection. Will not start anti-seizure medications at this time. Patient can follow outpatient for further evaluation     Recommendation:  [x] vEEG reviewed, normal with no epileptiform activity   [x] MRI brain w/ and w/o reviewed  [ ] Will monitor off anti-seizure medications at this time  [x] Seizure, fall and aspiration precautions  [x] Given concern for seizure, advise patient to not drive, operate heavy machinery, avoid heights, pools, bathtubs, locked doors until cleared by further follow up outpatient by neurology.    [ ] Please advise patient/family to seek medical attention if there is a second event as she may benefit from anti-seizure medications and evaluation for Epilepsy  [ ] Neurology to sign off at this time, please page if there are additional questions  [ ] Follow outpatient with Neurology (Epilepsy) for continued management. If patient does not have a Neurologist, can follow at [address] [facility]. [address] [place] [address].  Patient can call [phone] to schedule this appointment.  If pt is without insurance, patient can follow up with [facility], located in [address] [place] [address] at [address] [place] [address]. Patient/family can call [phone] to schedule this appointment.    Case discussed with Neurology attending Dr. Woodruff MR Head w/wo IV Cont (12.22.23 @ 03:48)     IMPRESSION: Left-sided mesial temporal sclerosis.  No acute intracranial hemorrhage or evidence of acute ischemia.  Chronic infarcts within the right lateral posterior frontal lobe and   right cerebellar hemisphere.  Unchanged disproportionate volume loss involving the right cerebellar   hemisphere.    EEG Classification / Summary:  Study Date: 12- 1431 - 7788 12-20-23  Duration x Hours: 20 hrs 19 min    Normal  EEG in the awake / drowsy / asleep state(s).    Clinical Impression:  There were no epileptiform abnormalities recorded.    **In absence of additional clinical concerns, recommend consideration for discontinuation of current EEG study with reconnection in future if warranted.  __________________________________________________________________________________    Impression:     Ms. Cano is a 68 y/o Creole speaking woman with a PMHx significant for DM2, Afib (on Eliquis), CHF, bioprosthetic MVR/AVR, MI, Stroke (R cerebellar) admitted for Afib with RVR, UTI. Neurology consulted for seizure-like activity. Daughter states patient was eating breakfast, then noted to leaning her head against the pantry, she was groaning, eyes open and had shaking of all her extremities. Denies tongue biting or incontinence. Episode lasted 15-20 seconds and she was sleepy afterwards. On exam mild dysmetria to right side. CTH no acute findings. Video EEG with no seizures or epileptiform activity. MRI showing L mesial temporal sclerosis. This episode may have been 2/2 convulsive syncope or perhaps first lifetime seizure triggered by infection. Will not start anti-seizure medications at this time. Patient can follow outpatient for further evaluation     Recommendation:  [x] vEEG reviewed, normal with no epileptiform activity   [x] MRI brain w/ and w/o reviewed  [ ] Will monitor off anti-seizure medications at this time  [x] Seizure, fall and aspiration precautions  [x] Given concern for seizure, advise patient to not drive, operate heavy machinery, avoid heights, pools, bathtubs, locked doors until cleared by further follow up outpatient by neurology.    [ ] Please advise patient/family to seek medical attention if there is a second event as she may benefit from anti-seizure medications and evaluation for Epilepsy  [ ] Neurology to sign off at this time, please page if there are additional questions  [ ] Follow outpatient with Neurology (Epilepsy) for continued management. If patient does not have a Neurologist, can follow at [address] [facility]. [address] [place] [address].  Patient can call [phone] to schedule this appointment.  If pt is without insurance, patient can follow up with [facility], located in [address] [place] [address] at [address] [place] [address]. Patient/family can call [phone] to schedule this appointment.    Case discussed with Neurology attending Dr. Woodruff  Thank you MR Head w/wo IV Cont (12.22.23 @ 03:48)     IMPRESSION: Left-sided mesial temporal sclerosis.  No acute intracranial hemorrhage or evidence of acute ischemia.  Chronic infarcts within the right lateral posterior frontal lobe and   right cerebellar hemisphere.  Unchanged disproportionate volume loss involving the right cerebellar   hemisphere.    EEG Classification / Summary:  Study Date: 12- 1231 - 2704 12-20-23  Duration x Hours: 20 hrs 19 min    Normal  EEG in the awake / drowsy / asleep state(s).    Clinical Impression:  There were no epileptiform abnormalities recorded.    **In absence of additional clinical concerns, recommend consideration for discontinuation of current EEG study with reconnection in future if warranted.  __________________________________________________________________________________    Impression:     Ms. Cano is a 66 y/o Creole speaking woman with a PMHx significant for DM2, Afib (on Eliquis), CHF, bioprosthetic MVR/AVR, MI, Stroke (R cerebellar) admitted for Afib with RVR, UTI. Neurology consulted for seizure-like activity. Daughter states patient was eating breakfast, then noted to leaning her head against the pantry, she was groaning, eyes open and had shaking of all her extremities. Denies tongue biting or incontinence. Episode lasted 15-20 seconds and she was sleepy afterwards. On exam mild dysmetria to right side. CTH no acute findings. Video EEG with no seizures or epileptiform activity. MRI showing L mesial temporal sclerosis. This episode may have been 2/2 convulsive syncope or perhaps first lifetime seizure triggered by infection. Will not start anti-seizure medications at this time. Patient can follow outpatient for further evaluation     Recommendation:  [x] vEEG reviewed, normal with no epileptiform activity   [x] MRI brain w/ and w/o reviewed  [ ] Will monitor off anti-seizure medications at this time  [x] Seizure, fall and aspiration precautions  [x] Given concern for seizure, advise patient to not drive, operate heavy machinery, avoid heights, pools, bathtubs, locked doors until cleared by further follow up outpatient by neurology.    [ ] Please advise patient/family to seek medical attention if there is a second event as she may benefit from anti-seizure medications and evaluation for Epilepsy  [ ] Neurology to sign off at this time, please page if there are additional questions  [ ] Follow outpatient with Neurology (Epilepsy) for continued management. If patient does not have a Neurologist, can follow at [address] [facility]. [address] [place] [address].  Patient can call [phone] to schedule this appointment.  If pt is without insurance, patient can follow up with [facility], located in [address] [place] [address] at [address] [place] [address]. Patient/family can call [phone] to schedule this appointment.    Case discussed with Neurology attending Dr. Woodruff  Thank you

## 2023-12-22 NOTE — PROGRESS NOTE ADULT - PROBLEM SELECTOR PLAN 3
UA: LE: Moderate, WBC 24, Bacteria occasional, epithelial cells: 9  Ucx: likely containment   s/p rocephin treatment

## 2023-12-22 NOTE — DISCHARGE NOTE PROVIDER - NSDCMRMEDTOKEN_GEN_ALL_CORE_FT
Eliquis 5 mg oral tablet: 1 tab(s) orally every 12 hours  furosemide 40 mg oral tablet: 1 tab(s) orally once a day  hydrALAZINE 25 mg oral tablet: 1 tab(s) orally 3 times a day  isosorbide dinitrate 10 mg oral tablet: 1 tab(s) orally 3 times a day  metFORMIN 500 mg oral tablet, extended release: 1 tab(s) orally once a day  sacubitril-valsartan 49 mg-51 mg oral tablet: 1 tab(s) orally 2 times a day  simvastatin 10 mg oral tablet: 1 tab(s) orally once a day (at bedtime)  spironolactone 25 mg oral tablet: 1 tab(s) orally once a day  Vitamin D2 2000 intl units (50 mcg) oral capsule: 1  orally once a day   acetaminophen 325 mg oral tablet: 2 tab(s) orally every 6 hours As needed Temp greater or equal to 38C (100.4F), Mild Pain (1 - 3)  aspirin 81 mg oral delayed release tablet: 1 tab(s) orally once a day  Eliquis 5 mg oral tablet: 1 tab(s) orally every 12 hours  furosemide 40 mg oral tablet: 1 tab(s) orally once a day  metFORMIN 500 mg oral tablet, extended release: 1 tab(s) orally once a day  sacubitril-valsartan 49 mg-51 mg oral tablet: 1 tab(s) orally 2 times a day  simvastatin 10 mg oral tablet: 1 tab(s) orally once a day (at bedtime)  spironolactone 25 mg oral tablet: 1 tab(s) orally once a day  Vitamin D2 2000 intl units (50 mcg) oral capsule: 1  orally once a day

## 2023-12-22 NOTE — PROGRESS NOTE ADULT - PROBLEM SELECTOR PLAN 7
Chronic stable   Previous MI in [facility] 2008? Unclear interventions at the time.  Cardiac cath 12/15/2023: Diagnostic Conclusions - Mild nonobstructive CAD   Continue aspirin 81mg daily and simvastatin 10mg daily. Chronic stable   Previous MI in [facility] 2008? Unclear interventions at the time.  Cardiac cath 12/15/2023: Diagnostic Conclusions - Mild nonobstructive CAD   Continue aspirin 81mg daily and simvastatin 10mg daily.

## 2023-12-22 NOTE — DISCHARGE NOTE NURSING/CASE MANAGEMENT/SOCIAL WORK - NSDCPEFALRISK_GEN_ALL_CORE
For information on Fall & Injury Prevention, visit: [email or web address] [email or web address]-[email or web address]-[email or web address]-mobility OR  [email or web address]://www.[email or web address] [place]/[email or web address]-tips-[email or web address]-injury OR  https://www.cdc.gov/steadi/patient.html For information on Fall & Injury Prevention, visit: [email or web address]://www.[facility]/news/fall-prevention-protects-and-maintains-health-and-mobility OR  https://www.[facility]/news/fall-prevention-tips-to-avoid-injury OR  https://www.cdc.gov/steadi/patient.html

## 2023-12-22 NOTE — PROGRESS NOTE ADULT - PROBLEM SELECTOR PLAN 8
DVT PPx on Eliquis   Diet  carb cons, DASH/TLC diet   hyperkalemia : specimen hemolyzed, will repeat  PT eval for DC planning DVT PPx on Eliquis   Diet  carb cons, DASH/TLC diet   hyperkalemia : specimen hemolyzed,  K 4.9 on repeat  PT eval for DC planning- rec home PT  dc home today   plan of care d/w pt and sister at bedside   Patient hemodynamically stable for discharge home  Time spent in discharge process is 40 min

## 2023-12-22 NOTE — DISCHARGE NOTE PROVIDER - NSDCFUADDAPPT_GEN_ALL_CORE_FT
You can follow up with [facility], located in [address] [place] [address]. Patient/family can call [phone] to schedule this appointment. You can follow up with [facility], located in [address] [place] [address]. Patient/family can call [phone] to schedule this appointment.

## 2023-12-22 NOTE — PROGRESS NOTE ADULT - PROBLEM SELECTOR PLAN 5
Chronic moderate exacerbation   /60  Previously on HCTZ and lisinopirl- now discontinued  Continue Entresto 49-51mg BID and spironolactone 25mg daily   Holding Hydralazine 25mg TID and Isordil 10mg TID- restart as appropriate   Monitor
Room spinning and dizziness, positionally triggers. Symptoms began after cardiac cath 12/15.  Transient, not a/w hearing changes.  Likely BPPV vs less likely vertebrobasilar insufficiency/new CVA vs complication of old cerebellar CVA vs vestibular neuritis vs epileptic vertigo.  - CTH w/o contrast does not show acute changes, chronic cerebellar volume loss likely 2/2 historical CVA  > consider symptomatic treatment depending on symptom severity; antihistamines, BZDs, antiemetics  > may benefit from vestibular rehab if symptoms continue given hx of cerebellar CVA  > attempt Epley if symptoms continue

## 2023-12-22 NOTE — DISCHARGE NOTE NURSING/CASE MANAGEMENT/SOCIAL WORK - NSDCFUADDAPPT_GEN_ALL_CORE_FT
You can follow up with [facility], located in [address] [place] [address]. Patient/family can call [phone] to schedule this appointment. You can follow up with [facility], located in [address] [place] [address]. Patient/family can call [phone] to schedule this appointment.

## 2023-12-22 NOTE — PROGRESS NOTE ADULT - PROBLEM SELECTOR PLAN 1
seizure vs syncope  post-ictal lethargy on presentation with no prodromal symptoms. Pt does not recall events   CT Head with no acute changes, chronic cerebellar volume loss i/s/o prior R cerebellar CVA  EEG: Normal  EEG in the awake / drowsy / asleep state(s).  Neurology consulted: MRI noted as above   [ ] Follow outpatient with Neurology for continued management. If patient does not have a Neurologist, can follow at [address] [facility]. [address] [place] [address].  Patient can call [phone] to schedule this appointment.  If pt is without insurance, patient can follow up with [facility], located in [address] [place] [address] at [address] [place] [address]. Patient/family can call [phone] to schedule this appointment.    Pt can likely be discharged in the AM seizure vs syncope  post-ictal lethargy on presentation with no prodromal symptoms. Pt does not recall events   CT Head with no acute changes, chronic cerebellar volume loss i/s/o prior R cerebellar CVA  EEG: Normal  EEG in the awake / drowsy / asleep state(s).  Neurology consulted: MRI noted as above   [ ] Follow outpatient with Neurology for continued management. If patient does not have a Neurologist, can follow at [address] [facility]. [address] [address].  Patient can call [phone] to schedule this appointment.  If pt is without insurance, patient can follow up with [facility], located in [address] [place] [address] at [address] [place] [address]. Patient/family can call [phone] to schedule this appointment.    Pt can likely be discharged in the AM seizure vs syncope  post-ictal lethargy on presentation with no prodromal symptoms. Pt does not recall events   CT Head with no acute changes, chronic cerebellar volume loss i/s/o prior R cerebellar CVA  EEG: Normal  EEG in the awake / drowsy / asleep state(s).  Neurology consulted: MRI noted as above , case d/w Neurology , Neurology chart note reviewed, rec to monitor off anti-seizure medications at this time  Seizure, fall and aspiration precautions,  Given concern for seizure, advise patient to not drive, operate heavy machinery, avoid heights, pools, bathtubs, locked doors until cleared by further follow up outpatient by neurology.    patient/family to seek medical attention if there is a second event as she may benefit from anti-seizure medications and evaluation for Epilepsy   Follow outpatient with Neurology for continued management. If patient does not have a Neurologist, can follow at [address] [place] [address]. [address] [address].  Patient can call [phone] to schedule this appointment.  If pt is without insurance, patient can follow up with [facility], located in [address] [place] [address] at [address] [place] [address]. Patient/family can call [phone] to schedule this appointment. seizure vs syncope  post-ictal lethargy on presentation with no prodromal symptoms. Pt does not recall events   CT Head with no acute changes, chronic cerebellar volume loss i/s/o prior R cerebellar CVA  EEG: Normal  EEG in the awake / drowsy / asleep state(s).  Neurology consulted: MRI noted as above , case d/w Neurology , Neurology chart note reviewed, rec to monitor off anti-seizure medications at this time  Seizure, fall and aspiration precautions,  Given concern for seizure, advise patient to not drive, operate heavy machinery, avoid heights, pools, bathtubs, locked doors until cleared by further follow up outpatient by neurology.    patient/family to seek medical attention if there is a second event as she may benefit from anti-seizure medications and evaluation for Epilepsy   Follow outpatient with Neurology for continued management. If patient does not have a Neurologist, can follow at [address] [place] [address]. [address] [address].  Patient can call [phone] to schedule this appointment.  If pt is without insurance, patient can follow up with [facility], located in [address] [place] [address] at [address] [place] [address]. Patient/family can call [phone] to schedule this appointment.

## 2023-12-22 NOTE — DISCHARGE NOTE PROVIDER - NSDCCPTREATMENT_GEN_ALL_CORE_FT
PRINCIPAL PROCEDURE  Procedure: MRI  Findings and Treatment:   FINDINGS: A small chronic transcortical infarct is seen within the right   lateral frontal lobe.  A small chronic area of encephalomalacia and gliosis seen within the   right superior cerebellar hemisphere with surrounding hemosiderin   staining. Disproportionate volume loss of the right cerebellar hemisphere   is again seen which appears unchanged.  Disproportionate left-sided hippocampal volume loss is seen along with   hyperintense signal. There is no disproportionate volume loss of the left   fornix.  < end of copied text >  Multiple somewhat patchy confluent nonspecific T2/FLAIR hyperintense   signal changes are noted throughout the bihemispheric white matter   without associated mass effect or restricted diffusion.  A few scattered foci of susceptibility artifact are seen within the left   cerebral hemisphere which may represent areas of chronic microhemorrhage.  There is no abnormal brain parenchymalor leptomeningeal enhancement.  Ventricular size and configuration is unremarkable. No abnormal   extra-axial fluid collections are seen Flow-voids are noted throughout   the major intracranial vessels, on the T2 weighted images, consistent   with their patency. A partially empty sella is seen which appears   unchanged.  A polyp versus retention cyst is seen within the right maxillary sinus.   Additional scattered mucosal thickening is seen throughout the remainder   of the paranasal sinuses.The tympanic mastoid cavities are clear.   Calvarial signal appears unremarkable. The orbits appear within normal   limits.  IMPRESSION: Left-sided mesial temporal sclerosis.  No acute intracranial hemorrhage or evidence of acute ischemia.  Chronic infarcts within the right lateral posterior frontal lobe and   right cerebellar hemisphere.  Unchanged disproportionate volume loss involving the right cerebellar   hemisphere.< from: MR Head w/wo IV Cont (12.22.23 @ 03:48) >        SECONDARY PROCEDURE  Procedure: EEG, awake and asleep  Findings and Treatment: ECG:  The heart rate on single channel ECG was predominantly between 60-70 BPM.  EEG Classification / Summary:  Normal  EEG in the awake / drowsy / asleep state(s).  Clinical Impression:  There were no epileptiform abnormalities recorded.    **In absence of additional clinical concerns, recommend consideration for discontinuation of current EEG study with reconnection in future if warranted.

## 2023-12-22 NOTE — DISCHARGE NOTE PROVIDER - CARE PROVIDER_API CALL
Cleveland Posdaa (DO)  Family Medicine  [address] [facility], [address]  [place] [address]  Phone: [phone]  Fax: [phone]  Follow Up Time: 1 week   Cleveland Posada (DO)  Family Medicine  [address] [facility], [address]  [place] [address]  Phone: [phone]  Fax: [phone]  Follow Up Time: 1 week

## 2023-12-22 NOTE — DISCHARGE NOTE PROVIDER - HOSPITAL COURSE
67yr old female with a pmh of DM Type 2, permanent Afib (on Eliquis), CHF (TTE 12/13 EF 45-50%; bioprosthetic MVR/AVR 2008), CAD (prior MI, RHC/LHC 12/15 mild mLAD dz), CVA (R cerebellar), recent hospitalization 12/12-16 for acute decompensated HF, who presented to [facility] ED with seizure-like activity and found to be in Afib with RVR and have an acute UTI and GABRIELLE.       Seizure-like activity.   seizure vs syncope  post-ictal lethargy on presentation with no prodromal symptoms. Pt does not recall events   CT Head with no acute changes, chronic cerebellar volume loss i/s/o prior R cerebellar CVA  EEG: Normal  EEG in the awake / drowsy / asleep state(s).  Neurology consulted: MRI noted as above , case d/w Neurology , Neurology chart note reviewed, rec to monitor off anti-seizure medications at this time  Seizure, fall and aspiration precautions,  Given concern for seizure, advise patient to not drive, operate heavy machinery, avoid heights, pools, bathtubs, locked doors until cleared by further follow up outpatient by neurology.    patient/family to seek medical attention if there is a second event as she may benefit from anti-seizure medications and evaluation for Epilepsy   Follow outpatient with Neurology for continued management. If patient does not have a Neurologist, can follow at [address] [place] [address]. [address] [address].  Patient can call [phone] to schedule this appointment.  If pt is without insurance, patient can follow up with [facility], located in [address] [place] [address] at [address] [place] [address]. Patient/family can call [phone] to schedule this appointment.    Atrial fibrillation with RVR.   ·  Plan: presented with afib with RVR  [id number]: 8, TSH 2.08  Per cardiology progress note dated 12/15/2023: avoid BB iso conduction disease, AV block and sinus pauses and hx of syncope;   Continue Eliquis BID (educate on BID dosing prior to dc as was taking daily)  Monitor.     Acute UTI.   ·  Plan: UA: LE: Moderate, WBC 24, Bacteria occasional, epithelial cells: 9  Ucx: likely containment   s/p rocephin treatment.    : Heart failure with mildly reduced ejection fraction (HFmrEF).   ·  Plan: Chronic stable  TTE 2021 and 2023, per outpatient cards note had worsening gradients across bioprosthetic valves from 2021->2023   Continue Entresto 49-51mg BID, spironolactone 25mg daily, lasix 40mg daily  Holding Hydralazine 25mg TID and Isordil 10mg TID- restart as appropriate   Monitor  pt and family advised to f/u with cardiology as outpt.     HTN (hypertension).   ·  Plan: Chronic moderate exacerbation   /60  Previously on HCTZ and lisinopirl- now discontinued  Continue Entresto 49-51mg BID and spironolactone 25mg daily   Holding Hydralazine 25mg TID and Isordil 10mg TID- restart as appropriate   Monitor.  DM2 (diabetes mellitus, type 2).   ·  Plan: Chronic stable  A1c 12/23/23: 6.2  Hold home metformin 500mg daily   LDCS with diabetic diet.    : CAD (coronary artery disease).   ·  Plan: Chronic stable   Previous MI in [facility] 2008? Unclear interventions at the time.  Cardiac cath 12/15/2023: Diagnostic Conclusions - Mild nonobstructive CAD   Continue aspirin 81mg daily and simvastatin 10mg daily.     67yr old female with a pmh of DM Type 2, permanent Afib (on Eliquis), CHF (TTE 12/13 EF 45-50%; bioprosthetic MVR/AVR 2008), CAD (prior MI, RHC/LHC 12/15 mild mLAD dz), CVA (R cerebellar), recent hospitalization 12/12-16 for acute decompensated HF, who presented to [facility] ED with seizure-like activity and found to be in Afib with RVR and have an acute UTI and GABRIELLE.       Seizure-like activity.   seizure vs syncope  post-ictal lethargy on presentation with no prodromal symptoms. Pt does not recall events   CT Head with no acute changes, chronic cerebellar volume loss i/s/o prior R cerebellar CVA  EEG: Normal  EEG in the awake / drowsy / asleep state(s).  Neurology consulted: MRI noted as above , case d/w Neurology , Neurology chart note reviewed, rec to monitor off anti-seizure medications at this time  Seizure, fall and aspiration precautions,  Given concern for seizure, advise patient to not drive, operate heavy machinery, avoid heights, pools, bathtubs, locked doors until cleared by further follow up outpatient by neurology.    patient/family to seek medical attention if there is a second event as she may benefit from anti-seizure medications and evaluation for Epilepsy   Follow outpatient with Neurology for continued management. If patient does not have a Neurologist, can follow at [address] [place] [address]. [address] [address].  Patient can call [phone] to schedule this appointment.  If pt is without insurance, patient can follow up with [facility], located in [address] [place] [address] at [address] [place] [address]. Patient/family can call [phone] to schedule this appointment.    Atrial fibrillation with RVR.   ·  Plan: presented with afib with RVR  [id number]: 8, TSH 2.08  Per cardiology progress note dated 12/15/2023: avoid BB iso conduction disease, AV block and sinus pauses and hx of syncope;   Continue Eliquis BID (educate on BID dosing prior to dc as was taking daily)  Monitor.     Acute UTI.   ·  Plan: UA: LE: Moderate, WBC 24, Bacteria occasional, epithelial cells: 9  Ucx: likely containment   s/p rocephin treatment.    : Heart failure with mildly reduced ejection fraction (HFmrEF).   ·  Plan: Chronic stable  TTE 2021 and 2023, per outpatient cards note had worsening gradients across bioprosthetic valves from 2021->2023   Continue Entresto 49-51mg BID, spironolactone 25mg daily, lasix 40mg daily  Holding Hydralazine 25mg TID and Isordil 10mg TID- restart as appropriate   Monitor  pt and family advised to f/u with cardiology as outpt.     HTN (hypertension).   ·  Plan: Chronic moderate exacerbation   /60  Previously on HCTZ and lisinopirl- now discontinued  Continue Entresto 49-51mg BID and spironolactone 25mg daily   Holding Hydralazine 25mg TID and Isordil 10mg TID- restart as appropriate   Monitor.  DM2 (diabetes mellitus, type 2).   ·  Plan: Chronic stable  A1c 12/23/23: 6.2  Hold home metformin 500mg daily   LDCS with diabetic diet.    : CAD (coronary artery disease).   ·  Plan: Chronic stable   Previous MI in [facility] 2008? Unclear interventions at the time.  Cardiac cath 12/15/2023: Diagnostic Conclusions - Mild nonobstructive CAD   Continue aspirin 81mg daily and simvastatin 10mg daily.     67yr old female with a pmh of DM Type 2, permanent Afib (on Eliquis), CHF (TTE 12/13 EF 45-50%; bioprosthetic MVR/AVR 2008), CAD (prior MI, RHC/LHC 12/15 mild mLAD dz), CVA (R cerebellar), recent hospitalization 12/12-16 for acute decompensated HF, who presented to [facility] ED with seizure-like activity and found to be in Afib with RVR and have an acute UTI and GABRIELLE.         Seizure-like activity.   seizure vs syncope  post-ictal lethargy on presentation with no prodromal symptoms. Pt does not recall events   CT Head with no acute changes, chronic cerebellar volume loss i/s/o prior R cerebellar CVA  EEG: Normal  EEG in the awake / drowsy / asleep state(s).  Neurology consulted: MRI noted as above , case d/w Neurology , Neurology chart note reviewed, rec to monitor off anti-seizure medications at this time  Seizure, fall and aspiration precautions,  Given concern for seizure, advise patient to not drive, operate heavy machinery, avoid heights, pools, bathtubs, locked doors until cleared by further follow up outpatient by neurology.    patient/family to seek medical attention if there is a second event as she may benefit from anti-seizure medications and evaluation for Epilepsy   Follow outpatient with Neurology for continued management. If patient does not have a Neurologist, can follow at [address] [place] [address]. [address] [address].  Patient can call [phone] to schedule this appointment.  If pt is without insurance, patient can follow up with [facility], located in [address] [place] [address] at [address] [place] [address]. Patient/family can call [phone] to schedule this appointment.    Atrial fibrillation with RVR.   ·  Plan: presented with afib with RVR  [id number]: 8, TSH 2.08  Per cardiology progress note dated 12/15/2023: avoid BB iso conduction disease, AV block and sinus pauses and hx of syncope;   Continue Eliquis BID (educate on BID dosing prior to dc as was taking daily)  Monitor.       Acute UTI.   ·  Plan: UA: LE: Moderate, WBC 24, Bacteria occasional, epithelial cells: 9  Ucx: likely containment   s/p rocephin treatment.    : Heart failure with mildly reduced ejection fraction (HFmrEF).   ·  Plan: Chronic stable  TTE 2021 and 2023, per outpatient cards note had worsening gradients across bioprosthetic valves from 2021->2023   Continue Entresto 49-51mg BID, spironolactone 25mg daily, lasix 40mg daily  Holding Hydralazine 25mg TID and Isordil 10mg TID- restart as appropriate   Monitor  pt and family advised to f/u with cardiology as outpt.     HTN (hypertension).   ·  Plan: Chronic moderate exacerbation   /60  Previously on HCTZ and lisinopirl- now discontinued  Continue Entresto 49-51mg BID and spironolactone 25mg daily   Holding Hydralazine 25mg TID and Isordil 10mg TID- restart as appropriate   Monitor.  DM2 (diabetes mellitus, type 2).   ·  Plan: Chronic stable  A1c 12/23/23: 6.2  Hold home metformin 500mg daily   LDCS with diabetic diet.    : CAD (coronary artery disease).   ·  Plan: Chronic stable   Previous MI in [facility] 2008? Unclear interventions at the time.  Cardiac cath 12/15/2023: Diagnostic Conclusions - Mild nonobstructive CAD   Continue aspirin 81mg daily and simvastatin 10mg daily.    On 12/22/2023  this case was reviewed with Dr. Martinez the patient is medically stable and optimized for discharge. All medications were reviewed and prescriptions were sent to mutually agreed upon pharmacy.           67yr old female with a pmh of DM Type 2, permanent Afib (on Eliquis), CHF (TTE 12/13 EF 45-50%; bioprosthetic MVR/AVR 2008), CAD (prior MI, RHC/LHC 12/15 mild mLAD dz), CVA (R cerebellar), recent hospitalization 12/12-16 for acute decompensated HF, who presented to [facility] ED with seizure-like activity and found to be in Afib with RVR and have an acute UTI and GABRIELLE.         Seizure-like activity.   seizure vs syncope  post-ictal lethargy on presentation with no prodromal symptoms. Pt does not recall events   CT Head with no acute changes, chronic cerebellar volume loss i/s/o prior R cerebellar CVA  EEG: Normal  EEG in the awake / drowsy / asleep state(s).  Neurology consulted: MRI noted as above , case d/w Neurology , Neurology chart note reviewed, rec to monitor off anti-seizure medications at this time  Seizure, fall and aspiration precautions,  Given concern for seizure, advise patient to not drive, operate heavy machinery, avoid heights, pools, bathtubs, locked doors until cleared by further follow up outpatient by neurology.    patient/family to seek medical attention if there is a second event as she may benefit from anti-seizure medications and evaluation for Epilepsy   Follow outpatient with Neurology for continued management. If patient does not have a Neurologist, can follow at [address] [place] [address]. [address] [address].  Patient can call [phone] to schedule this appointment.  If pt is without insurance, patient can follow up with [facility], located in [address] [place] [address] at [address] [place] [address]. Patient/family can call [phone] to schedule this appointment.    Atrial fibrillation with RVR.   ·  Plan: presented with afib with RVR  [id number]: 8, TSH 2.08  Per cardiology progress note dated 12/15/2023: avoid BB iso conduction disease, AV block and sinus pauses and hx of syncope;   Continue Eliquis BID (educate on BID dosing prior to dc as was taking daily)  Monitor.       Acute UTI.   ·  Plan: UA: LE: Moderate, WBC 24, Bacteria occasional, epithelial cells: 9  Ucx: likely containment   s/p rocephin treatment.    : Heart failure with mildly reduced ejection fraction (HFmrEF).   ·  Plan: Chronic stable  TTE 2021 and 2023, per outpatient cards note had worsening gradients across bioprosthetic valves from 2021->2023   Continue Entresto 49-51mg BID, spironolactone 25mg daily, lasix 40mg daily  Holding Hydralazine 25mg TID and Isordil 10mg TID- restart as appropriate   Monitor  pt and family advised to f/u with cardiology as outpt.     HTN (hypertension).   ·  Plan: Chronic moderate exacerbation   /60  Previously on HCTZ and lisinopirl- now discontinued  Continue Entresto 49-51mg BID and spironolactone 25mg daily   Holding Hydralazine 25mg TID and Isordil 10mg TID- restart as appropriate   Monitor.  DM2 (diabetes mellitus, type 2).   ·  Plan: Chronic stable  A1c 12/23/23: 6.2  Hold home metformin 500mg daily   LDCS with diabetic diet.    : CAD (coronary artery disease).   ·  Plan: Chronic stable   Previous MI in [facility] 2008? Unclear interventions at the time.  Cardiac cath 12/15/2023: Diagnostic Conclusions - Mild nonobstructive CAD   Continue aspirin 81mg daily and simvastatin 10mg daily.    On 12/22/2023  this case was reviewed with Dr. Martinez the patient is medically stable and optimized for discharge. All medications were reviewed and prescriptions were sent to mutually agreed upon pharmacy.

## 2023-12-22 NOTE — DISCHARGE NOTE PROVIDER - NSDCCPCAREPLAN_GEN_ALL_CORE_FT
PRINCIPAL DISCHARGE DIAGNOSIS  Diagnosis: Seizure-like activity  Assessment and Plan of Treatment: You were admited with Seizure like activity. You were evaluated by neurology. CT scaan, MRI and EEG with no acute findings. Neurology recommend to monitor off anti-seizure medications at this time  Seizure, fall and aspiration precautions,    Given concern for seizure, please do not drive, operate heavy machinery, avoid heights, pools, bathtubs, locked doors until cleared by further follow up outpatient by neurology.    please seek medical attention if there is a second event as you  may benefit from anti-seizure medications and evaluation for Epilepsy   Follow outpatient with Neurology for continued management. You can follow at [address] [facility]. [address] [place] [address].  Please  call [phone] to schedule this appointment.  appointment.      SECONDARY DISCHARGE DIAGNOSES  Diagnosis: Acute UTI  Assessment and Plan of Treatment: You were note to have urinary tract infection. You were treated with antibiotics    Diagnosis: Atrial fibrillation with RVR  Assessment and Plan of Treatment:     Diagnosis: Heart failure with mildly reduced ejection fraction (HFmrEF)  Assessment and Plan of Treatment: Continue regimen from hospital. Follow heart healthy diet. Monitor weight. If you develop severe lower extremity swelling and shortness of breath please seek medial attention. Follow up with your PCP and cardiologist for further evaluation and managment. Please call to make an appointment.      Diagnosis: HTN (hypertension)  Assessment and Plan of Treatment: Continue blood pressure medication regimen as directed. Monitor for any visual changes, headaches or dizziness.  Monitor blood pressure regularly.  Follow up with your PCP for further management for high blood pressure.      Diagnosis: DM2 (diabetes mellitus, type 2)  Assessment and Plan of Treatment: Continue consistent carbohydrate diet.  Monitor blood glucose levels throughout the day before meals and at bedtime.  Record blood sugars and bring to outpatient providers appointment in order to be reviewed by your doctor for management modifications.  Be aware of diabetes management symptoms including feeling cool and clammy, which may be related to low glucose levels.  Feeling hot and dry may indicate high glucose levels.  If  you feel these symptoms, check your blood sugar.  Make regular podiatry appointments in order to have feet checked for wounds and toe nails cut by a doctor to prevent infections.       PRINCIPAL DISCHARGE DIAGNOSIS  Diagnosis: Seizure-like activity  Assessment and Plan of Treatment: You were admited with Seizure like activity. You were evaluated by neurology. CT scaan, MRI and EEG with no acute findings. Neurology recommend to monitor off anti-seizure medications at this time  Seizure, fall and aspiration precautions,    Given concern for seizure, please do not drive, operate heavy machinery, avoid heights, pools, bathtubs, locked doors until cleared by further follow up outpatient by neurology.    please seek medical attention if there is a second event as you  may benefit from anti-seizure medications and evaluation for Epilepsy   Follow outpatient with Neurology for continued management. You can follow at [address] [facility]. [address] [place] [address].  Please  call [phone] to schedule this appointment.  appointment.      SECONDARY DISCHARGE DIAGNOSES  Diagnosis: Acute UTI  Assessment and Plan of Treatment: You were note to have urinary tract infection. You were treated with antibiotics    Diagnosis: Atrial fibrillation with RVR  Assessment and Plan of Treatment:     Diagnosis: Heart failure with mildly reduced ejection fraction (HFmrEF)  Assessment and Plan of Treatment: Continue regimen from hospital. Follow heart healthy diet. Monitor weight. If you develop severe lower extremity swelling and shortness of breath please seek medial attention. Follow up with your PCP and cardiologist for further evaluation and managment. Please call to make an appointment.      Diagnosis: HTN (hypertension)  Assessment and Plan of Treatment: Continue blood pressure medication regimen as directed. Monitor for any visual changes, headaches or dizziness.  Monitor blood pressure regularly.  Follow up with your PCP for further management for high blood pressure.      Diagnosis: DM2 (diabetes mellitus, type 2)  Assessment and Plan of Treatment: Continue consistent carbohydrate diet.  Monitor blood glucose levels throughout the day before meals and at bedtime.  Record blood sugars and bring to outpatient providers appointment in order to be reviewed by your doctor for management modifications.  Be aware of diabetes management symptoms including feeling cool and clammy, which may be related to low glucose levels.  Feeling hot and dry may indicate high glucose levels.  If  you feel these symptoms, check your blood sugar.  Make regular podiatry appointments in order to have feet checked for wounds and toe nails cut by a doctor to prevent infections.       PRINCIPAL DISCHARGE DIAGNOSIS  Diagnosis: Seizure-like activity  Assessment and Plan of Treatment: You were admited with Seizure like activity. You were evaluated by neurology. CT scaan, MRI and EEG with no acute findings. Neurology recommend to monitor off anti-seizure medications at this time  Seizure, fall and aspiration precautions,    Given concern for seizure, please do not drive, operate heavy machinery, avoid heights, pools, bathtubs, locked doors until cleared by further follow up outpatient by neurology.    please seek medical attention if there is a second event as you  may benefit from anti-seizure medications and evaluation for Epilepsy   Follow outpatient with Neurology for continued management. You can follow at 1 [facility]. [address] [place] [address].  Please  call [phone] to schedule this appointment.  appointment.      SECONDARY DISCHARGE DIAGNOSES  Diagnosis: Atrial fibrillation with RVR  Assessment and Plan of Treatment: Take eliquis as prescribed and do not stop without reporting to your doctor. Watch for any signs of bleeding and report it for doctor .    Diagnosis: Acute UTI  Assessment and Plan of Treatment: You were note to have urinary tract infection. You were treated with antibiotics, and you do not need more antibiotics.    Diagnosis: Heart failure with mildly reduced ejection fraction (HFmrEF)  Assessment and Plan of Treatment: Continue regimen from hospital. Follow heart healthy diet. Monitor weight. If you develop severe lower extremity swelling and shortness of breath please seek medial attention. Follow up with your PCP and cardiologist for further evaluation and managment. Please call to make an appointment. You have chronic moderate exacerbation of Continue regimen from hospital. Follow heart healthy diet. Monitor weight. If you develop severe lower extremity swelling and shortness of breath please seek medial attention. Follow up with your PCP and cardiologist for further evaluation and managment. Please call to make an appointment.   , hydralazine and Isordil is on hold ,your cardiology is going sulma restart it when appropriate and lisinopril and HCTZ was discontinued    Diagnosis: HTN (hypertension)  Assessment and Plan of Treatment: Continue blood pressure medication regimen as directed. Monitor for any visual changes, headaches or dizziness.  Monitor blood pressure regularly.  Follow up with your PCP for further management for high blood pressure.      Diagnosis: DM2 (diabetes mellitus, type 2)  Assessment and Plan of Treatment: Continue consistent carbohydrate diet.  Monitor blood glucose levels throughout the day before meals and at bedtime.  Record blood sugars and bring to outpatient providers appointment in order to be reviewed by your doctor for management modifications.  Be aware of diabetes management symptoms including feeling cool and clammy, which may be related to low glucose levels.  Feeling hot and dry may indicate high glucose levels.  If  you feel these symptoms, check your blood sugar.  Make regular podiatry appointments in order to have feet checked for wounds and toe nails cut by a doctor to prevent infections.       PRINCIPAL DISCHARGE DIAGNOSIS  Diagnosis: Seizure-like activity  Assessment and Plan of Treatment: You were admited with Seizure like activity. You were evaluated by neurology. CT scaan, MRI and EEG with no acute findings. Neurology recommend to monitor off anti-seizure medications at this time  Seizure, fall and aspiration precautions,    Given concern for seizure, please do not drive, operate heavy machinery, avoid heights, pools, bathtubs, locked doors until cleared by further follow up outpatient by neurology.    please seek medical attention if there is a second event as you  may benefit from anti-seizure medications and evaluation for Epilepsy   Follow outpatient with Neurology for continued management. You can follow at 1 [facility]. [place] [address].  Please  call [phone] to schedule this appointment.  appointment.      SECONDARY DISCHARGE DIAGNOSES  Diagnosis: Atrial fibrillation with RVR  Assessment and Plan of Treatment: Take eliquis as prescribed and do not stop without reporting to your doctor. Watch for any signs of bleeding and report it for doctor .    Diagnosis: Acute UTI  Assessment and Plan of Treatment: You were note to have urinary tract infection. You were treated with antibiotics, and you do not need more antibiotics.    Diagnosis: Heart failure with mildly reduced ejection fraction (HFmrEF)  Assessment and Plan of Treatment: Continue regimen from hospital. Follow heart healthy diet. Monitor weight. If you develop severe lower extremity swelling and shortness of breath please seek medial attention. Follow up with your PCP and cardiologist for further evaluation and managment. Please call to make an appointment. You have chronic moderate exacerbation of Continue regimen from hospital. Follow heart healthy diet. Monitor weight. If you develop severe lower extremity swelling and shortness of breath please seek medial attention. Follow up with your PCP and cardiologist for further evaluation and managment. Please call to make an appointment.   , hydralazine and Isordil is on hold ,your cardiology is going sulma restart it when appropriate and lisinopril and HCTZ was discontinued    Diagnosis: HTN (hypertension)  Assessment and Plan of Treatment: Continue blood pressure medication regimen as directed. Monitor for any visual changes, headaches or dizziness.  Monitor blood pressure regularly.  Follow up with your PCP for further management for high blood pressure.      Diagnosis: DM2 (diabetes mellitus, type 2)  Assessment and Plan of Treatment: Continue consistent carbohydrate diet.  Monitor blood glucose levels throughout the day before meals and at bedtime.  Record blood sugars and bring to outpatient providers appointment in order to be reviewed by your doctor for management modifications.  Be aware of diabetes management symptoms including feeling cool and clammy, which may be related to low glucose levels.  Feeling hot and dry may indicate high glucose levels.  If  you feel these symptoms, check your blood sugar.  Make regular podiatry appointments in order to have feet checked for wounds and toe nails cut by a doctor to prevent infections.

## 2023-12-22 NOTE — DISCHARGE NOTE PROVIDER - NSDCFUSCHEDAPPT_GEN_ALL_CORE_FT
[facility]  PAINM[facility] [address]  Scheduled Appointment: 01/02/2024     [facility]  [facility] [address]  Scheduled Appointment: 01/02/2024

## 2023-12-22 NOTE — PROGRESS NOTE ADULT - PROBLEM SELECTOR PLAN 6
Chronic stable  A1c 12/23/23: 6.2  Hold home metformin 500mg daily   LDCS with diabetic diet
Chronic stable  A1c 12/23/23: 6.2  Hold home metformin 500mg daily   LDCS with diabetic diet
Appears volume down, not in exacerbation    - TTE 2021 and 2023, per outpatient cards note had worsening gradients across bioprosthetic valves from 2021->2023   - s/p LHC/RHC with mild mLAD dz, mildly elevated PCWP -> medical mgmt w/ outpatient cards  > Lasix changed to 40mg PO qd on last discharge, cont 40mg PO qD  > cont GDMT per Cardiology with newly started on last admission Spironolactone 25mg qD and Entresto 49-51mg BID  > hold isosorbide and hydral given infection  > oupt f/u cardiology Dr. Skip Roberto  - orthostatics
Chronic stable  A1c 12/23/23: 6.2  Hold home metformin 500mg daily   LDCS with diabetic diet

## 2023-12-22 NOTE — PROGRESS NOTE ADULT - PROBLEM SELECTOR PLAN 2
presented with afib with RVR  [id number]: 8, TSH 2.08  Per cardiology progress note dated 12/15/2023: avoid BB iso conduction disease, AV block and sinus pauses and hx of syncope;   Continue Eliquis BID (educate on BID dosing prior to dc as was taking daily)  Monitor presented with afib with RVR  [id number]: 8, TSH 2.08  Per cardiology progress note dated 12/15/2023: avoid BB iso conduction disease, AV block and sinus pauses and hx of syncope;   Continue Eliquis BID (educate on BID dosing prior to dc as was taking daily)  Monitor

## 2024-01-02 ENCOUNTER — APPOINTMENT (OUTPATIENT)
Dept: PAIN MANAGEMENT | Facility: CLINIC | Age: 69
End: 2024-01-02
Payer: MEDICARE

## 2024-01-02 VITALS
HEART RATE: 91 BPM | SYSTOLIC BLOOD PRESSURE: 111 MMHG | BODY MASS INDEX: 30.12 KG/M2 | HEIGHT: 63 IN | DIASTOLIC BLOOD PRESSURE: 53 MMHG | WEIGHT: 170 LBS

## 2024-01-02 PROCEDURE — 99213 OFFICE O/P EST LOW 20 MIN: CPT

## 2024-01-02 NOTE — ASSESSMENT
[FreeTextEntry1] : Headache are improving.  Pt to follow up with Neurology clinic regarding recent seizure like activity.

## 2024-01-02 NOTE — REVIEW OF SYSTEMS
[Fever] : no fever [Chills] : no chills [Feeling Poorly] : feeling poorly [Feeling Tired] : not feeling tired [Eye Pain] : no eye pain [Eyesight Problems] : no eyesight problems [Nasal Discharge] : no nasal discharge [Chest Pain] : no chest pain [Cough] : no cough [Arthralgias] : arthralgias [Itching] : no itching [Convulsions] : no convulsions [Dizziness] : no dizziness [Fainting] : no fainting [Muscle Weakness] : no muscle weakness [Swollen Glands] : no swollen glands

## 2024-01-02 NOTE — HISTORY OF PRESENT ILLNESS
[FreeTextEntry1] : Pt returns today for a follow up appt.  Headaches have been stable however pt was in the hospital recently for 2 weeks( discharged on 12/22/23) regarding seizure like activity.  Pt will be following up with Neurology clinic. Denies any headache today .  She is taking the vitamins for prevention.

## 2024-01-02 NOTE — PHYSICAL EXAM
[FreeTextEntry1] : Constitutional: No signs of distress or signs of toxicity. Mental Status: Alert. Speech seemingly fluent (Creole) Psychiatric: Mood stable Cranial Nerve: PERRLA; no nystagmus. No Barby. V1-3 intact. No facial asymmetry, hearing grossly intact; tongue midline Motor: No involuntary movements noted. Adequate bulk, tone throughout, 5/5 strength of all muscle groups DTR: present and symmetrical  Sensory: intact to primary and secondary modalities  Cerebellar: adequate finger to nose  Gait: non antalgic or ataxic. Eyes: no redness or swelling HEENT: intact; no signs of trauma. Neck: No masses noted Pulmonary: no respiratory distress Vascular: no temperature, color change or sudomotor changes.; no edema Musculoskeletal: examination of the cervical spine reveals no midline tenderness, range of motion full upon flexion, extension and lateral rotation.  Skin: No rash. [General Appearance - Alert] : alert [General Appearance - Well Nourished] : well nourished [General Appearance - Well Developed] : well developed [Impaired Insight] : insight and judgment were intact [Affect] : the affect was normal [Memory Recent] : recent memory was not impaired [Memory Remote] : remote memory was not impaired [Place] : oriented to place [Time] : oriented to time [Registration Intact] : recent registration memory intact [Visual Intact] : visual attention was ~T not ~L decreased [Naming Objects] : no difficulty naming common objects [Fluency] : fluency intact [Comprehension] : comprehension intact [Past History] : adequate knowledge of personal past history [Vocabulary] : adequate range of vocabulary [Cranial Nerves Oculomotor (III)] : extraocular motion intact [Cranial Nerves Facial (VII)] : face symmetrical [Cranial Nerves Vestibulocochlear (VIII)] : hearing was intact bilaterally [Cranial Nerves Accessory (XI - Cranial And Spinal)] : head turning and shoulder shrug symmetric [Cranial Nerves Hypoglossal (XII)] : there was no tongue deviation with protrusion [Motor Strength] : muscle strength was normal in all four extremities [No Muscle Atrophy] : normal bulk in all four extremities [Motor Strength Upper Extremities Bilaterally] : strength was normal in both upper extremities [Motor Strength Lower Extremities Bilaterally] : strength was normal in both lower extremities [Sensation Tactile Decrease] : light touch was intact [Abnormal Walk] : normal gait [Tremor] : no tremor present [Sclera] : the sclera and conjunctiva were normal [No TRINITY] : no internuclear ophthalmoplegia [Strabismus] : no strabismus was seen [Outer Ear] : the ears and nose were normal in appearance [Neck Cervical Mass (___cm)] : no neck mass was observed [Exaggerated Use Of Accessory Muscles For Inspiration] : no accessory muscle use [Nail Clubbing] : no clubbing  or cyanosis of the fingernails [Involuntary Movements] : no involuntary movements were seen [Skin Color & Pigmentation] : normal skin color and pigmentation [] : no rash

## 2024-01-10 ENCOUNTER — APPOINTMENT (OUTPATIENT)
Dept: CARDIOLOGY | Facility: CLINIC | Age: 69
End: 2024-01-10
Payer: MEDICARE

## 2024-01-10 ENCOUNTER — NON-APPOINTMENT (OUTPATIENT)
Age: 69
End: 2024-01-10

## 2024-01-10 VITALS — HEART RATE: 90 BPM | SYSTOLIC BLOOD PRESSURE: 110 MMHG | DIASTOLIC BLOOD PRESSURE: 68 MMHG

## 2024-01-10 VITALS
WEIGHT: 166 LBS | DIASTOLIC BLOOD PRESSURE: 58 MMHG | HEART RATE: 90 BPM | BODY MASS INDEX: 29.41 KG/M2 | OXYGEN SATURATION: 99 % | SYSTOLIC BLOOD PRESSURE: 87 MMHG

## 2024-01-10 DIAGNOSIS — Z95.3 PRESENCE OF XENOGENIC HEART VALVE: ICD-10-CM

## 2024-01-10 DIAGNOSIS — I50.22 CHRONIC SYSTOLIC (CONGESTIVE) HEART FAILURE: ICD-10-CM

## 2024-01-10 DIAGNOSIS — I48.21 PERMANENT ATRIAL FIBRILLATION: ICD-10-CM

## 2024-01-10 DIAGNOSIS — R56.9 UNSPECIFIED CONVULSIONS: ICD-10-CM

## 2024-01-10 PROCEDURE — 99215 OFFICE O/P EST HI 40 MIN: CPT

## 2024-01-10 PROCEDURE — G2211 COMPLEX E/M VISIT ADD ON: CPT

## 2024-01-10 PROCEDURE — 93000 ELECTROCARDIOGRAM COMPLETE: CPT

## 2024-01-10 RX ORDER — ASPIRIN 81 MG/1
81 TABLET, COATED ORAL DAILY
Refills: 0 | Status: ACTIVE | COMMUNITY
Start: 2024-01-10

## 2024-01-10 RX ORDER — HYDROCHLOROTHIAZIDE 12.5 MG/1
12.5 CAPSULE ORAL
Qty: 90 | Refills: 0 | Status: DISCONTINUED | COMMUNITY
Start: 2021-09-09 | End: 2024-01-10

## 2024-01-10 NOTE — REASON FOR VISIT
[FreeTextEntry1] : 68 year old [place] Creole speaking female with history of hypetension, hyperlipidema, s/p AVR/MVR 2008 and paroxysmal atrial fibrillation on coumadin therapy. Now here after 2 admissions to [facility] in December, 2023

## 2024-01-10 NOTE — REVIEW OF SYSTEMS
[Feeling Fatigued] : feeling fatigued [Weight Loss (___ Lbs)] : [unfilled] ~Ulb weight loss [Negative] : Heme/Lymph [FreeTextEntry8] : Vaginal bleeding [Dyspnea on exertion] : not dyspnea during exertion [Chest Discomfort] : no chest discomfort [Lower Ext Edema] : no extremity edema [Palpitations] : no palpitations [Orthopnea] : no orthopnea [PND] : no PND [Syncope] : no syncope [Abn Vaginal Bleeding] : no unexplained vaginal bleeding [de-identified] : Previous stroke and TIA [de-identified] : Possibly depressed.  Afraid to get vaccine for Covid

## 2024-01-10 NOTE — DISCUSSION/SUMMARY
[FreeTextEntry1] : Based on today's lab results there may be some adjustment in her medication or she will just be continued on the current regimen.  She will continue to follow-up with neuro.  Depending on whether any adjustments are made she will follow-up in 1 to 2 months. [EKG obtained to assist in diagnosis and management of assessed problem(s)] : EKG obtained to assist in diagnosis and management of assessed problem(s)
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
## 2024-01-10 NOTE — ASSESSMENT
[FreeTextEntry1] : Long complicated history with complex decision making.  On the one hand despite complaints of severe shortness of breath for quite some time there is no evidence of CHF on exam, uncertain about the history of PND and orthopnea, normal LV and RV function with only mild pulmonary hypertension.  By the same token there is at least some degree of mitral stenosis of the bioprosthetic mitral valve.  I explained to the daughter that at some point it might be useful to get a MARSHA to better evaluate this but given the absence of findings now I am not sure it is essential currently.  However with upcoming surgery and her last echo being almost a year and a half ago she should have another echocardiogram. This was done 11/2021. In addition given the history of syncope and the previous findings of pauses etc. it sounds like she should have at least the micro pacemaker and on her initial visit with me in April I did encourage them to do that but I fear they are going to hold off until she has another episode.  Her EKG was atrial fibrillation with a ventricular rate of 73 and no pauses.  They claim she was on no negatively chronotropic medicines when she had that syncope/arrest.  The patient was quiet and not as talkative but the daughter says that the language barrier and she is not depressed at all in fact fairly active.  She was somewhat smiling and laughing at the end of the visit. She has not been vaccinated for Covid and I had a long firm discussion with the patient and her daughter about the necessity of that and the fact that her status is high risk if ever she did come down with Covid.  There is also the issue of some gynecologic bleeding and a biopsy that is pending. Labs were sent by Dr. Posada. She returned 3/30/2023, excellent labs by Dr. Posada but no BNP done.  According to the daughter she does have shortness of breath with exertion that is probably progressive.  There was some LV dysfunction on her last echo and high pressure across her bioprosthetic mitral valve.  Exam seems to be unchanged.  EKG done by Dr. Posada unchanged as well.  Blood pressure high even at the end of the exam. Increased to lisinopril to 20 mg and echo.  The echo has normal LV and RV function with little more valvular dysfunction than a year and a half ago but unlikely to be causing any of her symptoms.  Labs were sent including a baseline BNP (unfortunately we do not have a prior BNP) she remains in atrial fibrillation.  Exam did not show any fluid overload her blood pressure is high although with some bradycardia into the exam   She returns today January 10, 2024 almost 9 months since her last visit.  When she started to have issues with shortness of breath and some edema rather than call or come here she went to the emergency room at [facility] and was admitted with congestive heart failure felt to be a combination of some systolic dysfunction with moderate valvular disease in both of her bioprosthetic valves aortic and mitral.  She initially responded to a heart failure regimen with Entresto, Lasix, spironolactone and also Isordil and hydralazine were added.  After discharge she had what sounded like a seizure at home and was brought back to [facility].  Neurologic workup was unremarkable other than showing her previous strokes and her EEG was negative.  Blood pressure was a little low so her hydralazine and Isordil was stopped but she was maintained on her other medications.  She had a cath and an echo as mentioned and her medications are as mentioned.  She seems to be back to her baseline from a cardiac point of view.  She remains in atrial fibrillation with a good ventricular rate; mention was made of her previous telemetry years ago that did show conduction disease with 2-1 block etc. and therefore beta-blockers have always been avoided.  She is on metformin and perhaps she is a candidate for SGLT2 inhibitors especially if there is some manifestation of diastolic dysfunction although a lot of it could be related to her prosthetic valves.  She is now on Eliquis 5 twice daily instead of Coumadin.  Labs were sent.

## 2024-01-11 LAB
ALBUMIN SERPL ELPH-MCNC: 4.2 G/DL
ALP BLD-CCNC: 110 U/L
ALT SERPL-CCNC: 28 U/L
ANION GAP SERPL CALC-SCNC: 13 MMOL/L
AST SERPL-CCNC: 27 U/L
BILIRUB SERPL-MCNC: 0.2 MG/DL
BUN SERPL-MCNC: 33 MG/DL
CALCIUM SERPL-MCNC: 10.3 MG/DL
CHLORIDE SERPL-SCNC: 100 MMOL/L
CHOLEST SERPL-MCNC: 124 MG/DL
CO2 SERPL-SCNC: 25 MMOL/L
CREAT SERPL-MCNC: 1.3 MG/DL
EGFR: 44 ML/MIN/1.73M2
GLUCOSE SERPL-MCNC: 150 MG/DL
HCT VFR BLD CALC: 46 %
HDLC SERPL-MCNC: 50 MG/DL
HGB BLD-MCNC: 14.7 G/DL
LDLC SERPL CALC-MCNC: 55 MG/DL
MCHC RBC-ENTMCNC: 30.3 PG
MCHC RBC-ENTMCNC: 32 GM/DL
MCV RBC AUTO: 94.8 FL
NONHDLC SERPL-MCNC: 73 MG/DL
NT-PROBNP SERPL-MCNC: 434 PG/ML
PLATELET # BLD AUTO: 130 K/UL
POTASSIUM SERPL-SCNC: 5.6 MMOL/L
PROT SERPL-MCNC: 7.3 G/DL
RBC # BLD: 4.85 M/UL
RBC # FLD: 12.5 %
SODIUM SERPL-SCNC: 137 MMOL/L
TRIGL SERPL-MCNC: 97 MG/DL
WBC # FLD AUTO: 8.39 K/UL

## 2024-01-19 ENCOUNTER — APPOINTMENT (OUTPATIENT)
Dept: INTERNAL MEDICINE | Facility: CLINIC | Age: 69
End: 2024-01-19
Payer: MEDICARE

## 2024-01-19 VITALS
OXYGEN SATURATION: 98 % | HEART RATE: 100 BPM | SYSTOLIC BLOOD PRESSURE: 108 MMHG | WEIGHT: 166 LBS | DIASTOLIC BLOOD PRESSURE: 67 MMHG | TEMPERATURE: 98.4 F | BODY MASS INDEX: 29.41 KG/M2 | HEIGHT: 63 IN

## 2024-01-19 DIAGNOSIS — E78.5 HYPERLIPIDEMIA, UNSPECIFIED: ICD-10-CM

## 2024-01-19 DIAGNOSIS — I10 ESSENTIAL (PRIMARY) HYPERTENSION: ICD-10-CM

## 2024-01-19 DIAGNOSIS — D69.6 THROMBOCYTOPENIA, UNSPECIFIED: ICD-10-CM

## 2024-01-19 DIAGNOSIS — Z23 ENCOUNTER FOR IMMUNIZATION: ICD-10-CM

## 2024-01-19 DIAGNOSIS — Z09 ENCOUNTER FOR FOLLOW-UP EXAMINATION AFTER COMPLETED TREATMENT FOR CONDITIONS OTHER THAN MALIGNANT NEOPLASM: ICD-10-CM

## 2024-01-19 PROCEDURE — 90662 IIV NO PRSV INCREASED AG IM: CPT

## 2024-01-19 PROCEDURE — G2211 COMPLEX E/M VISIT ADD ON: CPT

## 2024-01-19 PROCEDURE — G0008: CPT

## 2024-01-19 PROCEDURE — 99215 OFFICE O/P EST HI 40 MIN: CPT | Mod: 25

## 2024-01-19 NOTE — PLAN
[FreeTextEntry1] : Influenza vaccine - administered today and tolerated well  Heart failure - continue entresto 49-51 mg BID, lasix 40 mg QD, spironolactone 25 mg every other day - low salt diet - cardio follow up  DM type 2 - A1c Dec 2023 6.2 - continue metformin 500 mg QD -- consider jardiance with hx of heart failure - Advised on lifestyle modifications such as increasing exercise and dietary changes. - pods and ophtho follow up  Hx of CVA, HTN - Blood pressure at goal - continue current meds spironolactone 25 mg every other day, lasix 40 mg QD, simvastatin 10 mg QD, asa 81 mg QD - advised to log BP at home. - advised on dietary changes, increasing exercise, avoiding excess salt  A fib, AVR, MVR - continue eliquis 5 mg BID - cardio Dr Roberto  Thrombocytopenia - platelets 130 Jan 2024 .

## 2024-01-19 NOTE — HISTORY OF PRESENT ILLNESS
[FreeTextEntry1] : follow up [de-identified] : 69 yo F pmh AVR, MVR, Dm type 2, a fib on eliquis, CVA 2011, TIA Sept 2020 with no residual deficits presents for follow up Accompanied by daughter Magaly.  Hospitalized twice in the past year. First admission 2/2 acute on chronic CHF causing dyspnea on exertion. S/p right heart cath. Was taking Eliquis 5 twice daily, furosemide 40 daily, spironolactone 25, Entresto 49/51 twice daily, hydralazine 25 3 times daily, Isordil 10 3 times daily, metformin 500 daily and simvastatin 10 daily.  She then was admitted for possible seizure at home. Had negative MRI CT and EEG. Her isosorbide and hydralazine were stopped due to hypotension. She was started on aspirin 81 mg QD. She had abdominal sono due to abdominal pain performed which was negative. She was discharged home and has had follow up with cardio Dr Roberto.

## 2024-01-19 NOTE — REVIEW OF SYSTEMS
[Fever] : no fever [Chills] : no chills [Fatigue] : no fatigue [Vision Problems] : no vision problems [Chest Pain] : no chest pain [Palpitations] : no palpitations [Lower Ext Edema] : no lower extremity edema [Shortness Of Breath] : no shortness of breath [Wheezing] : no wheezing [Cough] : no cough [Dyspnea on Exertion] : no dyspnea on exertion [Abdominal Pain] : no abdominal pain [Nausea] : no nausea [Diarrhea] : diarrhea [Vomiting] : no vomiting [Melena] : no melena [Dysuria] : no dysuria [Incontinence] : no incontinence [Muscle Weakness] : no muscle weakness [Muscle Pain] : no muscle pain [Skin Rash] : no skin rash [Headache] : no headache [Dizziness] : no dizziness [Fainting] : no fainting

## 2024-01-25 NOTE — ED ADULT TRIAGE NOTE - MODE OF ARRIVAL
Robotic assisted right total knee artroplasty with yuliya  labs - cbc,pt/ptt,bmp,t&s,nose cx,ekg  M/C required  preop 3 day Hibiclens instruction reviewed and given .instructed on if  nose cx positive use mupuricin 5 days and checklist given  take routine meds DOS with sips of water. avoid NSAID and OTC supplements. verbalized understanding  information on proper nutrition , increase protein and better food choices provided in packet   ensure clear given  Anesthesiologist to review PST labs, EKG, required clearances and optimization for surgery. Walk in

## 2024-02-26 ENCOUNTER — NON-APPOINTMENT (OUTPATIENT)
Age: 69
End: 2024-02-26

## 2024-02-26 ENCOUNTER — APPOINTMENT (OUTPATIENT)
Dept: CARDIOLOGY | Facility: CLINIC | Age: 69
End: 2024-02-26
Payer: MEDICARE

## 2024-02-26 VITALS
HEIGHT: 63 IN | SYSTOLIC BLOOD PRESSURE: 97 MMHG | BODY MASS INDEX: 30.3 KG/M2 | HEART RATE: 97 BPM | DIASTOLIC BLOOD PRESSURE: 63 MMHG | OXYGEN SATURATION: 99 % | WEIGHT: 171 LBS

## 2024-02-26 DIAGNOSIS — I50.32 CHRONIC DIASTOLIC (CONGESTIVE) HEART FAILURE: ICD-10-CM

## 2024-02-26 DIAGNOSIS — E87.5 HYPERKALEMIA: ICD-10-CM

## 2024-02-26 DIAGNOSIS — E11.9 TYPE 2 DIABETES MELLITUS W/OUT COMPLICATIONS: ICD-10-CM

## 2024-02-26 DIAGNOSIS — I48.91 UNSPECIFIED ATRIAL FIBRILLATION: ICD-10-CM

## 2024-02-26 DIAGNOSIS — D64.9 ANEMIA, UNSPECIFIED: ICD-10-CM

## 2024-02-26 DIAGNOSIS — R73.09 OTHER ABNORMAL GLUCOSE: ICD-10-CM

## 2024-02-26 PROCEDURE — 93000 ELECTROCARDIOGRAM COMPLETE: CPT

## 2024-02-26 PROCEDURE — 99214 OFFICE O/P EST MOD 30 MIN: CPT

## 2024-02-26 PROCEDURE — G2211 COMPLEX E/M VISIT ADD ON: CPT
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
## 2024-02-26 NOTE — PHYSICAL EXAM
[General Appearance - Well Developed] : well developed [Normal Appearance] : normal appearance [Well Groomed] : well groomed [General Appearance - Well Nourished] : well nourished [No Deformities] : no deformities [General Appearance - In No Acute Distress] : no acute distress [Normal Conjunctiva] : the conjunctiva exhibited no abnormalities [Eyelids - No Xanthelasma] : the eyelids demonstrated no xanthelasmas [Normal Oral Mucosa] : normal oral mucosa [No Oral Pallor] : no oral pallor [No Oral Cyanosis] : no oral cyanosis [Normal Jugular Venous V Waves Present] : normal jugular venous V waves present [No Jugular Venous Jang A Waves] : no jugular venous jang A waves [Respiration, Rhythm And Depth] : normal respiratory rhythm and effort [Exaggerated Use Of Accessory Muscles For Inspiration] : no accessory muscle use [Auscultation Breath Sounds / Voice Sounds] : lungs were clear to auscultation bilaterally [Heart Rate And Rhythm] : heart rate and rhythm were normal [Heart Sounds] : normal S1 and S2 [Abdomen Soft] : soft [Abdomen Tenderness] : non-tender [Abdomen Mass (___ Cm)] : no abdominal mass palpated [Abnormal Walk] : normal gait [Gait - Sufficient For Exercise Testing] : the gait was sufficient for exercise testing [Cyanosis, Localized] : no localized cyanosis [Nail Clubbing] : no clubbing of the fingernails [Petechial Hemorrhages (___cm)] : no petechial hemorrhages [Skin Color & Pigmentation] : normal skin color and pigmentation [] : no rash [No Venous Stasis] : no venous stasis [Skin Lesions] : no skin lesions [No Xanthoma] : no  xanthoma was observed [No Skin Ulcers] : no skin ulcer [Oriented To Time, Place, And Person] : oriented to person, place, and time [Affect] : the affect was normal [Mood] : the mood was normal [No Anxiety] : not feeling anxious [FreeTextEntry1] : Not depressed

## 2024-02-26 NOTE — ASSESSMENT
[FreeTextEntry1] : Long complicated history with complex decision making. On the one hand despite complaints of severe shortness of breath for quite some time there is no evidence of CHF on exam, uncertain about the history of PND and orthopnea, normal LV and RV function with only mild pulmonary hypertension. By the same token there is at least some degree of mitral stenosis of the bioprosthetic mitral valve. I explained to the daughter that at some point it might be useful to get a MARSHA to better evaluate this but given the absence of findings now I am not sure it is essential currently. However with upcoming surgery and her last echo being almost a year and a half ago she should have another echocardiogram. This was done 11/2021. In addition given the history of syncope and the previous findings of pauses etc. it sounds like she should have at least the micro pacemaker and on her initial visit with me in April I did encourage them to do that but I fear they are going to hold off until she has another episode. Her EKG was atrial fibrillation with a ventricular rate of 73 and no pauses. They claim she was on no negatively chronotropic medicines when she had that syncope/arrest. The patient was quiet and not as talkative but the daughter says that the language barrier and she is not depressed at all in fact fairly active. She was somewhat smiling and laughing at the end of the visit. She has not been vaccinated for Covid and I had a long firm discussion with the patient and her daughter about the necessity of that and the fact that her status is high risk if ever she did come down with Covid. There is also the issue of some gynecologic bleeding and a biopsy that is pending. Labs were sent by Dr. Posada. She returned 3/30/2023, excellent labs by Dr. Posada but no BNP done. According to the daughter she does have shortness of breath with exertion that is probably progressive. There was some LV dysfunction on her last echo and high pressure across her bioprosthetic mitral valve. Exam seems to be unchanged. EKG done by Dr. Posada unchanged as well. Blood pressure high even at the end of the exam. Increased to lisinopril to 20 mg and echo. The echo has normal LV and RV function with little more valvular dysfunction than a year and a half ago but unlikely to be causing any of her symptoms. Labs were sent including a baseline BNP (unfortunately we do not have a prior BNP) she remains in atrial fibrillation. Exam did not show any fluid overload her blood pressure is high although with some bradycardia into the exam She returned January 10, 2024 almost 9 months since her last visit. When she started to have issues with shortness of breath and some edema rather than call or come here she went to the emergency room at [facility] and was admitted with congestive heart failure felt to be a combination of some systolic dysfunction with moderate valvular disease in both of her bioprosthetic valves aortic and mitral. She initially responded to a heart failure regimen with Entresto, Lasix, spironolactone and also Isordil and hydralazine were added. After discharge she had what sounded like a seizure at home and was brought back to [facility]. Neurologic workup was unremarkable other than showing her previous strokes and her EEG was negative. Blood pressure was a little low so her hydralazine and Isordil was stopped but she was maintained on her other medications. She had a cath and an echo as mentioned and her medications are as mentioned. She seems to be back to her baseline from a cardiac point of view. She remains in atrial fibrillation with a good ventricular rate; mention was made of her previous telemetry years ago that did show conduction disease with 2-1 block etc. and therefore beta-blockers have always been avoided. She is on metformin and perhaps she is a candidate for SGLT2 inhibitors especially if there is some manifestation of diastolic dysfunction although a lot of it could be related to her prosthetic valves. She is now on Eliquis 5 twice daily instead of Coumadin. Labs were sent.  She returns today February 26, 2024 because we changed her spironolactone to 25 mg every other day because of her potassium of 5.6.  She seems to be doing fine with no fluid overload but borderline blood pressure.  Reviewing the medicines with her son it seems she is still taking some amount of hydralazine, Isordil, and hydrochlorothiazide.  Unfortunately this may reflect the blood values that we get today but in the meantime I had the son stopped those 3 totally.  When the labs are back I will review them with the daughter and then we can decide where we are going forward.  Still a confusing picture in terms of valve disease, systolic and diastolic function etc.  An LDH was send looking for valve hemolysis as well.

## 2024-02-26 NOTE — REASON FOR VISIT
[FreeTextEntry1] : 68 year old Lao Creole speaking female with history of hypetension, hyperlipidema, s/p AVR/MVR 2008 and paroxysmal atrial fibrillation on coumadin therapy. Now here after 2 admissions to [facility] in December, 2023

## 2024-02-26 NOTE — REVIEW OF SYSTEMS
[Feeling Fatigued] : feeling fatigued [Negative] : Psychiatric [Weight Gain (___ Lbs)] : [unfilled] ~Ulb weight gain [Dyspnea on exertion] : not dyspnea during exertion [Weight Loss (___ Lbs)] : no recent weight loss [Lower Ext Edema] : no extremity edema [Chest Discomfort] : no chest discomfort [Palpitations] : no palpitations [Orthopnea] : no orthopnea [PND] : no PND [Syncope] : no syncope [de-identified] : Previous stroke and TIA [Abn Vaginal Bleeding] : no unexplained vaginal bleeding [de-identified] : Possibly depressed.  Afraid to get vaccine for Covid

## 2024-02-26 NOTE — DISCUSSION/SUMMARY
[FreeTextEntry1] : Continue current medicines except for hydralazine, Isordil, and hydrochlorothiazide.  Check labs on that regimen including proBNP, electrolytes and kidney function, and LDH.  Follow-up and possible repeat echo depending on those results.  Perhaps she would do well with an SGLT2 inhibitor in the near future. [EKG obtained to assist in diagnosis and management of assessed problem(s)] : EKG obtained to assist in diagnosis and management of assessed problem(s)

## 2024-02-27 LAB
ANION GAP SERPL CALC-SCNC: 14 MMOL/L
BUN SERPL-MCNC: 28 MG/DL
CALCIUM SERPL-MCNC: 9.6 MG/DL
CHLORIDE SERPL-SCNC: 102 MMOL/L
CO2 SERPL-SCNC: 24 MMOL/L
CREAT SERPL-MCNC: 1.42 MG/DL
EGFR: 40 ML/MIN/1.73M2
ESTIMATED AVERAGE GLUCOSE: 148 MG/DL
GLUCOSE SERPL-MCNC: 99 MG/DL
HBA1C MFR BLD HPLC: 6.8 %
LDH SERPL-CCNC: 318 U/L
NT-PROBNP SERPL-MCNC: 917 PG/ML
POTASSIUM SERPL-SCNC: 5.7 MMOL/L
SODIUM SERPL-SCNC: 139 MMOL/L

## 2024-03-01 RX ORDER — FUROSEMIDE 40 MG/1
40 TABLET ORAL
Qty: 90 | Refills: 0 | Status: ACTIVE | COMMUNITY
Start: 2024-01-10 | End: 1900-01-01

## 2024-03-01 RX ORDER — SPIRONOLACTONE 25 MG/1
25 TABLET ORAL
Qty: 45 | Refills: 0 | Status: ACTIVE | COMMUNITY
Start: 2024-01-10 | End: 1900-01-01

## 2024-03-25 ENCOUNTER — APPOINTMENT (OUTPATIENT)
Dept: MAMMOGRAPHY | Facility: IMAGING CENTER | Age: 69
End: 2024-03-25

## 2024-03-26 LAB
ANION GAP SERPL CALC-SCNC: 10 MMOL/L
BUN SERPL-MCNC: 12 MG/DL
CALCIUM SERPL-MCNC: 9.9 MG/DL
CHLORIDE SERPL-SCNC: 104 MMOL/L
CO2 SERPL-SCNC: 26 MMOL/L
CREAT SERPL-MCNC: 1 MG/DL
EGFR: 61 ML/MIN/1.73M2
ESTIMATED AVERAGE GLUCOSE: 131 MG/DL
GLUCOSE SERPL-MCNC: 95 MG/DL
HBA1C MFR BLD HPLC: 6.2 %
LDH SERPL-CCNC: 322 U/L
NT-PROBNP SERPL-MCNC: 501 PG/ML
POTASSIUM SERPL-SCNC: 4.8 MMOL/L
SODIUM SERPL-SCNC: 140 MMOL/L

## 2024-04-03 ENCOUNTER — APPOINTMENT (OUTPATIENT)
Dept: CARDIOLOGY | Facility: CLINIC | Age: 69
End: 2024-04-03
Payer: MEDICARE

## 2024-04-03 VITALS
HEART RATE: 70 BPM | DIASTOLIC BLOOD PRESSURE: 76 MMHG | WEIGHT: 176.19 LBS | BODY MASS INDEX: 31.22 KG/M2 | HEIGHT: 63 IN | OXYGEN SATURATION: 99 % | SYSTOLIC BLOOD PRESSURE: 118 MMHG

## 2024-04-03 PROCEDURE — G2211 COMPLEX E/M VISIT ADD ON: CPT

## 2024-04-03 PROCEDURE — 99214 OFFICE O/P EST MOD 30 MIN: CPT

## 2024-04-03 NOTE — REVIEW OF SYSTEMS
[Feeling Fatigued] : feeling fatigued [Negative] : Heme/Lymph [Weight Gain (___ Lbs)] : [unfilled] ~Ulb weight gain [Weight Loss (___ Lbs)] : no recent weight loss [Dyspnea on exertion] : not dyspnea during exertion [Chest Discomfort] : no chest discomfort [Lower Ext Edema] : no extremity edema [Orthopnea] : no orthopnea [Palpitations] : no palpitations [PND] : no PND [Syncope] : no syncope [Abn Vaginal Bleeding] : no unexplained vaginal bleeding [de-identified] : Possibly depressed.  Afraid to get vaccine for Covid [de-identified] : Previous stroke and TIA
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
## 2024-04-03 NOTE — PHYSICAL EXAM
[General Appearance - Well Developed] : well developed [Normal Appearance] : normal appearance [Well Groomed] : well groomed [No Deformities] : no deformities [General Appearance - Well Nourished] : well nourished [Normal Conjunctiva] : the conjunctiva exhibited no abnormalities [General Appearance - In No Acute Distress] : no acute distress [Eyelids - No Xanthelasma] : the eyelids demonstrated no xanthelasmas [Normal Oral Mucosa] : normal oral mucosa [No Oral Cyanosis] : no oral cyanosis [No Oral Pallor] : no oral pallor [Normal Jugular Venous V Waves Present] : normal jugular venous V waves present [No Jugular Venous Jang A Waves] : no jugular venous jang A waves [Respiration, Rhythm And Depth] : normal respiratory rhythm and effort [Exaggerated Use Of Accessory Muscles For Inspiration] : no accessory muscle use [Heart Rate And Rhythm] : heart rate and rhythm were normal [Auscultation Breath Sounds / Voice Sounds] : lungs were clear to auscultation bilaterally [Heart Sounds] : normal S1 and S2 [Abdomen Tenderness] : non-tender [Abdomen Soft] : soft [Abdomen Mass (___ Cm)] : no abdominal mass palpated [Abnormal Walk] : normal gait [Gait - Sufficient For Exercise Testing] : the gait was sufficient for exercise testing [Cyanosis, Localized] : no localized cyanosis [Nail Clubbing] : no clubbing of the fingernails [Petechial Hemorrhages (___cm)] : no petechial hemorrhages [Skin Color & Pigmentation] : normal skin color and pigmentation [] : no rash [No Venous Stasis] : no venous stasis [Skin Lesions] : no skin lesions [No Xanthoma] : no  xanthoma was observed [No Skin Ulcers] : no skin ulcer [Oriented To Time, Place, And Person] : oriented to person, place, and time [Mood] : the mood was normal [Affect] : the affect was normal [No Anxiety] : not feeling anxious [FreeTextEntry1] : Not depressed

## 2024-04-03 NOTE — REASON FOR VISIT
[FreeTextEntry1] : 68 year old Yoruba Creole speaking female with history of hypetension, hyperlipidema, s/p AVR/MVR 2008 and paroxysmal atrial fibrillation on coumadin therapy. Now here after 2 admissions to [facility] in December, 2023

## 2024-04-03 NOTE — ASSESSMENT
[FreeTextEntry1] : Long complicated history with complex decision making. On the one hand despite complaints of severe shortness of breath for quite some time there is no evidence of CHF on exam, uncertain about the history of PND and orthopnea, normal LV and RV function with only mild pulmonary hypertension. By the same token there is at least some degree of mitral stenosis of the bioprosthetic mitral valve. I explained to the daughter that at some point it might be useful to get a MARSHA to better evaluate this but given the absence of findings now I am not sure it is essential currently. However with upcoming surgery and her last echo being almost a year and a half ago she should have another echocardiogram. This was done 11/2021. In addition given the history of syncope and the previous findings of pauses etc. it sounds like she should have at least the micro pacemaker and on her initial visit with me in April I did encourage them to do that but I fear they are going to hold off until she has another episode. Her EKG was atrial fibrillation with a ventricular rate of 73 and no pauses. They claim she was on no negatively chronotropic medicines when she had that syncope/arrest. The patient was quiet and not as talkative but the daughter says that the language barrier and she is not depressed at all in fact fairly active. She was somewhat smiling and laughing at the end of the visit. She has not been vaccinated for Covid and I had a long firm discussion with the patient and her daughter about the necessity of that and the fact that her status is high risk if ever she did come down with Covid. There is also the issue of some gynecologic bleeding and a biopsy that is pending. Labs were sent by Dr. Posada. She returned 3/30/2023, excellent labs by Dr. Posada but no BNP done. According to the daughter she does have shortness of breath with exertion that is probably progressive. There was some LV dysfunction on her last echo and high pressure across her bioprosthetic mitral valve. Exam seems to be unchanged. EKG done by Dr. Posada unchanged as well. Blood pressure high even at the end of the exam. Increased to lisinopril to 20 mg and echo. The echo has normal LV and RV function with little more valvular dysfunction than a year and a half ago but unlikely to be causing any of her symptoms. Labs were sent including a baseline BNP (unfortunately we do not have a prior BNP) she remains in atrial fibrillation. Exam did not show any fluid overload her blood pressure is high although with some bradycardia into the exam She returned January 10, 2024 almost 9 months since her last visit. When she started to have issues with shortness of breath and some edema rather than call or come here she went to the emergency room at [facility] and was admitted with congestive heart failure felt to be a combination of some systolic dysfunction with moderate valvular disease in both of her bioprosthetic valves aortic and mitral. She initially responded to a heart failure regimen with Entresto, Lasix, spironolactone and also Isordil and hydralazine were added. After discharge she had what sounded like a seizure at home and was brought back to [facility]. Neurologic workup was unremarkable other than showing her previous strokes and her EEG was negative. Blood pressure was a little low so her hydralazine and Isordil was stopped but she was maintained on her other medications. She had a cath and an echo as mentioned and her medications are as mentioned. She seems to be back to her baseline from a cardiac point of view. She remains in atrial fibrillation with a good ventricular rate; mention was made of her previous telemetry years ago that did show conduction disease with 2-1 block etc. and therefore beta-blockers have always been avoided. She is on metformin and perhaps she is a candidate for SGLT2 inhibitors especially if there is some manifestation of diastolic dysfunction although a lot of it could be related to her prosthetic valves. She is now on Eliquis 5 twice daily instead of Coumadin. Labs were sent. She returned February 26, 2024 because we changed her spironolactone to 25 mg every other day because of her potassium of 5.6. She seems to be doing fine with no fluid overload but borderline blood pressure. Reviewing the medicines with her son it seems she is still taking some amount of hydralazine, Isordil, and hydrochlorothiazide. Unfortunately this may reflect the blood values that we get today but in the meantime I had the son stopped those 3 totally. When the labs are back I will review them with the daughter and then we can decide where we are going forward. Still a confusing picture in terms of valve disease, systolic and diastolic function etc. An LDH was send looking for valve hemolysis as well. I send labs March 26 and her BNP went down to 501 and her electrolytes were perfect with normal kidney function and a potassium of 4.8.  Here today April 3, 2024 and her blood pressure today is fine and she has no complaints.  I am pretty sure they have been compliant with the correct medical regimen.  She remains in atrial fibrillation with no signs of congestive heart failure, still systolic ejection murmur etc.  The daughter will call me about the medications because they need refills and I will make sure that they are correct and if so she will just continue as she is doing and come back in 4 months.

## 2024-04-03 NOTE — DISCUSSION/SUMMARY
[FreeTextEntry1] : Difficult complicated issues that now seem to be well-controlled on the current regimen.  Will review labs with the patient's daughter when she gets home and will send in for refills.  They know that if the other doctors want to make any changes in the regimen they have to call me.  If she remains stable I will see her again in 4 months

## 2024-04-16 NOTE — ED ADULT NURSE NOTE - NS PRO PASSIVE SMOKE EXP
Subjective   Roshni Yu is a 57 y.o. female and is here for a comprehensive physical exam. The patient reports problems - Dizziness. Sometimes in bed, occurs when she is driving, feels something in head move, occasional headaches, not constant., Also weight gainin the last year .  Last physical: 2023, labs look good.   Changes no  Concerns yes  Dentist yes  Vision no  Hearing Problems no  Diet yes Eggs daily. Chicken salad. Chicken bean corn thing. Less water.  Exercise no Would like to get out and walk  Alcohol no  Drugs no  Social History     Tobacco Use   Smoking Status Never   Smokeless Tobacco Never          Do you take any herbs or supplements that were not prescribed by a doctor? no  Are you taking calcium supplements? no  Are you taking aspirin daily? no      History:  LMP: No LMP recorded.  Menopause at 50 years  Last pap date: unsure  Abnormal pap? no  : 1  Para: 1    Do you have pain that bothers you in your daily life? no  Review of Systems   Neurological:  Positive for dizziness.        Objective   Physical Exam  Vitals reviewed.   Constitutional:       Appearance: Normal appearance.   HENT:      Head: Normocephalic and atraumatic.      Right Ear: Tympanic membrane normal.      Left Ear: Tympanic membrane normal.      Nose: Nose normal.      Mouth/Throat:      Mouth: Mucous membranes are moist.      Pharynx: Oropharynx is clear.   Eyes:      Extraocular Movements: Extraocular movements intact.      Conjunctiva/sclera: Conjunctivae normal.      Pupils: Pupils are equal, round, and reactive to light.   Cardiovascular:      Rate and Rhythm: Normal rate and regular rhythm.      Pulses: Normal pulses.      Heart sounds: Normal heart sounds.   Pulmonary:      Effort: Pulmonary effort is normal.      Breath sounds: Normal breath sounds.   Abdominal:      General: Abdomen is flat. Bowel sounds are normal.      Palpations: Abdomen is soft.   Musculoskeletal:         General: Normal range of  motion.      Cervical back: Normal range of motion and neck supple.   Skin:     General: Skin is warm and dry.      Capillary Refill: Capillary refill takes less than 2 seconds.   Neurological:      General: No focal deficit present.      Mental Status: She is alert and oriented to person, place, and time.   Psychiatric:         Mood and Affect: Mood normal.         Behavior: Behavior normal.         Assessment/Plan   Healthy female exam. 57 year old female for annual exam.     1. Labs ordered. Discussed mammogram and colon cancer.   2. Patient Counseling:  --Nutrition: Stressed importance of moderation in sodium/caffeine intake, saturated fat and cholesterol, caloric balance, sufficient intake of fresh fruits, vegetables, fiber, calcium, iron, and 1 mg of folate supplement per day (for females capable of pregnancy).  --Discussed the issue of estrogen replacement, calcium supplement, and the daily use of baby aspirin.  --Exercise: Stressed the importance of regular exercise.   --Substance Abuse: Discussed cessation/primary prevention of tobacco, alcohol, or other drug use; driving or other dangerous activities under the influence; availability of treatment for abuse.    --Sexuality: Discussed sexually transmitted diseases, partner selection, use of condoms, avoidance of unintended pregnancy  and contraceptive alternatives.   --Injury prevention: Discussed safety belts, safety helmets, smoke detector, smoking near bedding or upholstery.   --Dental health: Discussed importance of regular tooth brushing, flossing, and dental visits.  --Immunizations reviewed.  --Discussed benefits of screening colonoscopy.  --After hours service discussed with patient  3. Discussed the patient's BMI with her.  The BMI is above average. The patient received Provided instructions on dietary changes  Provided instructions on exercise  Discussed diet and eating because they have an above normal BMI.  4. Follow up in one year  When eating  you are shooting for 30% protein 40 % carbs 30 % fat about 1500 to 1800 jaylene per day, using my fitnesspal or weight watchers. More water.      Unknown

## 2024-05-12 NOTE — PATIENT PROFILE ADULT - NSPROPTRIGHTCAREGIVER_GEN_A_NUR
You can access the FollowMyHealth Patient Portal offered by [facility] by registering at the following website: [email or web address]. By joining [facility]’s FollowMyHealth portal, you will also be able to view your health information using other applications (apps) compatible with our system.
no

## 2024-05-17 ENCOUNTER — RX RENEWAL (OUTPATIENT)
Age: 69
End: 2024-05-17

## 2024-05-25 NOTE — H&P ADULT - NSHPLABSRESULTS_GEN_ALL_CORE
[facility] Progress Note           Braulio EUGENE Tonyagrace Patient Status:  Inpatient    1936 MRN [id number]   Location [facility] CARDIAC TELEMETRY Attending Bhavin Resendiz SRINIVAS FITZGERALD, [facility] ([id number]) Doctors' Hospital*  [address] [place] [id number]  Transthoracic Echocardiogram (TTE)    Patient: Braulio Zuniga     Study Date/Time:    May 12 2024 8:06AM  MRN:     [id number]                    FIN#:               [id number]  :     Liang Tobin    Referring Physician:  Liang Tobin    Attending Physician:  Bhavin Resendiz  Performing Physician: Eliu Sher MD  Diagnostic Physician: Eliu Sher MD  Sonographer:          Modesto Botello E', lat augie, augie, TDI        13           ---------- ----------  VT s):   - Attending Cardiologist: Mikhail Cerda MD  - Cardiology Fellow: Jonathon Paredes doses of medications   administered intravenously as well as the patient’s status during the   procedure.    Contrast volume used: 20 mL    Estimated blood loss: 10 mL    Complications: none    Impression:  - Severe distal left main calcific stenosis with high risk ulcerated   plaque lesion    Recommendations:  - Given patient's advanced age and dementia, recommend discussion with   primary cardiologist (Dr. Sher), HF cardiologist (Dr. Posey),   patient, and family regarding treatment options for distal left main   disease (high risk Impella supported PCI vs medical management).    - Aspirin 81 mg daily  - Atorvastatin 80 mg daily  - LDLc goal < 55-70    The procedure was directly supervised and primarily performed by Dr Mikhail Paredes DO  Cardiology Fellow  [facility], Mikhail Cerda MD was present during the entire procedure and performed   the key parts of the procedure. Agree with the findings as documented   above.    Mikhail Cerda MD  Cardiology                       Assessment and Plan:      87 year-old man with Alzheimer's dementia, pAF s/p Watchman, CAD s/p LAD PCI (2004) admitted with NSTEMI and decrement in EF to 20%, found to have severe distal Left Main disease. Decision was made between the patient's primary cardiologist and his family to medically manage this rather than pursuing complex high-risk PCI.     Paroxysmal AF with RVR  - recurrent this morning  - amio 150 mg IV x1  - increase amio from home dose of 100 mg daily to 200 mg daily   - discontinue digoxin given high dose amio  - metoprolol succinate 25 mg daily   - s/p Watchman, on ASA    NSTEMI  CAD  Severe distal LM disease  - medical management per primary cardiologist and family discussions  - ASA/plavix  - beta blocker, ACEi  - atorvastatin 40 mg daily     Acute HFrEF  Ischemic cardiomyopathy  - lasix 20 mg PO BID  - lisinopril 5 mg daily   - spironolactone 12.5 mg daily     No CPR,  no intubation.     OK for discharge once either rate controlled in AF or cardioverts to sinus.    Fareed Patel MD, [facility]    Follow up with Dr. Sher and CHF clinic, will arrange.    Thank you for allowing us to participate in this patient's care.  Please do not hesitate to call with any questions or concerns.       LABS:                        15.8   10.18 )-----------( 184      ( 17 Dec 2023 13:45 )             49.1         137  |  99  |  62<H>  ----------------------------<  232<H>  4.0   |  24  |  1.86<H>    Ca    9.4      17 Dec 2023 13:45  Phos  3.8       Mg     2.30         TPro  7.2  /  Alb  3.7  /  TBili  0.5  /  DBili  x   /  AST  20  /  ALT  28  /  AlkPhos  102            Urinalysis Basic - ( 17 Dec 2023 16:25 )    Color: Yellow / Appearance: Cloudy / S.016 / pH: x  Gluc: x / Ketone: Negative mg/dL  / Bili: Negative / Urobili: 1.0 mg/dL   Blood: x / Protein: Trace mg/dL / Nitrite: Negative   Leuk Esterase: Moderate / RBC: 3 /HPF / WBC 24 /HPF   Sq Epi: x / Non Sq Epi: 9 /HPF / Bacteria: Occasional /HPF          IMAGING & OTHER TESTS:    < from: Xray Chest 1 View- PORTABLE-Urgent (Xray Chest 1 View- PORTABLE-Urgent .) (23 @ 14:13) >    IMPRESSION:  No focal consolidations.    < end of copied text >    < from: CT Head No Cont (23 @ 17:01) >    IMPRESSION:    No acute intracranial bleeding.  Cerebellar volume loss may be due to chronic antiepileptic medication   use. Correlate with patient's history.    < end of copied text > .    -Personally interpreted: Afib with RVR 104BPM, QTc 420, Tw inv I, aVL, V5-V6, V3-V4, II, III, aVF    -Personally interpreted CXR: clear lungs, sternotomy wires, no pneumothorax, no pleural effusions    -Personally reviewed Cardiac cath 12/15/2023:  Diagnostic Conclusions:   Mild nonobstructive CAD and RHC with mildly elevated filling pressure,  mild pHTN mPAP 30 mmHg, PCWP 15 and PA sat  71%.      -Personally reviewed the following labs below:                        15.8   10.18 )-----------( 184      ( 17 Dec 2023 13:45 )             49.1         137  |  99  |  62<H>  ----------------------------<  232<H>  4.0   |  24  |  1.86<H>    Ca    9.4      17 Dec 2023 13:45  Phos  3.8       Mg     2.30         TPro  7.2  /  Alb  3.7  /  TBili  0.5  /  DBili  x   /  AST  20  /  ALT  28  /  AlkPhos  102  12-      Urinalysis Basic - ( 17 Dec 2023 16:25 )    Color: Yellow / Appearance: Cloudy / S.016 / pH: x  Gluc: x / Ketone: Negative mg/dL  / Bili: Negative / Urobili: 1.0 mg/dL   Blood: x / Protein: Trace mg/dL / Nitrite: Negative   Leuk Esterase: Moderate / RBC: 3 /HPF / WBC 24 /HPF   Sq Epi: x / Non Sq Epi: 9 /HPF / Bacteria: Occasional /HPF    -Personally reviewed: CT Head No Cont (23 @ 17:01)     IMPRESSION:  No acute intracranial bleeding.  Cerebellar volume loss may be due to chronic antiepileptic medication   use. Correlate with patient's history.

## 2024-05-29 ENCOUNTER — RX RENEWAL (OUTPATIENT)
Age: 69
End: 2024-05-29

## 2024-07-01 ENCOUNTER — RX RENEWAL (OUTPATIENT)
Age: 69
End: 2024-07-01

## 2024-08-07 ENCOUNTER — NON-APPOINTMENT (OUTPATIENT)
Age: 69
End: 2024-08-07

## 2024-08-07 ENCOUNTER — APPOINTMENT (OUTPATIENT)
Dept: CARDIOLOGY | Facility: CLINIC | Age: 69
End: 2024-08-07

## 2024-08-07 PROCEDURE — 99214 OFFICE O/P EST MOD 30 MIN: CPT

## 2024-08-07 PROCEDURE — G2211 COMPLEX E/M VISIT ADD ON: CPT

## 2024-08-07 PROCEDURE — 93000 ELECTROCARDIOGRAM COMPLETE: CPT

## 2024-08-07 NOTE — ASSESSMENT
[FreeTextEntry1] : Long complicated history with complex decision making. On the one hand despite complaints of severe shortness of breath for quite some time there is no evidence of CHF on exam, uncertain about the history of PND and orthopnea, normal LV and RV function with only mild pulmonary hypertension. By the same token there is at least some degree of mitral stenosis of the bioprosthetic mitral valve. I explained to the daughter that at some point it might be useful to get a MARSHA to better evaluate this but given the absence of findings now I am not sure it is essential currently. However with upcoming surgery and her last echo being almost a year and a half ago she should have another echocardiogram. This was done 11/2021. In addition given the history of syncope and the previous findings of pauses etc. it sounds like she should have at least the micro pacemaker and on her initial visit with me in April I did encourage them to do that but I fear they are going to hold off until she has another episode. Her EKG was atrial fibrillation with a ventricular rate of 73 and no pauses. They claim she was on no negatively chronotropic medicines when she had that syncope/arrest. The patient was quiet and not as talkative but the daughter says that the language barrier and she is not depressed at all in fact fairly active. She was somewhat smiling and laughing at the end of the visit. She has not been vaccinated for Covid and I had a long firm discussion with the patient and her daughter about the necessity of that and the fact that her status is high risk if ever she did come down with Covid. There is also the issue of some gynecologic bleeding and a biopsy that is pending. Labs were sent by Dr. Posada. She returned 3/30/2023, excellent labs by Dr. Posada but no BNP done. According to the daughter she does have shortness of breath with exertion that is probably progressive. There was some LV dysfunction on her last echo and high pressure across her bioprosthetic mitral valve. Exam seems to be unchanged. EKG done by Dr. Posada unchanged as well. Blood pressure high even at the end of the exam. Increased to lisinopril to 20 mg and echo. The echo has normal LV and RV function with little more valvular dysfunction than a year and a half ago but unlikely to be causing any of her symptoms. Labs were sent including a baseline BNP (unfortunately we do not have a prior BNP) she remains in atrial fibrillation. Exam did not show any fluid overload her blood pressure is high although with some bradycardia into the exam She returned January 10, 2024 almost 9 months since her last visit. When she started to have issues with shortness of breath and some edema rather than call or come here she went to the emergency room at [facility] and was admitted with congestive heart failure felt to be a combination of some systolic dysfunction with moderate valvular disease in both of her bioprosthetic valves aortic and mitral. She initially responded to a heart failure regimen with Entresto, Lasix, spironolactone and also Isordil and hydralazine were added. After discharge she had what sounded like a seizure at home and was brought back to [facility]. Neurologic workup was unremarkable other than showing her previous strokes and her EEG was negative. Blood pressure was a little low so her hydralazine and Isordil was stopped but she was maintained on her other medications. She had a cath and an echo as mentioned and her medications are as mentioned. She seems to be back to her baseline from a cardiac point of view. She remains in atrial fibrillation with a good ventricular rate; mention was made of her previous telemetry years ago that did show conduction disease with 2-1 block etc. and therefore beta-blockers have always been avoided. She is on metformin and perhaps she is a candidate for SGLT2 inhibitors especially if there is some manifestation of diastolic dysfunction although a lot of it could be related to her prosthetic valves. She is now on Eliquis 5 twice daily instead of Coumadin. Labs were sent. She returned February 26, 2024 because we changed her spironolactone to 25 mg every other day because of her potassium of 5.6.  She seems to be doing fine with no fluid overload but borderline blood pressure.  Reviewing the medicines with her son it seems she is still taking some amount of hydralazine, Isordil, and hydrochlorothiazide.  Unfortunately this may reflect the blood values that we get today but in the meantime I had the son stopped those 3 totally.  When the labs are back I will review them with the daughter and then we can decide where we are going forward.  Still a confusing picture in terms of valve disease, systolic and diastolic function etc.  An LDH was send looking for valve hemolysis as well.  She returns today with her daughter, August 7, 2024.  Good spirits, no complaints, no evidence of fluid overload, good blood pressure.  I reviewed all the medications and she is on the correct regimen.  The only question remaining is whether she would benefit from an SGLT2 inhibitor instead of or in addition to her metformin.  Labs will be sent including proBNP, lipids, hemoglobin A1c, LDH.  If no changes are made and she remains stable she will come back in 4 months and at that point we will do another echocardiogram for clinical follow-up.

## 2024-08-07 NOTE — DISCUSSION/SUMMARY
[FreeTextEntry1] : Seems clinically stable on the current regimen.  Labs will be checked.  Tentative follow-up 4 months with follow-up echo then as well. [EKG obtained to assist in diagnosis and management of assessed problem(s)] : EKG obtained to assist in diagnosis and management of assessed problem(s)
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
## 2024-08-07 NOTE — REVIEW OF SYSTEMS
[Negative] : Heme/Lymph [Weight Gain (___ Lbs)] : no recent weight gain [Feeling Fatigued] : not feeling fatigued [Weight Loss (___ Lbs)] : no recent weight loss [Dyspnea on exertion] : not dyspnea during exertion [Chest Discomfort] : no chest discomfort [Lower Ext Edema] : no extremity edema [Palpitations] : no palpitations [Orthopnea] : no orthopnea [PND] : no PND [Syncope] : no syncope [Abn Vaginal Bleeding] : no unexplained vaginal bleeding [de-identified] : Previous stroke and TIA [de-identified] : Possibly depressed.  Afraid to get vaccine for Covid

## 2024-08-07 NOTE — REASON FOR VISIT
[FreeTextEntry1] : 68 year old Pashto Creole speaking female with history of hypetension, hyperlipidema, s/p AVR/MVR 2008 and paroxysmal atrial fibrillation on coumadin therapy. Now here after 2 admissions to [facility] in December, 2023

## 2024-08-07 NOTE — PHYSICAL EXAM
[General Appearance - Well Developed] : well developed [Normal Appearance] : normal appearance [Well Groomed] : well groomed [No Deformities] : no deformities [General Appearance - Well Nourished] : well nourished [General Appearance - In No Acute Distress] : no acute distress [Normal Conjunctiva] : the conjunctiva exhibited no abnormalities [Eyelids - No Xanthelasma] : the eyelids demonstrated no xanthelasmas [Normal Oral Mucosa] : normal oral mucosa [No Oral Pallor] : no oral pallor [No Oral Cyanosis] : no oral cyanosis [Normal Jugular Venous V Waves Present] : normal jugular venous V waves present [No Jugular Venous Jang A Waves] : no jugular venous jang A waves [Respiration, Rhythm And Depth] : normal respiratory rhythm and effort [Exaggerated Use Of Accessory Muscles For Inspiration] : no accessory muscle use [Auscultation Breath Sounds / Voice Sounds] : lungs were clear to auscultation bilaterally [Heart Rate And Rhythm] : heart rate and rhythm were normal [Heart Sounds] : normal S1 and S2 [Abdomen Soft] : soft [Abdomen Tenderness] : non-tender [Abdomen Mass (___ Cm)] : no abdominal mass palpated [Abnormal Walk] : normal gait [Gait - Sufficient For Exercise Testing] : the gait was sufficient for exercise testing [Cyanosis, Localized] : no localized cyanosis [Nail Clubbing] : no clubbing of the fingernails [Petechial Hemorrhages (___cm)] : no petechial hemorrhages [Skin Color & Pigmentation] : normal skin color and pigmentation [] : no rash [No Venous Stasis] : no venous stasis [Skin Lesions] : no skin lesions [No Skin Ulcers] : no skin ulcer [No Xanthoma] : no  xanthoma was observed [Oriented To Time, Place, And Person] : oriented to person, place, and time [Affect] : the affect was normal [Mood] : the mood was normal [No Anxiety] : not feeling anxious [FreeTextEntry1] : Not depressed

## 2024-08-13 RX ORDER — EMPAGLIFLOZIN 10 MG/1
10 TABLET, FILM COATED ORAL
Qty: 30 | Refills: 3 | Status: ACTIVE | COMMUNITY
Start: 2024-08-13 | End: 1900-01-01

## 2024-09-12 NOTE — PROGRESS NOTE ADULT - PROVIDER SPECIALTY LIST ADULT
How Severe Is Your Changing Mole?: mild Has Your Changing Mole Been Treated?: has not been treated Additional History: New patient is here for an evaluation of a changing mole on her upper lip, she state she feels the moles has changed in size Internal Medicine Cardiology

## 2024-09-28 ENCOUNTER — RX RENEWAL (OUTPATIENT)
Age: 69
End: 2024-09-28

## 2024-09-30 ENCOUNTER — RX RENEWAL (OUTPATIENT)
Age: 69
End: 2024-09-30

## 2024-10-21 NOTE — PHYSICAL THERAPY INITIAL EVALUATION ADULT - LEVEL OF INDEPENDENCE: GAIT, REHAB EVAL
on the discharge service for the patient. I have reviewed and made amendments to the documentation where necessary.
contact guard

## 2024-10-29 ENCOUNTER — RX RENEWAL (OUTPATIENT)
Age: 69
End: 2024-10-29

## 2024-12-05 ENCOUNTER — APPOINTMENT (OUTPATIENT)
Dept: CARDIOLOGY | Facility: CLINIC | Age: 69
End: 2024-12-05

## 2024-12-09 ENCOUNTER — APPOINTMENT (OUTPATIENT)
Dept: CARDIOLOGY | Facility: CLINIC | Age: 69
End: 2024-12-09

## 2024-12-10 ENCOUNTER — APPOINTMENT (OUTPATIENT)
Dept: CARDIOLOGY | Facility: CLINIC | Age: 69
End: 2024-12-10
Payer: MEDICARE

## 2024-12-10 ENCOUNTER — NON-APPOINTMENT (OUTPATIENT)
Age: 69
End: 2024-12-10

## 2024-12-10 VITALS — DIASTOLIC BLOOD PRESSURE: 90 MMHG | SYSTOLIC BLOOD PRESSURE: 150 MMHG | OXYGEN SATURATION: 97 % | HEART RATE: 84 BPM

## 2024-12-10 VITALS
HEART RATE: 88 BPM | BODY MASS INDEX: 33.48 KG/M2 | DIASTOLIC BLOOD PRESSURE: 83 MMHG | WEIGHT: 189 LBS | SYSTOLIC BLOOD PRESSURE: 138 MMHG

## 2024-12-10 DIAGNOSIS — E11.9 TYPE 2 DIABETES MELLITUS W/OUT COMPLICATIONS: ICD-10-CM

## 2024-12-10 DIAGNOSIS — I10 ESSENTIAL (PRIMARY) HYPERTENSION: ICD-10-CM

## 2024-12-10 DIAGNOSIS — I48.91 UNSPECIFIED ATRIAL FIBRILLATION: ICD-10-CM

## 2024-12-10 DIAGNOSIS — I50.32 CHRONIC DIASTOLIC (CONGESTIVE) HEART FAILURE: ICD-10-CM

## 2024-12-10 PROCEDURE — 93000 ELECTROCARDIOGRAM COMPLETE: CPT

## 2024-12-10 PROCEDURE — 99214 OFFICE O/P EST MOD 30 MIN: CPT

## 2024-12-10 PROCEDURE — G2211 COMPLEX E/M VISIT ADD ON: CPT

## 2024-12-11 LAB
ALBUMIN SERPL ELPH-MCNC: 4.3 G/DL
ALP BLD-CCNC: 119 U/L
ALT SERPL-CCNC: 13 U/L
ANION GAP SERPL CALC-SCNC: 12 MMOL/L
AST SERPL-CCNC: 21 U/L
BASOPHILS # BLD AUTO: 0.14 K/UL
BASOPHILS NFR BLD AUTO: 1.5 %
BILIRUB SERPL-MCNC: 0.2 MG/DL
BUN SERPL-MCNC: 17 MG/DL
CALCIUM SERPL-MCNC: 10.2 MG/DL
CHLORIDE SERPL-SCNC: 105 MMOL/L
CHOLEST SERPL-MCNC: 118 MG/DL
CO2 SERPL-SCNC: 23 MMOL/L
CREAT SERPL-MCNC: 1.04 MG/DL
EGFR: 58 ML/MIN/1.73M2
EOSINOPHIL # BLD AUTO: 0.42 K/UL
EOSINOPHIL NFR BLD AUTO: 4.6 %
ESTIMATED AVERAGE GLUCOSE: 143 MG/DL
GLUCOSE SERPL-MCNC: 93 MG/DL
HBA1C MFR BLD HPLC: 6.6 %
HCT VFR BLD CALC: 48.1 %
HDLC SERPL-MCNC: 49 MG/DL
HGB BLD-MCNC: 15 G/DL
IMM GRANULOCYTES NFR BLD AUTO: 0.4 %
LDLC SERPL CALC-MCNC: 50 MG/DL
LYMPHOCYTES # BLD AUTO: 3.02 K/UL
LYMPHOCYTES NFR BLD AUTO: 33 %
MAN DIFF?: NORMAL
MCHC RBC-ENTMCNC: 30.7 PG
MCHC RBC-ENTMCNC: 31.2 G/DL
MCV RBC AUTO: 98.4 FL
MONOCYTES # BLD AUTO: 0.67 K/UL
MONOCYTES NFR BLD AUTO: 7.3 %
NEUTROPHILS # BLD AUTO: 4.86 K/UL
NEUTROPHILS NFR BLD AUTO: 53.2 %
NONHDLC SERPL-MCNC: 69 MG/DL
NT-PROBNP SERPL-MCNC: 556 PG/ML
PLATELET # BLD AUTO: 138 K/UL
POTASSIUM SERPL-SCNC: 5.8 MMOL/L
PROT SERPL-MCNC: 7.3 G/DL
RBC # BLD: 4.89 M/UL
RBC # FLD: 12.7 %
SODIUM SERPL-SCNC: 141 MMOL/L
TRIGL SERPL-MCNC: 104 MG/DL
TSH SERPL-ACNC: 1.55 UIU/ML
WBC # FLD AUTO: 9.15 K/UL

## 2025-01-02 ENCOUNTER — RX RENEWAL (OUTPATIENT)
Age: 70
End: 2025-01-02

## 2025-01-07 ENCOUNTER — APPOINTMENT (OUTPATIENT)
Dept: CARDIOLOGY | Facility: CLINIC | Age: 70
End: 2025-01-07
Payer: MEDICARE

## 2025-01-07 ENCOUNTER — APPOINTMENT (OUTPATIENT)
Dept: CARDIOLOGY | Facility: CLINIC | Age: 70
End: 2025-01-07

## 2025-01-07 VITALS
BODY MASS INDEX: 33.48 KG/M2 | OXYGEN SATURATION: 100 % | WEIGHT: 189 LBS | DIASTOLIC BLOOD PRESSURE: 80 MMHG | HEART RATE: 88 BPM | SYSTOLIC BLOOD PRESSURE: 123 MMHG

## 2025-01-07 VITALS — BODY MASS INDEX: 32.77 KG/M2 | WEIGHT: 185 LBS

## 2025-01-07 DIAGNOSIS — I10 ESSENTIAL (PRIMARY) HYPERTENSION: ICD-10-CM

## 2025-01-07 DIAGNOSIS — I50.32 CHRONIC DIASTOLIC (CONGESTIVE) HEART FAILURE: ICD-10-CM

## 2025-01-07 DIAGNOSIS — E11.9 TYPE 2 DIABETES MELLITUS W/OUT COMPLICATIONS: ICD-10-CM

## 2025-01-07 DIAGNOSIS — I48.91 UNSPECIFIED ATRIAL FIBRILLATION: ICD-10-CM

## 2025-01-07 PROCEDURE — 93306 TTE W/DOPPLER COMPLETE: CPT

## 2025-01-07 PROCEDURE — G2211 COMPLEX E/M VISIT ADD ON: CPT

## 2025-01-07 PROCEDURE — 99213 OFFICE O/P EST LOW 20 MIN: CPT | Mod: 25

## 2025-01-08 LAB
ALBUMIN SERPL ELPH-MCNC: 4.2 G/DL
ALP BLD-CCNC: 116 U/L
ALT SERPL-CCNC: 13 U/L
ANION GAP SERPL CALC-SCNC: 13 MMOL/L
AST SERPL-CCNC: 16 U/L
BASOPHILS # BLD AUTO: 0.13 K/UL
BASOPHILS NFR BLD AUTO: 1.3 %
BILIRUB SERPL-MCNC: 0.3 MG/DL
BUN SERPL-MCNC: 22 MG/DL
CALCIUM SERPL-MCNC: 9.9 MG/DL
CHLORIDE SERPL-SCNC: 100 MMOL/L
CO2 SERPL-SCNC: 25 MMOL/L
CREAT SERPL-MCNC: 1.41 MG/DL
EGFR: 40 ML/MIN/1.73M2
EOSINOPHIL # BLD AUTO: 0.43 K/UL
EOSINOPHIL NFR BLD AUTO: 4.3 %
GLUCOSE SERPL-MCNC: 91 MG/DL
HCT VFR BLD CALC: 49.5 %
HGB BLD-MCNC: 15.3 G/DL
IMM GRANULOCYTES NFR BLD AUTO: 0.4 %
LYMPHOCYTES # BLD AUTO: 3.23 K/UL
LYMPHOCYTES NFR BLD AUTO: 32.3 %
MAN DIFF?: NORMAL
MCHC RBC-ENTMCNC: 30.4 PG
MCHC RBC-ENTMCNC: 30.9 G/DL
MCV RBC AUTO: 98.2 FL
MONOCYTES # BLD AUTO: 0.82 K/UL
MONOCYTES NFR BLD AUTO: 8.2 %
NEUTROPHILS # BLD AUTO: 5.35 K/UL
NEUTROPHILS NFR BLD AUTO: 53.5 %
NT-PROBNP SERPL-MCNC: 198 PG/ML
PLATELET # BLD AUTO: 150 K/UL
POTASSIUM SERPL-SCNC: 5 MMOL/L
PROT SERPL-MCNC: 7.4 G/DL
RBC # BLD: 5.04 M/UL
RBC # FLD: 12.6 %
SODIUM SERPL-SCNC: 139 MMOL/L
WBC # FLD AUTO: 10 K/UL

## 2025-03-19 ENCOUNTER — RX RENEWAL (OUTPATIENT)
Age: 70
End: 2025-03-19

## 2025-04-08 ENCOUNTER — NON-APPOINTMENT (OUTPATIENT)
Age: 70
End: 2025-04-08

## 2025-04-08 ENCOUNTER — APPOINTMENT (OUTPATIENT)
Dept: CARDIOLOGY | Facility: CLINIC | Age: 70
End: 2025-04-08
Payer: MEDICARE

## 2025-04-08 VITALS
OXYGEN SATURATION: 98 % | HEART RATE: 96 BPM | BODY MASS INDEX: 32.77 KG/M2 | WEIGHT: 185 LBS | DIASTOLIC BLOOD PRESSURE: 70 MMHG | SYSTOLIC BLOOD PRESSURE: 122 MMHG

## 2025-04-08 DIAGNOSIS — I48.91 UNSPECIFIED ATRIAL FIBRILLATION: ICD-10-CM

## 2025-04-08 DIAGNOSIS — E11.9 TYPE 2 DIABETES MELLITUS W/OUT COMPLICATIONS: ICD-10-CM

## 2025-04-08 DIAGNOSIS — Z95.3 PRESENCE OF XENOGENIC HEART VALVE: ICD-10-CM

## 2025-04-08 DIAGNOSIS — I50.32 CHRONIC DIASTOLIC (CONGESTIVE) HEART FAILURE: ICD-10-CM

## 2025-04-08 PROCEDURE — 99214 OFFICE O/P EST MOD 30 MIN: CPT

## 2025-04-08 PROCEDURE — G2211 COMPLEX E/M VISIT ADD ON: CPT

## 2025-04-08 PROCEDURE — 93000 ELECTROCARDIOGRAM COMPLETE: CPT

## 2025-04-09 LAB
ALBUMIN SERPL ELPH-MCNC: 4.3 G/DL
ALP BLD-CCNC: 122 U/L
ALT SERPL-CCNC: 11 U/L
ANION GAP SERPL CALC-SCNC: 12 MMOL/L
AST SERPL-CCNC: 21 U/L
BASOPHILS # BLD AUTO: 0.12 K/UL
BASOPHILS NFR BLD AUTO: 1.5 %
BILIRUB SERPL-MCNC: 0.3 MG/DL
BUN SERPL-MCNC: 18 MG/DL
CALCIUM SERPL-MCNC: 10.3 MG/DL
CHLORIDE SERPL-SCNC: 101 MMOL/L
CHOLEST SERPL-MCNC: 122 MG/DL
CO2 SERPL-SCNC: 25 MMOL/L
CREAT SERPL-MCNC: 1.19 MG/DL
EGFRCR SERPLBLD CKD-EPI 2021: 49 ML/MIN/1.73M2
EOSINOPHIL # BLD AUTO: 0.18 K/UL
EOSINOPHIL NFR BLD AUTO: 2.2 %
ESTIMATED AVERAGE GLUCOSE: 148 MG/DL
GLUCOSE SERPL-MCNC: 103 MG/DL
HBA1C MFR BLD HPLC: 6.8 %
HCT VFR BLD CALC: 47.8 %
HDLC SERPL-MCNC: 47 MG/DL
HGB BLD-MCNC: 14.9 G/DL
IMM GRANULOCYTES NFR BLD AUTO: 0.4 %
LDH SERPL-CCNC: 346 U/L
LDLC SERPL-MCNC: 50 MG/DL
LYMPHOCYTES # BLD AUTO: 2.51 K/UL
LYMPHOCYTES NFR BLD AUTO: 30.5 %
MAN DIFF?: NORMAL
MCHC RBC-ENTMCNC: 30.6 PG
MCHC RBC-ENTMCNC: 31.2 G/DL
MCV RBC AUTO: 98.2 FL
MONOCYTES # BLD AUTO: 0.79 K/UL
MONOCYTES NFR BLD AUTO: 9.6 %
NEUTROPHILS # BLD AUTO: 4.59 K/UL
NEUTROPHILS NFR BLD AUTO: 55.8 %
NONHDLC SERPL-MCNC: 75 MG/DL
NT-PROBNP SERPL-MCNC: 498 PG/ML
PLATELET # BLD AUTO: 167 K/UL
POTASSIUM SERPL-SCNC: 5.1 MMOL/L
PROT SERPL-MCNC: 7.6 G/DL
RBC # BLD: 4.87 M/UL
RBC # FLD: 13.1 %
SODIUM SERPL-SCNC: 139 MMOL/L
TRIGL SERPL-MCNC: 145 MG/DL
TSH SERPL-ACNC: 1.84 UIU/ML
WBC # FLD AUTO: 8.22 K/UL

## 2025-04-21 ENCOUNTER — NON-APPOINTMENT (OUTPATIENT)
Age: 70
End: 2025-04-21

## 2025-04-25 ENCOUNTER — APPOINTMENT (OUTPATIENT)
Dept: INTERNAL MEDICINE | Facility: CLINIC | Age: 70
End: 2025-04-25
Payer: MEDICARE

## 2025-04-25 VITALS
RESPIRATION RATE: 15 BRPM | TEMPERATURE: 98.2 F | BODY MASS INDEX: 32.43 KG/M2 | WEIGHT: 183 LBS | DIASTOLIC BLOOD PRESSURE: 68 MMHG | OXYGEN SATURATION: 100 % | SYSTOLIC BLOOD PRESSURE: 116 MMHG | HEART RATE: 68 BPM | HEIGHT: 63 IN

## 2025-04-25 DIAGNOSIS — Z00.00 ENCOUNTER FOR GENERAL ADULT MEDICAL EXAMINATION W/OUT ABNORMAL FINDINGS: ICD-10-CM

## 2025-04-25 DIAGNOSIS — E78.5 HYPERLIPIDEMIA, UNSPECIFIED: ICD-10-CM

## 2025-04-25 DIAGNOSIS — I10 ESSENTIAL (PRIMARY) HYPERTENSION: ICD-10-CM

## 2025-04-25 DIAGNOSIS — E11.9 TYPE 2 DIABETES MELLITUS W/OUT COMPLICATIONS: ICD-10-CM

## 2025-04-25 DIAGNOSIS — I48.91 UNSPECIFIED ATRIAL FIBRILLATION: ICD-10-CM

## 2025-04-25 DIAGNOSIS — Z13.820 ENCOUNTER FOR SCREENING FOR OSTEOPOROSIS: ICD-10-CM

## 2025-04-25 DIAGNOSIS — Z12.11 ENCOUNTER FOR SCREENING FOR MALIGNANT NEOPLASM OF COLON: ICD-10-CM

## 2025-04-25 DIAGNOSIS — Z12.4 ENCOUNTER FOR SCREENING FOR MALIGNANT NEOPLASM OF CERVIX: ICD-10-CM

## 2025-04-25 DIAGNOSIS — Z12.39 ENCOUNTER FOR OTHER SCREENING FOR MALIGNANT NEOPLASM OF BREAST: ICD-10-CM

## 2025-04-25 PROCEDURE — 36415 COLL VENOUS BLD VENIPUNCTURE: CPT

## 2025-04-25 PROCEDURE — G0439: CPT

## 2025-06-11 ENCOUNTER — EMERGENCY (EMERGENCY)
Facility: HOSPITAL | Age: 70
LOS: 1 days | End: 2025-06-11
Attending: EMERGENCY MEDICINE | Admitting: STUDENT IN AN ORGANIZED HEALTH CARE EDUCATION/TRAINING PROGRAM
Payer: MEDICARE

## 2025-06-11 ENCOUNTER — APPOINTMENT (OUTPATIENT)
Dept: OBGYN | Facility: CLINIC | Age: 70
End: 2025-06-11
Payer: MEDICARE

## 2025-06-11 VITALS
HEART RATE: 65 BPM | DIASTOLIC BLOOD PRESSURE: 66 MMHG | TEMPERATURE: 98 F | HEIGHT: 62 IN | SYSTOLIC BLOOD PRESSURE: 109 MMHG | OXYGEN SATURATION: 98 % | RESPIRATION RATE: 16 BRPM | WEIGHT: 179.9 LBS

## 2025-06-11 VITALS
DIASTOLIC BLOOD PRESSURE: 80 MMHG | BODY MASS INDEX: 33.13 KG/M2 | SYSTOLIC BLOOD PRESSURE: 115 MMHG | WEIGHT: 187 LBS | HEIGHT: 63 IN

## 2025-06-11 LAB
ADD ON TEST-SPECIMEN IN LAB: SIGNIFICANT CHANGE UP
ALBUMIN SERPL ELPH-MCNC: 3.7 G/DL — SIGNIFICANT CHANGE UP (ref 3.3–5)
ALP SERPL-CCNC: 100 U/L — SIGNIFICANT CHANGE UP (ref 40–120)
ALT FLD-CCNC: SIGNIFICANT CHANGE UP U/L (ref 4–33)
ANION GAP SERPL CALC-SCNC: 8 MMOL/L — SIGNIFICANT CHANGE UP (ref 7–14)
AST SERPL-CCNC: SIGNIFICANT CHANGE UP U/L (ref 4–32)
BASOPHILS # BLD AUTO: 0.12 K/UL — SIGNIFICANT CHANGE UP (ref 0–0.2)
BASOPHILS NFR BLD AUTO: 1.3 % — SIGNIFICANT CHANGE UP (ref 0–2)
BILIRUB SERPL-MCNC: 0.3 MG/DL — SIGNIFICANT CHANGE UP (ref 0.2–1.2)
BUN SERPL-MCNC: 25 MG/DL — HIGH (ref 7–23)
CALCIUM SERPL-MCNC: 9.8 MG/DL — SIGNIFICANT CHANGE UP (ref 8.4–10.5)
CHLORIDE SERPL-SCNC: 103 MMOL/L — SIGNIFICANT CHANGE UP (ref 98–107)
CO2 SERPL-SCNC: 23 MMOL/L — SIGNIFICANT CHANGE UP (ref 22–31)
CREAT SERPL-MCNC: 1.26 MG/DL — SIGNIFICANT CHANGE UP (ref 0.5–1.3)
EGFR: 46 ML/MIN/1.73M2 — LOW
EGFR: 46 ML/MIN/1.73M2 — LOW
EOSINOPHIL # BLD AUTO: 0.26 K/UL — SIGNIFICANT CHANGE UP (ref 0–0.5)
EOSINOPHIL NFR BLD AUTO: 2.8 % — SIGNIFICANT CHANGE UP (ref 0–6)
GLUCOSE SERPL-MCNC: 94 MG/DL — SIGNIFICANT CHANGE UP (ref 70–99)
HCT VFR BLD CALC: 45.9 % — HIGH (ref 34.5–45)
HEMOLYSIS INDEX: 32 — SIGNIFICANT CHANGE UP
HGB BLD-MCNC: 14.7 G/DL — SIGNIFICANT CHANGE UP (ref 11.5–15.5)
IANC: 5.44 K/UL — SIGNIFICANT CHANGE UP (ref 1.8–7.4)
IMM GRANULOCYTES NFR BLD AUTO: 0.4 % — SIGNIFICANT CHANGE UP (ref 0–0.9)
LYMPHOCYTES # BLD AUTO: 2.6 K/UL — SIGNIFICANT CHANGE UP (ref 1–3.3)
LYMPHOCYTES # BLD AUTO: 27.7 % — SIGNIFICANT CHANGE UP (ref 13–44)
MAGNESIUM SERPL-MCNC: 2.4 MG/DL — SIGNIFICANT CHANGE UP (ref 1.6–2.6)
MCHC RBC-ENTMCNC: 31.7 PG — SIGNIFICANT CHANGE UP (ref 27–34)
MCHC RBC-ENTMCNC: 32 G/DL — SIGNIFICANT CHANGE UP (ref 32–36)
MCV RBC AUTO: 98.9 FL — SIGNIFICANT CHANGE UP (ref 80–100)
MONOCYTES # BLD AUTO: 0.93 K/UL — HIGH (ref 0–0.9)
MONOCYTES NFR BLD AUTO: 9.9 % — SIGNIFICANT CHANGE UP (ref 2–14)
NEUTROPHILS # BLD AUTO: 5.44 K/UL — SIGNIFICANT CHANGE UP (ref 1.8–7.4)
NEUTROPHILS NFR BLD AUTO: 57.9 % — SIGNIFICANT CHANGE UP (ref 43–77)
NRBC # BLD AUTO: 0 K/UL — SIGNIFICANT CHANGE UP (ref 0–0)
NRBC # FLD: 0 K/UL — SIGNIFICANT CHANGE UP (ref 0–0)
NRBC BLD AUTO-RTO: 0 /100 WBCS — SIGNIFICANT CHANGE UP (ref 0–0)
PLATELET # BLD AUTO: 146 K/UL — LOW (ref 150–400)
POTASSIUM SERPL-MCNC: 4.6 MMOL/L — SIGNIFICANT CHANGE UP (ref 3.5–5.3)
POTASSIUM SERPL-MCNC: SIGNIFICANT CHANGE UP MMOL/L (ref 3.5–5.3)
POTASSIUM SERPL-SCNC: 4.6 MMOL/L — SIGNIFICANT CHANGE UP (ref 3.5–5.3)
POTASSIUM SERPL-SCNC: SIGNIFICANT CHANGE UP MMOL/L (ref 3.5–5.3)
PROT SERPL-MCNC: SIGNIFICANT CHANGE UP G/DL (ref 6–8.3)
RBC # BLD: 4.64 M/UL — SIGNIFICANT CHANGE UP (ref 3.8–5.2)
RBC # FLD: 12.9 % — SIGNIFICANT CHANGE UP (ref 10.3–14.5)
SODIUM SERPL-SCNC: 134 MMOL/L — LOW (ref 135–145)
TROPONIN T, HIGH SENSITIVITY RESULT: <6 NG/L — SIGNIFICANT CHANGE UP
WBC # BLD: 9.39 K/UL — SIGNIFICANT CHANGE UP (ref 3.8–10.5)
WBC # FLD AUTO: 9.39 K/UL — SIGNIFICANT CHANGE UP (ref 3.8–10.5)

## 2025-06-11 PROCEDURE — 93010 ELECTROCARDIOGRAM REPORT: CPT

## 2025-06-11 PROCEDURE — 70496 CT ANGIOGRAPHY HEAD: CPT | Mod: 26

## 2025-06-11 PROCEDURE — 99223 1ST HOSP IP/OBS HIGH 75: CPT | Mod: FS

## 2025-06-11 PROCEDURE — 70498 CT ANGIOGRAPHY NECK: CPT | Mod: 26

## 2025-06-11 PROCEDURE — 99213 OFFICE O/P EST LOW 20 MIN: CPT

## 2025-06-11 RX ORDER — SPIRONOLACTONE 25 MG
25 TABLET ORAL DAILY
Refills: 0 | Status: DISCONTINUED | OUTPATIENT
Start: 2025-06-11 | End: 2025-06-12

## 2025-06-11 RX ORDER — MECLIZINE HCL 12.5 MG
50 TABLET ORAL ONCE
Refills: 0 | Status: COMPLETED | OUTPATIENT
Start: 2025-06-11 | End: 2025-06-11

## 2025-06-11 RX ORDER — ASPIRIN 325 MG
81 TABLET ORAL DAILY
Refills: 0 | Status: ACTIVE | OUTPATIENT
Start: 2025-06-11 | End: 2026-05-10

## 2025-06-11 RX ORDER — FUROSEMIDE 10 MG/ML
40 INJECTION INTRAMUSCULAR; INTRAVENOUS ONCE
Refills: 0 | Status: COMPLETED | OUTPATIENT
Start: 2025-06-11 | End: 2025-06-11

## 2025-06-11 RX ORDER — INSULIN LISPRO 100 U/ML
INJECTION, SOLUTION INTRAVENOUS; SUBCUTANEOUS AT BEDTIME
Refills: 0 | Status: DISCONTINUED | OUTPATIENT
Start: 2025-06-11 | End: 2025-06-11

## 2025-06-11 RX ORDER — APIXABAN 2.5 MG/1
5 TABLET, FILM COATED ORAL
Refills: 0 | Status: ACTIVE | OUTPATIENT
Start: 2025-06-11 | End: 2026-05-10

## 2025-06-11 RX ORDER — SODIUM CHLORIDE 9 G/1000ML
1000 INJECTION, SOLUTION INTRAVENOUS
Refills: 0 | Status: DISCONTINUED | OUTPATIENT
Start: 2025-06-11 | End: 2025-06-11

## 2025-06-11 RX ORDER — DEXTROSE 50 % IN WATER 50 %
25 SYRINGE (ML) INTRAVENOUS ONCE
Refills: 0 | Status: DISCONTINUED | OUTPATIENT
Start: 2025-06-11 | End: 2025-06-11

## 2025-06-11 RX ORDER — MECLIZINE HCL 12.5 MG
25 TABLET ORAL EVERY 6 HOURS
Refills: 0 | Status: ACTIVE | OUTPATIENT
Start: 2025-06-11 | End: 2026-05-10

## 2025-06-11 RX ORDER — SACUBITRIL AND VALSARTAN 49; 51 MG/1; MG/1
1 TABLET, FILM COATED ORAL
Refills: 0 | Status: ACTIVE | OUTPATIENT
Start: 2025-06-11 | End: 2026-05-10

## 2025-06-11 RX ORDER — ATORVASTATIN CALCIUM 80 MG/1
10 TABLET, FILM COATED ORAL AT BEDTIME
Refills: 0 | Status: ACTIVE | OUTPATIENT
Start: 2025-06-11 | End: 2026-05-10

## 2025-06-11 RX ORDER — DEXTROSE 50 % IN WATER 50 %
15 SYRINGE (ML) INTRAVENOUS ONCE
Refills: 0 | Status: DISCONTINUED | OUTPATIENT
Start: 2025-06-11 | End: 2025-06-11

## 2025-06-11 RX ORDER — DEXTROSE 50 % IN WATER 50 %
12.5 SYRINGE (ML) INTRAVENOUS ONCE
Refills: 0 | Status: DISCONTINUED | OUTPATIENT
Start: 2025-06-11 | End: 2025-06-11

## 2025-06-11 RX ORDER — GLUCAGON 3 MG/1
1 POWDER NASAL ONCE
Refills: 0 | Status: ACTIVE | OUTPATIENT
Start: 2025-06-11 | End: 2026-05-10

## 2025-06-11 RX ORDER — ACETAMINOPHEN 500 MG/5ML
975 LIQUID (ML) ORAL ONCE
Refills: 0 | Status: COMPLETED | OUTPATIENT
Start: 2025-06-11 | End: 2025-06-11

## 2025-06-11 RX ORDER — INSULIN LISPRO 100 U/ML
INJECTION, SOLUTION INTRAVENOUS; SUBCUTANEOUS
Refills: 0 | Status: DISCONTINUED | OUTPATIENT
Start: 2025-06-11 | End: 2025-06-11

## 2025-06-11 RX ADMIN — Medication 975 MILLIGRAM(S): at 23:50

## 2025-06-11 RX ADMIN — APIXABAN 5 MILLIGRAM(S): 2.5 TABLET, FILM COATED ORAL at 23:14

## 2025-06-11 RX ADMIN — Medication 975 MILLIGRAM(S): at 23:20

## 2025-06-11 RX ADMIN — Medication 81 MILLIGRAM(S): at 23:14

## 2025-06-11 RX ADMIN — ATORVASTATIN CALCIUM 10 MILLIGRAM(S): 80 TABLET, FILM COATED ORAL at 23:14

## 2025-06-11 RX ADMIN — Medication 50 MILLIGRAM(S): at 12:56

## 2025-06-11 NOTE — CONSULT NOTE ADULT - ASSESSMENT
Assessment:   69 year old Salvadorean Creole speaking female presenting to [facility] ED 06/11/2025 for dizziness. 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 Blurry vision and neck pain occur together but patient did not provide many details. Double vision and vertigo occurring together. Physical exam revealing for unilateral nystagmus and HINTS (+) exam.  Symptoms responded to meclizine    Initial VS in ED: 104/60  Exam:   RIGHT corrective saccades on head impulse to the LEFT  RIGHT beating nystagmus on RIGHTward gaze on H test  Coordination: subtle overshooting dysmetria in RUE    NIHSS: 1    mRS: 1  LKN: one week prior to 06/11/2025  NIHSS: 1    NOT a tenecteplase candidate due to oow  NOT a mechanical thrombectomy candidate due to oow    Impression:   1) room spinning dizziness iso of physical exam consistent with peripheral etiology. Given patient history, comorbidities, and previous posterior circulation stoke, patient would benefit from new stroke rule out  2) neck pain and blurry vision. Patient did not provide further details to these complaints, but stated that the occur together. Unclear etiopathology for these complaints. Patient asx in ED and CTA head and neck unrevealing.    Recommendations:    Diagnostics:  [] MRI brain without contrast   *if above (+) for stroke please conduct the following though stroke prevention unlikely to change as patient is already on Eliquis for afib  [] TTE w/ bubble   [] Implantable loop recorder  [] Lipid panel, HbA1c  [] CBC, CMP, coagulation panel, troponin    Medications:  [] continue home meds  [] dvt ppx: patient is already on therapeutic eliquis    Miscellaneous:  [] NPO until passes swallow assessment  [] normotension (<140/90)  [] Telemonitoring  [] Neurological checks and vital signs q4h  [] BGM goals 140-180    Follow up:  [] patient should follow up outpatient with [facility] therapy for peripheral vertigo  [] patient should keep follow up appointments with her outpatient neurologist. If she does not have an outpatient neurologist, she can establish care with general or stroke neurology at [address] [place] [address] 1-2 weeks after discharge. Please instruct the patient to call [phone] to schedule this appointment      * Case and plan not final until Attending attestation * Assessment:   69 year old Monegasque Creole speaking female presenting to [facility] ED 06/11/2025 for dizziness. 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 Blurry vision and neck pain occur together but patient did not provide many details. Double vision and vertigo occurring together. Physical exam revealing for unilateral nystagmus and HINTS (+) exam.  Symptoms responded to meclizine    Initial VS in ED: 104/60  Exam:   RIGHT corrective saccades on head impulse to the LEFT  RIGHT beating nystagmus on RIGHTward gaze on H test  Coordination: subtle overshooting dysmetria in RUE    NIHSS: 1    mRS: 1  LKN: one week prior to 06/11/2025  NIHSS: 1    NOT a tenecteplase candidate due to oow  NOT a mechanical thrombectomy candidate due to oow    Impression:   1) room spinning dizziness iso of physical exam consistent with peripheral etiology. Given patient history, comorbidities, and previous posterior circulation stoke, patient would benefit from new stroke rule out  2) neck pain and blurry vision. Patient did not provide further details to these complaints, but stated that the occur together. Unclear etiopathology for these complaints. Patient asx in ED and CTA head and neck unrevealing.    Recommendations:    Diagnostics:  [] MRI brain without contrast   *if above (+) for stroke please conduct the following though stroke prevention unlikely to change as patient is already on Eliquis for afib  [] TTE w/ bubble   [] Implantable loop recorder  [] Lipid panel, HbA1c  [] CBC, CMP, coagulation panel, troponin    Medications:  [] continue home meds  [] dvt ppx: patient is already on therapeutic eliquis  #for vertigo  1)Can trial meclizine for symptom relief:  oral: initial 12.5 to 25 mg every 6-12 hrs PRN, may inc to 50 mg per dose PRN based on response and tolerability. Max dose 100mg/d  2) If refractory, consider Diazepam:  IV or Oral: 1 to 5 mg every 12 hrs PRN for up to 48 to 72 hours      Miscellaneous:  [] NPO until passes swallow assessment  [] normotension (<140/90)  [] Telemonitoring  [] Neurological checks and vital signs q4h  [] BGM goals 140-180    Follow up:  [] patient should follow up outpatient with [facility] therapy for peripheral vertigo  [] patient should keep follow up appointments with her outpatient neurologist. If she does not have an outpatient neurologist, she can establish care with general or stroke neurology at [address] [place] [address] 1-2 weeks after discharge. Please instruct the patient to call [phone] to schedule this appointment      * Case and plan not final until Attending attestation *

## 2025-06-11 NOTE — ED PROVIDER NOTE - CRANIAL NERVE AND PUPILLARY EXAM
cranial nerves 2-12 intact horizontal nystagmus b/l/cranial nerves 2-12 intact/peripheral vision intact/extra-ocular movements intact/tongue is midline

## 2025-06-11 NOTE — ED CDU PROVIDER INITIAL DAY NOTE - TEMPLATE, MLM
Refill request for lunesta.    Eszopiclone 3 MG tablet 30 tablet 5 10/8/2020     Sig - Route: Take 1 tablet by mouth at bedtime as needed for Sleep. - Oral      Pt was last seen on 10/8/21.  Future apt is 5/7/21.  Will you refill?         VIEW ALL

## 2025-06-11 NOTE — ED CDU PROVIDER INITIAL DAY NOTE - NSICDXPASTMEDICALHX_GEN_ALL_CORE_FT
PAST MEDICAL HISTORY:  Abnormal uterine and vaginal bleeding     Atrial fibrillation on eliquis    Cerebrovascular accident (CVA)     Dyspnea on exertion     History of MI (myocardial infarction) 8-9 years ago in [facility]    History of stroke 2008 hospitalized in [facility]    History of TIA (transient ischemic attack) sept 2020    HLD (hyperlipidemia)     HTN (hypertension)     Long term current use of anticoagulant     Memory loss     S/P aortic valve replacement with bioprosthetic valve 2009    S/P mitral valve replacement with bioprosthetic valve 2009 "tissue valve per daughter"    Type 2 diabetes mellitus

## 2025-06-11 NOTE — ED CDU PROVIDER INITIAL DAY NOTE - ATTENDING APP SHARED VISIT CONTRIBUTION OF CARE
CDU MD KELLY:  I performed a face to face bedside interview with patient regarding history of present illness, review of symptoms and past medical history. I completed an independent physical exam.  I have discussed patient's plan of care with PA.   I agree with note as stated above, having amended the EMR as needed to reflect my findings. I have discussed the assessment and plan of care.  This includes during the time I functioned as the attending physician for this patient.    69 y /o female to cdu for mri to w/u dizziness blurry vision ha neck pain x1 wk.

## 2025-06-11 NOTE — ED PROVIDER NOTE - CLINICAL SUMMARY MEDICAL DECISION MAKING FREE TEXT BOX
69 year old Romanian Creole speaking female with Type 2 DM, Afib (on Eliquis), CHF (TTE 12/13 EF 45-50%; bioprosthetic MVR/AVR 2008), CAD (prior MI, RHC/LHC 12/15 mild mLAD dz), CVA (R cerebellar), EEG in dec of 2024 w/o seizure active while being follow by neuro for seizure like activity MRI with Left-sided mesial temporal sclerosis and chronic infracts presents with 1 week of episodic room spinning dizziness, blurred/double vision, headache, and neck pain.

## 2025-06-11 NOTE — ED PROVIDER NOTE - NSICDXPASTMEDICALHX_GEN_ALL_CORE_FT
PAST MEDICAL HISTORY:  Abnormal uterine and vaginal bleeding     Atrial fibrillation on eliquis    Cerebrovascular accident (CVA)     Dyspnea on exertion     History of MI (myocardial infarction) 8-9 years ago in [facility]    History of stroke 2008 hospitalized in [facility]    History of TIA (transient ischemic attack) sept 2020    HLD (hyperlipidemia)     HTN (hypertension)     Long term current use of anticoagulant     Memory loss     S/P aortic valve replacement with bioprosthetic valve 2009    S/P mitral valve replacement with bioprosthetic valve 2009 "tissue valve per daughter"    Type 2 diabetes mellitus

## 2025-06-11 NOTE — ED PROVIDER NOTE - OBJECTIVE STATEMENT
69 year old Belgian Creole speaking female with Type 2 DM, Afib (on Eliquis), CHF (TTE 12/13 EF 45-50%; bioprosthetic MVR/AVR 2008), CAD (prior MI, RHC/LHC 12/15 mild mLAD dz), CVA (R cerebellar), EEG in dec of 2024 w/o seizure active while being follow by neuro for seizure like activity MRI with Left-sided mesial temporal sclerosis and chronic infracts presents with 1 week of episodic room spinning dizziness, blurred/double vision, headache, and neck pain.    Denies fevers, chills, slurred speech, facial asymmetry, tinnitus, photophobia, paresthesias, weakness, chest pain, shortness of breath, palpitations, eye pain, pain with eye movement, loss of visual field, dysuria, hearing loss, leg swelling or pain, rash, syncope, abdominal pain, nausea, vomiting

## 2025-06-11 NOTE — ED CDU PROVIDER INITIAL DAY NOTE - OBJECTIVE STATEMENT
69 year old English Creole speaking female with Type 2 DM, Afib (on Eliquis), CHF (TTE 12/13 EF 45-50%; bioprosthetic MVR/AVR 2008), CAD (prior MI, RHC/LHC 12/15 mild mLAD dz), CVA (R cerebellar), EEG in dec of 2024 w/o seizure active while being follow by neuro for seizure like activity MRI with Left-sided mesial temporal sclerosis and chronic infracts presents with 1 week of episodic room spinning dizziness, blurred/double vision, headache, and neck pain. In ER labs nonactionable, patient's dizziness improved with meclizine however still present.  Neurology saw patient after CTA of head and neck with no acute findings and recommended CDU for MRI of head Noncon.  Patient agreeable to CDU stay

## 2025-06-11 NOTE — CONSULT NOTE ADULT - ATTENDING COMMENTS
Mrs. Cano is a 69 year old German Creole speaking woman w/ hx of Afib (on Eliquis), CHF (TTE 12/13 EF 45-50%; bioprosthetic MVR/AVR 2008), CAD, CVA (R cerebellar). She is presenting with 1 week of episodes room spinning dizziness, blurred/double vision, headache, and neck pain. Today patient is asymptomatic and headache resolved. I do not think symptoms were neurovascular. CT head reassuring. MRI negative. Exam normal. Suspect vertigo could be headache/migraine related.      [] patient should follow up outpatient with [facility] therapy for peripheral vertigo  [] patient should keep follow up appointments with her outpatient neurologist. If she does not have an outpatient neurologist, she can establish care with general or stroke neurology at [address] [place] [address] 1-2 weeks after discharge. Please instruct the patient to call [phone] to schedule this appointment

## 2025-06-11 NOTE — ED ADULT NURSE REASSESSMENT NOTE - NS ED NURSE REASSESS COMMENT FT1
Received pt after handoff in stable condition.  No complaints made at this time. Pt is alert and oriented x4.  Report given to CDU nurse Robel WONG.  Pt is pending transportation at this time.

## 2025-06-11 NOTE — CONSULT NOTE ADULT - SUBJECTIVE AND OBJECTIVE BOX
**STROKE CODE CONSULT NOTE**    Last known well time/Time of onset of symptoms:      After neurology consulted, imaging obtained and showed ***    HPI:    PAST MEDICAL & SURGICAL HISTORY:  HTN (hypertension)      HLD (hyperlipidemia)      Atrial fibrillation  on eliquis      History of MI (myocardial infarction)  8-9 years ago in [facility]      History of stroke  2008 hospitalized in [facility]      Cerebrovascular accident (CVA)      History of TIA (transient ischemic attack)  sept 2020      Long term current use of anticoagulant      Type 2 diabetes mellitus      Dyspnea on exertion      Memory loss      Abnormal uterine and vaginal bleeding      S/P aortic valve replacement with bioprosthetic valve  2009      S/P mitral valve replacement with bioprosthetic valve  2009 "tissue valve per daughter"      S/P AVR (aortic valve replacement)  2009 valve      S/P MVR (mitral valve replacement)  tissue valve 2009          FAMILY HISTORY:  FH: prostate cancer (Sibling)    FH: heart disease (Sibling)        SOCIAL HISTORY:  Smoking Cessation: Discussed    ROS:  Constitutional: No fever, weight loss or fatigue  Eyes: No eye pain, visual disturbances, or discharge  ENMT:  No difficulty hearing, tinnitus, vertigo; No sinus or throat pain  Neck: No pain or stiffness  Respiratory: No cough, wheezing, chills or hemoptysis  Cardiovascular: No chest pain, palpitations, shortness of breath, dizziness or leg swelling  Gastrointestinal: No abdominal pain. No nausea, vomiting or hematemesis; No diarrhea or constipation. Nohematochezia.  Genitourinary: No dysuria, frequency, hematuria or incontinence  Neurological: As per HPI  Skin: No itching, burning, rashes or lesions   Endocrine: No heat or cold intolerance; No hair loss  Musculoskeletal: No joint pain or swelling; No muscle, back or extremity pain  Psychiatric: No depression, anxiety, mood swings or difficulty sleeping  Heme/Lymph: No easy bruising or bleeding gums    MEDICATIONS  (STANDING):    MEDICATIONS  (PRN):      Allergies    No Known Allergies    Intolerances        Vital Signs Last 24 Hrs  T(C): 36.6 (11 Jun 2025 11:26), Max: 36.6 (11 Jun 2025 11:26)  T(F): 97.9 (11 Jun 2025 11:26), Max: 97.9 (11 Jun 2025 11:26)  HR: 65 (11 Jun 2025 11:26) (65 - 65)  BP: 109/66 (11 Jun 2025 11:26) (109/66 - 109/66)  BP(mean): --  RR: 16 (11 Jun 2025 11:26) (16 - 16)  SpO2: 98% (11 Jun 2025 11:26) (98% - 98%)    Parameters below as of 11 Jun 2025 11:26  Patient On (Oxygen Delivery Method): room air        PHYSICAL EXAM:  Constitutional: WDWN; NAD  Cardiovascular: RRR, no appreciable murmurs; no carotid bruits  Neurologic:  Mental status: Awake, alert and oriented x3.  Recent and remote memory intact.  Naming, repetition and comprehension intact.  Attention/concentration intact. Speech fluent and no errors on phoneme assessment.  Fund of knowledge appropriate.    Cranial nerves: pupils equally round and reactive to light, visual fields full, no nystagmus, extraocular muscles intact, V1 through V3 intact bilaterally and symmetric, face symmetric, hearing intact to voice, palate elevation symmetric, tongue was midline, sternocleidomastoid/shoulder shrug strength bilaterally 5/5.    Motor:  Normal bulk and tone, strength 5/5 in bilateral upper and lower extremities.   strength 5/5.  Rapid alternating movements intact and symmetric.   Sensation: Intact to light touch thru out and symmetric  Coordination: No dysmetria on finger-to-nose and heel-to-shin.  No clumsiness.  Reflexes: 2+ in upper and lower extremities, downgoing toes bilaterally  Gait: Narrow and steady. No ataxia.  Romberg negative    NIHSS:    Fingerstick Blood Glucose: CAPILLARY BLOOD GLUCOSE      POCT Blood Glucose.: 139 mg/dL (11 Jun 2025 11:30)       LABS:                        14.7   9.39  )-----------( 146      ( 11 Jun 2025 12:52 )             45.9     06-11    134[L]  |  103  |  25[H]  ----------------------------<  94  TNP   |  23  |  1.26    Ca    9.8      11 Jun 2025 12:52  Mg     2.40     06-11    TPro  TNP  /  Alb  3.7  /  TBili  0.3  /  DBili  x   /  AST  TNP  /  ALT  TNP  /  AlkPhos  100  06-11          Urinalysis Basic - ( 11 Jun 2025 12:52 )    Color: x / Appearance: x / SG: x / pH: x  Gluc: 94 mg/dL / Ketone: x  / Bili: x / Urobili: x   Blood: x / Protein: x / Nitrite: x   Leuk Esterase: x / RBC: x / WBC x   Sq Epi: x / Non Sq Epi: x / Bacteria: x        RADIOLOGY & ADDITIONAL STUDIES:    IV-tenecteplase(Y/N):                                   Bolus time:  Reason IV-tenecteplase not given:   **STROKE CODE CONSULT NOTE**    Last known well time/Time of onset of symptoms:  one week prior to 06/11/2025      After neurology consulted, imaging obtained and showed ***    HPI:  69 year old Malaysian Creole speaking female with Type 2 DM, Afib (on Eliquis), CHF (TTE 12/13 EF 45-50%; bioprosthetic MVR/AVR 2008), CAD (prior MI, RHC/LHC 12/15 mild mLAD dz), CVA (R cerebellar), EEG in dec of 2024 w/o seizure active while being follow by neuro for seizure like activity MRI with Left-sided mesial temporal sclerosis and chronic infracts presents with 1 week of episodic room spinning dizziness, blurred/double vision, headache, and neck pain.    	Denies fevers, chills, slurred speech, facial asymmetry, tinnitus, photophobia, paresthesias, weakness, chest pain, shortness of breath, palpitations, eye pain, pain with eye movement, loss of visual field, dysuria, hearing loss, leg swelling or pain, rash, syncope, abdominal pain, nausea, vomiting    PAST MEDICAL & SURGICAL HISTORY:  HTN (hypertension)      HLD (hyperlipidemia)      Atrial fibrillation  on eliquis      History of MI (myocardial infarction)  8-9 years ago in [facility]      History of stroke  2008 hospitalized in [facility]      Cerebrovascular accident (CVA)      History of TIA (transient ischemic attack)  sept 2020      Long term current use of anticoagulant      Type 2 diabetes mellitus      Dyspnea on exertion      Memory loss      Abnormal uterine and vaginal bleeding      S/P aortic valve replacement with bioprosthetic valve  2009      S/P mitral valve replacement with bioprosthetic valve  2009 "tissue valve per daughter"      S/P AVR (aortic valve replacement)  2009 valve      S/P MVR (mitral valve replacement)  tissue valve 2009          FAMILY HISTORY:  FH: prostate cancer (Sibling)    FH: heart disease (Sibling)        SOCIAL HISTORY:  Smoking Cessation: Discussed    ROS:  Constitutional: No fever, weight loss or fatigue  Eyes: No eye pain, visual disturbances, or discharge  ENMT:  No difficulty hearing, tinnitus, vertigo; No sinus or throat pain  Neck: No pain or stiffness  Respiratory: No cough, wheezing, chills or hemoptysis  Cardiovascular: No chest pain, palpitations, shortness of breath, dizziness or leg swelling  Gastrointestinal: No abdominal pain. No nausea, vomiting or hematemesis; No diarrhea or constipation. Nohematochezia.  Genitourinary: No dysuria, frequency, hematuria or incontinence  Neurological: As per HPI  Skin: No itching, burning, rashes or lesions   Endocrine: No heat or cold intolerance; No hair loss  Musculoskeletal: No joint pain or swelling; No muscle, back or extremity pain  Psychiatric: No depression, anxiety, mood swings or difficulty sleeping  Heme/Lymph: No easy bruising or bleeding gums    MEDICATIONS  (STANDING):    MEDICATIONS  (PRN):      Allergies    No Known Allergies    Intolerances        Vital Signs Last 24 Hrs  T(C): 36.6 (11 Jun 2025 11:26), Max: 36.6 (11 Jun 2025 11:26)  T(F): 97.9 (11 Jun 2025 11:26), Max: 97.9 (11 Jun 2025 11:26)  HR: 65 (11 Jun 2025 11:26) (65 - 65)  BP: 109/66 (11 Jun 2025 11:26) (109/66 - 109/66)  BP(mean): --  RR: 16 (11 Jun 2025 11:26) (16 - 16)  SpO2: 98% (11 Jun 2025 11:26) (98% - 98%)    Parameters below as of 11 Jun 2025 11:26  Patient On (Oxygen Delivery Method): room air        PHYSICAL EXAM:  Constitutional: WDWN; NAD  Cardiovascular: RRR, no appreciable murmurs; no carotid bruits  Neurologic:  Mental status: Awake, alert and oriented x3.  Recent and remote memory intact.  Naming, repetition and comprehension intact.  Attention/concentration intact. Speech fluent and no errors on phoneme assessment.  Fund of knowledge appropriate.    Cranial nerves: pupils equally round and reactive to light, visual fields full, no nystagmus, extraocular muscles intact, V1 through V3 intact bilaterally and symmetric, face symmetric, hearing intact to voice, palate elevation symmetric, tongue was midline, sternocleidomastoid/shoulder shrug strength bilaterally 5/5.    Motor:  Normal bulk and tone, strength 5/5 in bilateral upper and lower extremities.   strength 5/5.  Rapid alternating movements intact and symmetric.   Sensation: Intact to light touch thru out and symmetric  Coordination: No dysmetria on finger-to-nose and heel-to-shin.  No clumsiness.  Reflexes: 2+ in upper and lower extremities, downgoing toes bilaterally  Gait: Narrow and steady. No ataxia.  Romberg negative    NIHSS:    Fingerstick Blood Glucose: CAPILLARY BLOOD GLUCOSE      POCT Blood Glucose.: 139 mg/dL (11 Jun 2025 11:30)       LABS:                        14.7   9.39  )-----------( 146      ( 11 Jun 2025 12:52 )             45.9     06-11    134[L]  |  103  |  25[H]  ----------------------------<  94  TNP   |  23  |  1.26    Ca    9.8      11 Jun 2025 12:52  Mg     2.40     06-11    TPro  TNP  /  Alb  3.7  /  TBili  0.3  /  DBili  x   /  AST  TNP  /  ALT  TNP  /  AlkPhos  100  06-11          Urinalysis Basic - ( 11 Jun 2025 12:52 )    Color: x / Appearance: x / SG: x / pH: x  Gluc: 94 mg/dL / Ketone: x  / Bili: x / Urobili: x   Blood: x / Protein: x / Nitrite: x   Leuk Esterase: x / RBC: x / WBC x   Sq Epi: x / Non Sq Epi: x / Bacteria: x        RADIOLOGY & ADDITIONAL STUDIES:    IV-tenecteplase(Y/N):                                   Bolus time:  Reason IV-tenecteplase not given:   **STROKE CODE CONSULT NOTE**    Last known well time/Time of onset of symptoms:  one week prior to 06/11/2025    CC:  69 year old Togolese Creole speaking female presenting to [facility] ED 06/11/2025 for dizziness. Family member acted as   PMHx: Type 2 DM, Afib (on Eliquis), CHF (TTE 12/13 EF 45-50%; bioprosthetic MVR/AVR 2008), CAD (prior MI, RHC/LHC 12/15 mild mLAD dz), CVA (R cerebellar), EEG in dec of 2024 w/o seizure active, Left MTS    HPI:  1 week of episodes room spinning dizziness, blurred/double vision, headache, and neck pain:  ---dizziness: room spinning, lasts 1 hour, then won't be spinning for 2-3 hours, has 4-6 episodes a day, no positional component  ---neck pain: "it just hurts", occurs for a short time, coincides with blurry vision. No further details provided  ---double vision: sees horizontally next to each other double vision during dizziness periods  ---blurry vision: both eyes, occurs with neck pain. No further details provided  Neurology consulted. At time of neurology assessment around 1800 patient asx. no headache, blurry vision, double vision, difficulty swallowing, chest pain, palpitations, SOB, cough, n/v, dysuria, hematuria, blood in stool, numbness, weakness. Stopped taking daily baby aspirin years ago. No missed meds including her AC  baseline: ambulates without aide    PAST MEDICAL & SURGICAL HISTORY:  HTN (hypertension)      HLD (hyperlipidemia)      Atrial fibrillation  on eliquis      History of MI (myocardial infarction)  8-9 years ago in [facility]      History of stroke  2008 hospitalized in [facility]      Cerebrovascular accident (CVA)      History of TIA (transient ischemic attack)  sept 2020      Long term current use of anticoagulant      Type 2 diabetes mellitus      Dyspnea on exertion      Memory loss      Abnormal uterine and vaginal bleeding      S/P aortic valve replacement with bioprosthetic valve  2009      S/P mitral valve replacement with bioprosthetic valve  2009 "tissue valve per daughter"      S/P AVR (aortic valve replacement)  2009 valve      S/P MVR (mitral valve replacement)  tissue valve 2009          FAMILY HISTORY:  FH: prostate cancer (Sibling)    FH: heart disease (Sibling)          ROS:  as above    MEDICATIONS  (STANDING):    MEDICATIONS  (PRN):      Allergies    No Known Allergies    Intolerances        Vital Signs Last 24 Hrs  T(C): 36.6 (11 Jun 2025 11:26), Max: 36.6 (11 Jun 2025 11:26)  T(F): 97.9 (11 Jun 2025 11:26), Max: 97.9 (11 Jun 2025 11:26)  HR: 65 (11 Jun 2025 11:26) (65 - 65)  BP: 109/66 (11 Jun 2025 11:26) (109/66 - 109/66)  BP(mean): --  RR: 16 (11 Jun 2025 11:26) (16 - 16)  SpO2: 98% (11 Jun 2025 11:26) (98% - 98%)    Parameters below as of 11 Jun 2025 11:26  Patient On (Oxygen Delivery Method): room air        PHYSICAL EXAM:  Constitutional: WDWN; NAD  Cardiovascular: RRR, no appreciable murmurs; no carotid bruits    HINTS exam:   RIGHT corrective saccades on head impulse to the LEFT  RIGHT beating nystagmus on RIGHTward gaze on H test      Neurologic:  Mental status: Awake, alert and oriented x3.  Recent and remote memory intact.  Naming, repetition and comprehension intact.  Attention/concentration intact. Speech fluent and no errors on phoneme assessment.  Fund of knowledge appropriate.    Cranial nerves: pupils equally round and reactive to light, visual fields full, extraocular muscles intact, V1 through V3 intact bilaterally and symmetric, face symmetric, hearing intact to voice, palate elevation symmetric, tongue was midline, shoulder shrug strength bilaterally 5/5.    Motor:  Normal bulk and tone, strength 5/5 in bilateral upper and lower extremities.   strength 5/5.  Sensation: Intact to light touch thru out and symmetric  Coordination: subtle overshooting dysmetria in RUE    NIHSS: 1    Fingerstick Blood Glucose: CAPILLARY BLOOD GLUCOSE      POCT Blood Glucose.: 139 mg/dL (11 Jun 2025 11:30)       LABS:                        14.7   9.39  )-----------( 146      ( 11 Jun 2025 12:52 )             45.9     06-11    134[L]  |  103  |  25[H]  ----------------------------<  94  TNP   |  23  |  1.26    Ca    9.8      11 Jun 2025 12:52  Mg     2.40     06-11    TPro  TNP  /  Alb  3.7  /  TBili  0.3  /  DBili  x   /  AST  TNP  /  ALT  TNP  /  AlkPhos  100  06-11          Urinalysis Basic - ( 11 Jun 2025 12:52 )    Color: x / Appearance: x / SG: x / pH: x  Gluc: 94 mg/dL / Ketone: x  / Bili: x / Urobili: x   Blood: x / Protein: x / Nitrite: x   Leuk Esterase: x / RBC: x / WBC x   Sq Epi: x / Non Sq Epi: x / Bacteria: x        RADIOLOGY & ADDITIONAL STUDIES:  06/11/2025 CTH CTA head and neck  CT brain: No hydrocephalus, acute intracranial hemorrhage, mass effect, or brain edema. chronic infarction in RIGHT middle cerebellar peduncle and RIGHT cerebellar atrophy    CTA brain: No flow-limiting stenosis or vascular aneurysm. No AVM.    CTA neck: No flow-limiting stenosis, evidence for arterial dissection, or vascular aneurysm.      IV-tenecteplase(Y/N):                       N  Reason IV-tenecteplase not given: oow and presentation consistent w peripheral vertigo   **STROKE CODE CONSULT NOTE**    Last known well time/Time of onset of symptoms:  one week prior to 06/11/2025    CC:  69 year old Romanian Creole speaking female presenting to [facility] ED 06/11/2025 for dizziness. Family member acted as   PMHx: Type 2 DM, Afib (on Eliquis), CHF (TTE 12/13 EF 45-50%; bioprosthetic MVR/AVR 2008), CAD (prior MI, RHC/LHC 12/15 mild mLAD dz), CVA (R cerebellar), EEG in dec of 2024 w/o seizure active, Left MTS    HPI:  1 week of episodes room spinning dizziness, blurred/double vision, headache, and neck pain:  ---dizziness: room spinning, lasts 1 hour, then won't be spinning for 2-3 hours, has 4-6 episodes a day, no positional component  ---neck pain: "it just hurts", occurs for a short time, coincides with blurry vision. No further details provided  ---double vision: sees horizontally next to each other double vision during dizziness periods  ---blurry vision: both eyes, occurs with neck pain. No further details provided  Neurology consulted. At time of neurology assessment around 1800 patient asx. no headache, blurry vision, double vision, difficulty swallowing, chest pain, palpitations, SOB, cough, n/v, dysuria, hematuria, blood in stool, numbness, weakness. Stopped taking daily baby aspirin years ago. No missed meds including her AC  baseline: ambulates without aide    PAST MEDICAL & SURGICAL HISTORY:  HTN (hypertension)      HLD (hyperlipidemia)      Atrial fibrillation  on eliquis      History of MI (myocardial infarction)  8-9 years ago in [facility]      History of stroke  2008 hospitalized in [facility]      Cerebrovascular accident (CVA)      History of TIA (transient ischemic attack)  sept 2020      Long term current use of anticoagulant      Type 2 diabetes mellitus      Dyspnea on exertion      Memory loss      Abnormal uterine and vaginal bleeding      S/P aortic valve replacement with bioprosthetic valve  2009      S/P mitral valve replacement with bioprosthetic valve  2009 "tissue valve per daughter"      S/P AVR (aortic valve replacement)  2009 valve      S/P MVR (mitral valve replacement)  tissue valve 2009          FAMILY HISTORY:  FH: prostate cancer (Sibling)    FH: heart disease (Sibling)          ROS:  as above    MEDICATIONS  (STANDING):    MEDICATIONS  (PRN):      Allergies    No Known Allergies    Intolerances        Vital Signs Last 24 Hrs  T(C): 36.6 (11 Jun 2025 11:26), Max: 36.6 (11 Jun 2025 11:26)  T(F): 97.9 (11 Jun 2025 11:26), Max: 97.9 (11 Jun 2025 11:26)  HR: 65 (11 Jun 2025 11:26) (65 - 65)  BP: 109/66 (11 Jun 2025 11:26) (109/66 - 109/66)  BP(mean): --  RR: 16 (11 Jun 2025 11:26) (16 - 16)  SpO2: 98% (11 Jun 2025 11:26) (98% - 98%)    Parameters below as of 11 Jun 2025 11:26  Patient On (Oxygen Delivery Method): room air        PHYSICAL EXAM:  Constitutional: WDWN; NAD  Cardiovascular: RRR, no appreciable murmurs; no carotid bruits    HINTS exam:   RIGHT corrective saccades on head impulse to the LEFT  RIGHT beating nystagmus on RIGHTward gaze on H test      Neurologic:  Mental status: Awake, alert and oriented x3.  Recent and remote memory intact.  Naming, repetition and comprehension intact.  Attention/concentration intact. Speech fluent and no errors on phoneme assessment.  Fund of knowledge appropriate.    Cranial nerves: pupils equally round and reactive to light, visual fields full, extraocular muscles intact, V1 through V3 intact bilaterally and symmetric, face symmetric, hearing intact to voice, palate elevation symmetric, tongue was midline, shoulder shrug strength bilaterally 5/5.    Motor:  Normal bulk and tone, strength 5/5 in bilateral upper and lower extremities.   strength 5/5.  Sensation: Intact to light touch thru out and symmetric  Coordination: subtle overshooting dysmetria in RUE    NIHSS: 1    Fingerstick Blood Glucose: CAPILLARY BLOOD GLUCOSE      POCT Blood Glucose.: 139 mg/dL (11 Jun 2025 11:30)       LABS:                        14.7   9.39  )-----------( 146      ( 11 Jun 2025 12:52 )             45.9     06-11    134[L]  |  103  |  25[H]  ----------------------------<  94  TNP   |  23  |  1.26    Ca    9.8      11 Jun 2025 12:52  Mg     2.40     06-11    TPro  TNP  /  Alb  3.7  /  TBili  0.3  /  DBili  x   /  AST  TNP  /  ALT  TNP  /  AlkPhos  100  06-11          Urinalysis Basic - ( 11 Jun 2025 12:52 )    Color: x / Appearance: x / SG: x / pH: x  Gluc: 94 mg/dL / Ketone: x  / Bili: x / Urobili: x   Blood: x / Protein: x / Nitrite: x   Leuk Esterase: x / RBC: x / WBC x   Sq Epi: x / Non Sq Epi: x / Bacteria: x        RADIOLOGY & ADDITIONAL STUDIES:  06/11/2025 CTH CTA head and neck  CTH  -No hydrocephalus, mass effect, midline shift, acute intracranial hemorrhage, or brain edema.  -Chronic right brachium pontis/mesial right superior cerebellar hemisphere infarct.  -No abnormal intracranial enhancement.  -Visualized paranasal sinuses and mastoid air cells are clear  -R cerebellar atrophy consistent w known R cerebellar atrophy on previous imaging    CTA BRAIN:  -Normal flow-related enhancement of skull base, intracranial ICAs, ACAs, MCAs, PCAs, intradural VAs, and BA  -BL PComms  -No flow-limiting stenosis or vascular aneurysm. No AVM.    CTA NECK:  -Normal flow-related enhancement of ICAs, VAs  -No flow-limiting stenosis, evidence for arterial dissection, or vascular aneurysm    12/21/2023 MRI brain wwo  -Left-sided mesial temporal sclerosis.  -No acute intracranial hemorrhage or evidence of acute ischemia.  -Chronic infarcts within the right lateral posterior frontal lobe and right cerebellar hemisphere.  -Unchanged disproportionate volume loss involving the right cerebellar hemisphere.      IV-tenecteplase(Y/N):                       N  Reason IV-tenecteplase not given: oow and presentation consistent w peripheral vertigo

## 2025-06-11 NOTE — ED ADULT NURSE NOTE - OBJECTIVE STATEMENT
Pt A&OX3. Creole speaking, son at bedside translating. Hx of DM2, HTN, HLD. Presents to ED for intermittent dizziness, blurry vision x1 week. Describes dizziness as "room spinning with blurry vision." States she feels ok right now. Denies SOB, chest pain, N/V/D, numbness/tingling, leg pain. Heart sounds S1 and S2 heard upon auscultation. +2 pulses palpated in all extremities. Capillary refill less than 3 seconds. Breathing even and unlabored. Clear breath sounds. strength equal in all extremities, sensory intact. Skin warm dry and intact. Color normal for race. No limb restrictions. No pedal edema. Abdomen soft, non tender, non distended. Normoactive bowel sounds heard in all four quadrants. Denies urinary symptoms. Safety and comfort measures in place. Waiting upon further orders.

## 2025-06-11 NOTE — ED PROVIDER NOTE - ATTENDING APP SHARED VISIT CONTRIBUTION OF CARE
DR. KELLY, ATTENDING MD-  I personally saw the patient with the PA and performed a substantive portion of the visit including all aspects of the medical decision making.    70 y/o female h/o cad valve rep chf dm2 afib on eliquis with dizziness int blurry vision x1 wk.  Eval for cva, periph vert.  Obtain cbc cmp cta h/n give meclizine consult neuro.

## 2025-06-11 NOTE — ED CDU PROVIDER INITIAL DAY NOTE - CLINICAL SUMMARY MEDICAL DECISION MAKING FREE TEXT BOX
69 year old [place] Creole speaking female with Type 2 DM, Afib (on Eliquis), CHF (TTE 12/13 EF 45-50%; bioprosthetic MVR/AVR 2008), CAD (prior MI, RHC/LHC 12/15 mild mLAD dz), CVA (R cerebellar), EEG in dec of 2024 w/o seizure active while being follow by neuro for seizure like activity MRI with Left-sided mesial temporal sclerosis and chronic infracts presents with 1 week of episodic room spinning dizziness, blurred/double vision, headache, and neck pain. In ER labs nonactionable, patient's dizziness improved with meclizine however still present.  Neurology saw patient after CTA of head and neck with no acute findings and recommended CDU for MRI of head Noncon.

## 2025-06-11 NOTE — ED ADULT TRIAGE NOTE - CHIEF COMPLAINT QUOTE
Presents to ED for intermittent dizziness, blurry vision x1 week. Describes dizziness as "room spinning." Last episode yesterday, "all day." States she feels fine today. No focal neuro deficits. History of DM2, HLD, HTN.

## 2025-06-11 NOTE — ED PROVIDER NOTE - PROGRESS NOTE DETAILS
MD CHO:  Pt feeling improved.  Pending neuro eval. ANGUS Sears: Patient states dizziness has resolved, after meclizine. Neuro recs after discussing case however as patient states dizziness has been helped additional medication was not ordered.  CTA of head and neck with no emergent findings–pending neurology consult notes and likely discharge with outpatient follow-up unless neurology rec urgent imaging. ANGUS Sears: pt seen by neuro after cta results w/o emergent finding rec CDU for MR head non con.

## 2025-06-11 NOTE — ED ADULT NURSE NOTE - NSFALLRISKINTERV_ED_ALL_ED

## 2025-06-12 VITALS
DIASTOLIC BLOOD PRESSURE: 69 MMHG | OXYGEN SATURATION: 99 % | TEMPERATURE: 98 F | RESPIRATION RATE: 16 BRPM | HEART RATE: 65 BPM | SYSTOLIC BLOOD PRESSURE: 100 MMHG

## 2025-06-12 LAB
ALBUMIN SERPL ELPH-MCNC: 3.7 G/DL — SIGNIFICANT CHANGE UP (ref 3.3–5)
ALP SERPL-CCNC: 105 U/L — SIGNIFICANT CHANGE UP (ref 40–120)
ALT FLD-CCNC: 11 U/L — SIGNIFICANT CHANGE UP (ref 4–33)
ANION GAP SERPL CALC-SCNC: 11 MMOL/L — SIGNIFICANT CHANGE UP (ref 7–14)
APTT BLD: 29.3 SEC — SIGNIFICANT CHANGE UP (ref 26.1–36.8)
AST SERPL-CCNC: 15 U/L — SIGNIFICANT CHANGE UP (ref 4–32)
BASOPHILS # BLD AUTO: 0.12 K/UL — SIGNIFICANT CHANGE UP (ref 0–0.2)
BASOPHILS NFR BLD AUTO: 1.5 % — SIGNIFICANT CHANGE UP (ref 0–2)
BILIRUB SERPL-MCNC: 0.4 MG/DL — SIGNIFICANT CHANGE UP (ref 0.2–1.2)
BUN SERPL-MCNC: 25 MG/DL — HIGH (ref 7–23)
CALCIUM SERPL-MCNC: 9.8 MG/DL — SIGNIFICANT CHANGE UP (ref 8.4–10.5)
CHLORIDE SERPL-SCNC: 104 MMOL/L — SIGNIFICANT CHANGE UP (ref 98–107)
CHOLEST SERPL-MCNC: 101 MG/DL — SIGNIFICANT CHANGE UP
CO2 SERPL-SCNC: 24 MMOL/L — SIGNIFICANT CHANGE UP (ref 22–31)
CREAT SERPL-MCNC: 1.19 MG/DL — SIGNIFICANT CHANGE UP (ref 0.5–1.3)
EGFR: 49 ML/MIN/1.73M2 — LOW
EGFR: 49 ML/MIN/1.73M2 — LOW
EOSINOPHIL # BLD AUTO: 0.33 K/UL — SIGNIFICANT CHANGE UP (ref 0–0.5)
EOSINOPHIL NFR BLD AUTO: 4.2 % — SIGNIFICANT CHANGE UP (ref 0–6)
GLUCOSE SERPL-MCNC: 101 MG/DL — HIGH (ref 70–99)
HCT VFR BLD CALC: 42.6 % — SIGNIFICANT CHANGE UP (ref 34.5–45)
HDLC SERPL-MCNC: 43 MG/DL — LOW
HGB BLD-MCNC: 13.9 G/DL — SIGNIFICANT CHANGE UP (ref 11.5–15.5)
IANC: 4.67 K/UL — SIGNIFICANT CHANGE UP (ref 1.8–7.4)
IMM GRANULOCYTES NFR BLD AUTO: 0.4 % — SIGNIFICANT CHANGE UP (ref 0–0.9)
INR BLD: 1.15 RATIO — SIGNIFICANT CHANGE UP (ref 0.85–1.16)
LDLC SERPL-MCNC: 42 MG/DL — SIGNIFICANT CHANGE UP
LIPID PNL WITH DIRECT LDL SERPL: 42 MG/DL — SIGNIFICANT CHANGE UP
LYMPHOCYTES # BLD AUTO: 2.13 K/UL — SIGNIFICANT CHANGE UP (ref 1–3.3)
LYMPHOCYTES # BLD AUTO: 26.9 % — SIGNIFICANT CHANGE UP (ref 13–44)
MAGNESIUM SERPL-MCNC: 2.2 MG/DL — SIGNIFICANT CHANGE UP (ref 1.6–2.6)
MCHC RBC-ENTMCNC: 31.7 PG — SIGNIFICANT CHANGE UP (ref 27–34)
MCHC RBC-ENTMCNC: 32.6 G/DL — SIGNIFICANT CHANGE UP (ref 32–36)
MCV RBC AUTO: 97.3 FL — SIGNIFICANT CHANGE UP (ref 80–100)
MONOCYTES # BLD AUTO: 0.65 K/UL — SIGNIFICANT CHANGE UP (ref 0–0.9)
MONOCYTES NFR BLD AUTO: 8.2 % — SIGNIFICANT CHANGE UP (ref 2–14)
NEUTROPHILS # BLD AUTO: 4.67 K/UL — SIGNIFICANT CHANGE UP (ref 1.8–7.4)
NEUTROPHILS NFR BLD AUTO: 58.8 % — SIGNIFICANT CHANGE UP (ref 43–77)
NONHDLC SERPL-MCNC: 58 MG/DL — SIGNIFICANT CHANGE UP
NRBC # BLD AUTO: 0 K/UL — SIGNIFICANT CHANGE UP (ref 0–0)
NRBC # FLD: 0 K/UL — SIGNIFICANT CHANGE UP (ref 0–0)
NRBC BLD AUTO-RTO: 0 /100 WBCS — SIGNIFICANT CHANGE UP (ref 0–0)
PHOSPHATE SERPL-MCNC: 4.4 MG/DL — SIGNIFICANT CHANGE UP (ref 2.5–4.5)
PLATELET # BLD AUTO: 133 K/UL — LOW (ref 150–400)
POTASSIUM SERPL-MCNC: 4.7 MMOL/L — SIGNIFICANT CHANGE UP (ref 3.5–5.3)
POTASSIUM SERPL-SCNC: 4.7 MMOL/L — SIGNIFICANT CHANGE UP (ref 3.5–5.3)
PROT SERPL-MCNC: 6.6 G/DL — SIGNIFICANT CHANGE UP (ref 6–8.3)
PROTHROM AB SERPL-ACNC: 13.7 SEC — HIGH (ref 9.9–13.4)
RBC # BLD: 4.38 M/UL — SIGNIFICANT CHANGE UP (ref 3.8–5.2)
RBC # FLD: 12.7 % — SIGNIFICANT CHANGE UP (ref 10.3–14.5)
SODIUM SERPL-SCNC: 139 MMOL/L — SIGNIFICANT CHANGE UP (ref 135–145)
TRIGL SERPL-MCNC: 76 MG/DL — SIGNIFICANT CHANGE UP
TSH SERPL-MCNC: 1.89 UIU/ML — SIGNIFICANT CHANGE UP (ref 0.27–4.2)
WBC # BLD: 7.93 K/UL — SIGNIFICANT CHANGE UP (ref 3.8–10.5)
WBC # FLD AUTO: 7.93 K/UL — SIGNIFICANT CHANGE UP (ref 3.8–10.5)

## 2025-06-12 PROCEDURE — 99239 HOSP IP/OBS DSCHRG MGMT >30: CPT

## 2025-06-12 PROCEDURE — 70551 MRI BRAIN STEM W/O DYE: CPT | Mod: 26

## 2025-06-12 PROCEDURE — 99284 EMERGENCY DEPT VISIT MOD MDM: CPT

## 2025-06-12 RX ORDER — MECLIZINE HCL 12.5 MG
1 TABLET ORAL
Qty: 10 | Refills: 0
Start: 2025-06-12

## 2025-06-12 RX ADMIN — APIXABAN 5 MILLIGRAM(S): 2.5 TABLET, FILM COATED ORAL at 10:40

## 2025-06-12 RX ADMIN — SACUBITRIL AND VALSARTAN 1 TABLET(S): 49; 51 TABLET, FILM COATED ORAL at 06:50

## 2025-06-12 NOTE — ED CDU PROVIDER DISPOSITION NOTE - ATTENDING APP SHARED VISIT CONTRIBUTION OF CARE
I have evaluated the patient and agree with the documentation and assessment as made by the DAVID. We have discussed plan of care and work up for the patient.   This was a shared visit with the DAVID. I reviewed and verified the documentation and independently performed the documented:   History, Exam and Medical Decision Making.    69 year old female, speaks Prydein[place] Creole, PMH Afib (on Eliquis), CHF (TTE 12/13 EF 45-50%), bioprosthetic MVR/AVR 2008, CAD (prior MI, RHC/LHC 12/15 mild mLAD dz), CVA (R cerebellar), Type 2 DM, being worked up by outpatient neurologist for ?seizure-like activity w/ EEG 12/2024 w/o seizure; also had MRI with Left-sided mesial temporal sclerosis and chronic infracts.  Pt presented to the ED c/o 1 week of episodic room spinning dizziness, blurred/double vision, headache, and neck.  In the ED, VSS, EKG: rate controlled atrial fibrillation, labs with no emergent findings.  Pt reported dizziness improved somewhat with meclizine.  Neurology consulted; CTA of head / neck with no acute findings; Neurology recommended CDU for MRI of head; additional recs appreciated.  In the interim, pt objectively noted to be resting comfortably; pt has been clinically stable; no issues thus far.     IMPRESSION:  No MRI evidence of acute intracranial pathology.    Patient was seen and cleared by neurology this morning advised to discharge home on meclizine and follow-up outpatient.  Patient feels improved, ambulatory without difficulty, tolerating p.o., discharge and results reviewed with patient. Family member at bedside providing Prydeinig Creole translation. - Sorin David MD. EM Attending

## 2025-06-12 NOTE — ED CDU PROVIDER SUBSEQUENT DAY NOTE - PHYSICAL EXAMINATION
CONSTITUTIONAL:  Well appearing, awake, alert, oriented to person, place, time/situation and in no apparent distress.  Pt. is objectively comfortable appearing and verbalizing in full, clear, effortless sentences.  ENMT: NC/AT.  Airway patent.  Nasal mucosa clear.  Moist mucous membranes.  Neck supple.  EYES:  Clear OU.  CARDIAC:  Normal rate, irregular rhythm; rate controlled atrial fibrillation on monitor.  Heart sounds S1 S2.  No murmurs, gallops, or rubs.  RESPIRATORY:  Breath sounds clear and equal bilaterally.  No wheezes, no rales, no rhonchi.  GASTROINTESTINAL:  Abdomen soft, non-distended, non-tender.  No rebound, no guarding.  NEUROLOGICAL:  Alert and oriented to person/place/time/situation.  No focal deficits; no tremors noted.   MUSCULOSKELETAL:  Range of motion is not limited.    SKIN:  Skin color unremarkable.  Skin warm, dry.   PSYCHIATRIC:  Alert and oriented to person/place/time/situation.  Mood and affect WNL.  No apparent risk to self or others.

## 2025-06-12 NOTE — PHYSICAL THERAPY INITIAL EVALUATION ADULT - ACTIVE RANGE OF MOTION EXAMINATION, REHAB EVAL
emmanuel. upper extremity Active ROM was WNL (within normal limits)/bilateral lower extremity Active ROM was WNL (within normal limits)

## 2025-06-12 NOTE — ED CDU PROVIDER DISPOSITION NOTE - CLINICAL COURSE
ANGUS Barry: 69-year-old female with a past medical history of A-fib on Eliquis, CHF, CVA, diabetes presented to the emergency department complaining of room spinning dizziness.  Patient placed in CDU for MRI with neurology following.  Patient was seen and cleared by neurology this morning advised to discharge home on meclizine and follow-up outpatient.  Patient feels improved, ambulatory without difficulty, tolerating p.o., discharge and results reviewed with patient. ANGUS Barry: 69-year-old female with a past medical history of A-fib on Eliquis, CHF, CVA, diabetes presented to the emergency department complaining of room spinning dizziness.  Patient placed in CDU for MRI with neurology following.  Patient was seen and cleared by neurology this morning advised to discharge home on meclizine and follow-up outpatient.  Patient feels improved, ambulatory without difficulty, tolerating p.o., discharge and results reviewed with patient.  Discharge conducted via  ID# [id number].  Pt's son at bedside.

## 2025-06-12 NOTE — ED CDU PROVIDER SUBSEQUENT DAY NOTE - NS ED ROS FT
see HPI.  Pt's son (speaks English and pt requests him as her ) provided med list:  Entresto 49/51 BID,  Eliquis 5 BID,  simvastatin 10 QHS.

## 2025-06-12 NOTE — ED CDU PROVIDER DISPOSITION NOTE - NSFOLLOWUPINSTRUCTIONS_ED_ALL_ED_FT
Follow up with your Primary Medical Doctor in 1-2 days.  Follow up with Neurology in 1-2 days call to schedule an appointment [phone].  Take Meclizine 25mg one tablet orally every 8 hours as needed for dizziness don't drive or drink alcohol while taking this medication.  Rest. Stay well hydrated.  Return to the ER for any persistent/worsening or new symptoms chest pain, shortness of breath, weakness, dizziness or any concerning symptoms.

## 2025-06-12 NOTE — ED CDU PROVIDER SUBSEQUENT DAY NOTE - HISTORY
69 year old female, speaks Belgian Creole, PMH Afib (on Eliquis), CHF (TTE 12/13 EF 45-50%), bioprosthetic MVR/AVR 2008, CAD (prior MI, RHC/LHC 12/15 mild mLAD dz), CVA (R cerebellar), Type 2 DM, being worked up by outpatient neurologist for ?seizure-like activity w/ EEG 12/2024 w/o seizure; also had MRI with Left-sided mesial temporal sclerosis and chronic infracts.  Pt presented to the ED c/o 1 week of episodic room spinning dizziness, blurred/double vision, headache, and neck.  In the ED, VSS, EKG: rate controlled atrial fibrillation, labs with no emergent findings.  Pt reported dizziness improved somewhat with meclizine.  Neurology consulted; CTA of head / neck with no acute findings; Neurology recommended CDU for MRI of head; additional recs appreciated.  In the interim, pt objectively noted to be resting comfortably; pt has been clinically stable; no issues thus far.

## 2025-06-12 NOTE — ED CDU PROVIDER SUBSEQUENT DAY NOTE - CLINICAL SUMMARY MEDICAL DECISION MAKING FREE TEXT BOX
69 year old female, speaks Portuguese Creole, PMH Afib (on Eliquis), CHF (TTE 12/13 EF 45-50%), bioprosthetic MVR/AVR 2008, CAD (prior MI, RHC/LHC 12/15 mild mLAD dz), CVA (R cerebellar), Type 2 DM, being worked up by outpatient neurologist for ?seizure-like activity w/ EEG 12/2024 w/o seizure; also had MRI with Left-sided mesial temporal sclerosis and chronic infracts.  Pt presented to the ED c/o 1 week of episodic room spinning dizziness, blurred/double vision, headache, and neck.  In the ED, VSS, EKG: rate controlled atrial fibrillation, labs with no emergent findings.  Pt reported dizziness improved somewhat with meclizine.  Neurology consulted; CTA of head / neck with no acute findings; Neurology recommended CDU for MRI of head; additional recs appreciated.  In the interim, pt objectively noted to be resting comfortably; pt has been clinically stable; no issues thus far.

## 2025-06-12 NOTE — ED CDU PROVIDER DISPOSITION NOTE - PATIENT PORTAL LINK FT
You can access the FollowMyHealth Patient Portal offered by [facility] by registering at the following website: [email or web address]. By joining [facility]’s FollowMyHealth portal, you will also be able to view your health information using other applications (apps) compatible with our system.

## 2025-06-12 NOTE — ED CDU PROVIDER SUBSEQUENT DAY NOTE - ATTENDING APP SHARED VISIT CONTRIBUTION OF CARE
I have evaluated the patient and agree with the documentation and assessment as made by the DAVID. We have discussed plan of care and work up for the patient.   This was a shared visit with the DAVID. I reviewed and verified the documentation and independently performed the documented:   History, Exam and Medical Decision Making.    69 year old female, speaks Ivorian Creole, PMH Afib (on Eliquis), CHF (TTE 12/13 EF 45-50%), bioprosthetic MVR/AVR 2008, CAD (prior MI, RHC/LHC 12/15 mild mLAD dz), CVA (R cerebellar), Type 2 DM, being worked up by outpatient neurologist for ?seizure-like activity w/ EEG 12/2024 w/o seizure; also had MRI with Left-sided mesial temporal sclerosis and chronic infracts.  Pt presented to the ED c/o 1 week of episodic room spinning dizziness, blurred/double vision, headache, and neck.  In the ED, VSS, EKG: rate controlled atrial fibrillation, labs with no emergent findings.  Pt reported dizziness improved somewhat with meclizine.  Neurology consulted; CTA of head / neck with no acute findings; Neurology recommended CDU for MRI of head; additional recs appreciated.  In the interim, pt objectively noted to be resting comfortably; pt has been clinically stable; no issues thus far. Pending MR head and neuro recs this AM. - Sorin David MD. EM Attending

## 2025-06-12 NOTE — PHYSICAL THERAPY INITIAL EVALUATION ADULT - PERTINENT HX OF CURRENT PROBLEM, REHAB EVAL
Patient is a 69 year old Female, PMH stated below, presents with intermittent dizziness, blurry vision x 1 week.

## 2025-06-12 NOTE — ED CDU PROVIDER SUBSEQUENT DAY NOTE - NSICDXPASTMEDICALHX_GEN_ALL_CORE_FT
PAST MEDICAL HISTORY:  Abnormal uterine and vaginal bleeding     Atrial fibrillation on eliquis    Cerebrovascular accident (CVA)     Dyspnea on exertion     History of MI (myocardial infarction) 8-9 years ago in [facility]    History of stroke 2008 hospitalized in [facility]    History of TIA (transient ischemic attack) sept 2020    HLD (hyperlipidemia)     HTN (hypertension)     Long term current use of anticoagulant     Memory loss     S/P aortic valve replacement with bioprosthetic valve 2009    S/P mitral valve replacement with bioprosthetic valve 2009 "tissue valve per daughter"    Type 2 diabetes mellitus

## 2025-06-16 ENCOUNTER — RX RENEWAL (OUTPATIENT)
Age: 70
End: 2025-06-16

## 2025-06-19 ENCOUNTER — RESULT REVIEW (OUTPATIENT)
Age: 70
End: 2025-06-19

## 2025-06-19 ENCOUNTER — OUTPATIENT (OUTPATIENT)
Dept: OUTPATIENT SERVICES | Facility: HOSPITAL | Age: 70
LOS: 1 days | End: 2025-06-19
Payer: MEDICARE

## 2025-06-19 ENCOUNTER — APPOINTMENT (OUTPATIENT)
Dept: RADIOLOGY | Facility: IMAGING CENTER | Age: 70
End: 2025-06-19
Payer: MEDICARE

## 2025-06-19 ENCOUNTER — APPOINTMENT (OUTPATIENT)
Dept: MAMMOGRAPHY | Facility: IMAGING CENTER | Age: 70
End: 2025-06-19
Payer: MEDICARE

## 2025-06-19 DIAGNOSIS — Z13.820 ENCOUNTER FOR SCREENING FOR OSTEOPOROSIS: ICD-10-CM

## 2025-06-19 PROCEDURE — 77063 BREAST TOMOSYNTHESIS BI: CPT | Mod: 26

## 2025-06-19 PROCEDURE — 77063 BREAST TOMOSYNTHESIS BI: CPT

## 2025-06-19 PROCEDURE — 77067 SCR MAMMO BI INCL CAD: CPT | Mod: 26

## 2025-06-19 PROCEDURE — 77085 DXA BONE DENSITY AXL VRT FX: CPT | Mod: 26

## 2025-06-19 PROCEDURE — 77067 SCR MAMMO BI INCL CAD: CPT

## 2025-06-19 PROCEDURE — 77085 DXA BONE DENSITY AXL VRT FX: CPT

## 2025-06-24 ENCOUNTER — APPOINTMENT (OUTPATIENT)
Dept: INTERNAL MEDICINE | Facility: CLINIC | Age: 70
End: 2025-06-24
Payer: MEDICARE

## 2025-06-24 PROCEDURE — G2211 COMPLEX E/M VISIT ADD ON: CPT | Mod: 2W

## 2025-06-24 PROCEDURE — 99214 OFFICE O/P EST MOD 30 MIN: CPT | Mod: 2W

## 2025-06-26 ENCOUNTER — APPOINTMENT (OUTPATIENT)
Dept: PAIN MANAGEMENT | Facility: CLINIC | Age: 70
End: 2025-06-26
Payer: MEDICARE

## 2025-06-26 VITALS
BODY MASS INDEX: 33.13 KG/M2 | SYSTOLIC BLOOD PRESSURE: 109 MMHG | DIASTOLIC BLOOD PRESSURE: 70 MMHG | HEART RATE: 80 BPM | HEIGHT: 63 IN | WEIGHT: 187 LBS

## 2025-06-26 PROCEDURE — 99214 OFFICE O/P EST MOD 30 MIN: CPT

## 2025-07-15 ENCOUNTER — NON-APPOINTMENT (OUTPATIENT)
Age: 70
End: 2025-07-15

## 2025-07-15 ENCOUNTER — APPOINTMENT (OUTPATIENT)
Dept: NEUROLOGY | Facility: CLINIC | Age: 70
End: 2025-07-15

## 2025-07-25 ENCOUNTER — APPOINTMENT (OUTPATIENT)
Dept: INTERNAL MEDICINE | Facility: CLINIC | Age: 70
End: 2025-07-25

## 2025-08-04 ENCOUNTER — TRANSCRIPTION ENCOUNTER (OUTPATIENT)
Age: 70
End: 2025-08-04

## 2025-08-19 ENCOUNTER — APPOINTMENT (OUTPATIENT)
Dept: CARDIOLOGY | Facility: CLINIC | Age: 70
End: 2025-08-19

## 2025-09-11 ENCOUNTER — APPOINTMENT (OUTPATIENT)
Dept: GASTROENTEROLOGY | Facility: CLINIC | Age: 70
End: 2025-09-11